# Patient Record
Sex: FEMALE | Race: WHITE | Employment: OTHER | URBAN - METROPOLITAN AREA
[De-identification: names, ages, dates, MRNs, and addresses within clinical notes are randomized per-mention and may not be internally consistent; named-entity substitution may affect disease eponyms.]

---

## 2017-01-11 ENCOUNTER — DOCUMENTATION ONLY (OUTPATIENT)
Dept: OTHER | Facility: CLINIC | Age: 52
End: 2017-01-11

## 2017-01-11 DIAGNOSIS — Z71.89 ACP (ADVANCE CARE PLANNING): Primary | Chronic | ICD-10-CM

## 2017-01-23 ENCOUNTER — OFFICE VISIT (OUTPATIENT)
Dept: SURGERY | Facility: CLINIC | Age: 52
End: 2017-01-23
Attending: SURGERY
Payer: COMMERCIAL

## 2017-01-23 VITALS
OXYGEN SATURATION: 98 % | RESPIRATION RATE: 16 BRPM | SYSTOLIC BLOOD PRESSURE: 122 MMHG | BODY MASS INDEX: 21.63 KG/M2 | TEMPERATURE: 97.9 F | DIASTOLIC BLOOD PRESSURE: 74 MMHG | HEART RATE: 63 BPM | WEIGHT: 142.2 LBS

## 2017-01-23 DIAGNOSIS — Q44.6 POLYCYSTIC LIVER DISEASE: Primary | ICD-10-CM

## 2017-01-23 PROCEDURE — 99212 OFFICE O/P EST SF 10 MIN: CPT | Mod: ZF

## 2017-01-23 ASSESSMENT — PAIN SCALES - GENERAL: PAINLEVEL: NO PAIN (0)

## 2017-01-23 NOTE — PROGRESS NOTES
"Jade Carcamo is a 51 year old female who presents for:  Chief Complaint   Patient presents with     Oncology Clinic Visit     Return for Post op , Liver disease        Initial Vitals:  /74 mmHg  Pulse 63  Temp(Src) 97.9  F (36.6  C) (Oral)  Resp 16  Wt 64.5 kg (142 lb 3.2 oz)  SpO2 98% Estimated body mass index is 21.63 kg/(m^2) as calculated from the following:    Height as of 11/18/16: 1.727 m (5' 8\").    Weight as of this encounter: 64.5 kg (142 lb 3.2 oz).. There is no height on file to calculate BSA. BP completed using cuff size: regular  No Pain (0) No LMP recorded. Allergies and medications reviewed.     Medications: Medication refills not needed today.  Pharmacy name entered into [employer]:    [facility] [id number], [place] - [address] [facility] [id number]  [place], [place] - [address] [place] [address] [facility]    Comments:     6  minutes for nursing intake (face to face time)   Desiree Contreras MA          "

## 2017-01-23 NOTE — Clinical Note
"1/23/2017       RE: Jade Carcamo  [address] [id number]     Dear Colleague,    Thank you for referring your patient, Jade Carcamo, to the [facility]. Please see a copy of my visit note below.    Jade Carcamo is a 51 year old female who presents for:  Chief Complaint   Patient presents with     Oncology Clinic Visit     Return for Post op , Liver disease        Initial Vitals:  /74 mmHg  Pulse 63  Temp(Src) 97.9  F (36.6  C) (Oral)  Resp 16  Wt 64.5 kg (142 lb 3.2 oz)  SpO2 98% Estimated body mass index is 21.63 kg/(m^2) as calculated from the following:    Height as of 11/18/16: 1.727 m (5' 8\").    Weight as of this encounter: 64.5 kg (142 lb 3.2 oz).. There is no height on file to calculate BSA. BP completed using cuff size: regular  No Pain (0) No LMP recorded. Allergies and medications reviewed.     Medications: Medication refills not needed today.  Pharmacy name entered into [employer]:    [facility] [address] [facility], [place] - [address] [facility] [address] [facility], [place] - [address] [place] [address] [facility]    Comments:     6  minutes for nursing intake (face to face time)   Desiree Contreras MA            Looks great   Occasional belly pains but all resolved now    Reviewed recent CT   Discussed I typically do not recommend further imaging for surveillance - just follow up with occasional labs with Dr. Hobbs - and imaging at his discretion  I would be happy to see again if recurrent symptoms arise    Again, thank you for allowing me to participate in the care of your patient.      Sincerely,    Sonido Cruz MD      "

## 2017-01-23 NOTE — PROGRESS NOTES
Looks great   Occasional belly pains but all resolved now    Reviewed recent CT   Discussed I typically do not recommend further imaging for surveillance - just follow up with occasional labs with Dr. Hobbs - and imaging at his discretion  I would be happy to see again if recurrent symptoms arise

## 2017-05-19 ASSESSMENT — ENCOUNTER SYMPTOMS
DECREASED LIBIDO: 0
FEVER: 0
TACHYCARDIA: 1
HYPERTENSION: 0
SMELL DISTURBANCE: 0
LEG PAIN: 0
POLYPHAGIA: 0
CHILLS: 0
SINUS CONGESTION: 0
INCREASED ENERGY: 0
LIGHT-HEADEDNESS: 0
SINUS PAIN: 0
ORTHOPNEA: 0
HALLUCINATIONS: 0
WEIGHT LOSS: 0
PALPITATIONS: 1
LEG SWELLING: 0
FATIGUE: 0
HYPOTENSION: 0
TASTE DISTURBANCE: 0
HOARSE VOICE: 0
SORE THROAT: 0
NIGHT SWEATS: 1
EXERCISE INTOLERANCE: 0
TROUBLE SWALLOWING: 0
WEIGHT GAIN: 0
SLEEP DISTURBANCES DUE TO BREATHING: 0
NECK MASS: 0
CLAUDICATION: 0
HOT FLASHES: 1
DECREASED APPETITE: 0
POLYDIPSIA: 0
ALTERED TEMPERATURE REGULATION: 1
SYNCOPE: 0

## 2017-05-19 ASSESSMENT — ACTIVITIES OF DAILY LIVING (ADL)
ARE_THERE_FIREARMS_IN_YOUR_HOME?: N
DO_MEMBERS_OF_YOUR_HOUSEHOLD_USE_SAFETY_HELMETS?: Y
DO_MEMBERS_OF_YOUR_HOUSEHOLD_WEAR_SEAT_BELTS?: Y
DO_MEMBERS_OF_YOUR_HOUSEHOLD_USE_SUNSCREEN?: Y
ARE_THERE_CARBON_MONOXIDE_DETECTORS_IN_YOUR_HOME?: Y
ARE_THERE_SMOKE_DETECTORS_IN_YOUR_HOME?: Y

## 2017-05-22 ENCOUNTER — OFFICE VISIT (OUTPATIENT)
Dept: INTERNAL MEDICINE | Facility: CLINIC | Age: 52
End: 2017-05-22

## 2017-05-22 VITALS
TEMPERATURE: 97.9 F | BODY MASS INDEX: 21.96 KG/M2 | HEIGHT: 68 IN | HEART RATE: 74 BPM | SYSTOLIC BLOOD PRESSURE: 99 MMHG | RESPIRATION RATE: 18 BRPM | DIASTOLIC BLOOD PRESSURE: 66 MMHG | OXYGEN SATURATION: 97 % | WEIGHT: 144.9 LBS

## 2017-05-22 DIAGNOSIS — N95.1 MENOPAUSAL SYNDROME (HOT FLASHES): ICD-10-CM

## 2017-05-22 DIAGNOSIS — Z13.220 SCREENING FOR HYPERLIPIDEMIA: ICD-10-CM

## 2017-05-22 DIAGNOSIS — Z11.59 NEED FOR HEPATITIS C SCREENING TEST: ICD-10-CM

## 2017-05-22 DIAGNOSIS — Z12.31 VISIT FOR SCREENING MAMMOGRAM: ICD-10-CM

## 2017-05-22 DIAGNOSIS — G47.00 PERSISTENT DISORDER OF INITIATING OR MAINTAINING SLEEP: Primary | ICD-10-CM

## 2017-05-22 LAB
CHOLEST SERPL-MCNC: 195 MG/DL
HCV AB SERPL QL IA: NORMAL
HDLC SERPL-MCNC: 77 MG/DL
LDLC SERPL CALC-MCNC: 106 MG/DL
NONHDLC SERPL-MCNC: 118 MG/DL
TRIGL SERPL-MCNC: 63 MG/DL
TSH SERPL DL<=0.005 MIU/L-ACNC: 3.31 MU/L (ref 0.4–4)

## 2017-05-22 RX ORDER — VENLAFAXINE HYDROCHLORIDE 37.5 MG/1
37.5 CAPSULE, EXTENDED RELEASE ORAL EVERY MORNING
Qty: 60 CAPSULE | Refills: 1 | Status: ON HOLD | OUTPATIENT
Start: 2017-05-22 | End: 2018-07-13

## 2017-05-22 RX ORDER — ZOLPIDEM TARTRATE 5 MG/1
5 TABLET ORAL
Qty: 15 TABLET | Refills: 0 | Status: ON HOLD | OUTPATIENT
Start: 2017-05-22 | End: 2018-07-13

## 2017-05-22 RX ORDER — DIPHENOXYLATE HYDROCHLORIDE AND ATROPINE SULFATE 2.5; .025 MG/1; MG/1
TABLET ORAL
Status: ON HOLD | COMMUNITY
Start: 2006-01-18 | End: 2018-07-13

## 2017-05-22 ASSESSMENT — PAIN SCALES - GENERAL: PAINLEVEL: NO PAIN (0)

## 2017-05-22 NOTE — PROGRESS NOTES
Ms. Carcamo is a 51 year old female here to establish care, discuss hot flashes.    History of Present Illness:  Jade is a generally healthy 52 yo F with history of polycystic liver disease.  Had surgery with Dr. Cruz 11/16 for polycystic liver disease.  She developed pain, pressure, felt pregnant, nauseated.   Interestingly, she stopped having periods 11/16 and has been noted menopausal hot flashes, stopped abruptly.  Now she is having hot flashes, daily since January, until about a week ago.  No spotting, energy is okay, no mood issues, sleep is disrupted with hotflashes.  She wonders about medications.    Not opposed to HRT but would not want anything which would make liver cysts worse.  She has no history of DVT, no hx of CVD, breast cancer. No hysterectomy.    Her health maintenance is up to date through [facility].    A full 10-pt Review of Systems was performed, verified and is negative except as documented in the HPI.  All health questionnaires were reviewed, verified and relevant information documented above.    Past Medical History:  Past Medical History:   Diagnosis Date     GERD (gastroesophageal reflux disease)      Polycystic liver disease     in 40 years       Past Surgical History:  Past Surgical History:   Procedure Laterality Date     LAPAROSCOPIC UNROOFING LIVER CYST N/A 11/4/2016    Procedure: LAPAROSCOPIC UNROOFING LIVER CYST;  Surgeon: Sonido Cruz MD;  Location: [facility] OR       Active Meds:  Current Outpatient Prescriptions   Medication     Multiple Vitamin (MULTI-VITAMINS) TABS     zolpidem (AMBIEN) 5 MG tablet     venlafaxine (EFFEXOR-XR) 37.5 MG 24 hr capsule     No current facility-administered medications for this visit.         Allergies:  Bee venom and Penicillins    Family History:  family history includes Alzheimer Disease in her mother; Thyroid Disease in her sister.    Social History:  Social History   Substance Use Topics     Smoking status: Never Smoker     Smokeless tobacco: Not on  "file     Alcohol use Yes      Comment: 2 per week       Physical Exam:  Vitals: BP 99/66  Pulse 74  Temp 97.9  F (36.6  C) (Oral)  Resp 18  Ht 1.73 m (5' 8.11\")  Wt 65.7 kg (144 lb 14.4 oz)  SpO2 97%  Breastfeeding? No  BMI 21.96 kg/m2  Constitutional: Alert, oriented, pleasant, no acute distress  Head: Normocephalic, atraumatic  Eyes: Extra-ocular movements intact, pupils equally round and reactive bilaterally, no scleral icterus  ENT: Oropharynx clear, moist mucus membranes, good dentition  Neck: Supple, no lymphadenopathy, no thyromegaly  Cardiovascular: Regular rate and rhythm, no murmurs, rubs or gallops, peripheral pulses full/symmetric  Respiratory: Good air movement bilaterally, lungs clear, no wheezes/rales/rhonchi  GI: Abdomen soft, bowel sounds present, nondistended, nontender, liver palpable, no rebound/guarding  Musculoskeletal: No edema, normal muscle tone, normal gait  Neurologic: Alert and oriented, cranial nerves 2-12 intact, non-focal  Skin: No rashes/lesions  Psychiatric: normal mentation, affect and mood      Assessment and Plan:    Jade was seen today for establish care and menopausal sx. Extensive time spent today reviewing patients history, questions about menopause and treatment options, as well as overall care.    Diagnoses and all orders for this visit:    Persistent disorder of initiating or maintaining sleep  -     zolpidem (AMBIEN) 5 MG tablet; Take 1 tablet (5 mg) by mouth nightly as needed for sleep    Need for hepatitis C screening test  -     Hepatitis C Screen Reflex to HCV RNA Quant and Genotype; Future    Visit for screening mammogram  -     MA SCREENING DIGITAL BILAT - Future  (s+30); Future    Menopausal syndrome (hot flashes): Will trial non hormonal options first. Need to consult with hepatology about safety of estrogen prior to use.  -     TSH with free T4 reflex; Future  -     venlafaxine (EFFEXOR-XR) 37.5 MG 24 hr capsule; Take 1 capsule (37.5 mg) by mouth every " morning Take 1 capsule daily for 14 days, then take 2 capsules daily if tolerating well.    Screening for hyperlipidemia  -     Lipid panel reflex to direct LDL - -(Today); Future      #Routine Health Maintenence:  Immunizations (zoster, pneumovax, flu, Tdap, Hep A/B):   Most Recent Immunizations   Administered Date(s) Administered     Hepatitis A (Adult) 10/28/2008     Hepatitis B 05/29/2009     TDAP Vaccine (Boostrix) 09/26/2008     Zoster vaccine, live 06/24/2016     Lipids:   Recent Labs   Lab Test  05/22/17   1022   CHOL  195   HDL  77   LDL  106*   TRIG  63     Lung Ca Screening (>30 pk age 55-79 or >20 py age 50-79 + RF): n/a  Colonoscopy (50-75 yrs): 3/15, hemorrhoids, repeat 10 years  Dexa (>65W or 70M yrs): deferred  Mammogram (40-75 yrs): 6/16 Pathologic findings:  RIGHT BREAST, 3 O'CLOCK, 2 CM FROM NIPPLE, ULTRASOUND-GUIDED CORE BIOPSY:   1. Non-proliferative fibrocystic change with cystic apocrine metaplasia   2. Calcifications: Present   3. Negative for atypia and malignancy  Pap (21-65 yrs): 6/16 NIL  Pelvic/Breast: 6/16  GC/Chlam (<25 yrs): n/a  HIV/HCV if risk factors: n/a  Safety/Lifestyle: not high risk  Tob/EtOH: n/a  Depression: screen neg  Advanced Directive: deferred    Return to clinic:  Prn to f/u medication above    Marina Owens MD  Internal Medicine      30 min spent face to face, of which >50% time spent on counseling/coordinating care exclusive of any procedure time     ADDENDUM: Preliminary lit review suggests estrogen may be associated with increase liver mass or growth of cysts, thus would likely not be optimal intervention for hot flashes.  Will also see if GI has additional insight.  Marina Owens MD  Internal Medicine

## 2017-05-22 NOTE — MR AVS SNAPSHOT
After Visit Summary   5/22/2017    Jade Carcamo    MRN: [id number]           Patient Information     Date Of Birth          1965        Visit Information        Provider Department      5/22/2017 8:30 AM Marina Owens MD [facility]        Today's Diagnoses     Persistent disorder of initiating or maintaining sleep    -  1    Need for hepatitis C screening test        Visit for screening mammogram        Menopausal syndrome (hot flashes)        Screening for hyperlipidemia          Care Instructions    Primary Care Center Medication Refill Request Information:  * Please contact your pharmacy regarding ANY request for medication refills.  ** [facility] Prescription Fax = [phone]  * Please allow 3 business days for routine medication refills.  * Please allow 5 business days for controlled substance medication refills.     Primary Care Center Test Result notification information:  *You will be notified with in 7-10 days of your appointment day regarding the results of your test.  If you are on MyChart you will be notified as soon as the provider has reviewed the results and signed off on them.    Primary Care Center [phone]     Melatonin 3-4 mg with dinner.        Follow-ups after your visit        Your next 10 appointments already scheduled     May 22, 2017  9:45 AM CDT   LAB with  LAB   [facility] ([facility])    [address] [place] [id number] [phone]           Patient must bring picture ID.  Patient should be prepared to give a urine specimen  Please do not eat 10-12 hours before your appointment if you are coming in fasting for labs on lipids, cholesterol, or glucose (sugar).  Pregnant women should follow their Care Team instructions. Water with medications is okay. Do not drink coffee or other fluids.   If you have concerns about taking  your medications, please ask at office or if scheduling via  [email or web address], send a message by clicking on Secure Messaging, Message Your Care Team.              Future tests that were ordered for you today     Open Future Orders        Priority Expected Expires Ordered    Hepatitis C Screen Reflex to HCV RNA Quant and Genotype Routine 5/22/2017 5/22/2018 5/22/2017    MA SCREENING DIGITAL BILAT - Future  (s+30) Routine  5/22/2018 5/22/2017    Lipid panel reflex to direct LDL - -(Today) Routine 5/22/2017 5/22/2018 5/22/2017    TSH with free T4 reflex Routine 5/22/2017 6/5/2017 5/22/2017            Who to contact     Please call your clinic at [phone] to:    Ask questions about your health    Make or cancel appointments    Discuss your medicines    Learn about your test results    Speak to your doctor   If you have compliments or concerns about an experience at your clinic, or if you wish to file a complaint, please contact [facility] Patient Relations at [phone] or email us at [email or web address]         Additional Information About Your Visit        [email or web address] Information     [email or web address] gives you secure access to your electronic health record. If you see a primary care provider, you can also send messages to your care team and make appointments. If you have questions, please call your primary care clinic.  If you do not have a primary care provider, please call [phone] and they will assist you.      [email or web address] is an electronic gateway that provides easy, online access to your medical records. With [email or web address], you can request a clinic appointment, read your test results, renew a prescription or communicate with your care team.     To access your existing account, please contact your [facility] or call [phone] for assistance.        Care EveryWhere ID     This is your Care EveryWhere ID. This could be used by other organizations to access your [facility] medical records  [id number]        Your Vitals Were   "   Pulse Temperature Respirations Height Pulse Oximetry Breastfeeding?    74 97.9  F (36.6  C) (Oral) 18 1.73 m (5' 8.11\") 97% No    BMI (Body Mass Index)                   21.96 kg/m2            Blood Pressure from Last 3 Encounters:   05/22/17 99/66   01/23/17 122/74   11/18/16 105/73    Weight from Last 3 Encounters:   05/22/17 65.7 kg (144 lb 14.4 oz)   01/23/17 64.5 kg (142 lb 3.2 oz)   11/18/16 62 kg (136 lb 11.2 oz)                 Today's Medication Changes          These changes are accurate as of: 5/22/17  9:43 AM.  If you have any questions, ask your nurse or doctor.               Start taking these medicines.        Dose/Directions    zolpidem 5 MG tablet   Commonly known as:  AMBIEN   Used for:  Persistent disorder of initiating or maintaining sleep   Started by:  Marina Owens MD        Dose:  5 mg   Take 1 tablet (5 mg) by mouth nightly as needed for sleep   Quantity:  15 tablet   Refills:  0            Where to get your medicines      Some of these will need a paper prescription and others can be bought over the counter.  Ask your nurse if you have questions.     Bring a paper prescription for each of these medications     zolpidem 5 MG tablet                Primary Care Provider Office Phone # Fax #    Vilma Chu [phone]       [facility] [address] [place] [id number]        Thank you!     Thank you for choosing [facility]  for your care. Our goal is always to provide you with excellent care. Hearing back from our patients is one way we can continue to improve our services. Please take a few minutes to complete the written survey that you may receive in the mail after your visit with us. Thank you!             Your Updated Medication List - Protect others around you: Learn how to safely use, store and throw away your medicines at www.disposemymeds.org.          This list is accurate as of: 5/22/17  9:43 AM.  Always use your most " recent med list.                   Brand Name Dispense Instructions for use    MULTI-VITAMINS Tabs          zolpidem 5 MG tablet    AMBIEN    15 tablet    Take 1 tablet (5 mg) by mouth nightly as needed for sleep

## 2017-05-22 NOTE — PATIENT INSTRUCTIONS
[facility] Medication Refill Request Information:  * Please contact your pharmacy regarding ANY request for medication refills.  ** [facility] Prescription Fax = [phone]  * Please allow 3 business days for routine medication refills.  * Please allow 5 business days for controlled substance medication refills.     [facility] Test Result notification information:  *You will be notified with in 7-10 days of your appointment day regarding the results of your test.  If you are on MyChart you will be notified as soon as the provider has reviewed the results and signed off on them.    [facility] [phone]     Melatonin 3-4 mg with dinner.

## 2017-05-22 NOTE — NURSING NOTE
Chief Complaint   Patient presents with     Establish Care     Patient is here to establish a new PCP.      Menopausal Sx     Patient is here to discuss hot flashes.      Myrna Keen LPN at 8:32 AM on 5/22/2017.

## 2017-06-08 ENCOUNTER — MYC MEDICAL ADVICE (OUTPATIENT)
Dept: INTERNAL MEDICINE | Facility: CLINIC | Age: 52
End: 2017-06-08

## 2017-06-08 DIAGNOSIS — F32.89 OTHER DEPRESSION: Primary | ICD-10-CM

## 2017-06-09 RX ORDER — ESCITALOPRAM OXALATE 10 MG/1
10 TABLET ORAL DAILY
Qty: 60 TABLET | Refills: 1 | Status: SHIPPED | OUTPATIENT
Start: 2017-06-09 | End: 2017-09-21

## 2017-06-17 ENCOUNTER — HEALTH MAINTENANCE LETTER (OUTPATIENT)
Age: 52
End: 2017-06-17

## 2017-09-14 ENCOUNTER — TELEPHONE (OUTPATIENT)
Dept: INTERNAL MEDICINE | Facility: CLINIC | Age: 52
End: 2017-09-14

## 2017-09-18 NOTE — TELEPHONE ENCOUNTER
PA Initiation    Medication: escitalopram (LEXAPRO) 10 MG tablet -Initiated-  Insurance Company: [employer] - Phone [phone] Fax [phone]  Pharmacy Filling the Rx: [facility] [address] [place] [address] & [address]  Filling Pharmacy Phone: [phone]  Filling Pharmacy Fax:    Start Date: 9/18/2017    Spoke to [employer] to make sure the Escitalopram needed a prior authorization through the patient's insurance. The pharmacy said that the insurance would not cover two tablets a day. I did submit the prior authorization for two tablets a day, but the pharmacy suggested changing to the Escitalporam 20mg tablets, one tablet per day.

## 2017-09-20 NOTE — TELEPHONE ENCOUNTER
Prior Authorization Approval    Authorization Effective Date: 8/19/2017  Authorization Expiration Date: 9/17/2020  Medication: escitalopram (LEXAPRO) 10 MG tablet -APPROVED-  Approved Dose/Quantity:   Reference #:     Insurance Company: [employer] - Phone [phone] Fax [phone]  Expected CoPay: $5.12     CoPay Card Available:      Foundation Assistance Needed:    Which Pharmacy is filling the prescription (Not needed for infusion/clinic administered): [facility] [address] [address] & [facility]  Pharmacy Notified: Yes  Patient Notified: Yes

## 2017-09-21 DIAGNOSIS — F32.89 OTHER DEPRESSION: ICD-10-CM

## 2017-09-21 RX ORDER — ESCITALOPRAM OXALATE 20 MG/1
20 TABLET ORAL DAILY
Qty: 90 TABLET | Refills: 2 | Status: SHIPPED | OUTPATIENT
Start: 2017-09-21 | End: 2017-10-11

## 2017-09-21 NOTE — TELEPHONE ENCOUNTER
ecitalopram      Last Written Prescription Date:  6-9-17  Last Fill Quantity: 60,   # refills: 1  Last Office Visit : 5-22-17  Future Office visit:  none    Kathleen M Doege RN

## 2017-10-08 DIAGNOSIS — F32.89 OTHER DEPRESSION: ICD-10-CM

## 2017-10-10 RX ORDER — ESCITALOPRAM OXALATE 10 MG/1
TABLET ORAL
Qty: 60 TABLET | Refills: 0 | OUTPATIENT
Start: 2017-10-10

## 2017-10-11 ENCOUNTER — TELEPHONE (OUTPATIENT)
Dept: INTERNAL MEDICINE | Facility: CLINIC | Age: 52
End: 2017-10-11

## 2017-10-11 RX ORDER — ESCITALOPRAM OXALATE 20 MG/1
20 TABLET ORAL DAILY
Qty: 90 TABLET | Refills: 3 | Status: ON HOLD | COMMUNITY
Start: 2017-09-21 | End: 2018-07-13

## 2017-10-11 NOTE — TELEPHONE ENCOUNTER
Pharmacy requesting clarification of medication.  They cannot fill 2 tablets of ecitalopram without a prior authorization.  Contacted patient and she is tolerating 20mg daily and would prefer to stay there.  Pharmacy updated.  Prachi Fishman LPN

## 2018-06-23 ENCOUNTER — HOSPITAL ENCOUNTER (EMERGENCY)
Facility: CLINIC | Age: 53
Discharge: HOME | End: 2018-06-23
Attending: EMERGENCY MEDICINE | Admitting: EMERGENCY MEDICINE
Payer: COMMERCIAL

## 2018-06-23 ENCOUNTER — APPOINTMENT (OUTPATIENT)
Dept: ULTRASOUND IMAGING | Facility: CLINIC | Age: 53
End: 2018-06-23
Attending: EMERGENCY MEDICINE
Payer: COMMERCIAL

## 2018-06-23 ENCOUNTER — APPOINTMENT (OUTPATIENT)
Dept: GENERAL RADIOLOGY | Facility: CLINIC | Age: 53
End: 2018-06-23
Attending: EMERGENCY MEDICINE
Payer: COMMERCIAL

## 2018-06-23 VITALS
BODY MASS INDEX: 22.58 KG/M2 | OXYGEN SATURATION: 93 % | HEIGHT: 68 IN | TEMPERATURE: 99.8 F | RESPIRATION RATE: 16 BRPM | SYSTOLIC BLOOD PRESSURE: 100 MMHG | DIASTOLIC BLOOD PRESSURE: 65 MMHG | WEIGHT: 149 LBS

## 2018-06-23 DIAGNOSIS — R50.9 FEVER, UNSPECIFIED FEVER CAUSE: ICD-10-CM

## 2018-06-23 DIAGNOSIS — B34.9 VIRAL SYNDROME: ICD-10-CM

## 2018-06-23 LAB
ALBUMIN SERPL-MCNC: 3.8 G/DL (ref 3.4–5)
ALBUMIN UR-MCNC: 10 MG/DL
ALP SERPL-CCNC: 71 U/L (ref 40–150)
ALT SERPL W P-5'-P-CCNC: 17 U/L (ref 0–50)
ANION GAP SERPL CALCULATED.3IONS-SCNC: 8 MMOL/L (ref 3–14)
APPEARANCE UR: CLEAR
AST SERPL W P-5'-P-CCNC: 25 U/L (ref 0–45)
BACTERIA #/AREA URNS HPF: ABNORMAL /HPF
BASOPHILS # BLD AUTO: 0 10E9/L (ref 0–0.2)
BASOPHILS NFR BLD AUTO: 0.1 %
BILIRUB SERPL-MCNC: 2.8 MG/DL (ref 0.2–1.3)
BILIRUB UR QL STRIP: NEGATIVE
BUN SERPL-MCNC: 11 MG/DL (ref 7–30)
CALCIUM SERPL-MCNC: 9 MG/DL (ref 8.5–10.1)
CHLORIDE SERPL-SCNC: 104 MMOL/L (ref 94–109)
CO2 SERPL-SCNC: 28 MMOL/L (ref 20–32)
COLOR UR AUTO: YELLOW
CREAT SERPL-MCNC: 0.83 MG/DL (ref 0.52–1.04)
CRP SERPL-MCNC: 81 MG/L (ref 0–8)
DIFFERENTIAL METHOD BLD: ABNORMAL
EOSINOPHIL # BLD AUTO: 0 10E9/L (ref 0–0.7)
EOSINOPHIL NFR BLD AUTO: 0.1 %
ERYTHROCYTE [DISTWIDTH] IN BLOOD BY AUTOMATED COUNT: 12.3 % (ref 10–15)
GFR SERPL CREATININE-BSD FRML MDRD: 72 ML/MIN/1.7M2
GLUCOSE SERPL-MCNC: 118 MG/DL (ref 70–99)
GLUCOSE UR STRIP-MCNC: NEGATIVE MG/DL
HCT VFR BLD AUTO: 38.8 % (ref 35–47)
HGB BLD-MCNC: 12.8 G/DL (ref 11.7–15.7)
HGB UR QL STRIP: ABNORMAL
IMM GRANULOCYTES # BLD: 0 10E9/L (ref 0–0.4)
IMM GRANULOCYTES NFR BLD: 0.1 %
KETONES UR STRIP-MCNC: NEGATIVE MG/DL
LEUKOCYTE ESTERASE UR QL STRIP: ABNORMAL
LIPASE SERPL-CCNC: 135 U/L (ref 73–393)
LYMPHOCYTES # BLD AUTO: 0.3 10E9/L (ref 0.8–5.3)
LYMPHOCYTES NFR BLD AUTO: 4.2 %
MCH RBC QN AUTO: 30.5 PG (ref 26.5–33)
MCHC RBC AUTO-ENTMCNC: 33 G/DL (ref 31.5–36.5)
MCV RBC AUTO: 92 FL (ref 78–100)
MONOCYTES # BLD AUTO: 0.3 10E9/L (ref 0–1.3)
MONOCYTES NFR BLD AUTO: 3.8 %
MUCOUS THREADS #/AREA URNS LPF: PRESENT /LPF
NEUTROPHILS # BLD AUTO: 6.6 10E9/L (ref 1.6–8.3)
NEUTROPHILS NFR BLD AUTO: 91.7 %
NITRATE UR QL: NEGATIVE
NRBC # BLD AUTO: 0 10*3/UL
NRBC BLD AUTO-RTO: 0 /100
PH UR STRIP: 7 PH (ref 5–7)
PLATELET # BLD AUTO: 137 10E9/L (ref 150–450)
POTASSIUM SERPL-SCNC: 3.8 MMOL/L (ref 3.4–5.3)
PROT SERPL-MCNC: 7.1 G/DL (ref 6.8–8.8)
RBC # BLD AUTO: 4.2 10E12/L (ref 3.8–5.2)
RBC #/AREA URNS AUTO: 3 /HPF (ref 0–2)
SODIUM SERPL-SCNC: 140 MMOL/L (ref 133–144)
SOURCE: ABNORMAL
SP GR UR STRIP: 1.01 (ref 1–1.03)
SQUAMOUS #/AREA URNS AUTO: <1 /HPF (ref 0–1)
UROBILINOGEN UR STRIP-MCNC: NORMAL MG/DL (ref 0–2)
WBC # BLD AUTO: 7.2 10E9/L (ref 4–11)
WBC #/AREA URNS AUTO: 1 /HPF (ref 0–5)

## 2018-06-23 PROCEDURE — 80053 COMPREHEN METABOLIC PANEL: CPT | Performed by: EMERGENCY MEDICINE

## 2018-06-23 PROCEDURE — 99285 EMERGENCY DEPT VISIT HI MDM: CPT | Mod: 25

## 2018-06-23 PROCEDURE — 76705 ECHO EXAM OF ABDOMEN: CPT

## 2018-06-23 PROCEDURE — 83690 ASSAY OF LIPASE: CPT | Performed by: EMERGENCY MEDICINE

## 2018-06-23 PROCEDURE — 71046 X-RAY EXAM CHEST 2 VIEWS: CPT

## 2018-06-23 PROCEDURE — 96361 HYDRATE IV INFUSION ADD-ON: CPT

## 2018-06-23 PROCEDURE — 25000128 H RX IP 250 OP 636: Performed by: EMERGENCY MEDICINE

## 2018-06-23 PROCEDURE — 25000132 ZZH RX MED GY IP 250 OP 250 PS 637: Performed by: EMERGENCY MEDICINE

## 2018-06-23 PROCEDURE — 86618 LYME DISEASE ANTIBODY: CPT | Performed by: EMERGENCY MEDICINE

## 2018-06-23 PROCEDURE — 85025 COMPLETE CBC W/AUTO DIFF WBC: CPT | Performed by: EMERGENCY MEDICINE

## 2018-06-23 PROCEDURE — 96374 THER/PROPH/DIAG INJ IV PUSH: CPT

## 2018-06-23 PROCEDURE — 87086 URINE CULTURE/COLONY COUNT: CPT | Performed by: EMERGENCY MEDICINE

## 2018-06-23 PROCEDURE — 81001 URINALYSIS AUTO W/SCOPE: CPT | Performed by: EMERGENCY MEDICINE

## 2018-06-23 PROCEDURE — 86140 C-REACTIVE PROTEIN: CPT | Performed by: EMERGENCY MEDICINE

## 2018-06-23 RX ORDER — SODIUM CHLORIDE 9 MG/ML
1000 INJECTION, SOLUTION INTRAVENOUS CONTINUOUS
Status: DISCONTINUED | OUTPATIENT
Start: 2018-06-23 | End: 2018-06-23 | Stop reason: HOSPADM

## 2018-06-23 RX ORDER — IBUPROFEN 600 MG/1
600 TABLET, FILM COATED ORAL EVERY 8 HOURS PRN
Qty: 30 TABLET | Refills: 0 | Status: ON HOLD | OUTPATIENT
Start: 2018-06-23 | End: 2018-07-13

## 2018-06-23 RX ORDER — ACETAMINOPHEN 500 MG
1000 TABLET ORAL ONCE
Status: COMPLETED | OUTPATIENT
Start: 2018-06-23 | End: 2018-06-23

## 2018-06-23 RX ORDER — ONDANSETRON 2 MG/ML
4 INJECTION INTRAMUSCULAR; INTRAVENOUS EVERY 30 MIN PRN
Status: DISCONTINUED | OUTPATIENT
Start: 2018-06-23 | End: 2018-06-23 | Stop reason: HOSPADM

## 2018-06-23 RX ADMIN — SODIUM CHLORIDE 1000 ML: 9 INJECTION, SOLUTION INTRAVENOUS at 14:41

## 2018-06-23 RX ADMIN — ONDANSETRON 4 MG: 2 INJECTION INTRAMUSCULAR; INTRAVENOUS at 14:42

## 2018-06-23 RX ADMIN — ACETAMINOPHEN 1000 MG: 500 TABLET, FILM COATED ORAL at 14:42

## 2018-06-23 RX ADMIN — SODIUM CHLORIDE 1000 ML: 9 INJECTION, SOLUTION INTRAVENOUS at 17:09

## 2018-06-23 ASSESSMENT — ENCOUNTER SYMPTOMS
RHINORRHEA: 0
CHILLS: 1
VOMITING: 1
DIARRHEA: 0
SHORTNESS OF BREATH: 0
HEADACHES: 0
MYALGIAS: 1
SORE THROAT: 0
DYSURIA: 0
HEMATURIA: 0
NECK PAIN: 1
FEVER: 1

## 2018-06-23 NOTE — ED AVS SNAPSHOT
Emergency Department    [address] [facility] [address]    Phone:  [phone]    Fax:  [phone]                                       Jade Carcamo   MRN: [id number]    Department:   Emergency Department   Date of Visit:  6/23/2018           Patient Information     Date Of Birth          1965        Your diagnoses for this visit were:     Fever, unspecified fever cause     Viral syndrome        You were seen by Eliazar Davidson MD.      Follow-up Information     Follow up with Marina Owens MD. Schedule an appointment as soon as possible for a visit in 3 days.    Specialty:  Internal Medicine    Why:  For close follow up    Contact information:    [address] [facility] [address] [phone]          Discharge Instructions       Discharge Instructions  Fever    You have been seen today for a fever. Fever is a normal body reaction to illness or inflammation. Fever is a sign that your body is doing what it should to fight something off. Fever is not dangerous, but it can make you feel miserable, and you will probably feel better if you get your fever to go down. Most infections are caused by a virus, and antibiotics will not help; your provider will tell you whether antibiotics are needed in your case. At this time your provider does not find that your fever is a sign of anything dangerous or life-threatening.  However, sometimes the signs of serious illness do not show up right away so additional care may be necessary.    Generally, every Emergency Department visit should have a follow-up clinic visit with either a primary or a specialty clinic/provider. Please follow-up as instructed by your emergency provider today.    What can I do to help myself?    Fill any prescriptions the provider gave you and take them right away--especially antibiotics.    If you have a fever, get plenty of rest and drink lots of fluids, especially water.    What clothes or blankets  you have on will not change your fever. Do what is comfortable for you.    Bathing or sponging in lukewarm water may help you feel better.    Tylenol  (acetaminophen), Motrin  (ibuprofen), or Advil  (ibuprofen) help bring fever down and may help you feel more comfortable. Be sure to read and follow the package directions, and ask your provider if you have questions.    Do not drink alcohol.    Return to the Emergency Department if:    Any of the symptoms you have get much worse.    You seem very sick, like being too weak to get up.    You have any new symptoms, especially serious things like abdominal (belly) pain or chest pain.    You are short of breath.    You have a severe headache.    You are vomiting (throwing up) so much you cannot keep fluids or medicines down.    You have confusion or seem unusually drowsy.    You have a seizure.    You have anything else that worries you.  If you were given a prescription for medicine here today, be sure to read all of the information (including the package insert) that comes with your prescription.  This will include important information about the medicine, its side effects, and any warnings that you need to know about.  The pharmacist who fills the prescription can provide more information and answer questions you may have about the medicine.  If you have questions or concerns that the pharmacist cannot address, please call or return to the Emergency Department.   Remember that you can always come back to the Emergency Department if you are not able to see your regular provider in the amount of time listed above, if you get any new symptoms, or if there is anything that worries you.      24 Hour Appointment Hotline       To make an appointment at any [facility], call [phone] ([phone]). If you don't have a family doctor or clinic, we will help you find one. [facility] clinics are conveniently located to serve the needs of you and your family.              Review of your medicines      START taking        Dose / Directions Last dose taken    acetaminophen 500 MG Caps   Dose:  2 capsule   Quantity:  60 capsule        Take 2 capsules by mouth every 8 hours as needed For aches, pain, fever   Refills:  0        ibuprofen 600 MG tablet   Commonly known as:  ADVIL/MOTRIN   Dose:  600 mg   Quantity:  30 tablet        Take 1 tablet (600 mg) by mouth every 8 hours as needed for moderate pain   Refills:  0          Our records show that you are taking the medicines listed below. If these are incorrect, please call your family doctor or clinic.        Dose / Directions Last dose taken    escitalopram 20 MG tablet   Commonly known as:  LEXAPRO   Dose:  20 mg   Quantity:  90 tablet        Take 1 tablet (20 mg) by mouth daily   Refills:  3        MULTI-VITAMINS Tabs        Refills:  0        venlafaxine 37.5 MG 24 hr capsule   Commonly known as:  EFFEXOR-XR   Dose:  37.5 mg   Quantity:  60 capsule        Take 1 capsule (37.5 mg) by mouth every morning Take 1 capsule daily for 14 days, then take 2 capsules daily if tolerating well.   Refills:  1        zolpidem 5 MG tablet   Commonly known as:  AMBIEN   Dose:  5 mg   Quantity:  15 tablet        Take 1 tablet (5 mg) by mouth nightly as needed for sleep   Refills:  0                Prescriptions were sent or printed at these locations (2 Prescriptions)                   Other Prescriptions                Printed at Department/Unit printer (2 of 2)         acetaminophen 500 MG CAPS               ibuprofen (ADVIL/MOTRIN) 600 MG tablet                Procedures and tests performed during your visit     CBC with platelets differential    CRP inflammation    Comprehensive metabolic panel    Lipase    Lyme Disease April with reflex to WB Serum    Peripheral IV catheter    UA reflex to Microscopic and Culture    US Abdomen Limited    Urine Culture Aerobic Bacterial    XR Chest 2 Views      Orders Needing Specimen Collection     None       Pending Results     Date and Time Order Name Status Description    6/23/2018 1550 Urine Culture Aerobic Bacterial In process     6/23/2018 1415 Lyme Disease April with reflex to WB Serum In process             Pending Culture Results     Date and Time Order Name Status Description    6/23/2018 1550 Urine Culture Aerobic Bacterial In process             Pending Results Instructions     If you had any lab results that were not finalized at the time of your Discharge, you can call the ED Lab Result RN at [phone]. You will be contacted by this team for any positive Lab results or changes in treatment. The nurses are available 7 days a week from 10A to 6:30P.  You can leave a message 24 hours per day and they will return your call.        Test Results From Your Hospital Stay        6/23/2018  3:11 PM      Narrative     XR CHEST 2 VW   6/23/2018 2:56 PM     HISTORY: fever;     COMPARISON: Film dated 11/4/2016    FINDINGS: The heart is negative.  The lungs are clear. The pulmonary  vasculature is normal.  The bones and soft tissues are unremarkable.   Surgical clips are seen in the right upper abdomen.        Impression     IMPRESSION: No active infiltrates are identified. No significant  change.        CHAD RODRIGUEZ MD         6/23/2018  2:46 PM      Component Results     Component Value Ref Range & Units Status    WBC 7.2 4.0 - 11.0 10e9/L Final    RBC Count 4.20 3.8 - 5.2 10e12/L Final    Hemoglobin 12.8 11.7 - 15.7 g/dL Final    Hematocrit 38.8 35.0 - 47.0 % Final    MCV 92 78 - 100 fl Final    MCH 30.5 26.5 - 33.0 pg Final    MCHC 33.0 31.5 - 36.5 g/dL Final    RDW 12.3 10.0 - 15.0 % Final    Platelet Count 137 (L) 150 - 450 10e9/L Final    Diff Method Automated Method  Final    % Neutrophils 91.7 % Final    % Lymphocytes 4.2 % Final    % Monocytes 3.8 % Final    % Eosinophils 0.1 % Final    % Basophils 0.1 % Final    % Immature Granulocytes 0.1 % Final    Nucleated RBCs 0 0 /100 Final    Absolute Neutrophil 6.6 1.6  - 8.3 10e9/L Final    Absolute Lymphocytes 0.3 (L) 0.8 - 5.3 10e9/L Final    Absolute Monocytes 0.3 0.0 - 1.3 10e9/L Final    Absolute Eosinophils 0.0 0.0 - 0.7 10e9/L Final    Absolute Basophils 0.0 0.0 - 0.2 10e9/L Final    Abs Immature Granulocytes 0.0 0 - 0.4 10e9/L Final    Absolute Nucleated RBC 0.0  Final         6/23/2018  3:07 PM      Component Results     Component Value Ref Range & Units Status    Sodium 140 133 - 144 mmol/L Final    Potassium 3.8 3.4 - 5.3 mmol/L Final    Chloride 104 94 - 109 mmol/L Final    Carbon Dioxide 28 20 - 32 mmol/L Final    Anion Gap 8 3 - 14 mmol/L Final    Glucose 118 (H) 70 - 99 mg/dL Final    Urea Nitrogen 11 7 - 30 mg/dL Final    Creatinine 0.83 0.52 - 1.04 mg/dL Final    GFR Estimate 72 >60 mL/min/1.7m2 Final    Non  GFR Calc    GFR Estimate If Black 87 >60 mL/min/1.7m2 Final    African American GFR Calc    Calcium 9.0 8.5 - 10.1 mg/dL Final    Bilirubin Total 2.8 (H) 0.2 - 1.3 mg/dL Final    Albumin 3.8 3.4 - 5.0 g/dL Final    Protein Total 7.1 6.8 - 8.8 g/dL Final    Alkaline Phosphatase 71 40 - 150 U/L Final    ALT 17 0 - 50 U/L Final    AST 25 0 - 45 U/L Final         6/23/2018  4:21 PM      Component Results     Component Value Ref Range & Units Status    Color Urine Yellow  Final    Appearance Urine Clear  Final    Glucose Urine Negative NEG^Negative mg/dL Final    Bilirubin Urine Negative NEG^Negative Final    Ketones Urine Negative NEG^Negative mg/dL Final    Specific Gravity Urine 1.015 1.003 - 1.035 Final    Blood Urine Trace (A) NEG^Negative Final    pH Urine 7.0 5.0 - 7.0 pH Final    Protein Albumin Urine 10 (A) NEG^Negative mg/dL Final    Urobilinogen mg/dL Normal 0.0 - 2.0 mg/dL Final    Nitrite Urine Negative NEG^Negative Final    Leukocyte Esterase Urine Moderate (A) NEG^Negative Final    Source Midstream Urine  Final    RBC Urine 3 (H) 0 - 2 /HPF Final    WBC Urine 1 0 - 5 /HPF Final    Bacteria Urine Few (A) NEG^Negative /HPF Final     Squamous Epithelial /HPF Urine <1 0 - 1 /HPF Final    Mucous Urine Present (A) NEG^Negative /LPF Final         6/23/2018  3:05 PM      Component Results     Component Value Ref Range & Units Status    CRP Inflammation 81.0 (H) 0.0 - 8.0 mg/L Final         6/23/2018  3:29 PM      Narrative     US ABDOMEN LIMITED 6/23/2018 3:27 PM    HISTORY: Abdominal pain.    FINDINGS: Numerous cysts scattered throughout the liver. Negative  gallbladder, and common bile duct. Negative right kidney. Pancreas  partially obscured by gas.         Impression     IMPRESSION:  Numerous cysts scattered throughout the liver. Exam  otherwise unremarkable.    ZENIA SKINNER MD         6/23/2018  3:05 PM      Component Results     Component Value Ref Range & Units Status    Lipase 135 73 - 393 U/L Final         6/23/2018  3:50 PM         6/23/2018  4:23 PM                Clinical Quality Measure: Blood Pressure Screening     Your blood pressure was checked while you were in the emergency department today. The last reading we obtained was  BP: 100/65 . Please read the guidelines below about what these numbers mean and what you should do about them.  If your systolic blood pressure (the top number) is less than 120 and your diastolic blood pressure (the bottom number) is less than 80, then your blood pressure is normal. There is nothing more that you need to do about it.  If your systolic blood pressure (the top number) is 120-139 or your diastolic blood pressure (the bottom number) is 80-89, your blood pressure may be higher than it should be. You should have your blood pressure rechecked within a year by a primary care provider.  If your systolic blood pressure (the top number) is 140 or greater or your diastolic blood pressure (the bottom number) is 90 or greater, you may have high blood pressure. High blood pressure is treatable, but if left untreated over time it can put you at risk for heart attack, stroke, or kidney failure. You should  have your blood pressure rechecked by a primary care provider within the next 4 weeks.  If your provider in the emergency department today gave you specific instructions to follow-up with your doctor or provider even sooner than that, you should follow that instruction and not wait for up to 4 weeks for your follow-up visit.        Thank you for choosing [facility]       Thank you for choosing [facility] for your care. Our goal is always to provide you with excellent care. Hearing back from our patients is one way we can continue to improve our services. Please take a few minutes to complete the written survey that you may receive in the mail after you visit with us. Thank you!        [facility] Information     [employer] gives you secure access to your electronic health record. If you see a primary care provider, you can also send messages to your care team and make appointments. If you have questions, please call your primary care clinic.  If you do not have a primary care provider, please call [phone] and they will assist you.        Care EveryWhere ID     This is your Care EveryWhere ID. This could be used by other organizations to access your [facility] medical records  [id number]        Equal Access to Services     HENRRY HALL : Hadbrian Lees, waedy cruz, qalisa tovar, nehemiah rocha . So [facility] [phone].    ATENCIÓN: Si habla español, tiene a francisco disposición servicios gratuitos de asistencia lingüística. Llame al [phone].    We comply with applicable federal civil rights laws and [place] laws. We do not discriminate on the basis of race, color, national origin, age, disability, sex, sexual orientation, or gender identity.            After Visit Summary       This is your record. Keep this with you and show to your community pharmacist(s) and doctor(s) at your next visit.

## 2018-06-23 NOTE — ED AVS SNAPSHOT
Emergency Department    [address] [place] [address]    Phone:  [phone]    Fax:  [phone]                                       Jade Carcamo   MRN: [id number]    Department:   Emergency Department   Date of Visit:  6/23/2018           After Visit Summary Signature Page     I have received my discharge instructions, and my questions have been answered. I have discussed any challenges I see with this plan with the nurse or doctor.    ..........................................................................................................................................  Patient/Patient Representative Signature      ..........................................................................................................................................  Patient Representative Print Name and Relationship to Patient    ..................................................               ................................................  Date                                            Time    ..........................................................................................................................................  Reviewed by Signature/Title    ...................................................              ..............................................  Date                                                            Time

## 2018-06-23 NOTE — ED PROVIDER NOTES
"  History     Chief Complaint:  Fever and Vomiting      HPI   Jade Carcamo is a 52 year old female who presents with fever and vomiting which came on suddenly yesterday. She first noticed widespread myalgias at noon yesterday, followed by a fever before she went to bed and vomiting x 1 this morning.  She notes her fever was at 102 and she couldn't keep it down. She has taken Aleve, Motrin and Tylenol but nothing has helped to alleviate her symptoms. She reports associated chills and muscle soreness. She also reports her breathing \"feels funny\" and that her head does not feel connected to her body.  Her last BM was yesterday. She still has her appendix and gallbladder. Patient travelled to the [facility] two weeks ago and was exposed to ticks but did not see a tick on her personally. Patient states she is concerned about lung cancer. She notes her  felt sick last week with congestion and fatigue but no fever. Patient denies sore throat, abdominal pain, chest pain, shortness of breath, rhinorrhea, otalgia, diarrhea, dysuria, hematuria, headache and rash. Denies neck stiffness.     Allergies:  Bee venom  Penicillins     Medications:    Lexapro  Multivitamin  Effexor  Ambien     Past Medical History:    GERD  Polycystic liver disease    Past Surgical History:    Laparoscopic unroofing liver cyst    Family History:    Alzheimer's disease  Thyroid disease    Social History:  Marital Status:   Presents to the ED with her .  Tobacco Use: Never  Alcohol Use: Twice weekly  PCP: Marina Owens     Review of Systems   Constitutional: Positive for chills and fever.   HENT: Negative for ear pain, rhinorrhea and sore throat.    Respiratory: Negative for shortness of breath.    Gastrointestinal: Positive for vomiting. Negative for diarrhea.   Genitourinary: Negative for dysuria and hematuria.   Musculoskeletal: Positive for myalgias and neck pain.   Skin: Negative for rash.   Neurological: Negative " "for headaches.   All other systems reviewed and are negative.      Physical Exam   First Vitals:  BP: 135/78  Heart Rate: 99  Temp: 102.2  F (39  C)  Resp: 16  Height: 172.7 cm (5' 8\")  Weight: 67.6 kg (149 lb)  SpO2: 100 %      Physical Exam  General: Well appearing, nontoxic. Patient tearful.  Head:  Scalp, face, and head appear normal  Eyes:  Pupils are equal, round, and reactive to light    Conjunctivae non-injected and sclerae white  ENT:    The external nose is normal    Pinnae are normal. Bilateral TMs clear without erythema, bulging, or effusion. Auditory canals normal.     The oropharynx is normal, mucous membranes moist    Posterior pharynx clear without swelling, exudates or erythema     Uvula is in the midline  Neck:  Normal range of motion    There is no rigidity noted    Trachea is in the midline  CV:  Regular rate and rhythm     Normal S1/S2, no S3/S4    No murmur or rub  Resp:  Lungs are equal bilaterally    There is no tachypnea    No increased work of breathing    No wheezing, or rhonchi. Fine rales left lung base.   GI:  Abdomen is soft, no rigidity or guarding    No distension, or mass    Minimal epigastric tenderness. No rebound tenderness   MS:  Normal muscular tone    Symmetric motor strength    No lower extremity edema  Skin:  No rash or acute skin lesions noted  Neuro: Awake and alert    Speech is normal and fluent    Moves all extremities spontaneously  Psych:  Tearful labile affect.  Appropriate interactions.     Emergency Department Course     Imaging:  Radiographic findings were communicated with the patient who voiced understanding of the findings.    US Abdomen Limited   Final Result   IMPRESSION:  Numerous cysts scattered throughout the liver. Exam   otherwise unremarkable.      ZENIA SKINNER MD      XR Chest 2 Views   Final Result   IMPRESSION: No active infiltrates are identified. No significant   change.          CHAD RODRIGUEZ MD        Laboratory:  CBC:  WBC 7.2, HGB 12.8,  " (L), otherwise WNL  CMP: Glucose 118 (H), bilirubin 2.8 (H), otherwise WNL (Creatinine 0.83)  CRP: 81.0 (H)  Lipase: 135  UA: Clear yellow urine, trace blood, protein 10, moderate leukocyte esterase, RBC 3 (H), few bacteria, mucous present, otherwise WNL  Lyme Disease April with reflex to WB Serum: Pending    Interventions:  1441: Normal Saline, 1 liter, IV bolus   1442: Zofran, 4 mg, IV injection   1442: Tylenol, 1000 mg, oral   1709: Normal Saline, 1 liter, IV bolus     Emergency Department Course:  The patient arrived in triage where vitals were measured and recorded.   The patient was then escorted back to the emergency department.   The patient's medical records were reviewed.  Nursing notes and vitals were reviewed.  1413: I performed an exam of the patient as documented above.  The above workup was undertaken.  1738: I rechecked the patient and discussed results.  Findings and plan explained to the Patient. Patient discharged home, status improved, with instructions regarding supportive care, medications, and reasons to return as well as the importance of close follow-up was reviewed. Patient was prescribed Acetaminophen and Advil.     Impression & Plan      Medical Decision Making:  Jade Carcamo is a 52 year old female who presents for evaluation of fever.  This is of unclear source by history and no source is seen on detailed physical exam.  The differential diagnosis of a fever is broad and includes more benign etiologies such as viral syndromes such as URI, influenza, etc.  However, other serious etiologies were considered in this patient including bacterial etiologies (meningitis, otitis, pneumonia, bacteremia, cellulitis, intraabdominal infections/appendicitis, cellulitis, lyme etc), encephalitis, central fevers, leukemias or lymphomas, etc.  Given the otherwise well appearance of the patient, lack of focal findings suggestive of any serious bacterial etiologies. CXR, UA, and CBC are nonconcerning. No  focal clinical evidence of infection. I do not believe further workup is needed here in the Emergency Department. Given  with recent illness, the most likely etiology is viral. Lyme testing ordered as patient had known possible exposure to ticks and the duration since then is about right for lyme incubation period. No erythema migrans. Repeat abdominal exam remains benign. US RUQ neg for cholecystitis.  Recommended continued supportive care with tylenol/ibuprofen, rest, and good PO fluids. Recommended close PCP follow up in 2-3 days. Return precautions were discussed with patient. The patient's questions were answered and the patient was agreeable with discharge.    Diagnosis:    ICD-10-CM    1. Fever, unspecified fever cause R50.9    2. Viral syndrome B34.9        Disposition:  Discharged to home.     Discharge Medications:  New Prescriptions    ACETAMINOPHEN 500 MG CAPS    Take 2 capsules by mouth every 8 hours as needed For aches, pain, fever    IBUPROFEN (ADVIL/MOTRIN) 600 MG TABLET    Take 1 tablet (600 mg) by mouth every 8 hours as needed for moderate pain         I, Brenda Saleh, am serving as a scribe on 6/23/2018 at 2:13 PM to personally document services performed by Dr. Davidson based on my observations and the provider's statements to me.    EMERGENCY DEPARTMENT         Eliazar Davidson MD  06/23/18 1329

## 2018-06-24 LAB
BACTERIA SPEC CULT: NORMAL
Lab: NORMAL
SPECIMEN SOURCE: NORMAL

## 2018-06-25 ENCOUNTER — MYC MEDICAL ADVICE (OUTPATIENT)
Dept: INTERNAL MEDICINE | Facility: CLINIC | Age: 53
End: 2018-06-25

## 2018-06-25 ENCOUNTER — OFFICE VISIT (OUTPATIENT)
Dept: INTERNAL MEDICINE | Facility: CLINIC | Age: 53
End: 2018-06-25
Payer: COMMERCIAL

## 2018-06-25 VITALS
HEART RATE: 84 BPM | DIASTOLIC BLOOD PRESSURE: 68 MMHG | SYSTOLIC BLOOD PRESSURE: 102 MMHG | OXYGEN SATURATION: 96 % | TEMPERATURE: 99.8 F

## 2018-06-25 DIAGNOSIS — R68.83 CHILLS: ICD-10-CM

## 2018-06-25 DIAGNOSIS — R53.81 MALAISE: Primary | ICD-10-CM

## 2018-06-25 DIAGNOSIS — M79.10 MYALGIA: ICD-10-CM

## 2018-06-25 DIAGNOSIS — R53.81 MALAISE: ICD-10-CM

## 2018-06-25 DIAGNOSIS — R11.2 NON-INTRACTABLE VOMITING WITH NAUSEA, UNSPECIFIED VOMITING TYPE: ICD-10-CM

## 2018-06-25 LAB
ALBUMIN SERPL-MCNC: 3.3 G/DL (ref 3.4–5)
ALP SERPL-CCNC: 112 U/L (ref 40–150)
ALT SERPL W P-5'-P-CCNC: 51 U/L (ref 0–50)
ANION GAP SERPL CALCULATED.3IONS-SCNC: 4 MMOL/L (ref 3–14)
AST SERPL W P-5'-P-CCNC: 48 U/L (ref 0–45)
B BURGDOR IGG+IGM SER QL: <0.01 (ref 0–0.89)
BASOPHILS # BLD AUTO: 0 10E9/L (ref 0–0.2)
BASOPHILS NFR BLD AUTO: 0 %
BILIRUB SERPL-MCNC: 1.8 MG/DL (ref 0.2–1.3)
BUN SERPL-MCNC: 8 MG/DL (ref 7–30)
CALCIUM SERPL-MCNC: 9 MG/DL (ref 8.5–10.1)
CHLORIDE SERPL-SCNC: 105 MMOL/L (ref 94–109)
CK SERPL-CCNC: 39 U/L (ref 30–225)
CO2 SERPL-SCNC: 30 MMOL/L (ref 20–32)
CREAT SERPL-MCNC: 0.66 MG/DL (ref 0.52–1.04)
DIFFERENTIAL METHOD BLD: ABNORMAL
EOSINOPHIL # BLD AUTO: 0 10E9/L (ref 0–0.7)
EOSINOPHIL NFR BLD AUTO: 0 %
ERYTHROCYTE [DISTWIDTH] IN BLOOD BY AUTOMATED COUNT: 12 % (ref 10–15)
GFR SERPL CREATININE-BSD FRML MDRD: >90 ML/MIN/1.7M2
GLUCOSE SERPL-MCNC: 112 MG/DL (ref 70–99)
HCT VFR BLD AUTO: 36.8 % (ref 35–47)
HGB BLD-MCNC: 12.1 G/DL (ref 11.7–15.7)
LYMPHOCYTES # BLD AUTO: 0.5 10E9/L (ref 0.8–5.3)
LYMPHOCYTES NFR BLD AUTO: 8.7 %
MCH RBC QN AUTO: 30.9 PG (ref 26.5–33)
MCHC RBC AUTO-ENTMCNC: 32.9 G/DL (ref 31.5–36.5)
MCV RBC AUTO: 94 FL (ref 78–100)
MONOCYTES # BLD AUTO: 0.2 10E9/L (ref 0–1.3)
MONOCYTES NFR BLD AUTO: 3.5 %
NEUTROPHILS # BLD AUTO: 4.8 10E9/L (ref 1.6–8.3)
NEUTROPHILS NFR BLD AUTO: 87.8 %
PLATELET # BLD AUTO: 118 10E9/L (ref 150–450)
PLATELET # BLD EST: ABNORMAL 10*3/UL
POTASSIUM SERPL-SCNC: 3.7 MMOL/L (ref 3.4–5.3)
PROT SERPL-MCNC: 6.9 G/DL (ref 6.8–8.8)
RBC # BLD AUTO: 3.92 10E12/L (ref 3.8–5.2)
RBC MORPH BLD: NORMAL
SODIUM SERPL-SCNC: 139 MMOL/L (ref 133–144)
WBC # BLD AUTO: 5.5 10E9/L (ref 4–11)

## 2018-06-25 RX ORDER — DOXYCYCLINE 100 MG/1
100 CAPSULE ORAL 2 TIMES DAILY
Qty: 15 CAPSULE | Refills: 0 | Status: ON HOLD | OUTPATIENT
Start: 2018-06-25 | End: 2018-07-13

## 2018-06-25 ASSESSMENT — PAIN SCALES - GENERAL: PAINLEVEL: MODERATE PAIN (4)

## 2018-06-25 NOTE — NURSING NOTE
Chief Complaint   Patient presents with     Pain     Has had nausea, vomiting, and fevers 3-4 days.       Jayda Hicks LPN at 4:25 PM on 6/25/2018

## 2018-06-25 NOTE — PATIENT INSTRUCTIONS
Primary Care Center Phone Number [phone]  Primary Care Center Medication Refill Request Information:  * Please contact your pharmacy regarding ANY request for medication refills.  ** [facility] Prescription Fax = [phone]  * Please allow 3 business days for routine medication refills.  * Please allow 5 business days for controlled substance medication refills.     Primary Care Center Test Result notification information:  *You will be notified with in 7-10 days of your appointment day regarding the results of your test.  If you are on MyChart you will be notified as soon as the provider has reviewed the results and signed off on them.

## 2018-06-25 NOTE — PROGRESS NOTES
"[facility]  Primary Care [place]   Marina Owens MD  06/25/2018      Chief Complaint:   Pain       History of Present Illness:   Jade Carcamo is a 52 year old female with a history of GERD and polycystic liver disease who presents for evaluation of generalized pain.    Hospital Visit 06/23/18:  Jade was admitted to the ED due to a sudden onset of fever and vomiting the day prior. Her fever had been at 102 at the time and she couldn't keep this down. She had tried taking Aleve, Motrin and Tylenol but with minimal symptomatic improvement. Jade had travelled to the [facility] ([facility]] 2 weeks prior and was exposed to ticks but found none on her body. CXR, UA, and CBC were found to be nonconcerning.    Jade notes that last Friday her body was very sore. She reports extreme soreness to the point that the \"hairs on her legs were sore\". She had recently started yoga and accredited the soreness to this but knows it not to be the case now. She feels some pressure in her sinuses and on her eyes. The passed few days she has had \"uncontrollable shivering\" and a fever. Jade acknowledges that she did vomit last night. Symptoms drastically worsened in a matter of a day. Her pain is severe enough where it clearly limits her daily activities to a minimum. She intermittently has coughing when inhaling drastically. She used to have some stomach pain and is generally nauseated. The patient states she has had no rash, diarrhea or pain urinating. She was tested for lyme disease but came back negative. Nobody else at home is sick. They did just come back two weeks ago from the [facility] ([facility]] and could have been exposed to ticks. The patient reports that she is still eating and drinking water but in very small amounts.     Review of Systems:   Pertinent items are noted in HPI, remainder of complete ROS is negative.      Active Medications:      acetaminophen 500 MG CAPS, Take 2 capsules by mouth every 8 hours as needed For aches, " pain, fever, Disp: 60 capsule, Rfl: 0     escitalopram (LEXAPRO) 20 MG tablet, Take 1 tablet (20 mg) by mouth daily, Disp: 90 tablet, Rfl: 3     ibuprofen (ADVIL/MOTRIN) 600 MG tablet, Take 1 tablet (600 mg) by mouth every 8 hours as needed for moderate pain, Disp: 30 tablet, Rfl: 0     Multiple Vitamin (MULTI-VITAMINS) TABS, , Disp: , Rfl:      venlafaxine (EFFEXOR-XR) 37.5 MG 24 hr capsule, Take 1 capsule (37.5 mg) by mouth every morning Take 1 capsule daily for 14 days, then take 2 capsules daily if tolerating well., Disp: 60 capsule, Rfl: 1     zolpidem (AMBIEN) 5 MG tablet, Take 1 tablet (5 mg) by mouth nightly as needed for sleep, Disp: 15 tablet, Rfl: 0      Allergies:   Bee venom and Penicillins      Past Medical History:  GERD  Polycystic liver disease  ACP     Past Surgical History:  Laparoscopic unroofing liver cyst    Family History:   Alzheimer- Mother  Thyroid Disease- Sister      Social History:   The patient was accompanied to the appointment by her .  Smoking Status: Never   Smokeless Tobacco: Not on file   Alcohol Use: Yes, 2 per week   Marital Status:      Physical Exam:   /68  Pulse 84  Temp 99.8  F (37.7  C) (Oral)  SpO2 96%  Breastfeeding? No   Constitutional: Alert, oriented, pleasant, ill appearing laying on the exam table  Head: Normocephalic, atraumatic  Eyes: Extra-ocular movements intact, no scleral icterus  ENT: Oropharynx clear, moist mucus membranes, good dentition, no oral petechiae, TM's clear bilaterally  Neck: Supple, no lymphadenopathy, no thyromegaly, no meningismus  Cardiovascular: Regular rate and rhythm, no murmurs, rubs or gallops, peripheral pulses full/symmetric  Respiratory: Good air movement bilaterally, lungs clear, no wheezes/rales/rhonchi  GI: Abdomen soft, bowel sounds present, nondistended, nontender, no organomegaly or masses, no rebound/guarding  Musculoskeletal: No edema, normal muscle tone, normal gait  Neurologic: Alert and oriented,  cranial nerves 2-12 intact.  Skin: No rashes/lesions  Psychiatric: normal mentation, affect and mood     Assessment and Plan:  Malaise, chills, myalgia, and non-intractable vomiting with nausea, unspecified vomiting type  Patient presents with 4 days of acute febrile illness associated with myalgias, vomiting, and mild head pressure. She returned from the [facility] two weeks ago. A recent lab work up in the ER showed a normal abdominal ultrasound, chest xray. However, she had a very elevated CRP, elevated Billirubin and mild thrombocytopenia. She has no concerning symptoms for encephalitis or meningitis at this time. I am concerned about tick born illness with ehrlichia or rickettsial illness being possible or possibly bacteremia. I told her we are going to presumptively treat her for tick born illness with doxycycline. If she does not defervesce in 48-72 hours, I recommended returning to the ER.   - Parasite stain  - Ehrlichia Anaplasma Sp by PCR  - Ehrlichia chaffeenis Abys IgG and IgM  - Anaplasma phagocytoph antibody IgG IgM  - Babesia antibody IgG IgM  - Lyme Disease April with reflex to WB  - Rickettsia rickettsii antibody IgG & IgM  - Blood culture  - CK total  - Comprehensive metabolic panel  - CBC with platelets differential  - doxycycline (VIBRAMYCIN) 100 MG capsule  Dispense: 15 capsule; Refill: 0        Follow-up: PRN     ADDENDUM: Tick smear presumptively positive for ehrlichiosis. Will send confirmatory PCR to [facility].  Patient notified. Started doxy yesterday.  She will let us know if not improving by tomorrow or any worsening symptoms develop. ML        Scribe Disclosure:   I, Salas Cedillo, am serving as a scribe to document services personally performed by Marina Owens MD at this visit, based upon the provider's statements to me. All documentation has been reviewed by the aforementioned provider prior to being entered into the official medical record.     Portions of this medical record  were completed by a scribe. UPON MY REVIEW AND AUTHENTICATION BY ELECTRONIC SIGNATURE, this confirms (a) I performed the applicable clinical services, and (b) the record is accurate.   Marina Owens MD  Internal Medicine    25 min spent face to face, of which >50% time spent on counseling/coordinating care exclusive of any procedure time

## 2018-06-25 NOTE — MR AVS SNAPSHOT
After Visit Summary   6/25/2018    Jade Carcamo    MRN: [id number]           Patient Information     Date Of Birth          1965        Visit Information        Provider Department      6/25/2018 3:55 PM Marina Owens MD [facility]        Care Instructions    Primary Care Center Phone Number [phone]  [facility] Medication Refill Request Information:  * Please contact your pharmacy regarding ANY request for medication refills.  ** [facility] Prescription Fax = [phone]  * Please allow 3 business days for routine medication refills.  * Please allow 5 business days for controlled substance medication refills.     [facility] Test Result notification information:  *You will be notified with in 7-10 days of your appointment day regarding the results of your test.  If you are on MyChart you will be notified as soon as the provider has reviewed the results and signed off on them.                    Follow-ups after your visit        Who to contact     Please call your clinic at [phone] to:    Ask questions about your health    Make or cancel appointments    Discuss your medicines    Learn about your test results    Speak to your doctor            Additional Information About Your Visit        MyChart Information     [employer] gives you secure access to your electronic health record. If you see a primary care provider, you can also send messages to your care team and make appointments. If you have questions, please call your primary care clinic.  If you do not have a primary care provider, please call [phone] and they will assist you.      [employer] is an electronic gateway that provides easy, online access to your medical records. With [employer], you can request a clinic appointment, read your test results, renew a prescription or communicate with your care team.     To access your existing account, please contact your [facility]inic or call [phone] for assistance.        Care EveryWhere ID     This is your Care EveryWhere ID. This could be used by other organizations to access your [place] medical records  [id number]        Your Vitals Were     Pulse Temperature Pulse Oximetry Breastfeeding?          84 99.8  F (37.7  C) (Oral) 96% No         Blood Pressure from Last 3 Encounters:   06/25/18 102/68   06/23/18 100/65   05/22/17 99/66    Weight from Last 3 Encounters:   06/23/18 67.6 kg (149 lb)   05/22/17 65.7 kg (144 lb 14.4 oz)   01/23/17 64.5 kg (142 lb 3.2 oz)              Today, you had the following     No orders found for display       Primary Care Provider Office Phone # Fax #    Marina Owens [phone] [address] [address]        Equal Access to Services     HENRRY HALL : Hadii aad ku hadasho Soomaali, waaxda luqadaha, qaybta kaalmada adeegyada, waxay emmettin caroline rocha . So [facility] [phone].    ATENCIÓN: Si habla español, tiene a francisco disposición servicios gratuitos de asistencia lingüística. Llame al [phone].    We comply with applicable federal civil rights laws and [place] laws. We do not discriminate on the basis of race, color, national origin, age, disability, sex, sexual orientation, or gender identity.            Thank you!     Thank you for choosing [facility]IMARY [facility] CLIN[facility]  for your care. Our goal is always to provide you with excellent care. Hearing back from our patients is one way we can continue to improve our services. Please take a few minutes to complete the written survey that you may receive in the mail after your visit with us. Thank you!             Your Updated Medication List - Protect others around you: Learn how to safely use, store and throw away your medicines at www.disposemymeds.org.          This list is accurate as of 6/25/18  4:55 PM.  Always use your most recent med list.                   Brand Name  Dispense Instructions for use Diagnosis    acetaminophen 500 MG Caps     60 capsule    Take 2 capsules by mouth every 8 hours as needed For aches, pain, fever        escitalopram 20 MG tablet    LEXAPRO    90 tablet    Take 1 tablet (20 mg) by mouth daily        ibuprofen 600 MG tablet    ADVIL/MOTRIN    30 tablet    Take 1 tablet (600 mg) by mouth every 8 hours as needed for moderate pain        MULTI-VITAMINS Tabs           venlafaxine 37.5 MG 24 hr capsule    EFFEXOR-XR    60 capsule    Take 1 capsule (37.5 mg) by mouth every morning Take 1 capsule daily for 14 days, then take 2 capsules daily if tolerating well.    Menopausal syndrome (hot flashes)       zolpidem 5 MG tablet    AMBIEN    15 tablet    Take 1 tablet (5 mg) by mouth nightly as needed for sleep    Persistent disorder of initiating or maintaining sleep

## 2018-06-25 NOTE — TELEPHONE ENCOUNTER
Spoke with patient via telephone call. Appt scheduled for today at 3:55 with Dr Owens. Ama Skaggs CMA at 8:42 AM on 6/25/2018

## 2018-06-26 LAB
A PHAGOCYTOPH DNA BLD QL NAA+PROBE: NORMAL
B BURGDOR IGG+IGM SER QL: <0.01 (ref 0–0.89)
E CHAFFEENSIS DNA BLD QL NAA+PROBE: NORMAL
E EWINGII DNA SPEC QL NAA+PROBE: NORMAL
EHRLICHIA DNA SPEC QL NAA+PROBE: NORMAL
Lab: NORMAL
MISCELLANEOUS TEST: NORMAL
PARASITE SPEC INSPECT: NORMAL
SPECIMEN SOURCE: NORMAL

## 2018-06-26 RX ORDER — DOXYCYCLINE 100 MG/1
100 CAPSULE ORAL 2 TIMES DAILY
Qty: 6 CAPSULE | Refills: 0 | Status: SHIPPED | OUTPATIENT
Start: 2018-06-26 | End: 2018-06-29

## 2018-06-27 ENCOUNTER — TELEPHONE (OUTPATIENT)
Dept: INTERNAL MEDICINE | Facility: CLINIC | Age: 53
End: 2018-06-27

## 2018-06-27 LAB
RESULT: NORMAL
SEND OUTS MISC TEST CODE: NORMAL
SEND OUTS MISC TEST SPECIMEN: NORMAL
TEST NAME: NORMAL

## 2018-06-27 NOTE — TELEPHONE ENCOUNTER
Patient called and c/o fever 102.0F since Saturday (5 days ago) along with nausea, vomiting, weakness and fatigue.  She is taking doxycycline as directed 1 tablet 2x daily since being seen in clinic on Monday. She had been taking compazine, given to her after a past surgery, and still she is nauseated.  Advised her to ER per triage RN.  Blayne Yancey LPN at 9:25 AM on 6/27/2018  Called patient back and she agreed to go to [place] [facility] as it is closest to her home. Blayne Yancey LPN at 9:31 AM on 6/27/2018

## 2018-06-27 NOTE — TELEPHONE ENCOUNTER
Spoke with Eleazar and relayed prior messages/recommendations to go the ED. Requested to speak with Genny as she had talked to the patient before. Ama Skaggs CMA at 2:36 PM on 6/27/2018    Routed to Genny MORA and Urmila ALEXANDER     called and he states that pt is drinking fluids and fever is down to 100.0. Pt resting and sleeping at this time. Pt is urinating.  to monitor pt intake/output and will take to ER if he feels it is necessary.  Urmila Padilla RN 2:44 PM on 6/27/2018.

## 2018-06-27 NOTE — TELEPHONE ENCOUNTER
[facility] Call Center    Phone Message    May a detailed message be left on voicemail: yes    Reason for Call: Symptoms or Concerns     If patient has red-flag symptoms, warm transfer to triage line    Current symptom or concern: Pt's  Eleazar would like a call back asap. Pt saw [facility] on Monday for a bad tick born illness. Pt is still dealing with fever and nausea. They said they are trying to figure out whether or not to go to the hospital. Please call pt's  back asap.    Symptoms have been present for:  UNSPECIFIED day(s)    Has patient previously been seen for this? Yes    By : FABIAN LOGEAIS    Date: 06/25/18    Are there any new or worsening symptoms? No    Action Taken: Message routed to:  [facility] P [facility]

## 2018-06-28 ENCOUNTER — APPOINTMENT (OUTPATIENT)
Dept: GENERAL RADIOLOGY | Facility: CLINIC | Age: 53
End: 2018-06-28
Attending: EMERGENCY MEDICINE
Payer: COMMERCIAL

## 2018-06-28 ENCOUNTER — TELEPHONE (OUTPATIENT)
Dept: INTERNAL MEDICINE | Facility: CLINIC | Age: 53
End: 2018-06-28

## 2018-06-28 ENCOUNTER — HOSPITAL ENCOUNTER (EMERGENCY)
Facility: CLINIC | Age: 53
Discharge: HOME | End: 2018-06-28
Attending: EMERGENCY MEDICINE | Admitting: EMERGENCY MEDICINE
Payer: COMMERCIAL

## 2018-06-28 VITALS
RESPIRATION RATE: 16 BRPM | DIASTOLIC BLOOD PRESSURE: 72 MMHG | HEIGHT: 68 IN | TEMPERATURE: 100.9 F | HEART RATE: 101 BPM | SYSTOLIC BLOOD PRESSURE: 114 MMHG | OXYGEN SATURATION: 98 %

## 2018-06-28 DIAGNOSIS — M79.10 MYALGIA: ICD-10-CM

## 2018-06-28 DIAGNOSIS — R68.83 CHILLS: ICD-10-CM

## 2018-06-28 DIAGNOSIS — R11.0 NAUSEA: ICD-10-CM

## 2018-06-28 DIAGNOSIS — R53.81 MALAISE: ICD-10-CM

## 2018-06-28 LAB
A PHAGOCYTOPH IGG TITR SER IF: NORMAL {TITER}
A PHAGOCYTOPH IGM TITR SER IF: NORMAL {TITER}
ALBUMIN SERPL-MCNC: 2.6 G/DL (ref 3.4–5)
ALBUMIN UR-MCNC: 100 MG/DL
ALP SERPL-CCNC: 151 U/L (ref 40–150)
ALT SERPL W P-5'-P-CCNC: 27 U/L (ref 0–50)
ANION GAP SERPL CALCULATED.3IONS-SCNC: 8 MMOL/L (ref 3–14)
APPEARANCE UR: ABNORMAL
AST SERPL W P-5'-P-CCNC: 15 U/L (ref 0–45)
BASOPHILS # BLD AUTO: 0 10E9/L (ref 0–0.2)
BASOPHILS NFR BLD AUTO: 0.1 %
BILIRUB SERPL-MCNC: 1.5 MG/DL (ref 0.2–1.3)
BILIRUB UR QL STRIP: NEGATIVE
BUN SERPL-MCNC: 8 MG/DL (ref 7–30)
CA-I BLD-MCNC: 4.6 MG/DL (ref 4.4–5.2)
CALCIUM SERPL-MCNC: 8.8 MG/DL (ref 8.5–10.1)
CHLORIDE BLD-SCNC: 102 MMOL/L (ref 94–109)
CHLORIDE SERPL-SCNC: 103 MMOL/L (ref 94–109)
CK SERPL-CCNC: 34 U/L (ref 30–225)
CO2 SERPL-SCNC: 28 MMOL/L (ref 20–32)
COLOR UR AUTO: YELLOW
CREAT SERPL-MCNC: 0.63 MG/DL (ref 0.52–1.04)
CRP SERPL-MCNC: 330 MG/L (ref 0–8)
DIFFERENTIAL METHOD BLD: ABNORMAL
E CHAFFEENSIS IGG TITR SER: NORMAL {TITER}
E CHAFFEENSIS IGM TITR SER: NORMAL {TITER}
EOSINOPHIL # BLD AUTO: 0 10E9/L (ref 0–0.7)
EOSINOPHIL NFR BLD AUTO: 0 %
ERYTHROCYTE [DISTWIDTH] IN BLOOD BY AUTOMATED COUNT: 12.7 % (ref 10–15)
GFR SERPL CREATININE-BSD FRML MDRD: >90 ML/MIN/1.7M2
GLUCOSE BLD-MCNC: 116 MG/DL (ref 70–99)
GLUCOSE SERPL-MCNC: 117 MG/DL (ref 70–99)
GLUCOSE UR STRIP-MCNC: NEGATIVE MG/DL
HCT VFR BLD AUTO: 30.9 % (ref 35–47)
HGB BLD-MCNC: 10.5 G/DL (ref 11.7–15.7)
HGB UR QL STRIP: ABNORMAL
IMM GRANULOCYTES # BLD: 0.1 10E9/L (ref 0–0.4)
IMM GRANULOCYTES NFR BLD: 0.6 %
KETONES UR STRIP-MCNC: NEGATIVE MG/DL
LACTATE BLD-SCNC: 1.2 MMOL/L (ref 0.7–2)
LACTATE BLD-SCNC: 1.2 MMOL/L (ref 0.7–2)
LEUKOCYTE ESTERASE UR QL STRIP: NEGATIVE
LYMPHOCYTES # BLD AUTO: 1.3 10E9/L (ref 0.8–5.3)
LYMPHOCYTES NFR BLD AUTO: 15.5 %
MAGNESIUM SERPL-MCNC: 2.1 MG/DL (ref 1.6–2.3)
MCH RBC QN AUTO: 30.9 PG (ref 26.5–33)
MCHC RBC AUTO-ENTMCNC: 34 G/DL (ref 31.5–36.5)
MCV RBC AUTO: 91 FL (ref 78–100)
MONOCYTES # BLD AUTO: 0.6 10E9/L (ref 0–1.3)
MONOCYTES NFR BLD AUTO: 6.7 %
MUCOUS THREADS #/AREA URNS LPF: PRESENT /LPF
NEUTROPHILS # BLD AUTO: 6.4 10E9/L (ref 1.6–8.3)
NEUTROPHILS NFR BLD AUTO: 77.1 %
NITRATE UR QL: NEGATIVE
NRBC # BLD AUTO: 0 10*3/UL
NRBC BLD AUTO-RTO: 0 /100
PH UR STRIP: 6 PH (ref 5–7)
PLATELET # BLD AUTO: 173 10E9/L (ref 150–450)
PLATELET # BLD EST: ABNORMAL 10*3/UL
POTASSIUM BLD-SCNC: 3 MMOL/L (ref 3.4–5.3)
POTASSIUM SERPL-SCNC: 3.1 MMOL/L (ref 3.4–5.3)
PROT SERPL-MCNC: 6.8 G/DL (ref 6.8–8.8)
R RICKETTSI IGG TITR SER IF: NORMAL {TITER}
R RICKETTSI IGM TITR SER IF: NORMAL {TITER}
RBC # BLD AUTO: 3.4 10E12/L (ref 3.8–5.2)
RBC #/AREA URNS AUTO: 5 /HPF (ref 0–2)
SODIUM BLD-SCNC: 140 MMOL/L (ref 133–144)
SODIUM SERPL-SCNC: 139 MMOL/L (ref 133–144)
SOURCE: ABNORMAL
SP GR UR STRIP: 1.01 (ref 1–1.03)
SQUAMOUS #/AREA URNS AUTO: 1 /HPF (ref 0–1)
TRANS CELLS #/AREA URNS HPF: <1 /HPF (ref 0–1)
UROBILINOGEN UR STRIP-MCNC: 2 MG/DL (ref 0–2)
WBC # BLD AUTO: 8.3 10E9/L (ref 4–11)
WBC #/AREA URNS AUTO: 5 /HPF (ref 0–5)

## 2018-06-28 PROCEDURE — 86747 PARVOVIRUS ANTIBODY: CPT | Performed by: INTERNAL MEDICINE

## 2018-06-28 PROCEDURE — 25000132 ZZH RX MED GY IP 250 OP 250 PS 637: Performed by: EMERGENCY MEDICINE

## 2018-06-28 PROCEDURE — 99284 EMERGENCY DEPT VISIT MOD MDM: CPT | Mod: 25 | Performed by: EMERGENCY MEDICINE

## 2018-06-28 PROCEDURE — 99285 EMERGENCY DEPT VISIT HI MDM: CPT | Mod: Z6 | Performed by: EMERGENCY MEDICINE

## 2018-06-28 PROCEDURE — 84295 ASSAY OF SERUM SODIUM: CPT | Mod: 91

## 2018-06-28 PROCEDURE — 82550 ASSAY OF CK (CPK): CPT | Performed by: EMERGENCY MEDICINE

## 2018-06-28 PROCEDURE — 25000128 H RX IP 250 OP 636: Performed by: EMERGENCY MEDICINE

## 2018-06-28 PROCEDURE — 84132 ASSAY OF SERUM POTASSIUM: CPT

## 2018-06-28 PROCEDURE — 80053 COMPREHEN METABOLIC PANEL: CPT | Performed by: EMERGENCY MEDICINE

## 2018-06-28 PROCEDURE — 81001 URINALYSIS AUTO W/SCOPE: CPT | Performed by: EMERGENCY MEDICINE

## 2018-06-28 PROCEDURE — 82435 ASSAY OF BLOOD CHLORIDE: CPT

## 2018-06-28 PROCEDURE — 87040 BLOOD CULTURE FOR BACTERIA: CPT | Performed by: EMERGENCY MEDICINE

## 2018-06-28 PROCEDURE — 96361 HYDRATE IV INFUSION ADD-ON: CPT | Performed by: EMERGENCY MEDICINE

## 2018-06-28 PROCEDURE — 25000125 ZZHC RX 250: Performed by: EMERGENCY MEDICINE

## 2018-06-28 PROCEDURE — 87798 DETECT AGENT NOS DNA AMP: CPT | Performed by: EMERGENCY MEDICINE

## 2018-06-28 PROCEDURE — 83735 ASSAY OF MAGNESIUM: CPT | Performed by: EMERGENCY MEDICINE

## 2018-06-28 PROCEDURE — 71046 X-RAY EXAM CHEST 2 VIEWS: CPT

## 2018-06-28 PROCEDURE — 82330 ASSAY OF CALCIUM: CPT

## 2018-06-28 PROCEDURE — 83605 ASSAY OF LACTIC ACID: CPT | Performed by: EMERGENCY MEDICINE

## 2018-06-28 PROCEDURE — 82947 ASSAY GLUCOSE BLOOD QUANT: CPT

## 2018-06-28 PROCEDURE — 86140 C-REACTIVE PROTEIN: CPT | Performed by: EMERGENCY MEDICINE

## 2018-06-28 PROCEDURE — 83605 ASSAY OF LACTIC ACID: CPT

## 2018-06-28 PROCEDURE — 85025 COMPLETE CBC W/AUTO DIFF WBC: CPT | Performed by: EMERGENCY MEDICINE

## 2018-06-28 PROCEDURE — 87799 DETECT AGENT NOS DNA QUANT: CPT | Mod: XU | Performed by: EMERGENCY MEDICINE

## 2018-06-28 PROCEDURE — 96374 THER/PROPH/DIAG INJ IV PUSH: CPT | Performed by: EMERGENCY MEDICINE

## 2018-06-28 RX ORDER — POTASSIUM CHLORIDE 1.5 G/1.58G
40 POWDER, FOR SOLUTION ORAL ONCE
Status: COMPLETED | OUTPATIENT
Start: 2018-06-28 | End: 2018-06-28

## 2018-06-28 RX ORDER — ONDANSETRON 2 MG/ML
4 INJECTION INTRAMUSCULAR; INTRAVENOUS ONCE
Status: COMPLETED | OUTPATIENT
Start: 2018-06-28 | End: 2018-06-28

## 2018-06-28 RX ORDER — ONDANSETRON 4 MG/1
4 TABLET, ORALLY DISINTEGRATING ORAL ONCE
Status: COMPLETED | OUTPATIENT
Start: 2018-06-28 | End: 2018-06-28

## 2018-06-28 RX ORDER — ONDANSETRON 4 MG/1
4 TABLET, ORALLY DISINTEGRATING ORAL EVERY 6 HOURS PRN
Qty: 10 TABLET | Refills: 0 | Status: ON HOLD | OUTPATIENT
Start: 2018-06-28 | End: 2018-07-13

## 2018-06-28 RX ADMIN — POTASSIUM CHLORIDE 40 MEQ: 1.5 POWDER, FOR SOLUTION ORAL at 12:35

## 2018-06-28 RX ADMIN — ONDANSETRON 4 MG: 2 INJECTION INTRAMUSCULAR; INTRAVENOUS at 11:21

## 2018-06-28 RX ADMIN — ONDANSETRON 4 MG: 4 TABLET, ORALLY DISINTEGRATING ORAL at 14:20

## 2018-06-28 RX ADMIN — POTASSIUM CHLORIDE 40 MEQ: 1.5 POWDER, FOR SOLUTION ORAL at 13:47

## 2018-06-28 RX ADMIN — SODIUM CHLORIDE 2000 ML: 9 INJECTION, SOLUTION INTRAVENOUS at 11:20

## 2018-06-28 ASSESSMENT — ENCOUNTER SYMPTOMS
DIFFICULTY URINATING: 0
SHORTNESS OF BREATH: 0
MYALGIAS: 1
NECK STIFFNESS: 0
ABDOMINAL PAIN: 0
ARTHRALGIAS: 0
COLOR CHANGE: 0
EYE REDNESS: 0
FATIGUE: 1
DIARRHEA: 0
NAUSEA: 1
CONFUSION: 0
FEVER: 1
HEADACHES: 0
VOMITING: 1

## 2018-06-28 NOTE — ED AVS SNAPSHOT
[facility], Emergency Department    [address] [address]    Phone:  [phone]                                       Jade Carcamo   MRN: [id number]    Department:  [facility], Emergency Department   Date of Visit:  6/28/2018           Patient Information     Date Of Birth          1965        Your diagnoses for this visit were:     Malaise     Chills     Myalgia     Nausea        You were seen by Shlomo Saldaña MD.      Follow-up Information     Follow up with Marina Owens MD.    Specialty:  Internal Medicine    Contact information:    [address]  [facility] [address]  [phone]          Follow up with Britt Dumas MD. Go in 1 day.    Specialty:  Infectious Diseases    Contact information:    [address]  [place] [facility] [address]  [phone]          Discharge Instructions         Myalgias  Myalgias are another word for muscle aches and soreness. This is a symptom, not a disease. Myalgias can have many causes. A cold, the flu, or an acute infection can cause them. So can any illness with a high fever. They may happen after exertion (such as heavy exercise) or injury (such as an accident or fall). Some medicines (such as statins and certain antidepressants) can cause myalgias. They can also be a symptom of chronic or ongoing medical problems (such as lupus, chronic fatigue, or hypothyroidism). With these illnesses, other serious symptoms often occur in addition to muscle pain and soreness.    Myalgias most often go away on their own. If they don't go away, come back, or are severe, testing may be needed to help find the cause.  Home care    Rest until you feel better.    Follow instructions that you were given for how to care for yourself. This may depend on the cause of your myalgias.     If myalgia is thought to be due to a medicine, be sure to talk to the doctor that prescribed the medicine about the best course of action.    To control  pain, take prescription or over-the-counter medicines as directed. Unless told not to, you can try acetaminophen or ibuprofen.  Follow-up care  Follow up with your healthcare provider or as advised. If your symptoms do not go away in a few days or if they come back, follow up with your healthcare provider for an exam and testing.  When to see medical advice  Call your healthcare provider for any of the following:    Fever of 100.4 F (38 C) or higher, or as directed by your healthcare provider    Pain that gets worse and not better, or that goes away and comes back    New joint pains    New rash    Severe headache, neck pain, drowsiness, or confusion  Date Last Reviewed: 3/1/2017    2180-2363 [employer]. [address] [place] [address], [place] [id number]. All rights reserved. This information is not intended as a substitute for professional medical care. Always follow your healthcare professional's instructions.          24 Hour Appointment Hotline       To make an appointment at any [facility] call [phone] ([phone]). If you don't have a family doctor or clinic, we will help you find one. [place] clinics are conveniently located to serve the needs of you and your family.             Review of your medicines      START taking        Dose / Directions Last dose taken    ondansetron 4 MG ODT tab   Commonly known as:  ZOFRAN ODT   Dose:  4 mg   Quantity:  10 tablet        Take 1 tablet (4 mg) by mouth every 6 hours as needed for nausea   Refills:  0          Our records show that you are taking the medicines listed below. If these are incorrect, please call your family doctor or clinic.        Dose / Directions Last dose taken    acetaminophen 500 MG Caps   Dose:  2 capsule   Quantity:  60 capsule        Take 2 capsules by mouth every 8 hours as needed For aches, pain, fever   Refills:  0        * doxycycline 100 MG capsule   Commonly known as:  VIBRAMYCIN   Dose:  100 mg   Quantity:  15  capsule        Take 1 capsule (100 mg) by mouth 2 times daily Take 2 tabs (200mg) for first dose, then 1 tab twice daily until gone   Refills:  0        * doxycycline 100 MG capsule   Commonly known as:  VIBRAMYCIN   Dose:  100 mg   Quantity:  6 capsule        Take 1 capsule (100 mg) by mouth 2 times daily for 3 days In addition to previous 7 days to complete 10 total days   Refills:  0        escitalopram 20 MG tablet   Commonly known as:  LEXAPRO   Dose:  20 mg   Quantity:  90 tablet        Take 1 tablet (20 mg) by mouth daily   Refills:  3        ibuprofen 600 MG tablet   Commonly known as:  ADVIL/MOTRIN   Dose:  600 mg   Quantity:  30 tablet        Take 1 tablet (600 mg) by mouth every 8 hours as needed for moderate pain   Refills:  0        MULTI-VITAMINS Tabs        Refills:  0        venlafaxine 37.5 MG 24 hr capsule   Commonly known as:  EFFEXOR-XR   Dose:  37.5 mg   Quantity:  60 capsule        Take 1 capsule (37.5 mg) by mouth every morning Take 1 capsule daily for 14 days, then take 2 capsules daily if tolerating well.   Refills:  1        zolpidem 5 MG tablet   Commonly known as:  AMBIEN   Dose:  5 mg   Quantity:  15 tablet        Take 1 tablet (5 mg) by mouth nightly as needed for sleep   Refills:  0        * Notice:  This list has 2 medication(s) that are the same as other medications prescribed for you. Read the directions carefully, and ask your doctor or other care provider to review them with you.            Prescriptions were sent or printed at these locations (1 Prescription)                   Other Prescriptions                Printed at Department/Unit printer (1 of 1)         ondansetron (ZOFRAN ODT) 4 MG ODT tab                Procedures and tests performed during your visit     Babesia Species by PCR    Blood Culture ONE site    CBC with platelets differential    CK total    CMV DNA quantification    CRP inflammation    Calcium ionized whole blood    Chloride whole blood    Comprehensive  metabolic panel    EBV DNA PCR Quantitative Whole Blood    Glucose whole blood    Lactic acid    Lactic acid whole blood    Magnesium    Potassium whole blood    Routine UA with microscopic    Sodium whole blood    XR Chest 2 Views      Orders Needing Specimen Collection     None      Pending Results     Date and Time Order Name Status Description    6/28/2018 1338 CMV DNA quantification In process     6/28/2018 1337 Babesia Species by PCR In process     6/28/2018 1335 EBV DNA PCR Quantitative Whole Blood In process     6/28/2018 1121 Blood Culture ONE site Preliminary             Pending Culture Results     Date and Time Order Name Status Description    6/28/2018 1121 Blood Culture ONE site Preliminary             Pending Results Instructions     If you had any lab results that were not finalized at the time of your Discharge, you can call the ED Lab Result RN at [phone]. You will be contacted by this team for any positive Lab results or changes in treatment. The nurses are available 7 days a week from 10A to 6:30P.  You can leave a message 24 hours per day and they will return your call.        Thank you for choosing [facility]       Thank you for choosing [facility] for your care. Our goal is always to provide you with excellent care. Hearing back from our patients is one way we can continue to improve our services. Please take a few minutes to complete the written survey that you may receive in the mail after you visit with us. Thank you!        [employer] Information     [employer] gives you secure access to your electronic health record. If you see a primary care provider, you can also send messages to your care team and make appointments. If you have questions, please call your primary care clinic.  If you do not have a primary care provider, please call [phone] and they will assist you.        Care EveryWhere ID     This is your Care EveryWhere ID. This could be used by other organizations to access your  [place] medical records  [id number]        Equal Access to Services     HENRRY HALL : Susana Lees, chris cruz, dusty tovar, nehemiah worrell. So [facility] [phone].    ATENCIÓN: Si habla español, tiene a francisco disposición servicios gratuitos de asistencia lingüística. Llame al [phone].    We comply with applicable federal civil rights laws and [place] laws. We do not discriminate on the basis of race, color, national origin, age, disability, sex, sexual orientation, or gender identity.            After Visit Summary       This is your record. Keep this with you and show to your community pharmacist(s) and doctor(s) at your next visit.

## 2018-06-28 NOTE — DISCHARGE INSTRUCTIONS
Myalgias  Myalgias are another word for muscle aches and soreness. This is a symptom, not a disease. Myalgias can have many causes. A cold, the flu, or an acute infection can cause them. So can any illness with a high fever. They may happen after exertion (such as heavy exercise) or injury (such as an accident or fall). Some medicines (such as statins and certain antidepressants) can cause myalgias. They can also be a symptom of chronic or ongoing medical problems (such as lupus, chronic fatigue, or hypothyroidism). With these illnesses, other serious symptoms often occur in addition to muscle pain and soreness.    Myalgias most often go away on their own. If they don't go away, come back, or are severe, testing may be needed to help find the cause.  Home care    Rest until you feel better.    Follow instructions that you were given for how to care for yourself. This may depend on the cause of your myalgias.     If myalgia is thought to be due to a medicine, be sure to talk to the doctor that prescribed the medicine about the best course of action.    To control pain, take prescription or over-the-counter medicines as directed. Unless told not to, you can try acetaminophen or ibuprofen.  Follow-up care  Follow up with your healthcare provider or as advised. If your symptoms do not go away in a few days or if they come back, follow up with your healthcare provider for an exam and testing.  When to see medical advice  Call your healthcare provider for any of the following:    Fever of 100.4 F (38 C) or higher, or as directed by your healthcare provider    Pain that gets worse and not better, or that goes away and comes back    New joint pains    New rash    Severe headache, neck pain, drowsiness, or confusion  Date Last Reviewed: 3/1/2017    0071-4296 [employer]. [address] [place] [address]. All rights reserved. This information is not intended as a substitute for professional medical  care. Always follow your healthcare professional's instructions.

## 2018-06-28 NOTE — ED PROVIDER NOTES
"  History     Chief Complaint   Patient presents with     Fatigue     HPI  Jade Carcamo is a 52 year old female with a history of polycystic liver disease and GERD who presents for evaluation of fever and fatigue. Patient reports she had the onset of fevers measured up to 102  F one week ago with associated generalized myalgias, fatigue, nausea, and intermittent vomiting. Per chart review, she was initially seen and evaluated in the Emergency Department at [facility] for these symptoms where she had a chest x-ray and abdominal ultrasound which were both unremarkable. She also had laboratory studies including CBC, CMP, CRP, lipase, UA, and Lyme disease (as patient had travelled to the [facility] two weeks prior to symptom onset) performed which showed elevated CRP and bilirubin, and mild thrombocytopenia. She was treated in the Emergency Department with 2 L normal saline, 1g Tylenol, and 4 mg of Zofran and discharged with instructions to follow-up with her PCP. She was seen and evaluated in the primary care clinic on 06/25/18, three days ago, where she had further testing including Ehrlichia and was presumptively started on doxycycline.     Today, patient returns to the Emergency Department due to persistent fevers measured between 102-103  F despite routinely taking Motrin and Tylenol. She complains of associated fatigue, generalized myalgias, nausea, and 1-2 episodes of vomiting daily. Her last episode of vomiting was yesterday. She also reports early this morning she had two episodes of urination with \"orange and red\" color, but subsequently urinated again later and this was clear. She denies chest pain, shortness of breath, abdominal pain, diarrhea, or syncope. She has been compliant with prescribed doxycycline since Monday, without improvement in her symptoms. She states she was instructed by her primary care provider to present here to the [place] ED for further evaluation and possible ID " consult if her fever did not go down.     I have reviewed the Medications, Allergies, Past Medical and Surgical History, and Social History in the [facility] system.  Past Medical History:   Diagnosis Date     GERD (gastroesophageal reflux disease)      Polycystic liver disease     in 40 years       Past Surgical History:   Procedure Laterality Date     LAPAROSCOPIC UNROOFING LIVER CYST N/A 11/4/2016    Procedure: LAPAROSCOPIC UNROOFING LIVER CYST;  Surgeon: Sonido Cruz MD;  Location: [facility] OR       Family History   Problem Relation Age of Onset     Alzheimer Disease Mother      Thyroid Disease Sister      thyroid cancer     HEART DISEASE No family hx of        Social History   Substance Use Topics     Smoking status: Never Smoker     Smokeless tobacco: Never Used     Alcohol use Yes      Comment: 2 per week       No current facility-administered medications for this encounter.      Current Outpatient Prescriptions   Medication     ondansetron (ZOFRAN ODT) 4 MG ODT tab     acetaminophen 500 MG CAPS     doxycycline (VIBRAMYCIN) 100 MG capsule     doxycycline (VIBRAMYCIN) 100 MG capsule     escitalopram (LEXAPRO) 20 MG tablet     ibuprofen (ADVIL/MOTRIN) 600 MG tablet     Multiple Vitamin (MULTI-VITAMINS) TABS     venlafaxine (EFFEXOR-XR) 37.5 MG 24 hr capsule     zolpidem (AMBIEN) 5 MG tablet        Allergies   Allergen Reactions     Bee Venom      Penicillins Unknown       Review of Systems   Constitutional: Positive for fatigue and fever.   HENT: Negative for congestion.    Eyes: Negative for redness.   Respiratory: Negative for shortness of breath.    Cardiovascular: Negative for chest pain.   Gastrointestinal: Positive for nausea and vomiting. Negative for abdominal pain and diarrhea.   Genitourinary: Negative for difficulty urinating.   Musculoskeletal: Positive for myalgias. Negative for arthralgias and neck stiffness.   Skin: Negative for color change.   Neurological: Negative for headaches.  "  Psychiatric/Behavioral: Negative for confusion.   All other systems reviewed and are negative.      Physical Exam   BP: 114/69  Pulse: 103  Temp: 100.9  F (38.3  C)  Resp: 16  Height: 172.7 cm (5' 8\")  SpO2: 96 %      Physical Exam   Constitutional: She appears distressed ( anxious\).   HENT:   Head: Atraumatic.   Mouth/Throat: Oropharynx is clear and moist. No oropharyngeal exudate.   Eyes: Pupils are equal, round, and reactive to light. No scleral icterus.   Cardiovascular: Normal heart sounds and intact distal pulses.    Pulmonary/Chest: Breath sounds normal. No respiratory distress.   Abdominal: Soft. Bowel sounds are normal. There is no tenderness.   Musculoskeletal: She exhibits no edema or tenderness.   Skin: Skin is warm. No rash noted. She is not diaphoretic.       ED Course     ED Course     Procedures       11:03 AM  The patient was seen and examined by Dr. Saldaña in Room HW.     Results for orders placed or performed during the hospital encounter of 06/28/18   XR Chest 2 Views    Narrative    XR CHEST 2 VW  6/28/2018 11:53 AM    History:  Cough, fever; .     Comparison: Chest radiograph dated 6/23/2018    Findings:   PA and the lateral view of the chest. Trachea is midline. Cardiac  silhouette is within normal limits. Pulmonary vasculature is distinct.  No airspace opacities. Small pleural effusions. Scattered surgical  clips projects over the right upper quadrant.      Impression    IMPRESSION: Small pleural effusions, new from previous. Otherwise no  new focal airspace opacities.    I have personally reviewed the examination and initial interpretation  and I agree with the findings.    GRACE ODOM MD   CBC with platelets differential   Result Value Ref Range    WBC 8.3 4.0 - 11.0 10e9/L    RBC Count 3.40 (L) 3.8 - 5.2 10e12/L    Hemoglobin 10.5 (L) 11.7 - 15.7 g/dL    Hematocrit 30.9 (L) 35.0 - 47.0 %    MCV 91 78 - 100 fl    MCH 30.9 26.5 - 33.0 pg    MCHC 34.0 31.5 - 36.5 g/dL    RDW 12.7 " 10.0 - 15.0 %    Platelet Count 173 150 - 450 10e9/L    Diff Method Automated Method     % Neutrophils 77.1 %    % Lymphocytes 15.5 %    % Monocytes 6.7 %    % Eosinophils 0.0 %    % Basophils 0.1 %    % Immature Granulocytes 0.6 %    Nucleated RBCs 0 0 /100    Absolute Neutrophil 6.4 1.6 - 8.3 10e9/L    Absolute Lymphocytes 1.3 0.8 - 5.3 10e9/L    Absolute Monocytes 0.6 0.0 - 1.3 10e9/L    Absolute Eosinophils 0.0 0.0 - 0.7 10e9/L    Absolute Basophils 0.0 0.0 - 0.2 10e9/L    Abs Immature Granulocytes 0.1 0 - 0.4 10e9/L    Absolute Nucleated RBC 0.0     Platelet Estimate Confirming automated cell count    Comprehensive metabolic panel   Result Value Ref Range    Sodium 139 133 - 144 mmol/L    Potassium 3.1 (L) 3.4 - 5.3 mmol/L    Chloride 103 94 - 109 mmol/L    Carbon Dioxide 28 20 - 32 mmol/L    Anion Gap 8 3 - 14 mmol/L    Glucose 117 (H) 70 - 99 mg/dL    Urea Nitrogen 8 7 - 30 mg/dL    Creatinine 0.63 0.52 - 1.04 mg/dL    GFR Estimate >90 >60 mL/min/1.7m2    GFR Estimate If Black >90 >60 mL/min/1.7m2    Calcium 8.8 8.5 - 10.1 mg/dL    Bilirubin Total 1.5 (H) 0.2 - 1.3 mg/dL    Albumin 2.6 (L) 3.4 - 5.0 g/dL    Protein Total 6.8 6.8 - 8.8 g/dL    Alkaline Phosphatase 151 (H) 40 - 150 U/L    ALT 27 0 - 50 U/L    AST 15 0 - 45 U/L   CK total   Result Value Ref Range    CK Total 34 30 - 225 U/L   Routine UA with microscopic   Result Value Ref Range    Color Urine Yellow     Appearance Urine Slightly Cloudy     Glucose Urine Negative NEG^Negative mg/dL    Bilirubin Urine Negative NEG^Negative    Ketones Urine Negative NEG^Negative mg/dL    Specific Gravity Urine 1.015 1.003 - 1.035    Blood Urine Moderate (A) NEG^Negative    pH Urine 6.0 5.0 - 7.0 pH    Protein Albumin Urine 100 (A) NEG^Negative mg/dL    Urobilinogen mg/dL 2.0 0.0 - 2.0 mg/dL    Nitrite Urine Negative NEG^Negative    Leukocyte Esterase Urine Negative NEG^Negative    Source Midstream Urine     WBC Urine 5 0 - 5 /HPF    RBC Urine 5 (H) 0 - 2 /HPF     Squamous Epithelial /HPF Urine 1 0 - 1 /HPF    Transitional Epi <1 0 - 1 /HPF    Mucous Urine Present (A) NEG^Negative /LPF   Lactic acid   Result Value Ref Range    Lactic Acid 1.2 0.7 - 2.0 mmol/L   Chloride whole blood   Result Value Ref Range    Chloride 102 94 - 109 mmol/L   Glucose whole blood   Result Value Ref Range    Glucose 116 (H) 70 - 99 mg/dL   Calcium ionized whole blood   Result Value Ref Range    Calcium Ionized Whole Blood 4.6 4.4 - 5.2 mg/dL   Potassium whole blood   Result Value Ref Range    Potassium 3.0 (L) 3.4 - 5.3 mmol/L   Lactic acid whole blood   Result Value Ref Range    Lactic Acid 1.2 0.7 - 2.0 mmol/L   Sodium whole blood   Result Value Ref Range    Sodium 140 133 - 144 mmol/L   Magnesium   Result Value Ref Range    Magnesium 2.1 1.6 - 2.3 mg/dL   CRP inflammation   Result Value Ref Range    CRP Inflammation 330.0 (H) 0.0 - 8.0 mg/L   Blood Culture ONE site   Result Value Ref Range    Specimen Description Right Hand Blood     Special Requests Received in aerobic bottle only     Culture Micro PENDING             Labs Ordered and Resulted from Time of ED Arrival Up to the Time of Departure from the ED   CBC WITH PLATELETS DIFFERENTIAL - Abnormal; Notable for the following:        Result Value    RBC Count 3.40 (*)     Hemoglobin 10.5 (*)     Hematocrit 30.9 (*)     All other components within normal limits   COMPREHENSIVE METABOLIC PANEL - Abnormal; Notable for the following:     Potassium 3.1 (*)     Glucose 117 (*)     Bilirubin Total 1.5 (*)     Albumin 2.6 (*)     Alkaline Phosphatase 151 (*)     All other components within normal limits   ROUTINE UA WITH MICROSCOPIC - Abnormal; Notable for the following:     Blood Urine Moderate (*)     Protein Albumin Urine 100 (*)     RBC Urine 5 (*)     Mucous Urine Present (*)     All other components within normal limits   GLUCOSE WHOLE BLOOD - Abnormal; Notable for the following:     Glucose 116 (*)     All other components within normal limits    POTASSIUM WHOLE BLOOD - Abnormal; Notable for the following:     Potassium 3.0 (*)     All other components within normal limits   CRP INFLAMMATION - Abnormal; Notable for the following:     CRP Inflammation 330.0 (*)     All other components within normal limits   CK TOTAL   LACTIC ACID WHOLE BLOOD   CHLORIDE WHOLE BLOOD   CALCIUM IONIZED WHOLE BLOOD   LACTIC ACID WHOLE BLOOD   SODIUM WHOLE BLOOD   MAGNESIUM   EBV DNA PCR QUANTITATIVE WHOLE BLOOD   BABESIA SPECIES BY PCR   CMV DNA QUANTIFICATION   BLOOD CULTURE           Medications   0.9% sodium chloride BOLUS (0 mLs Intravenous Stopped 6/28/18 1345)   ondansetron (ZOFRAN) injection 4 mg (4 mg Intravenous Given 6/28/18 1121)   potassium chloride (KLOR-CON) Packet 40 mEq (40 mEq Oral Given 6/28/18 1235)   potassium chloride (KLOR-CON) Packet 40 mEq (40 mEq Oral Given 6/28/18 1347)   ondansetron (ZOFRAN-ODT) ODT tab 4 mg (4 mg Oral Given 6/28/18 1420)         Assessments & Plan (with Medical Decision Making)   52-year-old female presents for evaluation of ongoing 7 day history of weakness, fatigue, fevers and nausea.  Differential is broad and includes infectious etiologies, rheumatologic conditions as well as malignancies.  Initial exam revealed temperature 100.9 with heart rate of 103.  Remainder of her exam was unremarkable.  Laboratories in the emergency room were obtained including CBC revealing normal white count at 8.3 as well as normocytic anemia with a hemoglobin at 10.5.  Lactic acid and basic metabolic panel were unremarkable with exception of slightly low potassium of 3.1.  Urinalysis revealed 5 red cells and 5 white cells.  Chest x-ray revealed small pleural effusions.  Magnesium was normal.  The case was discussed at length with the patient's primary physician as well as infectious disease specialist who is down to see the patient.  Number of other studies were ordered including PCR for EBV, CMV and babesia.  In the emergency room, the patient was  treated with Zofran, 2 doses of potassium as well as IV bolus.  The patient will be discharged to follow-up with infectious disease tomorrow.    I have reviewed the nursing notes.    I have reviewed the findings, diagnosis, plan and need for follow up with the patient.    Discharge Medication List as of 6/28/2018  2:18 PM      START taking these medications    Details   ondansetron (ZOFRAN ODT) 4 MG ODT tab Take 1 tablet (4 mg) by mouth every 6 hours as needed for nausea, Disp-10 tablet, R-0, Local Print             Final diagnoses:   Malaise   Chills   Myalgia   Nausea   I, Ritu Langford, am serving as a trained medical scribe to document services personally performed by Eleazar Saldaña MD, based on the provider's statements to me.   I, Eleazar Saldaña MD, was physically present and have reviewed and verified the accuracy of this note documented by Ritu Langford.      6/28/2018   [facility], [place], EMERGENCY DEPARTMENT     Shlomo Saldaña MD  06/28/18 1532

## 2018-06-28 NOTE — ED TRIAGE NOTES
"Pt presents with 7 days of weakness/fatigue, fevers, and nausea. Pt reports she was diagnosed with a \"tick infection\".   "

## 2018-06-28 NOTE — PROGRESS NOTES
"6/28/2018     Infectious Diseases Brief Note:   I saw Ms. Carcamo in the ED today at the request of Sharlene Saldaña (ED) and Graciela (PCP). Ms. Carcamo has had nearly a week of ongoing fevers to 102 and diffuse body aches. These symptoms developed ~2 weeks after a camping/hiking trip along the [facility]. She had a tick borne workup (some still pending) and a thick/thin smear \"could not rule out Ehrlichia.\" She has been on doxycycline for several days without improvement and Ehrlichia PCR is negative. Today I recommended continuing doxycycline, adding some viral testing (EBV, CMV, parvovirus), and I will see her in clinic tomorrow (6/29) at 4:30 PM for further evaluation of fever. Dr. Saldaña kindly helped with symptom management in the meantime (Zofran, etc.). Patient was agreeable to this plan.     Britt Dumas MD  Infectious Diseases  [phone]    "

## 2018-06-28 NOTE — ED AVS SNAPSHOT
[facility], [place], Emergency Department    [address] [place] [id number]    Phone:  [phone]                                       Jade Carcamo   MRN: [id number]    Department:  [facility], Emergency Department   Date of Visit:  6/28/2018           After Visit Summary Signature Page     I have received my discharge instructions, and my questions have been answered. I have discussed any challenges I see with this plan with the nurse or doctor.    ..........................................................................................................................................  Patient/Patient Representative Signature      ..........................................................................................................................................  Patient Representative Print Name and Relationship to Patient    ..................................................               ................................................  Date                                            Time    ..........................................................................................................................................  Reviewed by Signature/Title    ...................................................              ..............................................  Date                                                            Time

## 2018-06-28 NOTE — TELEPHONE ENCOUNTER
"[facility]: Nurse Triage Note  SITUATION/BACKGROUND                                                      Jade Carcamo is a 52 year old female who calls with  Fever, persistent at 102, new hematuria, recent tick bite.  PMH:GERD and polycystic liver disease   Body aches, cough with sputum.  Nausea and vomiting continue.  Saw Dr Owens /PCP on 6/25 and [facility] ER 6/23.  She states\" I am sicker than I have ever been in my life.\".   Labs done in Er 6/23/2018 and with Dr Owens 6/25/2018, screens and blood  Cultures.  6/23: CRP 81.0  6/25 Platelet count   was 118, Bili1.8  Lyme antibodies neg on 6/25/2018  Other parasite screens pending.  Negative CXR/ UA in ER  She remains on Doxycycline x 3 days without change in symptoms    Negative chest pain, breathing difficulties.  Positive for cough, persistent fever at 102 or greater  Positive blood in urine( negative culture).  She has been advised to present to ED for assessment if fever persisted or hematuria.  She was advised today to present to ED.  She has questions about potentially being seen by ID specialist, and we reviewed that coming in and being assessed for continuing persistent and worsening symptoms was priority, and ID is consulted as indicated by findings.  MEDICATIONS:   Taking medication(s) as prescribed? Yes  Taking over the counter medication(s?) Yes and No  Any barriers to taking medication(s) as prescribed?  NoN/V  Medication(s) improving/managing symptoms?  No    Allergies:   Allergies   Allergen Reactions     Bee Venom      Penicillins Unknown       ASSESSMENT      Recurrent and persistent fever of >102 X 5 DAYS  With N and V not changed by Doxycycline and new hematuria without pain.    RECOMMENDATION/PLAN                                                      RECOMMENDED DISPOSITION:  To ED, another person to drive -  available. She asks about [facility] vs [facility], and availability of ID staff.  Will comply with recommendation: " Yes    If further questions/concerns or if symptoms do not improve, worsen or new symptoms develop, call your PCP or [phone] to talk with the Resident on call, as soon as possible.    Guideline used: pp urine, abnormal color, pp [phone]  Telephone Triage Protocols for Nurses, Fifth Edition, Hui Cazares RN

## 2018-06-29 ENCOUNTER — RADIANT APPOINTMENT (OUTPATIENT)
Dept: CT IMAGING | Facility: CLINIC | Age: 53
End: 2018-06-29
Attending: INTERNAL MEDICINE
Payer: COMMERCIAL

## 2018-06-29 ENCOUNTER — OFFICE VISIT (OUTPATIENT)
Dept: INFECTIOUS DISEASES | Facility: CLINIC | Age: 53
End: 2018-06-29
Attending: INTERNAL MEDICINE
Payer: COMMERCIAL

## 2018-06-29 VITALS
HEART RATE: 100 BPM | SYSTOLIC BLOOD PRESSURE: 127 MMHG | DIASTOLIC BLOOD PRESSURE: 77 MMHG | OXYGEN SATURATION: 97 % | WEIGHT: 157.3 LBS | HEIGHT: 68 IN | BODY MASS INDEX: 23.84 KG/M2 | TEMPERATURE: 102.4 F

## 2018-06-29 DIAGNOSIS — R50.9 FUO (FEVER OF UNKNOWN ORIGIN): Primary | ICD-10-CM

## 2018-06-29 DIAGNOSIS — R50.9 FUO (FEVER OF UNKNOWN ORIGIN): ICD-10-CM

## 2018-06-29 DIAGNOSIS — M25.50 ARTHRALGIA, UNSPECIFIED JOINT: ICD-10-CM

## 2018-06-29 DIAGNOSIS — D72.810 LYMPHOPENIA: ICD-10-CM

## 2018-06-29 DIAGNOSIS — R79.82 ELEVATED C-REACTIVE PROTEIN (CRP): ICD-10-CM

## 2018-06-29 LAB
CMV DNA SPEC NAA+PROBE-ACNC: NORMAL [IU]/ML
CMV DNA SPEC NAA+PROBE-LOG#: NORMAL {LOG_IU}/ML
SPECIMEN SOURCE: NORMAL

## 2018-06-29 PROCEDURE — G0463 HOSPITAL OUTPT CLINIC VISIT: HCPCS | Mod: ZF

## 2018-06-29 RX ORDER — DOXYCYCLINE 100 MG/1
100 CAPSULE ORAL 2 TIMES DAILY
Qty: 8 CAPSULE | Refills: 0 | Status: ON HOLD | OUTPATIENT
Start: 2018-06-29 | End: 2018-07-13

## 2018-06-29 RX ORDER — IOPAMIDOL 755 MG/ML
100 INJECTION, SOLUTION INTRAVASCULAR ONCE
Status: COMPLETED | OUTPATIENT
Start: 2018-06-29 | End: 2018-06-29

## 2018-06-29 RX ADMIN — IOPAMIDOL 96 ML: 755 INJECTION, SOLUTION INTRAVASCULAR at 17:37

## 2018-06-29 ASSESSMENT — ENCOUNTER SYMPTOMS
STIFFNESS: 0
HEMOPTYSIS: 0
DIARRHEA: 0
ARTHRALGIAS: 0
FATIGUE: 1
NECK PAIN: 0
MYALGIAS: 1
FEVER: 1
NAUSEA: 1
ABDOMINAL PAIN: 1
BLOATING: 1
POLYPHAGIA: 0
POLYDIPSIA: 1
DECREASED APPETITE: 1
SHORTNESS OF BREATH: 1
DIFFICULTY URINATING: 0
FLANK PAIN: 0
BOWEL INCONTINENCE: 0
HALLUCINATIONS: 0
WHEEZING: 0
BACK PAIN: 0
HEMATURIA: 0
CONSTIPATION: 0
CHILLS: 1
NIGHT SWEATS: 1
COUGH: 0
POSTURAL DYSPNEA: 0
SPUTUM PRODUCTION: 0
WEIGHT GAIN: 0
HOT FLASHES: 1
INCREASED ENERGY: 1
MUSCLE CRAMPS: 0
JOINT SWELLING: 0
VOMITING: 1
JAUNDICE: 0
MUSCLE WEAKNESS: 0
RECTAL PAIN: 0
SNORES LOUDLY: 0
COUGH DISTURBING SLEEP: 0
DYSURIA: 0
WEIGHT LOSS: 0
DYSPNEA ON EXERTION: 0
BLOOD IN STOOL: 0
ALTERED TEMPERATURE REGULATION: 1
DECREASED LIBIDO: 1
HEARTBURN: 0

## 2018-06-29 ASSESSMENT — PAIN SCALES - GENERAL: PAINLEVEL: SEVERE PAIN (7)

## 2018-06-29 NOTE — PROGRESS NOTES
[facility]  New Patient Visit  6/29/2018     Chief Complaint:  Fever    HPI:  Jade Carcamo is a 52 year old female who I first met briefly in the emergency department yesterday.  Ms. Carcamo has polycystic liver disease but has otherwise been in good health.  She recently went on a trip that involved camping and hiking along the [facility].  Then, approximately 1 week ago she developed fevers as high as 103 and diffuse muscle and joint pains.  Since then she has been seen by multiple providers in both her primary care clinic and in the emergency department.  Her initial labs are remarkable for mild thrombocytopenia, normal total white count but with some lymphopenia, mildly elevated LFTs, he had a markedly elevated CRP.  Her CK was normal.  She initially underwent a evaluation for tickborne workup with Lyme serology which was negative, Babesia serology which is still pending, and a parasite smear which showed some conclusions that could not rule out Ehrlichia.  An Ehrlichia PCR was sent to [place] in the meantime she was started on doxycycline.  The Ehrlichia PCR returned as negative and today, after 72 hours and doxycycline she remains febrile up to 102.  She has been using ibuprofen and Tylenol which do temporarily bring down her fever.  In addition to fevers, she continues to have a dull headache.  She does not have significant photophobia and has no neck pain or stiffness.  She has not had any lethargy or confusion.  She did report some mild stomach pain on some of her earlier medical visits but does not have any today.  In fact, she reports that she did have a few hours this morning where she felt better than she has since this started.  And then her fever relapsed this afternoon.     ROS: She has a mild nonproductive cough but mostly comes on with the aforementioned coughing.  No diarrhea.  She thought she might of had some red urine but a UA was not concerning for hematuria and her urine  subsequently returned to normal.  A complete 12 point review of systems was otherwise negative.    Past Medical History:  Past Medical History:   Diagnosis Date     GERD (gastroesophageal reflux disease)      Polycystic liver disease     in 40 years       Past Surgical History:  Past Surgical History:   Procedure Laterality Date     LAPAROSCOPIC UNROOFING LIVER CYST N/A 11/4/2016    Procedure: LAPAROSCOPIC UNROOFING LIVER CYST;  Surgeon: Sonido Cruz MD;  Location:  OR       Social History:  Social History     Social History     Marital status:      Spouse name: N/A     Number of children: N/A     Years of education: N/A     Occupational History     Not on file.     Social History Main Topics     Smoking status: Never Smoker     Smokeless tobacco: Never Used     Alcohol use Yes      Comment: 2 per week     Drug use: No     Sexual activity: Not on file     Other Topics Concern     Not on file     Social History Narrative    Works in [employer], contracts with [employer]       In addition to travel to the [facility], the patient was recently in [place].  No other travel.  She does garden but does not spend a lot of time in the woods and has had no known tick bites this season.  She has had a healthy dog.  No other animal exposures.  No sick contacts.  No significant time around small children.    Family Medical History:  Family History   Problem Relation Age of Onset     Alzheimer Disease Mother      Thyroid Disease Sister      thyroid cancer     HEART DISEASE No family hx of        Allergies:     Allergies   Allergen Reactions     Bee Venom      Penicillins Unknown       Medications:  Current Outpatient Prescriptions   Medication Sig Dispense Refill     doxycycline (VIBRAMYCIN) 100 MG capsule Take 1 capsule (100 mg) by mouth 2 times daily 8 capsule 0     doxycycline (VIBRAMYCIN) 100 MG capsule Take 1 capsule (100 mg) by mouth 2 times daily for 3 days In addition to previous 7  days to complete 10 total days 6 capsule 0     ibuprofen (ADVIL/MOTRIN) 600 MG tablet Take 1 tablet (600 mg) by mouth every 8 hours as needed for moderate pain 30 tablet 0     ondansetron (ZOFRAN ODT) 4 MG ODT tab Take 1 tablet (4 mg) by mouth every 6 hours as needed for nausea 10 tablet 0     acetaminophen 500 MG CAPS Take 2 capsules by mouth every 8 hours as needed For aches, pain, fever (Patient not taking: Reported on 6/25/2018) 60 capsule 0     doxycycline (VIBRAMYCIN) 100 MG capsule Take 1 capsule (100 mg) by mouth 2 times daily Take 2 tabs (200mg) for first dose, then 1 tab twice daily until gone 15 capsule 0     escitalopram (LEXAPRO) 20 MG tablet Take 1 tablet (20 mg) by mouth daily 90 tablet 3     Multiple Vitamin (MULTI-VITAMINS) TABS        venlafaxine (EFFEXOR-XR) 37.5 MG 24 hr capsule Take 1 capsule (37.5 mg) by mouth every morning Take 1 capsule daily for 14 days, then take 2 capsules daily if tolerating well. (Patient not taking: Reported on 6/25/2018) 60 capsule 1     zolpidem (AMBIEN) 5 MG tablet Take 1 tablet (5 mg) by mouth nightly as needed for sleep (Patient not taking: Reported on 6/25/2018) 15 tablet 0       Immunizations:  Immunization History   Administered Date(s) Administered     HepA-Adult 09/26/2008, 10/28/2008     HepB 09/26/2008, 10/28/2008, 05/29/2009     TDAP Vaccine (Boostrix) 09/26/2008     Zoster vaccine, live 06/24/2016       Exam:  B/P: 127/77, T: 102.4, P: 100, R: Data Unavailable, Weight: 157 lbs 4.8 oz  Gen: Alert and in no distress but appears uncomfortable  Psych: Normal affect. Alert and oriented.   HEENT: PERRL. No icterus. Oropharynx pink and moist without lesions.   Neck: No lymphadenopathy.   CV: Regular rate and rhythm without m/r/g.   Chest: Clear to auscultation bilaterally without wheezes or crackles.   Abdomen: Soft, non-distended. Non-tender. Normal bowel sounds.   Extremities: Warm and well perfused.   Skin: No rashes or lesions noted.     Labs:  All labs  personally reviewed.   WBC   Date Value Ref Range Status   06/28/2018 8.3 4.0 - 11.0 10e9/L Final       CRP Inflammation   Date Value Ref Range Status   06/28/2018 330.0 (H) 0.0 - 8.0 mg/L Final   06/23/2018 81.0 (H) 0.0 - 8.0 mg/L Final       Creatinine   Date Value Ref Range Status   06/28/2018 0.63 0.52 - 1.04 mg/dL Final   06/25/2018 0.66 0.52 - 1.04 mg/dL Final   06/23/2018 0.83 0.52 - 1.04 mg/dL Final     Imaging:   CXR images reviewed. Agree with findings as stated below.   Recent Results (from the past 48 hour(s))   XR Chest 2 Views    Narrative    XR CHEST 2 VW  6/28/2018 11:53 AM    History:  Cough, fever; .     Comparison: Chest radiograph dated 6/23/2018    Findings:   PA and the lateral view of the chest. Trachea is midline. Cardiac  silhouette is within normal limits. Pulmonary vasculature is distinct.  No airspace opacities. Small pleural effusions. Scattered surgical  clips projects over the right upper quadrant.      Impression    IMPRESSION: Small pleural effusions, new from previous. Otherwise no  new focal airspace opacities.    I have personally reviewed the examination and initial interpretation  and I agree with the findings.    GRACE ODOM MD       Assessment and Plan:  Jade Carcamo is a 52 year old female who has an ongoing acute inflammatory process marked by ongoing high fevers and a markedly elevated CRP.  To date all testing for a infectious etiology including blood cultures, Lyme serology, Ehrlichia PCR, RMSF serology, Anaplasma testing, and CMV PCR are negative. A  Babesia PCR, Babesia serology, parvovirus serologies, and EBV are still pending.  If this were Lyme, Ehrlichia, or Anaplasma she should respond to doxycycline by now.  However, I will have her complete a 14 day course of doxycycline so that we are certain she has been fully treated for Lyme disease and no questions regarding a partial treatment course arise in the future.  Doxycycline would not treat babesiosis so we  will wait for this testing.  However it should be noted that babesiosis should have shown up on the parasite smear if present.  Given the duration of her fever, she is nearing meeting criteria for fever of unknown origin.  The next step in evaluating FUO would be a CT scan of the chest abdomen and pelvis.  We will have this done today.    Plan:  1. Continue doxycycline to complete 14 day course  2. CT of the chest abdomen and pelvis today  3. Await pending parvovirus, EBV, and babesiosis testing  4. If CT is unrevealing, will check additional blood cultures, echocardiogram, recheck inflammatory markers, and possibly start workup for FUO  starting on Monday.    5. If patient has ongoing fevers without evidence of infection, involving rheumatology may then be warranted.    Patient and  are agreeable to the above plan.  I will call her on June 30 with CT results.    7/2/2018 Addendum:   CT was unrevealing. However, since becoming ill she has now had 3 ED visits and had symptomatic improvement with IV hydration each time. Home antipyretics are not effective. She has now also been on doxycycline for 1 week without improvement. I did also get additional history that she has recently redone a fish pond in the backyard so she would be at increased risk of endemic fungi with soil disruption. At her ED visit last night she had a WBC of 12.8 and . Therefore, we will pursue admission.     To date she has had the following evaluation:   Ehrlichia  - negative  Lyme screen - negative  Anaplasma - negative   Babesia - negative  RMSF - negative  CMV - negative  EBV - low titer consistent with reactivation  Parvovirus - previous infection  CK - normal  Blood cultures x 3 negative to date (only first one was prior to doxycycline)  Urine culture negative  CT C/A/P showed previously known polycystic liver disease. No evidence of infected cyst on CT - discussed with radiology 7/2/18.   ROBBY - pending    Planned additional  evaluation (currently ordered for outpatient draw):  TTE  Hepatititis A,B,C  HIV  Quantiferon Gold  Histoplasma urine and blood ag  Blastomyces urine and blood ag  Fungal antibody panel  West Nile Virus serology  RF  LDH  Repeat LFTs - GGT if alk phos is still rising    Note that leptospirosis, tularemia, Q fever, and brucellosis should all have improved with 1 week of doxycycline so are unlikely.     At this point she is unable to manage her fevers at home. Therefore, we will admit her to medicine for symptomatic relief and to facilitate workup of FUO. I will plan to see her as an inpatient on 7/3/18.     Britt Dumas MD  Infectious Diseases  [phone]

## 2018-06-29 NOTE — LETTER
6/29/2018      RE: Jade Carcamo  [address] [address]       [facility]  New Patient Visit  6/29/2018     Chief Complaint:  Fever    HPI:  Jade Carcamo is a 52 year old female who I first met briefly in the emergency department yesterday.  Ms. Carcamo has polycystic liver disease but has otherwise been in good health.  She recently went on a trip that involved camping and hiking along the [facility].  Then, approximately 1 week ago she developed fevers as high as 103 and diffuse muscle and joint pains.  Since then she has been seen by multiple providers in both her primary care clinic and in the emergency department.  Her initial labs are remarkable for mild thrombocytopenia, normal total white count but with some lymphopenia, mildly elevated LFTs, he had a markedly elevated CRP.  Her CK was normal.  She initially underwent a evaluation for tickborne workup with Lyme serology which was negative, Babesia serology which is still pending, and a parasite smear which showed some conclusions that could not rule out Ehrlichia.  An Ehrlichia PCR was sent to [place] in the meantime she was started on doxycycline.  The Ehrlichia PCR returned as negative and today, after 72 hours and doxycycline she remains febrile up to 102.  She has been using ibuprofen and Tylenol which do temporarily bring down her fever.  In addition to fevers, she continues to have a dull headache.  She does not have significant photophobia and has no neck pain or stiffness.  She has not had any lethargy or confusion.  She did report some mild stomach pain on some of her earlier medical visits but does not have any today.  In fact, she reports that she did have a few hours this morning where she felt better than she has since this started.  And then her fever relapsed this afternoon.     ROS: She has a mild nonproductive cough but mostly comes on with the aforementioned coughing.  No diarrhea.  She thought she  might of had some red urine but a UA was not concerning for hematuria and her urine subsequently returned to normal.  A complete 12 point review of systems was otherwise negative.    Past Medical History:  Past Medical History:   Diagnosis Date     GERD (gastroesophageal reflux disease)      Polycystic liver disease     in 40 years       Past Surgical History:  Past Surgical History:   Procedure Laterality Date     LAPAROSCOPIC UNROOFING LIVER CYST N/A 11/4/2016    Procedure: LAPAROSCOPIC UNROOFING LIVER CYST;  Surgeon: Sonido Cruz MD;  Location:  OR       Social History:  Social History     Social History     Marital status:      Spouse name: N/A     Number of children: N/A     Years of education: N/A     Occupational History     Not on file.     Social History Main Topics     Smoking status: Never Smoker     Smokeless tobacco: Never Used     Alcohol use Yes      Comment: 2 per week     Drug use: No     Sexual activity: Not on file     Other Topics Concern     Not on file     Social History Narrative    Works in [employer], contracts with [employer]       In addition to travel to the [facility], the patient was recently in [place].  No other travel.  She does garden but does not spend a lot of time in the woods and has had no known tick bites this season.  She has had a healthy dog.  No other animal exposures.  No sick contacts.  No significant time around small children.    Family Medical History:  Family History   Problem Relation Age of Onset     Alzheimer Disease Mother      Thyroid Disease Sister      thyroid cancer     HEART DISEASE No family hx of        Allergies:     Allergies   Allergen Reactions     Bee Venom      Penicillins Unknown       Medications:  Current Outpatient Prescriptions   Medication Sig Dispense Refill     doxycycline (VIBRAMYCIN) 100 MG capsule Take 1 capsule (100 mg) by mouth 2 times daily 8 capsule 0     doxycycline (VIBRAMYCIN) 100 MG capsule  Take 1 capsule (100 mg) by mouth 2 times daily for 3 days In addition to previous 7 days to complete 10 total days 6 capsule 0     ibuprofen (ADVIL/MOTRIN) 600 MG tablet Take 1 tablet (600 mg) by mouth every 8 hours as needed for moderate pain 30 tablet 0     ondansetron (ZOFRAN ODT) 4 MG ODT tab Take 1 tablet (4 mg) by mouth every 6 hours as needed for nausea 10 tablet 0     acetaminophen 500 MG CAPS Take 2 capsules by mouth every 8 hours as needed For aches, pain, fever (Patient not taking: Reported on 6/25/2018) 60 capsule 0     doxycycline (VIBRAMYCIN) 100 MG capsule Take 1 capsule (100 mg) by mouth 2 times daily Take 2 tabs (200mg) for first dose, then 1 tab twice daily until gone 15 capsule 0     escitalopram (LEXAPRO) 20 MG tablet Take 1 tablet (20 mg) by mouth daily 90 tablet 3     Multiple Vitamin (MULTI-VITAMINS) TABS        venlafaxine (EFFEXOR-XR) 37.5 MG 24 hr capsule Take 1 capsule (37.5 mg) by mouth every morning Take 1 capsule daily for 14 days, then take 2 capsules daily if tolerating well. (Patient not taking: Reported on 6/25/2018) 60 capsule 1     zolpidem (AMBIEN) 5 MG tablet Take 1 tablet (5 mg) by mouth nightly as needed for sleep (Patient not taking: Reported on 6/25/2018) 15 tablet 0       Immunizations:  Immunization History   Administered Date(s) Administered     HepA-Adult 09/26/2008, 10/28/2008     HepB 09/26/2008, 10/28/2008, 05/29/2009     TDAP Vaccine (Boostrix) 09/26/2008     Zoster vaccine, live 06/24/2016       Exam:  B/P: 127/77, T: 102.4, P: 100, R: Data Unavailable, Weight: 157 lbs 4.8 oz  Gen: Alert and in no distress but appears uncomfortable  Psych: Normal affect. Alert and oriented.   HEENT: PERRL. No icterus. Oropharynx pink and moist without lesions.   Neck: No lymphadenopathy.   CV: Regular rate and rhythm without m/r/g.   Chest: Clear to auscultation bilaterally without wheezes or crackles.   Abdomen: Soft, non-distended. Non-tender. Normal bowel sounds.   Extremities:  Warm and well perfused.   Skin: No rashes or lesions noted.     Labs:  All labs personally reviewed.   WBC   Date Value Ref Range Status   06/28/2018 8.3 4.0 - 11.0 10e9/L Final       CRP Inflammation   Date Value Ref Range Status   06/28/2018 330.0 (H) 0.0 - 8.0 mg/L Final   06/23/2018 81.0 (H) 0.0 - 8.0 mg/L Final       Creatinine   Date Value Ref Range Status   06/28/2018 0.63 0.52 - 1.04 mg/dL Final   06/25/2018 0.66 0.52 - 1.04 mg/dL Final   06/23/2018 0.83 0.52 - 1.04 mg/dL Final     Imaging:   CXR images reviewed. Agree with findings as stated below.   Recent Results (from the past 48 hour(s))   XR Chest 2 Views    Narrative    XR CHEST 2 VW  6/28/2018 11:53 AM    History:  Cough, fever; .     Comparison: Chest radiograph dated 6/23/2018    Findings:   PA and the lateral view of the chest. Trachea is midline. Cardiac  silhouette is within normal limits. Pulmonary vasculature is distinct.  No airspace opacities. Small pleural effusions. Scattered surgical  clips projects over the right upper quadrant.      Impression    IMPRESSION: Small pleural effusions, new from previous. Otherwise no  new focal airspace opacities.    I have personally reviewed the examination and initial interpretation  and I agree with the findings.    GRACE ODOM MD       Assessment and Plan:  Jade Carcamo is a 52 year old female who has an ongoing acute inflammatory process marked by ongoing high fevers and a markedly elevated CRP.  To date all testing for a infectious etiology including blood cultures, Lyme serology, Ehrlichia PCR, RMSF serology, Anaplasma testing, and CMV PCR are negative. A  Babesia PCR, Babesia serology, parvovirus serologies, and EBV are still pending.  If this were Lyme, Ehrlichia, or Anaplasma she should respond to doxycycline by now.  However, I will have her complete a 14 day course of doxycycline so that we are certain she has been fully treated for Lyme disease and no questions regarding a partial  treatment course arise in the future.  Doxycycline would not treat babesiosis so we will wait for this testing.  However it should be noted that babesiosis should have shown up on the parasite smear if present.  Given the duration of her fever, she is nearing meeting criteria for fever of unknown origin.  The next step in evaluating FUO would be a CT scan of the chest abdomen and pelvis.  We will have this done today.    Plan:  1. Continue doxycycline to complete 14 day course  2. CT of the chest abdomen and pelvis today  3. Await pending parvovirus, EBV, and babesiosis testing  4. If CT is unrevealing, will check additional blood cultures, echocardiogram, recheck inflammatory markers, and possibly start workup for organisms that contribute to FUO such as Coxiella starting on Monday.    5. If patient has ongoing fevers without evidence of infection, involving rheumatology may then be warranted.    Patient and  are agreeable to the above plan.  I will call him on June 30 with CT results.    Britt Dumas MD  Infectious Diseases  [phone]

## 2018-06-29 NOTE — MR AVS SNAPSHOT
After Visit Summary   6/29/2018    Jade Carcamo    MRN: [id number]           Patient Information     Date Of Birth          1965        Visit Information        Provider Department      6/29/2018 4:30 PM Britt Dumas MD [facility]        Today's Diagnoses     FUO (fever of unknown origin)    -  1    Lymphopenia        Arthralgia, unspecified joint        Elevated C-reactive protein (CRP)           Follow-ups after your visit        Future tests that were ordered for you today     Open Future Orders        Priority Expected Expires Ordered    Parvovirus B19 Antibody IgG Add-On  6/29/2019 6/29/2018            Who to contact     If you have questions or need follow up information about today's clinic visit or your schedule please [facility] directly at [phone].  Normal or non-critical lab and imaging results will be communicated to you by MyChart, letter or phone within 4 business days after the clinic has received the results. If you do not hear from us within 7 days, please contact the clinic through [employer] or phone. If you have a critical or abnormal lab result, we will notify you by phone as soon as possible.  Submit refill requests through [employer] or call your pharmacy and they will forward the refill request to us. Please allow 3 business days for your refill to be completed.          Additional Information About Your Visit        MyChart Information     [employer] gives you secure access to your electronic health record. If you see a primary care provider, you can also send messages to your care team and make appointments. If you have questions, please call your primary care clinic.  If you do not have a primary care provider, please call [phone] and they will assist you.        Care EveryWhere ID     This is your Care EveryWhere ID. This could be used by other organizations to access your  "[place] medical records  [id number]        Your Vitals Were     Pulse Temperature Height Last Period Pulse Oximetry BMI (Body Mass Index)    100 102.4  F (39.1  C) (Oral) 1.727 m (5' 8\") 11/14/2016 97% 23.92 kg/m2       Blood Pressure from Last 3 Encounters:   06/29/18 127/77   06/28/18 114/72   06/25/18 102/68    Weight from Last 3 Encounters:   06/29/18 71.4 kg (157 lb 4.8 oz)   06/23/18 67.6 kg (149 lb)   05/22/17 65.7 kg (144 lb 14.4 oz)              We Performed the Following     Parvovirus B19 Antibody IgG     Parvovirus B19 Antibody IgM          Today's Medication Changes          These changes are accurate as of 6/29/18 11:59 PM.  If you have any questions, ask your nurse or doctor.               These medicines have changed or have updated prescriptions.        Dose/Directions    * doxycycline 100 MG capsule   Commonly known as:  VIBRAMYCIN   This may have changed:  Another medication with the same name was added. Make sure you understand how and when to take each.   Used for:  Malaise, Chills, Myalgia, Non-intractable vomiting with nausea, unspecified vomiting type   Changed by:  Britt Dumas MD        Dose:  100 mg   Take 1 capsule (100 mg) by mouth 2 times daily Take 2 tabs (200mg) for first dose, then 1 tab twice daily until gone   Quantity:  15 capsule   Refills:  0       * doxycycline 100 MG capsule   Commonly known as:  VIBRAMYCIN   This may have changed:  Another medication with the same name was added. Make sure you understand how and when to take each.   Used for:  Malaise, Chills, Myalgia, Non-intractable vomiting with nausea, unspecified vomiting type   Changed by:  Britt Dumas MD        Dose:  100 mg   Take 1 capsule (100 mg) by mouth 2 times daily for 3 days In addition to previous 7 days to complete 10 total days   Quantity:  6 capsule   Refills:  0       * doxycycline 100 MG capsule   Commonly known as:  VIBRAMYCIN   This may have changed:  You were already taking a " medication with the same name, and this prescription was added. Make sure you understand how and when to take each.   Used for:  FUO (fever of unknown origin)   Changed by:  Britt Dumas MD        Dose:  100 mg   Take 1 capsule (100 mg) by mouth 2 times daily   Quantity:  8 capsule   Refills:  0       * Notice:  This list has 3 medication(s) that are the same as other medications prescribed for you. Read the directions carefully, and ask your doctor or other care provider to review them with you.         Where to get your medicines      These medications were sent to [facility] [id number] [place] [address] & Market  [address] [place] [address]     Phone:  [phone]     doxycycline 100 MG capsule                Primary Care Provider Office Phone # Fax #    Marina Owens [phone]       [address] [address]        Equal Access to Services     [facility]RRY [facility] : Hadii laisha ku hadasho Soomaali, waaxda luqadaha, qaybta kaalmada adeegyada, waxay emmettin haybelia rocha . So [facility] [phone].    ATENCIÓN: Si habla español, tiene a francisco disposición servicios gratuitos de asistencia lingüística. Llame al [phone].    We comply with applicable federal civil rights laws and [place] laws. We do not discriminate on the basis of race, color, national origin, age, disability, sex, sexual orientation, or gender identity.            Thank you!     Thank you for choosing [facility]  for your care. Our goal is always to provide you with excellent care. Hearing back from our patients is one way we can continue to improve our services. Please take a few minutes to complete the written survey that you may receive in the mail after your visit with us. Thank you!             Your Updated Medication List - Protect others around you: Learn how to safely use, store and throw away your  medicines at www.disposemymeds.org.          This list is accurate as of 6/29/18 11:59 PM.  Always use your most recent med list.                   Brand Name Dispense Instructions for use Diagnosis    acetaminophen 500 MG Caps     60 capsule    Take 2 capsules by mouth every 8 hours as needed For aches, pain, fever        * doxycycline 100 MG capsule    VIBRAMYCIN    15 capsule    Take 1 capsule (100 mg) by mouth 2 times daily Take 2 tabs (200mg) for first dose, then 1 tab twice daily until gone    Malaise, Chills, Myalgia, Non-intractable vomiting with nausea, unspecified vomiting type       * doxycycline 100 MG capsule    VIBRAMYCIN    6 capsule    Take 1 capsule (100 mg) by mouth 2 times daily for 3 days In addition to previous 7 days to complete 10 total days    Malaise, Chills, Myalgia, Non-intractable vomiting with nausea, unspecified vomiting type       * doxycycline 100 MG capsule    VIBRAMYCIN    8 capsule    Take 1 capsule (100 mg) by mouth 2 times daily    FUO (fever of unknown origin)       escitalopram 20 MG tablet    LEXAPRO    90 tablet    Take 1 tablet (20 mg) by mouth daily        ibuprofen 600 MG tablet    ADVIL/MOTRIN    30 tablet    Take 1 tablet (600 mg) by mouth every 8 hours as needed for moderate pain        MULTI-VITAMINS Tabs           ondansetron 4 MG ODT tab    ZOFRAN ODT    10 tablet    Take 1 tablet (4 mg) by mouth every 6 hours as needed for nausea        venlafaxine 37.5 MG 24 hr capsule    EFFEXOR-XR    60 capsule    Take 1 capsule (37.5 mg) by mouth every morning Take 1 capsule daily for 14 days, then take 2 capsules daily if tolerating well.    Menopausal syndrome (hot flashes)       zolpidem 5 MG tablet    AMBIEN    15 tablet    Take 1 tablet (5 mg) by mouth nightly as needed for sleep    Persistent disorder of initiating or maintaining sleep       * Notice:  This list has 3 medication(s) that are the same as other medications prescribed for you. Read the directions carefully,  and ask your doctor or other care provider to review them with you.

## 2018-06-29 NOTE — NURSING NOTE
"Chief Complaint   Patient presents with     Consult     fever- tick bite      /77  Pulse 100  Temp 102.4  F (39.1  C) (Oral)  Ht 1.727 m (5' 8\")  Wt 71.4 kg (157 lb 4.8 oz)  LMP 11/14/2016  SpO2 97%  BMI 23.92 kg/m2  Niall Castro, CMA    "

## 2018-06-29 NOTE — DISCHARGE INSTRUCTIONS

## 2018-06-30 LAB
B19V IGG SER IA-ACNC: 5.82 IV
B19V IGM SER IA-ACNC: 0.18 IV

## 2018-07-01 ENCOUNTER — HOSPITAL ENCOUNTER (EMERGENCY)
Facility: CLINIC | Age: 53
Discharge: HOME | End: 2018-07-02
Attending: EMERGENCY MEDICINE | Admitting: EMERGENCY MEDICINE
Payer: COMMERCIAL

## 2018-07-01 DIAGNOSIS — R50.9 FEVER OF UNKNOWN ORIGIN (FUO): ICD-10-CM

## 2018-07-01 LAB
ANION GAP SERPL CALCULATED.3IONS-SCNC: 11 MMOL/L (ref 3–14)
BACTERIA SPEC CULT: NO GROWTH
BUN SERPL-MCNC: 8 MG/DL (ref 7–30)
CALCIUM SERPL-MCNC: 8.6 MG/DL (ref 8.5–10.1)
CHLORIDE SERPL-SCNC: 103 MMOL/L (ref 94–109)
CO2 SERPL-SCNC: 24 MMOL/L (ref 20–32)
CREAT SERPL-MCNC: 0.65 MG/DL (ref 0.52–1.04)
EBV DNA # SPEC NAA+PROBE: 1863 {COPIES}/ML
EBV DNA SPEC NAA+PROBE-LOG#: 3.3 {LOG_COPIES}/ML
ERYTHROCYTE [DISTWIDTH] IN BLOOD BY AUTOMATED COUNT: 12.4 % (ref 10–15)
ERYTHROCYTE [SEDIMENTATION RATE] IN BLOOD BY WESTERGREN METHOD: 102 MM/H (ref 0–30)
GFR SERPL CREATININE-BSD FRML MDRD: >90 ML/MIN/1.7M2
GLUCOSE SERPL-MCNC: 101 MG/DL (ref 70–99)
HCT VFR BLD AUTO: 28.8 % (ref 35–47)
HGB BLD-MCNC: 9.8 G/DL (ref 11.7–15.7)
Lab: NORMAL
MAGNESIUM SERPL-MCNC: 2.3 MG/DL (ref 1.6–2.3)
MCH RBC QN AUTO: 30.7 PG (ref 26.5–33)
MCHC RBC AUTO-ENTMCNC: 34 G/DL (ref 31.5–36.5)
MCV RBC AUTO: 90 FL (ref 78–100)
PLATELET # BLD AUTO: 280 10E9/L (ref 150–450)
POTASSIUM SERPL-SCNC: 3.8 MMOL/L (ref 3.4–5.3)
RBC # BLD AUTO: 3.19 10E12/L (ref 3.8–5.2)
SODIUM SERPL-SCNC: 138 MMOL/L (ref 133–144)
SPECIMEN SOURCE: NORMAL
WBC # BLD AUTO: 12.8 10E9/L (ref 4–11)

## 2018-07-01 PROCEDURE — 96374 THER/PROPH/DIAG INJ IV PUSH: CPT

## 2018-07-01 PROCEDURE — 85652 RBC SED RATE AUTOMATED: CPT | Performed by: EMERGENCY MEDICINE

## 2018-07-01 PROCEDURE — 25000128 H RX IP 250 OP 636: Performed by: EMERGENCY MEDICINE

## 2018-07-01 PROCEDURE — 96361 HYDRATE IV INFUSION ADD-ON: CPT

## 2018-07-01 PROCEDURE — 85027 COMPLETE CBC AUTOMATED: CPT | Performed by: EMERGENCY MEDICINE

## 2018-07-01 PROCEDURE — 99284 EMERGENCY DEPT VISIT MOD MDM: CPT | Mod: 25

## 2018-07-01 PROCEDURE — 80048 BASIC METABOLIC PNL TOTAL CA: CPT | Performed by: EMERGENCY MEDICINE

## 2018-07-01 PROCEDURE — 87040 BLOOD CULTURE FOR BACTERIA: CPT | Performed by: EMERGENCY MEDICINE

## 2018-07-01 PROCEDURE — 86140 C-REACTIVE PROTEIN: CPT | Performed by: EMERGENCY MEDICINE

## 2018-07-01 PROCEDURE — 86038 ANTINUCLEAR ANTIBODIES: CPT | Performed by: EMERGENCY MEDICINE

## 2018-07-01 PROCEDURE — 83735 ASSAY OF MAGNESIUM: CPT | Performed by: EMERGENCY MEDICINE

## 2018-07-01 PROCEDURE — 36415 COLL VENOUS BLD VENIPUNCTURE: CPT

## 2018-07-01 RX ORDER — ONDANSETRON 2 MG/ML
4 INJECTION INTRAMUSCULAR; INTRAVENOUS ONCE
Status: COMPLETED | OUTPATIENT
Start: 2018-07-01 | End: 2018-07-01

## 2018-07-01 RX ORDER — SODIUM CHLORIDE, SODIUM LACTATE, POTASSIUM CHLORIDE, CALCIUM CHLORIDE 600; 310; 30; 20 MG/100ML; MG/100ML; MG/100ML; MG/100ML
1000 INJECTION, SOLUTION INTRAVENOUS CONTINUOUS
Status: DISCONTINUED | OUTPATIENT
Start: 2018-07-01 | End: 2018-07-02 | Stop reason: HOSPADM

## 2018-07-01 RX ADMIN — SODIUM CHLORIDE, POTASSIUM CHLORIDE, SODIUM LACTATE AND CALCIUM CHLORIDE 1000 ML: 600; 310; 30; 20 INJECTION, SOLUTION INTRAVENOUS at 23:14

## 2018-07-01 RX ADMIN — ONDANSETRON 4 MG: 2 INJECTION INTRAMUSCULAR; INTRAVENOUS at 23:17

## 2018-07-01 ASSESSMENT — ENCOUNTER SYMPTOMS
FEVER: 1
ACTIVITY CHANGE: 0
FATIGUE: 1

## 2018-07-01 NOTE — ED AVS SNAPSHOT
Emergency Department    [address] [place] [address]    Phone:  [phone]    Fax:  [phone]                                       Jade Carcamo   MRN: [id number]    Department:   Emergency Department   Date of Visit:  7/1/2018           After Visit Summary Signature Page     I have received my discharge instructions, and my questions have been answered. I have discussed any challenges I see with this plan with the nurse or doctor.    ..........................................................................................................................................  Patient/Patient Representative Signature      ..........................................................................................................................................  Patient Representative Print Name and Relationship to Patient    ..................................................               ................................................  Date                                            Time    ..........................................................................................................................................  Reviewed by Signature/Title    ...................................................              ..............................................  Date                                                            Time

## 2018-07-01 NOTE — ED AVS SNAPSHOT
Emergency Department    [address] [facility] [address]    Phone:  [phone]    Fax:  [phone]                                       Jade Carcamo   MRN: [id number]    Department:   Emergency Department   Date of Visit:  7/1/2018           Patient Information     Date Of Birth          1965        Your diagnoses for this visit were:     Fever of unknown origin (FUO)        You were seen by Trierweiler, Chad A, MD.      Follow-up Information     Follow up with Britt Dumas MD In 1 day.    Specialty:  Infectious Diseases    Why:  as scheduled    Contact information:    [address]  [facility] [address] [phone]          Follow up with  Emergency Department.    Specialty:  EMERGENCY MEDICINE    Why:  If symptoms worsen    Contact information:    [address] [facility] [id number] [phone]        Discharge Instructions       Discharge Instructions  Fever    You have been seen today for a fever. Fever is a normal body reaction to illness or inflammation. Fever is a sign that your body is doing what it should to fight something off. Fever is not dangerous, but it can make you feel miserable, and you will probably feel better if you get your fever to go down. Most infections are caused by a virus, and antibiotics will not help; your provider will tell you whether antibiotics are needed in your case. At this time your provider does not find that your fever is a sign of anything dangerous or life-threatening.  However, sometimes the signs of serious illness do not show up right away so additional care may be necessary.    Generally, every Emergency Department visit should have a follow-up clinic visit with either a primary or a specialty clinic/provider. Please follow-up as instructed by your emergency provider today.    What can I do to help myself?    Fill any prescriptions the provider gave you and take them right away--especially  antibiotics.    If you have a fever, get plenty of rest and drink lots of fluids, especially water.    What clothes or blankets you have on will not change your fever. Do what is comfortable for you.    Bathing or sponging in lukewarm water may help you feel better.    Tylenol  (acetaminophen), Motrin  (ibuprofen), or Advil  (ibuprofen) help bring fever down and may help you feel more comfortable. Be sure to read and follow the package directions, and ask your provider if you have questions.    Do not drink alcohol.    Return to the Emergency Department if:    Any of the symptoms you have get much worse.    You seem very sick, like being too weak to get up.    You have any new symptoms, especially serious things like abdominal (belly) pain or chest pain.    You are short of breath.    You have a severe headache.    You are vomiting (throwing up) so much you cannot keep fluids or medicines down.    You have confusion or seem unusually drowsy.    You have a seizure.    You have anything else that worries you.  If you were given a prescription for medicine here today, be sure to read all of the information (including the package insert) that comes with your prescription.  This will include important information about the medicine, its side effects, and any warnings that you need to know about.  The pharmacist who fills the prescription can provide more information and answer questions you may have about the medicine.  If you have questions or concerns that the pharmacist cannot address, please call or return to the Emergency Department.   Remember that you can always come back to the Emergency Department if you are not able to see your regular provider in the amount of time listed above, if you get any new symptoms, or if there is anything that worries you.      24 Hour Appointment Hotline       To make an appointment at any [facility], call [phone] ([phone]). If you don't have a family doctor or  clinic, we will help you find one. [facility] are conveniently located to serve the needs of you and your family.             Review of your medicines      Our records show that you are taking the medicines listed below. If these are incorrect, please call your family doctor or clinic.        Dose / Directions Last dose taken    acetaminophen 500 MG Caps   Dose:  2 capsule   Quantity:  60 capsule        Take 2 capsules by mouth every 8 hours as needed For aches, pain, fever   Refills:  0        * doxycycline 100 MG capsule   Commonly known as:  VIBRAMYCIN   Dose:  100 mg   Quantity:  15 capsule        Take 1 capsule (100 mg) by mouth 2 times daily Take 2 tabs (200mg) for first dose, then 1 tab twice daily until gone   Refills:  0        * doxycycline 100 MG capsule   Commonly known as:  VIBRAMYCIN   Dose:  100 mg   Quantity:  8 capsule        Take 1 capsule (100 mg) by mouth 2 times daily   Refills:  0        escitalopram 20 MG tablet   Commonly known as:  LEXAPRO   Dose:  20 mg   Quantity:  90 tablet        Take 1 tablet (20 mg) by mouth daily   Refills:  3        ibuprofen 600 MG tablet   Commonly known as:  ADVIL/MOTRIN   Dose:  600 mg   Quantity:  30 tablet        Take 1 tablet (600 mg) by mouth every 8 hours as needed for moderate pain   Refills:  0        MULTI-VITAMINS Tabs        Refills:  0        venlafaxine 37.5 MG 24 hr capsule   Commonly known as:  EFFEXOR-XR   Dose:  37.5 mg   Quantity:  60 capsule        Take 1 capsule (37.5 mg) by mouth every morning Take 1 capsule daily for 14 days, then take 2 capsules daily if tolerating well.   Refills:  1        zolpidem 5 MG tablet   Commonly known as:  AMBIEN   Dose:  5 mg   Quantity:  15 tablet        Take 1 tablet (5 mg) by mouth nightly as needed for sleep   Refills:  0        * Notice:  This list has 2 medication(s) that are the same as other medications prescribed for you. Read the directions carefully, and ask your doctor or other care provider to  review them with you.      ASK your doctor about these medications        Dose / Directions Last dose taken    ondansetron 4 MG ODT tab   Commonly known as:  ZOFRAN ODT   Dose:  4 mg   Quantity:  10 tablet   Ask about: Should I take this medication?        Take 1 tablet (4 mg) by mouth every 6 hours as needed for nausea   Refills:  0                Procedures and tests performed during your visit     Procedure/Test Number of Times Performed    Anti Nuclear April IgG by IFA with Reflex 1    Basic metabolic panel 1    Blood culture 2    CBC (+ platelets, no diff) 1    CRP inflammation 1    Erythrocyte sedimentation rate auto 1    Magnesium 1      Orders Needing Specimen Collection     None      Pending Results     Date and Time Order Name Status Description    7/1/2018 2259 Blood culture In process     7/1/2018 2259 Blood culture In process     7/1/2018 2259 Anti Nuclear April IgG by IFA with Reflex In process             Pending Culture Results     Date and Time Order Name Status Description    7/1/2018 2259 Blood culture In process     7/1/2018 2259 Blood culture In process             Pending Results Instructions     If you had any lab results that were not finalized at the time of your Discharge, you can call the ED Lab Result RN at [phone]. You will be contacted by this team for any positive Lab results or changes in treatment. The nurses are available 7 days a week from 10A to 6:30P.  You can leave a message 24 hours per day and they will return your call.        Test Results From Your Hospital Stay        7/1/2018 11:52 PM      Component Results     Component Value Ref Range & Units Status    Sodium 138 133 - 144 mmol/L Final    Potassium 3.8 3.4 - 5.3 mmol/L Final    Chloride 103 94 - 109 mmol/L Final    Carbon Dioxide 24 20 - 32 mmol/L Final    Anion Gap 11 3 - 14 mmol/L Final    Glucose 101 (H) 70 - 99 mg/dL Final    Urea Nitrogen 8 7 - 30 mg/dL Final    Creatinine 0.65 0.52 - 1.04 mg/dL Final    GFR  Estimate >90 >60 mL/min/1.7m2 Final    Non  GFR Calc    GFR Estimate If Black >90 >60 mL/min/1.7m2 Final    African American GFR Calc    Calcium 8.6 8.5 - 10.1 mg/dL Final         7/1/2018 11:52 PM      Component Results     Component Value Ref Range & Units Status    Magnesium 2.3 1.6 - 2.3 mg/dL Final         7/2/2018 12:05 AM      Component Results     Component Value Ref Range & Units Status    CRP Inflammation 236.0 (H) 0.0 - 8.0 mg/L Final         7/1/2018 11:53 PM      Component Results     Component Value Ref Range & Units Status    Sed Rate 102 (H) 0 - 30 mm/h Final         7/1/2018 11:34 PM      Component Results     Component Value Ref Range & Units Status    WBC 12.8 (H) 4.0 - 11.0 10e9/L Final    RBC Count 3.19 (L) 3.8 - 5.2 10e12/L Final    Hemoglobin 9.8 (L) 11.7 - 15.7 g/dL Final    Hematocrit 28.8 (L) 35.0 - 47.0 % Final    MCV 90 78 - 100 fl Final    MCH 30.7 26.5 - 33.0 pg Final    MCHC 34.0 31.5 - 36.5 g/dL Final    RDW 12.4 10.0 - 15.0 % Final    Platelet Count 280 150 - 450 10e9/L Final         7/1/2018 11:25 PM         7/2/2018 12:49 AM         7/2/2018  1:22 AM                Clinical Quality Measure: Blood Pressure Screening     Your blood pressure was checked while you were in the emergency department today. The last reading we obtained was  BP: 130/81 . Please read the guidelines below about what these numbers mean and what you should do about them.  If your systolic blood pressure (the top number) is less than 120 and your diastolic blood pressure (the bottom number) is less than 80, then your blood pressure is normal. There is nothing more that you need to do about it.  If your systolic blood pressure (the top number) is 120-139 or your diastolic blood pressure (the bottom number) is 80-89, your blood pressure may be higher than it should be. You should have your blood pressure rechecked within a year by a primary care provider.  If your systolic blood pressure (the top  number) is 140 or greater or your diastolic blood pressure (the bottom number) is 90 or greater, you may have high blood pressure. High blood pressure is treatable, but if left untreated over time it can put you at risk for heart attack, stroke, or kidney failure. You should have your blood pressure rechecked by a primary care provider within the next 4 weeks.  If your provider in the emergency department today gave you specific instructions to follow-up with your doctor or provider even sooner than that, you should follow that instruction and not wait for up to 4 weeks for your follow-up visit.        Thank you for choosing [facility]       Thank you for choosing [facility] for your care. Our goal is always to provide you with excellent care. Hearing back from our patients is one way we can continue to improve our services. Please take a few minutes to complete the written survey that you may receive in the mail after you visit with us. Thank you!        [employer] Information     [employer] gives you secure access to your electronic health record. If you see a primary care provider, you can also send messages to your care team and make appointments. If you have questions, please call your primary care clinic.  If you do not have a primary care provider, please call [phone] and they will assist you.        Care EveryWhere ID     This is your Care EveryWhere ID. This could be used by other organizations to access your [facility] medical records  [id number]        Equal Access to Services     HENRRY HALL : Susana Lees, waarjunda nancy, qaybta kaaloneal tovar, nehemiah worrell. So [facility] [phone].    ATENCIÓN: Si habla español, tiene a francisco disposición servicios gratuitos de asistencia lingüística. Llame al [phone].    We comply with applicable federal civil rights laws and [place] laws. We do not discriminate on the basis of race, color, national origin, age,  disability, sex, sexual orientation, or gender identity.            After Visit Summary       This is your record. Keep this with you and show to your community pharmacist(s) and doctor(s) at your next visit.

## 2018-07-02 ENCOUNTER — TELEPHONE (OUTPATIENT)
Dept: INFECTIOUS DISEASES | Facility: CLINIC | Age: 53
End: 2018-07-02

## 2018-07-02 ENCOUNTER — HOSPITAL ENCOUNTER (INPATIENT)
Facility: CLINIC | Age: 53
LOS: 11 days | Discharge: HOME HEALTH | DRG: 445 | End: 2018-07-13
Attending: PEDIATRICS | Admitting: HOSPITALIST
Payer: COMMERCIAL

## 2018-07-02 VITALS
RESPIRATION RATE: 20 BRPM | HEART RATE: 75 BPM | DIASTOLIC BLOOD PRESSURE: 64 MMHG | HEIGHT: 68 IN | OXYGEN SATURATION: 94 % | SYSTOLIC BLOOD PRESSURE: 108 MMHG | TEMPERATURE: 98.7 F | BODY MASS INDEX: 22.73 KG/M2 | WEIGHT: 150 LBS

## 2018-07-02 DIAGNOSIS — R50.9 FUO (FEVER OF UNKNOWN ORIGIN): Primary | ICD-10-CM

## 2018-07-02 DIAGNOSIS — K30 ACID INDIGESTION: ICD-10-CM

## 2018-07-02 DIAGNOSIS — K83.09 CHOLANGITIS (H): Primary | ICD-10-CM

## 2018-07-02 DIAGNOSIS — B37.0 THRUSH OF MOUTH AND ESOPHAGUS (H): ICD-10-CM

## 2018-07-02 DIAGNOSIS — B37.81 THRUSH OF MOUTH AND ESOPHAGUS (H): ICD-10-CM

## 2018-07-02 LAB
ALBUMIN SERPL-MCNC: 2.4 G/DL (ref 3.4–5)
ALP SERPL-CCNC: 229 U/L (ref 40–150)
ALT SERPL W P-5'-P-CCNC: 29 U/L (ref 0–50)
ANION GAP SERPL CALCULATED.3IONS-SCNC: 6 MMOL/L (ref 3–14)
AST SERPL W P-5'-P-CCNC: 18 U/L (ref 0–45)
B MICROTI DNA SPEC QL NAA+PROBE: NOT DETECTED
BABESIA DNA SPEC QL NAA+PROBE: NOT DETECTED
BASOPHILS # BLD AUTO: 0 10E9/L (ref 0–0.2)
BASOPHILS NFR BLD AUTO: 0.1 %
BILIRUB SERPL-MCNC: 0.8 MG/DL (ref 0.2–1.3)
BUN SERPL-MCNC: 8 MG/DL (ref 7–30)
CALCIUM SERPL-MCNC: 8.7 MG/DL (ref 8.5–10.1)
CHLORIDE SERPL-SCNC: 102 MMOL/L (ref 94–109)
CO2 SERPL-SCNC: 29 MMOL/L (ref 20–32)
CREAT SERPL-MCNC: 0.68 MG/DL (ref 0.52–1.04)
CREAT SERPL-MCNC: 0.69 MG/DL (ref 0.52–1.04)
CRP SERPL-MCNC: 236 MG/L (ref 0–8)
DIFFERENTIAL METHOD BLD: ABNORMAL
EOSINOPHIL # BLD AUTO: 0.1 10E9/L (ref 0–0.7)
EOSINOPHIL NFR BLD AUTO: 0.4 %
ERYTHROCYTE [DISTWIDTH] IN BLOOD BY AUTOMATED COUNT: 12.7 % (ref 10–15)
FERRITIN SERPL-MCNC: 210 NG/ML (ref 8–252)
GFR SERPL CREATININE-BSD FRML MDRD: 89 ML/MIN/1.7M2
GFR SERPL CREATININE-BSD FRML MDRD: >90 ML/MIN/1.7M2
GLUCOSE SERPL-MCNC: 113 MG/DL (ref 70–99)
HCT VFR BLD AUTO: 30 % (ref 35–47)
HGB BLD-MCNC: 10 G/DL (ref 11.7–15.7)
IMM GRANULOCYTES # BLD: 0.1 10E9/L (ref 0–0.4)
IMM GRANULOCYTES NFR BLD: 0.6 %
IRON SATN MFR SERPL: 8 % (ref 15–46)
IRON SERPL-MCNC: 15 UG/DL (ref 35–180)
LYMPHOCYTES # BLD AUTO: 1.1 10E9/L (ref 0.8–5.3)
LYMPHOCYTES NFR BLD AUTO: 7.7 %
MCH RBC QN AUTO: 31 PG (ref 26.5–33)
MCHC RBC AUTO-ENTMCNC: 33.3 G/DL (ref 31.5–36.5)
MCV RBC AUTO: 93 FL (ref 78–100)
MONOCYTES # BLD AUTO: 1.3 10E9/L (ref 0–1.3)
MONOCYTES NFR BLD AUTO: 9.1 %
NEUTROPHILS # BLD AUTO: 11.6 10E9/L (ref 1.6–8.3)
NEUTROPHILS NFR BLD AUTO: 82.1 %
NRBC # BLD AUTO: 0 10*3/UL
NRBC BLD AUTO-RTO: 0 /100
PLATELET # BLD AUTO: 255 10E9/L (ref 150–450)
PLATELET # BLD AUTO: 289 10E9/L (ref 150–450)
POTASSIUM SERPL-SCNC: 4.1 MMOL/L (ref 3.4–5.3)
PROT SERPL-MCNC: 6.9 G/DL (ref 6.8–8.8)
RBC # BLD AUTO: 3.23 10E12/L (ref 3.8–5.2)
SODIUM SERPL-SCNC: 138 MMOL/L (ref 133–144)
TIBC SERPL-MCNC: 181 UG/DL (ref 240–430)
WBC # BLD AUTO: 14.1 10E9/L (ref 4–11)

## 2018-07-02 PROCEDURE — 82728 ASSAY OF FERRITIN: CPT | Performed by: HOSPITALIST

## 2018-07-02 PROCEDURE — 87040 BLOOD CULTURE FOR BACTERIA: CPT | Performed by: EMERGENCY MEDICINE

## 2018-07-02 PROCEDURE — 85025 COMPLETE CBC W/AUTO DIFF WBC: CPT | Performed by: HOSPITALIST

## 2018-07-02 PROCEDURE — 25000128 H RX IP 250 OP 636: Performed by: STUDENT IN AN ORGANIZED HEALTH CARE EDUCATION/TRAINING PROGRAM

## 2018-07-02 PROCEDURE — 99223 1ST HOSP IP/OBS HIGH 75: CPT | Mod: AI | Performed by: HOSPITALIST

## 2018-07-02 PROCEDURE — 85049 AUTOMATED PLATELET COUNT: CPT | Performed by: HOSPITALIST

## 2018-07-02 PROCEDURE — 25000128 H RX IP 250 OP 636: Performed by: EMERGENCY MEDICINE

## 2018-07-02 PROCEDURE — 87449 NOS EACH ORGANISM AG IA: CPT | Performed by: HOSPITALIST

## 2018-07-02 PROCEDURE — 82565 ASSAY OF CREATININE: CPT | Performed by: HOSPITALIST

## 2018-07-02 PROCEDURE — 87340 HEPATITIS B SURFACE AG IA: CPT | Performed by: HOSPITALIST

## 2018-07-02 PROCEDURE — 83540 ASSAY OF IRON: CPT | Performed by: HOSPITALIST

## 2018-07-02 PROCEDURE — 87385 HISTOPLASMA CAPSUL AG IA: CPT | Performed by: HOSPITALIST

## 2018-07-02 PROCEDURE — 86803 HEPATITIS C AB TEST: CPT | Performed by: HOSPITALIST

## 2018-07-02 PROCEDURE — 84443 ASSAY THYROID STIM HORMONE: CPT | Performed by: HOSPITALIST

## 2018-07-02 PROCEDURE — 36415 COLL VENOUS BLD VENIPUNCTURE: CPT | Performed by: HOSPITALIST

## 2018-07-02 PROCEDURE — 80053 COMPREHEN METABOLIC PANEL: CPT | Performed by: HOSPITALIST

## 2018-07-02 PROCEDURE — 25000125 ZZHC RX 250: Performed by: STUDENT IN AN ORGANIZED HEALTH CARE EDUCATION/TRAINING PROGRAM

## 2018-07-02 PROCEDURE — 86704 HEP B CORE ANTIBODY TOTAL: CPT | Performed by: HOSPITALIST

## 2018-07-02 PROCEDURE — 86708 HEPATITIS A ANTIBODY: CPT | Performed by: HOSPITALIST

## 2018-07-02 PROCEDURE — 40000141 ZZH STATISTIC PERIPHERAL IV START W/O US GUIDANCE

## 2018-07-02 PROCEDURE — 83550 IRON BINDING TEST: CPT | Performed by: HOSPITALIST

## 2018-07-02 PROCEDURE — 25000132 ZZH RX MED GY IP 250 OP 250 PS 637: Performed by: STUDENT IN AN ORGANIZED HEALTH CARE EDUCATION/TRAINING PROGRAM

## 2018-07-02 PROCEDURE — 12000026 ZZH R&B TRANSPLANT

## 2018-07-02 RX ORDER — ACETAMINOPHEN 325 MG/1
975 TABLET ORAL EVERY 4 HOURS PRN
Status: DISCONTINUED | OUTPATIENT
Start: 2018-07-02 | End: 2018-07-02

## 2018-07-02 RX ORDER — ONDANSETRON 2 MG/ML
4 INJECTION INTRAMUSCULAR; INTRAVENOUS EVERY 6 HOURS PRN
Status: DISCONTINUED | OUTPATIENT
Start: 2018-07-02 | End: 2018-07-07

## 2018-07-02 RX ORDER — ONDANSETRON 4 MG/1
4 TABLET, ORALLY DISINTEGRATING ORAL EVERY 6 HOURS PRN
Status: DISCONTINUED | OUTPATIENT
Start: 2018-07-02 | End: 2018-07-07

## 2018-07-02 RX ORDER — ACETAMINOPHEN 325 MG/1
650 TABLET ORAL EVERY 4 HOURS PRN
Status: DISCONTINUED | OUTPATIENT
Start: 2018-07-02 | End: 2018-07-02

## 2018-07-02 RX ORDER — AMOXICILLIN 250 MG
1 CAPSULE ORAL 2 TIMES DAILY PRN
Status: DISCONTINUED | OUTPATIENT
Start: 2018-07-02 | End: 2018-07-03

## 2018-07-02 RX ORDER — SODIUM CHLORIDE 9 MG/ML
INJECTION, SOLUTION INTRAVENOUS CONTINUOUS
Status: DISCONTINUED | OUTPATIENT
Start: 2018-07-02 | End: 2018-07-03

## 2018-07-02 RX ORDER — AMOXICILLIN 250 MG
2 CAPSULE ORAL 2 TIMES DAILY PRN
Status: DISCONTINUED | OUTPATIENT
Start: 2018-07-02 | End: 2018-07-03

## 2018-07-02 RX ORDER — ACETAMINOPHEN 325 MG/1
975 TABLET ORAL EVERY 6 HOURS
Status: DISCONTINUED | OUTPATIENT
Start: 2018-07-03 | End: 2018-07-10

## 2018-07-02 RX ORDER — IBUPROFEN 400 MG/1
400 TABLET, FILM COATED ORAL EVERY 6 HOURS PRN
Status: DISCONTINUED | OUTPATIENT
Start: 2018-07-02 | End: 2018-07-03

## 2018-07-02 RX ORDER — NALOXONE HYDROCHLORIDE 0.4 MG/ML
.1-.4 INJECTION, SOLUTION INTRAMUSCULAR; INTRAVENOUS; SUBCUTANEOUS
Status: DISCONTINUED | OUTPATIENT
Start: 2018-07-02 | End: 2018-07-13 | Stop reason: HOSPADM

## 2018-07-02 RX ORDER — DOXYCYCLINE 100 MG/1
100 CAPSULE ORAL 2 TIMES DAILY
Status: DISCONTINUED | OUTPATIENT
Start: 2018-07-02 | End: 2018-07-02

## 2018-07-02 RX ADMIN — SODIUM CHLORIDE, POTASSIUM CHLORIDE, SODIUM LACTATE AND CALCIUM CHLORIDE 1000 ML: 600; 310; 30; 20 INJECTION, SOLUTION INTRAVENOUS at 00:15

## 2018-07-02 RX ADMIN — ONDANSETRON 4 MG: 4 TABLET, ORALLY DISINTEGRATING ORAL at 21:42

## 2018-07-02 RX ADMIN — DOXYCYCLINE HYCLATE 100 MG: 100 CAPSULE ORAL at 21:11

## 2018-07-02 RX ADMIN — SODIUM CHLORIDE: 9 INJECTION, SOLUTION INTRAVENOUS at 22:18

## 2018-07-02 RX ADMIN — ACETAMINOPHEN 975 MG: 325 TABLET, FILM COATED ORAL at 21:11

## 2018-07-02 RX ADMIN — ENOXAPARIN SODIUM 40 MG: 40 INJECTION SUBCUTANEOUS at 21:11

## 2018-07-02 NOTE — ED PROVIDER NOTES
"  History     Chief Complaint:  Fever       HPI   Jade Carcamo is a 52 year old female who presents with a fever. The patient has been having a high fever for over a week. In this time, the patient has been evaluated by her primary care doctor and seen in the ED twice each. The patient has also seen an infectious disease doctor, and had a CT scan in the past week. The patient states that she has had a fever of 102 or higher every day for the past week. She complains of fatigue, trouble sleeping, and general body aches. She states that she is \"just not feeling well\". The patient took 3 Motrin around 1930, which didn't help reduce the fever, and then took 5 Tylenol today.    Allergies:  Bee Venom  Penicillins    Medications:    Doxycycline   Escitalopram   Effexor-XR  Ambien    Past Medical History:    GERD   Polycystic liver disease     Past Surgical History:    Laparoscopic Unroofing Liver Cyst    Family History:    Alzheimer Disease  Thyroid cancer    Social History:  Smoking status: Never Smoker  Alcohol use: Yes     Review of Systems   Constitutional: Positive for fatigue and fever. Negative for activity change.   All other systems reviewed and are negative.        Physical Exam   First Vitals:  BP: 130/81  Pulse: 94  Temp: 102.8  F (39.3  C)  Resp: 20  Height: 172.7 cm (5' 8\")  Weight: 68 kg (150 lb)  SpO2: 94 %      Physical Exam  Eye:  Pupils are equal, round, and reactive.  Extraocular movements intact.    ENT:  No rhinorrhea.  Moist mucus membranes.  Normal tongue and tonsil.    Cardiac:  Regular rate and rhythm.  No murmurs, gallops, or rubs.    Pulmonary:  Clear to auscultation bilaterally.  No wheezes, rales, or rhonchi.    Abdomen:  Positive bowel sounds.  Abdomen is soft and non-distended, without focal tenderness.    Musculoskeletal:  Normal movement of all extremities without evidence for deficit.    Skin:  diaphoretic and warm to touch without rash.    Neurologic:  Non-focal exam without asymmetric " "weakness or numbness.     Psychiatric:  Patient is tearful and moaning, asking \"Why is this happening\".        Emergency Department Course     Laboratory:  Blood culture: (In process)    Blood culture: (In process)    CRP inflammation: 236.0    SED Rate: 102    CBC: WBC: 12.8, HGB: 9.8, PLT: 280    BMP: Glucose 101, o/w WNL (Creatinine: 0.65)    Magnesium: 2.3    Anti Nuclear April IgG by IFA with Reflex: (In process)    Interventions:  2314 Lactated ringers 1000 mL IV Bolus   84251 Lactated ringers 1000 mL IV Bolus   2317 Zofran 4 mg IV      Emergency Department Course:  Nursing notes and vitals reviewed.     1026 I performed an exam of the patient as documented above.     IV inserted. Medicine administered as documented above.     Blood drawn. This was sent to the lab for further testing, results above.    2252 I consulted with Dr. Faria, [facility] Infect[facility], regarding the patient's history and presentation here in the emergency department.    0054 I rechecked the patient and discussed the results of her workup thus far.     Findings and plan explained to the patient. Patient discharged home with instructions regarding supportive care, medications, and reasons to return. The importance of close follow-up was reviewed.     I personally reviewed the laboratory results with the patient and answered all related questions prior to discharge.         Impression & Plan      Medical Decision Making:  This delightful and unfortunate 52-year-old presents with continued issues of fevers for the last 12 days.  In brief, she has had several workups to the emergency department, her primary doctor, and a visit with infectious disease.  Life-threatening etiologies have been considered and ruled out with no bacterial cause ever found.  She has never shown any signs of severe sepsis.  Viral etiologies have seem to be the most likely and her workup is ongoing.  Multiple labs were sent after she visited " "with Dr. Dumas of infectious disease at the [facility] on Thursday and she underwent a CT of the chest/abdomen/pelvis to look for other more nefarious causes.  This CT was unremarkable and her virology's were generally unremarkable except for an elevation of her IgG of parvovirus and a mild elevation of a viral load from Maude-Barr virus.  With these results, I spoke with Dr. Faria of infectious disease from the [facility] who reviewed these lab results with me.  She felt that the viral load was low with Maude-Barr and she felt that this was likely a spurious value rather than the source of fever.  She requested that a repeat ESR, CRP, white blood cell count, blood cultures, and ROBBY would be sent.  The patient is scheduled to visit with her infectious disease physician tomorrow to talk about further workup.    On exam, the patient was initially febrile and felt miserable from her fevers, but denies any new or focal abnormality.  She denies abdominal pain, dysuria, rash, or anything that is different.  She simply came in tonight because \"I just cannot take these fevers any longer.\"  She had taken antipyretics before she came in and in the time that she was in my emergency department, her fever came down to a normal temperature.  A laboratory investigation shows a mild elevation of her white blood cell count.  Otherwise, she shows no evidence of dehydration or electrolyte abnormality, as she has struggled with hypokalemia in the past.    On my multiple reassessments, the patient noted she was feeling markedly improved.  Considering her very thorough outpatient workup along with scheduled outpatient evaluation tomorrow by infectious disease, we discussed disposition.  The patient was torn between being admitted versus going home.  After shared decision making, she decided that she would prefer to sleep in her own bed tonight and follow-up with infectious disease tomorrow to continue her workup.  I fully " support this as I feel she is unlikely to be suffering from an occult bacterial infection or other more life-threatening process.  However, it was exceedingly clear that should never be hesitation to return to us if she has any worsening of her conditions or other emergent concerns.  Her  was present for these conversations and he is comfortable taking her home with his questions fully answered.      Diagnosis:    ICD-10-CM   1. Fever of unknown origin (FUO) R50.9       Disposition:  Discharged to home with .    Discharge Medications:    I, Randy Correa, am serving as a scribe on 7/1/2018 at 10:26 PM to personally document services performed by Trierweiler, Chad A, MD based on my observations and the provider's statements to me.       Randy Correa  7/1/2018    EMERGENCY DEPARTMENT       Trierweiler, Chad A, MD  07/02/18 7302

## 2018-07-02 NOTE — IP AVS SNAPSHOT
[address] [address]    Phone:  [phone]                                       After Visit Summary   7/2/2018    Jade Carcamo    MRN: [id number]           After Visit Summary Signature Page     I have received my discharge instructions, and my questions have been answered. I have discussed any challenges I see with this plan with the nurse or doctor.    ..........................................................................................................................................  Patient/Patient Representative Signature      ..........................................................................................................................................  Patient Representative Print Name and Relationship to Patient    ..................................................               ................................................  Date                                            Time    ..........................................................................................................................................  Reviewed by Signature/Title    ...................................................              ..............................................  Date                                                            Time

## 2018-07-02 NOTE — LETTER
Transition Communication Hand-off for Care Transitions to Next Level of Care Provider    Name: Jade Carcamo  : 1965  MRN #: [id number]  Primary Care Provider: Marina Owens     Primary Clinic: [address] [place] [id number]     Reason for Hospitalization:    fever of unknown origin  Fever  Cholangititis   Cholangitis     Admit Date/Time: 2018  7:09 PM  Discharge Date: 2018    Payor Source: Payor: [employer] / Plan: [employer] CHOICES OPEN ACCESS / Product Type: HMO /     Discharge Needs Assessment:  Needs       Most Recent Value    Equipment Currently Used at Home none    Transportation Available car, family or friend will provide    Home Infusion Provider [facility] [phone], Fax: [phone]              Referrals     Future Labs/Procedures    Home infusion referral     Comments:    [facility]  Phone # [phone]  Fax # [phone]     Anticipated Length of Therapy: per MD discharge order, expected last dose is     Home Infusion Pharmacist to adjust therapy based on labs and clinical assessments: No (Home Infusion will call for order)    Labs:  May draw labs from Venous Catheter: Yes  Home Infusion Pharmacist to order labs based on therapy type and clinical assessments: Yes    Agency Staff to assess nursing needs for Infusion Therapy.    Access Device Management:  IV Access Type: PICC (placed 18)  Flush with Heparin and Normal Saline IVP PRN and routine site care (per agency protocol) to maintain access device? Yes            Key Recommendations:  Please review AVS    Felicity Hanley RN BSN PHN  Patient Care Management Coordinator  [facility] [address] [address]  Phone: [phone] / Pager: [phone]      AVS/Discharge Summary is the source of truth; this is a helpful guide for improved communication of patient story

## 2018-07-02 NOTE — IP AVS SNAPSHOT
"    [address] [facility]: [phone]                                              INTERAGENCY TRANSFER FORM - PHYSICIAN ORDERS   2018                    Hospital Admission Date: 2018  IGNACIO ZULETA   : 1965  Sex: Female        Attending Provider: Davis Guajardo MD     Allergies:  Bee Venom, Penicillins    Infection:  None   Service:  GENERAL MEDI    Ht:  1.727 m (5' 8\")   Wt:  78.8 kg (173 lb 11.2 oz)   Admission Wt:  72.3 kg (159 lb 8 oz)    BMI:  26.41 kg/m 2   BSA:  1.94 m 2            Patient PCP Information     Provider PCP Type    Marina Owens MD General      ED Clinical Impression     Diagnosis Description Comment Added By Time Added    Cholangitis [K83.0] Cholangitis [K83.0]  Anusha Moscoso MD 2018 10:06 AM    Acid indigestion [K30] Acid indigestion [K30]  Anusha Moscoso MD 2018 11:35 AM      Hospital Problems as of 2018              Priority Class Noted POA    Fever Medium  2018 Yes      Non-Hospital Problems as of 2018              Priority Class Noted    Liver cyst Medium  2016    ACP (advance care planning) Medium  2017      Code Status History     Date Active Date Inactive Code Status Order ID Comments User Context    2018 12:11 PM  Full Code [id number]  Anusha Moscoso MD Outpatient    2018  7:57 PM 2018 12:11 PM Full Code [id number]  Taylor Desai MD Inpatient    2016  8:09 AM 2018  7:57 PM Full Code [id number]  Armando Rosado MD Outpatient    2016  5:56 PM 2016  8:09 AM Full Code [id number]  Can Valdivia MD Inpatient         Medication Review      START taking        Dose / Directions Comments    calcium carbonate 500 MG chewable tablet   Commonly known as:  TUMS   Used for:  Acid indigestion        Dose:  1 chew tab   Take 1 tablet (500 mg) by mouth daily   Quantity:  150 tablet   Refills:  0        ciprofloxacin 500 MG tablet   Commonly known as:  CIPRO   Indication:  Infection Within " the Abdomen   Used for:  Cholangitis        Dose:  500 mg   Take 1 tablet (500 mg) by mouth every 12 hours for 9 days   Quantity:  18 tablet   Refills:  0        ertapenem 1 GM vial   Commonly known as:  INVanz   Indication:  Infection Within the Abdomen   Used for:  Cholangitis        Dose:  1 g   Inject 1 g into the vein every 24 hours for 9 days   Quantity:  90 mL   Refills:  0          CONTINUE these medications which have NOT CHANGED        Dose / Directions Comments    acetaminophen 500 MG Caps        Dose:  2 capsule   Take 2 capsules by mouth every 8 hours as needed For aches, pain, fever   Quantity:  60 capsule   Refills:  0          STOP taking     doxycycline 100 MG capsule   Commonly known as:  VIBRAMYCIN           escitalopram 20 MG tablet   Commonly known as:  LEXAPRO           ibuprofen 600 MG tablet   Commonly known as:  ADVIL/MOTRIN           MULTI-VITAMINS Tabs           ondansetron 4 MG ODT tab   Commonly known as:  ZOFRAN ODT           venlafaxine 37.5 MG 24 hr capsule   Commonly known as:  EFFEXOR-XR           zolpidem 5 MG tablet   Commonly known as:  AMBIEN                   Summary of Visit     Reason for your hospital stay       You were hospitalized for fever of unknown etiology with a presumptive diagnosis of atypical cholangitis             After Care     Activity       Your activity upon discharge: activity as tolerated       Diet       Follow this diet upon discharge: Advance to a regular diet as tolerated             Referrals     Home infusion referral       [facility]  Phone # [phone]  Fax # [phone]     Anticipated Length of Therapy: per MD discharge order, expected last dose is 7/23    Home Infusion Pharmacist to adjust therapy based on labs and clinical assessments: No (Home Infusion will call for order)    Labs:  May draw labs from Venous Catheter: Yes  Home Infusion Pharmacist to order labs based on therapy type and clinical assessments: Yes    Agency Staff  to assess nursing needs for Infusion Therapy.    Access Device Management:  IV Access Type: PICC (placed 7/12/18)  Flush with Heparin and Normal Saline IVP PRN and routine site care (per agency protocol) to maintain access device? Yes             Follow-Up Appointment Instructions     Future Labs/Procedures    Adult [facility] Follow-up and recommended labs and tests     Comments:    Follow up with primary care provider, Marina Owens, within 4-5 days, for hospital follow- up. The following labs/tests are recommended: CBC, CRP.    Follow-up with GI for repeat ERCP in 8 weeks for repeat ECRP.  Follow-up with Infectious disease within 2-4 weeks (Britt Dumas MD) for further outpatient work-up of fever of unknown etiology.     Appointments on [facility] and/or [facility] (with [email or web address] or [facility] provider or service). Call [phone] if you haven't heard regarding these appointments within 7 days of discharge.      Follow-Up Appointment Instructions     Adult [facility] Follow-up and recommended labs and tests       Follow up with primary care provider, Marina Owens, within 4-5 days, for hospital follow- up. The following labs/tests are recommended: CBC, CRP.    Follow-up with GI for repeat ERCP in 8 weeks for repeat ECRP.  Follow-up with Infectious disease within 2-4 weeks (Britt Dumas MD) for further outpatient work-up of fever of unknown etiology.     Appointments on [facility] and/or [facility] (with [facility] [email or web address] or [facility] provider or service). Call [phone] if you haven't heard regarding these appointments within 7 days of discharge.             Statement of Approval     Ordered          07/13/18 1212  I have reviewed and agree with all the recommendations and orders detailed in this document.  EFFECTIVE NOW     Approved and electronically signed by:  Anusha Moscoso MD

## 2018-07-02 NOTE — TELEPHONE ENCOUNTER
Infectious Diseases Telephone Note:   7/2/2018     Will admit to medicine for ongoing workup. Please see addendum at end of 6/29/18 clinic note for synopsis of workup to date and recommendations for next steps. I will plan to see patient for ID consult on 7/3/18.     Britt Dumas MD  Infectious Diseases  [phone]

## 2018-07-02 NOTE — IP AVS SNAPSHOT
MRN:[id number]                      After Visit Summary   7/2/2018    Jade Carcamo    MRN: [id number]           Thank you!     Thank you for choosing [facility] for your care. Our goal is always to provide you with excellent care. Hearing back from our patients is one way we can continue to improve our services. Please take a few minutes to complete the written survey that you may receive in the mail after you visit with us. Thank you!        Patient Information     Date Of Birth          1965        Designated Caregiver       Most Recent Value    Caregiver    Will someone help with your care after discharge? yes    Name of designated caregiver Eleazar    Phone number of caregiver     Caregiver address [place], [place]      About your hospital stay     You were admitted on:  July 2, 2018 You last received care in the:  [address] [facility]    You were discharged on:  July 13, 2018        Reason for your hospital stay       You were hospitalized for fever of unknown etiology with a presumptive diagnosis of atypical cholangitis                  Who to Call     For medical emergencies, please call 911.  For non-urgent questions about your medical care, please call your primary care provider or clinic, [phone]  For questions related to your surgery, please call your surgery clinic        Attending Provider     Provider Specialty    Johnnie Mathews MD Internal Medicine    Fredis Servin MD Internal Medicine    Sarah Strickland MD Internal Medicine    Davis Guajardo MD Internal Medicine       Primary Care Provider Office Phone # Fax #    Marina Owens [phone]      After Care Instructions     Activity       Your activity upon discharge: activity as tolerated            Diet       Follow this diet upon discharge: Advance to a regular diet as tolerated                  Follow-up Appointments     Adult [facility] Follow-up and recommended labs and  tests       Follow up with primary care provider, Marina Owens, within 4-5 days, for hospital follow- up. The following labs/tests are recommended: CBC, CRP.    Follow-up with GI for repeat ERCP in 8 weeks for repeat ECRP.  Follow-up with Infectious disease within 2-4 weeks (Britt Dumas MD) for further outpatient work-up of fever of unknown etiology.     Appointments on [place] and/or [facility] (with [facility] or [facility] provider or service). Call [phone] if you haven't heard regarding these appointments within 7 days of discharge.                  Additional Services     Home infusion referral       [facility]  Phone # [phone]  Fax # [phone]     Anticipated Length of Therapy: per MD discharge order, expected last dose is 7/23    Home Infusion Pharmacist to adjust therapy based on labs and clinical assessments: No (Home Infusion will call for order)    Labs:  May draw labs from Venous Catheter: Yes  Home Infusion Pharmacist to order labs based on therapy type and clinical assessments: Yes    Agency Staff to assess nursing needs for Infusion Therapy.    Access Device Management:  IV Access Type: PICC (placed 7/12/18)  Flush with Heparin and Normal Saline IVP PRN and routine site care (per agency protocol) to maintain access device? Yes                  Additional Information     If you use hormonal birth control (such as the pill, patch, ring or implants): You'll need a second form of birth control for 7 days (condoms, a diaphragm or contraceptive foam). While in the hospital, you received a medicine called Bridion. Your normal birth control will not work as well for a week after taking this medicine.          Pending Results     Date and Time Order Name Status Description    7/12/2018 1107 POC US Guide for Thorcentesis In process     7/11/2018 0737 Blood culture Preliminary     7/11/2018 0737 Blood culture Preliminary             Statement of Approval     Ordered           07/13/18 1341  I have reviewed and agree with all the recommendations and orders detailed in this document.  EFFECTIVE NOW     Approved and electronically signed by:  Anusha Moscoso MD           07/13/18 1212  I have reviewed and agree with all the recommendations and orders detailed in this document.  EFFECTIVE NOW     Approved and electronically signed by:  Anusha Moscoso MD             Admission Information     Date & Time Provider Department Dept. Phone    7/2/2018 Davis Guajardo MD [address] [facility] [phone]      Your Vitals Were     Blood Pressure Pulse Temperature Respirations Weight Last Period    138/84 (BP Location: Right arm) 87 99.3  F (37.4  C) (Oral) 18 78.8 kg (173 lb 11.2 oz) 11/14/2016    Pulse Oximetry BMI (Body Mass Index)                94% 26.41 kg/m2          MyChart Information     [employer] gives you secure access to your electronic health record. If you see a primary care provider, you can also send messages to your care team and make appointments. If you have questions, please call your primary care clinic.  If you do not have a primary care provider, please call [phone] and they will assist you.        Care EveryWhere ID     This is your Care EveryWhere ID. This could be used by other organizations to access your [facility] medical records  [id number]        Equal Access to Services     HENRRY HALL : Susana Lees, waaxda luqadaha, qaybta kaalmada guy, nehemiah worrell. So [facility] [phone].    ATENCIÓN: Si habla español, tiene a francisco disposición servicios gratuitos de asistencia lingüística. Llame al [phone].    We comply with applicable federal civil rights laws and [place] laws. We do not discriminate on the basis of race, color, national origin, age, disability, sex, sexual orientation, or gender identity.               Review of your medicines      START taking        Dose / Directions    calcium carbonate 500 MG chewable  tablet   Commonly known as:  TUMS   Used for:  Acid indigestion        Dose:  1 chew tab   Take 1 tablet (500 mg) by mouth daily   Quantity:  150 tablet   Refills:  0       ciprofloxacin 500 MG tablet   Commonly known as:  CIPRO   Indication:  Infection Within the Abdomen   Used for:  Cholangitis        Dose:  500 mg   Take 1 tablet (500 mg) by mouth every 12 hours for 9 days   Quantity:  18 tablet   Refills:  0       ertapenem 1 GM vial   Commonly known as:  INVanz   Indication:  Infection Within the Abdomen   Used for:  Cholangitis        Dose:  1 g   Inject 1 g into the vein every 24 hours for 9 days   Quantity:  90 mL   Refills:  0       nystatin [id number] UNIT/ML suspension   Commonly known as:  MYCOSTATIN   Indication:  Candidiasis Fungal Infection of the Oropharynx   Used for:  Thrush of mouth and esophagus (H)        Dose:  066039 Units   Take 5 mLs (500,000 Units) by mouth 4 times daily for 12 days   Quantity:  240 mL   Refills:  0         CONTINUE these medicines which have NOT CHANGED        Dose / Directions    acetaminophen 500 MG Caps        Dose:  2 capsule   Take 2 capsules by mouth every 8 hours as needed For aches, pain, fever   Quantity:  60 capsule   Refills:  0         STOP taking     doxycycline 100 MG capsule   Commonly known as:  VIBRAMYCIN           escitalopram 20 MG tablet   Commonly known as:  LEXAPRO           ibuprofen 600 MG tablet   Commonly known as:  ADVIL/MOTRIN           MULTI-VITAMINS Tabs           ondansetron 4 MG ODT tab   Commonly known as:  ZOFRAN ODT           venlafaxine 37.5 MG 24 hr capsule   Commonly known as:  EFFEXOR-XR           zolpidem 5 MG tablet   Commonly known as:  AMBIEN                Where to get your medicines      These medications were sent to [facility] - [place], [place] - [address]  [address] [address]     Phone:  [phone]     calcium carbonate 500 MG chewable tablet    ciprofloxacin 500 MG tablet     nystatin [id number] UNIT/ML suspension         Some of these will need a paper prescription and others can be bought over the counter. Ask your nurse if you have questions.     You don't need a prescription for these medications     ertapenem 1 GM vial                Protect others around you: Learn how to safely use, store and throw away your medicines at www.disposemymeds.org.        ANTIBIOTIC INSTRUCTION     You've Been Prescribed an Antibiotic - Now What?  Your healthcare team thinks that you or your loved one might have an infection. Some infections can be treated with antibiotics, which are powerful, life-saving drugs. Like all medications, antibiotics have side effects and should only be used when necessary. There are some important things you should know about your antibiotic treatment.      Your healthcare team may run tests before you start taking an antibiotic.    Your team may take samples (e.g., from your blood, urine or other areas) to run tests to look for bacteria. These test can be important to determine if you need an antibiotic at all and, if you do, which antibiotic will work best.      Within a few days, your healthcare team might change or even stop your antibiotic.    Your team may start you on an antibiotic while they are working to find out what is making you sick.    Your team might change your antibiotic because test results show that a different antibiotic would be better to treat your infection.    In some cases, once your team has more information, they learn that you do not need an antibiotic at all. They may find out that you don't have an infection, or that the antibiotic you're taking won't work against your infection. For example, an infection caused by a virus can't be treated with antibiotics. Staying on an antibiotic when you don't need it is more likely to be harmful than helpful.      You may experience side effects from your antibiotic.    Like all medications, antibiotics have  side effects. Some of these can be serious.    Let you healthcare team know if you have any known allergies when you are admitted to the hospital.    One significant side effect of nearly all antibiotics is the risk of severe and sometimes deadly diarrhea caused by Clostridium difficile (C. Difficile). This occurs when a person takes antibiotics because some good germs are destroyed. Antibiotic use allows C. diificile to take over, putting patients at high risk for this serious infection.    As a patient or caregiver, it is important to understand your or your loved one's antibiotic treatment. It is especially important for caregivers to speak up when patients can't speak for themselves. Here are some important questions to ask your healthcare team.    What infection is this antibiotic treating and how do you know I have that infection?    What side effects might occur from this antibiotic?    How long will I need to take this antibiotic?    Is it safe to take this antibiotic with other medications or supplements (e.g., vitamins) that I am taking?     Are there any special directions I need to know about taking this antibiotic? For example, should I take it with food?    How will I be monitored to know whether my infection is responding to the antibiotic?    What tests may help to make sure the right antibiotic is prescribed for me?      Information provided by:  www.cdc.gov/getsmart  U.S. Department of Health and Human Services  Centers for disease Control and Prevention  National Center for Emerging and Zoonotic Infectious Diseases  Division of Healthcare Quality Promotion             Medication List: This is a list of all your medications and when to take them. Check marks below indicate your daily home schedule. Keep this list as a reference.      Medications           Morning Afternoon Evening Bedtime As Needed    acetaminophen 500 MG Caps   Take 2 capsules by mouth every 8 hours as needed For aches, pain,  fever                                calcium carbonate 500 MG chewable tablet   Commonly known as:  TUMS   Take 1 tablet (500 mg) by mouth daily   Last time this was given:  500 mg on 7/12/2018  5:48 PM                                ciprofloxacin 500 MG tablet   Commonly known as:  CIPRO   Take 1 tablet (500 mg) by mouth every 12 hours for 9 days   Last time this was given:  500 mg on 7/13/2018 10:46 AM                                ertapenem 1 GM vial   Commonly known as:  INVanz   Inject 1 g into the vein every 24 hours for 9 days   Last time this was given:  1 g on 7/13/2018 12:47 PM                                nystatin [id number] UNIT/ML suspension   Commonly known as:  MYCOSTATIN   Take 5 mLs (500,000 Units) by mouth 4 times daily for 12 days   Last time this was given:  500,000 Units on 7/13/2018 12:57 PM

## 2018-07-03 ENCOUNTER — APPOINTMENT (OUTPATIENT)
Dept: CARDIOLOGY | Facility: CLINIC | Age: 53
DRG: 445 | End: 2018-07-03
Attending: HOSPITALIST
Payer: COMMERCIAL

## 2018-07-03 LAB
ANA SER QL IF: NEGATIVE
ANION GAP SERPL CALCULATED.3IONS-SCNC: 9 MMOL/L (ref 3–14)
BASOPHILS # BLD AUTO: 0 10E9/L (ref 0–0.2)
BASOPHILS NFR BLD AUTO: 0.1 %
BUN SERPL-MCNC: 7 MG/DL (ref 7–30)
CALCIUM SERPL-MCNC: 8.8 MG/DL (ref 8.5–10.1)
CHLORIDE SERPL-SCNC: 103 MMOL/L (ref 94–109)
CO2 SERPL-SCNC: 27 MMOL/L (ref 20–32)
CREAT SERPL-MCNC: 0.7 MG/DL (ref 0.52–1.04)
DIFFERENTIAL METHOD BLD: ABNORMAL
EOSINOPHIL # BLD AUTO: 0 10E9/L (ref 0–0.7)
EOSINOPHIL NFR BLD AUTO: 0.3 %
ERYTHROCYTE [DISTWIDTH] IN BLOOD BY AUTOMATED COUNT: 12.7 % (ref 10–15)
GFR SERPL CREATININE-BSD FRML MDRD: 87 ML/MIN/1.7M2
GLUCOSE SERPL-MCNC: 96 MG/DL (ref 70–99)
HAV IGG SER QL IA: REACTIVE
HBV CORE AB SERPL QL IA: NONREACTIVE
HBV SURFACE AG SERPL QL IA: NONREACTIVE
HCT VFR BLD AUTO: 29.9 % (ref 35–47)
HCV AB SERPL QL IA: NONREACTIVE
HGB BLD-MCNC: 9.8 G/DL (ref 11.7–15.7)
HIV 1+2 AB+HIV1 P24 AG SERPL QL IA: NONREACTIVE
IMM GRANULOCYTES # BLD: 0.1 10E9/L (ref 0–0.4)
IMM GRANULOCYTES NFR BLD: 0.7 %
LACTATE BLD-SCNC: 0.5 MMOL/L (ref 0.4–1.9)
LDH SERPL L TO P-CCNC: 140 U/L (ref 81–234)
LYMPHOCYTES # BLD AUTO: 0.9 10E9/L (ref 0.8–5.3)
LYMPHOCYTES NFR BLD AUTO: 5.9 %
MCH RBC QN AUTO: 30.3 PG (ref 26.5–33)
MCHC RBC AUTO-ENTMCNC: 32.8 G/DL (ref 31.5–36.5)
MCV RBC AUTO: 93 FL (ref 78–100)
MONOCYTES # BLD AUTO: 1.5 10E9/L (ref 0–1.3)
MONOCYTES NFR BLD AUTO: 9.9 %
NEUTROPHILS # BLD AUTO: 12.2 10E9/L (ref 1.6–8.3)
NEUTROPHILS NFR BLD AUTO: 83.1 %
NRBC # BLD AUTO: 0 10*3/UL
NRBC BLD AUTO-RTO: 0 /100
PLATELET # BLD AUTO: 288 10E9/L (ref 150–450)
POTASSIUM SERPL-SCNC: 3.8 MMOL/L (ref 3.4–5.3)
PROCALCITONIN SERPL-MCNC: 1.04 NG/ML
RBC # BLD AUTO: 3.23 10E12/L (ref 3.8–5.2)
RETICS # AUTO: 50.4 10E9/L (ref 25–95)
RETICS/RBC NFR AUTO: 1.6 % (ref 0.5–2)
RHEUMATOID FACT SER NEPH-ACNC: <20 IU/ML (ref 0–20)
SODIUM SERPL-SCNC: 139 MMOL/L (ref 133–144)
T4 FREE SERPL-MCNC: 1.28 NG/DL (ref 0.76–1.46)
TSH SERPL DL<=0.005 MIU/L-ACNC: 5.42 MU/L (ref 0.4–4)
WBC # BLD AUTO: 14.7 10E9/L (ref 4–11)

## 2018-07-03 PROCEDURE — 99233 SBSQ HOSP IP/OBS HIGH 50: CPT | Mod: GC | Performed by: INTERNAL MEDICINE

## 2018-07-03 PROCEDURE — 25000128 H RX IP 250 OP 636: Performed by: HOSPITALIST

## 2018-07-03 PROCEDURE — 25000132 ZZH RX MED GY IP 250 OP 250 PS 637: Performed by: INTERNAL MEDICINE

## 2018-07-03 PROCEDURE — 36415 COLL VENOUS BLD VENIPUNCTURE: CPT | Performed by: INTERNAL MEDICINE

## 2018-07-03 PROCEDURE — 87449 NOS EACH ORGANISM AG IA: CPT | Performed by: HOSPITALIST

## 2018-07-03 PROCEDURE — 93306 TTE W/DOPPLER COMPLETE: CPT | Mod: 26 | Performed by: INTERNAL MEDICINE

## 2018-07-03 PROCEDURE — 85045 AUTOMATED RETICULOCYTE COUNT: CPT | Performed by: INTERNAL MEDICINE

## 2018-07-03 PROCEDURE — 83605 ASSAY OF LACTIC ACID: CPT | Performed by: HOSPITALIST

## 2018-07-03 PROCEDURE — 87040 BLOOD CULTURE FOR BACTERIA: CPT | Performed by: HOSPITALIST

## 2018-07-03 PROCEDURE — 85025 COMPLETE CBC W/AUTO DIFF WBC: CPT | Performed by: INTERNAL MEDICINE

## 2018-07-03 PROCEDURE — 86698 HISTOPLASMA ANTIBODY: CPT | Performed by: HOSPITALIST

## 2018-07-03 PROCEDURE — 93308 TTE F-UP OR LMTD: CPT

## 2018-07-03 PROCEDURE — 86612 BLASTOMYCES ANTIBODY: CPT | Performed by: HOSPITALIST

## 2018-07-03 PROCEDURE — 86709 HEPATITIS A IGM ANTIBODY: CPT | Performed by: HOSPITALIST

## 2018-07-03 PROCEDURE — 87385 HISTOPLASMA CAPSUL AG IA: CPT | Performed by: HOSPITALIST

## 2018-07-03 PROCEDURE — 83615 LACTATE (LD) (LDH) ENZYME: CPT | Performed by: HOSPITALIST

## 2018-07-03 PROCEDURE — 84145 PROCALCITONIN (PCT): CPT | Performed by: HOSPITALIST

## 2018-07-03 PROCEDURE — 86431 RHEUMATOID FACTOR QUANT: CPT | Performed by: HOSPITALIST

## 2018-07-03 PROCEDURE — 40000611 ZZHCL STATISTIC MORPHOLOGY W/INTERP HEMEPATH TC 85060: Performed by: INTERNAL MEDICINE

## 2018-07-03 PROCEDURE — 87389 HIV-1 AG W/HIV-1&-2 AB AG IA: CPT | Performed by: HOSPITALIST

## 2018-07-03 PROCEDURE — 25000128 H RX IP 250 OP 636: Performed by: INTERNAL MEDICINE

## 2018-07-03 PROCEDURE — 86606 ASPERGILLUS ANTIBODY: CPT | Performed by: HOSPITALIST

## 2018-07-03 PROCEDURE — 25000125 ZZHC RX 250: Performed by: STUDENT IN AN ORGANIZED HEALTH CARE EDUCATION/TRAINING PROGRAM

## 2018-07-03 PROCEDURE — 86635 COCCIDIOIDES ANTIBODY: CPT | Performed by: HOSPITALIST

## 2018-07-03 PROCEDURE — 86480 TB TEST CELL IMMUN MEASURE: CPT | Performed by: HOSPITALIST

## 2018-07-03 PROCEDURE — 12000026 ZZH R&B TRANSPLANT

## 2018-07-03 PROCEDURE — 93306 TTE W/DOPPLER COMPLETE: CPT

## 2018-07-03 PROCEDURE — 36415 COLL VENOUS BLD VENIPUNCTURE: CPT | Performed by: HOSPITALIST

## 2018-07-03 PROCEDURE — 25000132 ZZH RX MED GY IP 250 OP 250 PS 637: Performed by: STUDENT IN AN ORGANIZED HEALTH CARE EDUCATION/TRAINING PROGRAM

## 2018-07-03 PROCEDURE — 84145 PROCALCITONIN (PCT): CPT | Performed by: INTERNAL MEDICINE

## 2018-07-03 PROCEDURE — 84080 ASSAY ALKALINE PHOSPHATASES: CPT | Performed by: INTERNAL MEDICINE

## 2018-07-03 PROCEDURE — 86788 WEST NILE VIRUS AB IGM: CPT | Performed by: HOSPITALIST

## 2018-07-03 PROCEDURE — 80048 BASIC METABOLIC PNL TOTAL CA: CPT | Performed by: HOSPITALIST

## 2018-07-03 PROCEDURE — 86789 WEST NILE VIRUS ANTIBODY: CPT | Performed by: HOSPITALIST

## 2018-07-03 PROCEDURE — 84439 ASSAY OF FREE THYROXINE: CPT | Performed by: HOSPITALIST

## 2018-07-03 PROCEDURE — 25000128 H RX IP 250 OP 636: Performed by: STUDENT IN AN ORGANIZED HEALTH CARE EDUCATION/TRAINING PROGRAM

## 2018-07-03 RX ORDER — PROCHLORPERAZINE MALEATE 5 MG
5 TABLET ORAL EVERY 6 HOURS PRN
Status: DISCONTINUED | OUTPATIENT
Start: 2018-07-03 | End: 2018-07-13 | Stop reason: HOSPADM

## 2018-07-03 RX ORDER — PROCHLORPERAZINE 25 MG
25 SUPPOSITORY, RECTAL RECTAL EVERY 12 HOURS PRN
Status: DISCONTINUED | OUTPATIENT
Start: 2018-07-03 | End: 2018-07-13 | Stop reason: HOSPADM

## 2018-07-03 RX ORDER — POLYETHYLENE GLYCOL 3350 17 G/17G
17 POWDER, FOR SOLUTION ORAL DAILY
Status: DISCONTINUED | OUTPATIENT
Start: 2018-07-03 | End: 2018-07-03

## 2018-07-03 RX ORDER — IBUPROFEN 400 MG/1
400 TABLET, FILM COATED ORAL EVERY 6 HOURS
Status: DISCONTINUED | OUTPATIENT
Start: 2018-07-03 | End: 2018-07-10

## 2018-07-03 RX ORDER — POLYETHYLENE GLYCOL 3350 17 G/17G
17 POWDER, FOR SOLUTION ORAL DAILY PRN
Status: DISCONTINUED | OUTPATIENT
Start: 2018-07-03 | End: 2018-07-13 | Stop reason: HOSPADM

## 2018-07-03 RX ORDER — DOCUSATE SODIUM 100 MG/1
100 CAPSULE, LIQUID FILLED ORAL 2 TIMES DAILY PRN
Status: DISCONTINUED | OUTPATIENT
Start: 2018-07-03 | End: 2018-07-13 | Stop reason: HOSPADM

## 2018-07-03 RX ORDER — ACETAMINOPHEN 10 MG/ML
1000 INJECTION, SOLUTION INTRAVENOUS ONCE
Status: COMPLETED | OUTPATIENT
Start: 2018-07-03 | End: 2018-07-03

## 2018-07-03 RX ORDER — DOCUSATE SODIUM 100 MG/1
100 CAPSULE, LIQUID FILLED ORAL 2 TIMES DAILY
Status: DISCONTINUED | OUTPATIENT
Start: 2018-07-03 | End: 2018-07-03

## 2018-07-03 RX ADMIN — DOCUSATE SODIUM 100 MG: 100 CAPSULE, LIQUID FILLED ORAL at 15:51

## 2018-07-03 RX ADMIN — ONDANSETRON 4 MG: 4 TABLET, ORALLY DISINTEGRATING ORAL at 12:00

## 2018-07-03 RX ADMIN — ENOXAPARIN SODIUM 40 MG: 40 INJECTION SUBCUTANEOUS at 20:22

## 2018-07-03 RX ADMIN — IBUPROFEN 400 MG: 400 TABLET ORAL at 00:29

## 2018-07-03 RX ADMIN — ONDANSETRON 4 MG: 2 INJECTION INTRAMUSCULAR; INTRAVENOUS at 20:21

## 2018-07-03 RX ADMIN — DEXTROSE AND SODIUM CHLORIDE: 5; 900 INJECTION, SOLUTION INTRAVENOUS at 22:36

## 2018-07-03 RX ADMIN — PROCHLORPERAZINE EDISYLATE 5 MG: 5 INJECTION INTRAMUSCULAR; INTRAVENOUS at 00:08

## 2018-07-03 RX ADMIN — IBUPROFEN 400 MG: 400 TABLET ORAL at 12:06

## 2018-07-03 RX ADMIN — ACETAMINOPHEN 975 MG: 325 TABLET, FILM COATED ORAL at 15:51

## 2018-07-03 RX ADMIN — ACETAMINOPHEN 1000 MG: 10 INJECTION, SOLUTION INTRAVENOUS at 23:25

## 2018-07-03 RX ADMIN — DOCUSATE SODIUM 100 MG: 100 CAPSULE, LIQUID FILLED ORAL at 20:21

## 2018-07-03 RX ADMIN — ACETAMINOPHEN 975 MG: 325 TABLET, FILM COATED ORAL at 09:41

## 2018-07-03 RX ADMIN — IBUPROFEN 400 MG: 400 TABLET ORAL at 06:19

## 2018-07-03 RX ADMIN — ACETAMINOPHEN 975 MG: 325 TABLET, FILM COATED ORAL at 03:08

## 2018-07-03 RX ADMIN — IBUPROFEN 400 MG: 400 TABLET ORAL at 18:26

## 2018-07-03 RX ADMIN — Medication 1 MG: at 00:29

## 2018-07-03 RX ADMIN — DEXTROSE AND SODIUM CHLORIDE: 5; 900 INJECTION, SOLUTION INTRAVENOUS at 11:54

## 2018-07-03 RX ADMIN — ONDANSETRON 4 MG: 2 INJECTION INTRAMUSCULAR; INTRAVENOUS at 06:19

## 2018-07-03 NOTE — PLAN OF CARE
Problem: Infection, Risk/Actual (Adult)  Goal: Identify Related Risk Factors and Signs and Symptoms  Related risk factors and signs and symptoms are identified upon initiation of Human Response Clinical Practice Guideline (CPG).   Temp max 101.6. MD notified. Blood cx drawn x2. Sepsis protocol triggered. Lactic result of 0.5. Tylenol and ibuprofen admin. Ice packs applied. Temp reduction to 99.2o acheived. MIVF infusion maintained. Tolerated ice water through shift. zofran and compazine admin for nausea. No emesis. Up with sba 2/2 general weakness. Void spontaneous. UOP wdl. Rested btwn cares. Awaiting ID consult. Continue POC.   07/03/18 0804   Infection, Risk/Actual   Related Risk Factors (Infection, Risk/Actual) other (see comments)  (fever of unknown origin)   Signs and Symptoms (Infection, Risk/Actual) body temperature changes;chills;lab value changes;malaise;nausea;weakness

## 2018-07-03 NOTE — PROGRESS NOTES
[facility], [place]    Internal Medicine Progress Note - [facility] Service    Main Plans for Today   - ID consult  - supportive cares  - TTE to evaluate for endocarditis     Assessment & Plan   Jade Carcamo is a 52 year old female admitted on 7/2/2018. Jade Carcamo is a 52 year old female admitted on 7/2/2018. She was previously healthy now with 11 days of persistent fever up to 103F now s/p 7d doxycycline with negative outpatient infectious workup here now for supportive cares and further workup of fever.    Fever without source - unclear etiology  Ddx includes infections (fungal vs vector vs viral, less likely bacterial) vs autoimmune vs malignancy. Drug reaction less likely as no known exposure. On admission had leukocytosis of 14.1, up from 12.8 on 7/1 and 8.3 on 6/28, neutrophil predominant. Sed rate elevated at 102 (7/1) and . Has not improved after despite 7 days of doxycycline therapy. She has had a relatively negative infectious work up. She has had possible recent environmental exposure to spores on her hiking trip and/or around her backyard pond that had a broken filter. Fungal work-up pending. Her alk phos has been slowly trending up, but other LFTs are at her baseline. Autoimmune is also possible given elevated inflammatory markers and RF/ROBBY are pending; however, no arthralgias or rashes. Malignancy is much less likely at this time. Had CT C/A/P that was unremarkable on 6/28, CBC not concerning for hematologic malignancy. Although she does not meet the criteria for fever of unknown origin at this time, we are continuing with this workup to attempt to better elucidate the etiology of her symptoms.  - Infectious disease consult, recs appreciated  - TTE today  - no abx unless clinically decompensated  - HepA IgG, HepB sAg and core IgG, HepC IgG pending  - HIV antibody and antigen pending  - Quantiferon Gold pending  - Histoplasma urine and blood ag pending  - Blastomyces  urine and blood ag pending  - Fungal antibody panel pending  - West Nile Virus serology pending  - Rheumatoid factor pending  - ROBBY (drawn 7/1) pending  - Add on alk davi isoenzymes given elevated alk phos pending     Workup to date:  - Erlichia - negative  - Lyme screen - negative  - Anaplasma - negative  - Babesia - negative  - RMSF - negative  - CMV - negative  - EBV - low titer consistent with reactivation  - Parvovirus - previous infection  - CK - normal  - LDH - wnl  - Blood cultures x4 (6/25, 6/28, 7/1, 7/2) - NGTD  - Urine culture (6/23) - Negative  - CT C/A/P (7/1) - previously known polycystic liver disease. No evidence of new or infected cyst. Dr. Ferguson discussed with radiology.    # Pain Assessment:  Current Pain Score 7/3/2018   Patient currently in pain? denies   Pain score (0-10) -   Pain location -   Pain descriptors -   - Jade is experiencing pain due to fevers. Pain management was discussed and the plan was created in a collaborative fashion.  Jade's response to the current recommendations: engaged  - Please see the plan for pain management as documented above      Diet: Regular Diet Adult  Fluids: mIVF D5NS @100ml/h  DVT Prophylaxis: Enoxaparin (Lovenox) SQ  Code Status: Full Code    Disposition Plan   Expected discharge: 2 - 3 days, recommended to prior living arrangement once adequate pain management/ tolerating PO medications and SIRS/Sepsis treated.     Entered: Anusha Moscoso 07/03/2018, 7:28 AM   Information in the above section will display in the discharge planner report.      The patient's care was discussed with the Attending Physician, Dr. Strickland, Care Coordinator/ and Patient.    Anusha Moscoso  [facility]  Maroon: 5  Pager: [phone]  Please see sticky note for cross cover information    Interval History   Overnight events and notes reviewed.   This AM feels slightly better than at admission. Denies pain. Some nausea. Denies abdominal pain, chest pain, sob,  headache, vision changes.     4 point ROS negative except as above.     Physical Exam   Vital Signs: Temp: 99.2  F (37.3  C) Temp src: Oral BP: 110/77 Pulse: 82   Resp: 20 SpO2: 94 % O2 Device: None (Room air)    Weight: 0 lbs 0 oz    General Appearance: No distress. Awake and alert and answering questions appropriately.  Eyes: No scleral icterus. PERRLA  HEENT: NC/AT. Oropharynx clear. No lymphadenopathy.  Respiratory: CTAB. No accessory muscle use. No crackles, wheezes, rhonchi or rales.  Cardiovascular: RRR. No murmurs, rubs or gallops.  GI: BS+. Soft, nondistended. No tenderness to palpation. No guarding or rebound tenderness. No organomegaly.  Skin: No rash. No ecchymoses or petechiae.  Musculoskeletal: Normal muscle bulk and tone. Extremities warm and well perfused. DP and radial pulses 2+ bilaterally.  Neurologic: AOx3. CN II-XII intact. No focal deficits. Face symmetric. Moving all extremities equally and independently.

## 2018-07-03 NOTE — PLAN OF CARE
Problem: Infection, Risk/Actual (Adult)  Goal: Identify Related Risk Factors and Signs and Symptoms  Related risk factors and signs and symptoms are identified upon initiation of Human Response Clinical Practice Guideline (CPG).   Outcome: No Change    Tmax 99.1, other VSS, and denies pain.  Continues to alternate scheduled tylenol and ibuprofen.  Patient up independently in room, voiding, no bowel movement reported, and tolerating diet with fair appetite.  Echo completed (results pending).  ODT zofran x1 prophylaxis to lunch.  Scheduled meds given as ordered.  Continue to monitor, treat as ordered, notify team with changes.  Awaiting ID consult.

## 2018-07-03 NOTE — H&P
"[facility], [place]    Internal Medicine History and Physical - [facility] Service       Date of Admission:  7/2/2018    Chief Complaint   Fever    History is obtained from the patient    History of Present Illness   Jade Carcamo is a 52 year old female  who has a history of polycystic liver disease s/p laparoscopic un-karina (2016) and is admitted for further workup of persistent fever. Her symptoms started 11 days ago (Friday 6/22) around 1pm after she returned home from a class. She had relatively sudden onset of fatigue, diffuse myalgias and body aches. She described it as \"flu-like\" and said she had to lay down and not move. She also noted hypersensitivity and felt that even her \"hair follicles hurt\". She then spiked a fever to 103F associated with shaking chills and nausea with vomiting. The fever was unresponsive to 1000mg tylenol and 600mg motrin. She had recently returned from a backpacking trip in the [facility] two weeks prior to onset of symptoms. She did not notice any tick bites, but states that she did not wear any insect repellent as there weren't mosquitos. She has spent some time in [place] recently as well. Her only known sick contact was the son of a friend she visited while on her backpacking trip. She is not sure what he had, but that he \"appeared ill\". Other possible exposures include her backyard pond, which as had a broken filter since last August. She states that it has been a \"cest pool\" during the spring and she has spent time around it gardening. No new recent medications or supplements. No recent weight loss. Endorses minimal dry cough. No chest pain or SOB. Does have mild RUQ abdominal pain, unchanged over the past 11 days. No diarrhea or constipation. She did have nausea and vomiting until Wednesday, but has not had any subsequent episodes of emesis. No easy bruising. She has had an intermittent dull headache that is not associated with photophobia, " or neck stiffness. She has only had 1-2 instances where she has been afebrile otherwise her temp fluctuates between 100-103F daily. She usually only has ~1 temp spike a day. She has been taking scheduled tylenol (1000mg q6h) with 400mg motrin and only had minimal control of her temp.    On Saturday she went to the ED at [facility] where CXR, UA and CBC. Labs significant for mild thrombocytopenia (137), mild lymphopenia, elevated CRP (81). Lyme titer was sent given possible recent tick exposure, no rash was noted. RUQ US obtained due to RUQ pain, negative for cholecystitis. She did feel some symptomatic relief with IVF. She went to a f/u appointment with her PCP on 6/25 who initiated a tick-borne disease workup including ehrlichia, anaplasma, babesia, lyme, rickettsia in addition to blood cultures and CBC. She was also started on 7d course of doxycycline. That workup was negative (see below). She returned to the ED at [facility] on Thursday for continued fever, chills and body aches. Dr. Dumas of ID saw her in the ED and recommended continuing the doxy and f/u in ID clinic. At that visit Dr. Dumas recommended further infectious w/u including viral serologies (HIV, Hepatitis A/B/C, WNV), fungal workup, TB, ECHO as well as some initial rheumatology labs including ROBBY and RF. She also had a CT C/A/P, which was negative except for known polycystic liver. The plan was to do the remainder of this workup outpatient, but the patient has continued to have persistent fevers fluctuating between 100-103F and has not been able to manage her symptoms outpatient.     Workup to date:  - Erlichia - negative  - Lyme screen - negative  - Anaplasma - negative  - Babesia - negative  - RMSF - negative  - CMV - negative  - EBV - low titer consistent with reactivation  - Parvovirus - previous infection  - CK - normal  - Blood cultures x4 (6/25, 6/28, 7/1, 7/2) - NGTD  - Urine culture (6/23) - Negative  - CT C/A/P (7/1) - previously known  polycystic liver disease. No evidence of new or infected cyst. Dr. Ferguson discussed with radiology.    Review of Systems   The 10 point Review of Systems is negative other than noted in the HPI    Past Medical History    I have reviewed this patient's medical history and updated it with pertinent information if needed.   Past Medical History:   Diagnosis Date     GERD (gastroesophageal reflux disease)      Polycystic liver disease     in 40 years        Past Surgical History   I have reviewed this patient's surgical history and updated it with pertinent information if needed.  Past Surgical History:   Procedure Laterality Date     LAPAROSCOPIC UNROOFING LIVER CYST N/A 11/4/2016    Procedure: LAPAROSCOPIC UNROOFING LIVER CYST;  Surgeon: Sonido Cruz MD;  Location:  OR        Social History   Social History   Substance Use Topics     Smoking status: Never Smoker     Smokeless tobacco: Never Used     Alcohol use Yes      Comment: 2 per week   No history of IVDU.    Family History   I have reviewed this patient's family history and updated it with pertinent information if needed.   Family History   Problem Relation Age of Onset     Alzheimer Disease Mother      Thyroid Disease Sister      thyroid cancer     HEART DISEASE Father      Other - See Comments Brother      bradycardia requiring ablation     Psoriasis Maternal Grandfather        Prior to Admission Medications   Prior to Admission Medications   Prescriptions Last Dose Informant Patient Reported? Taking?   Multiple Vitamin (MULTI-VITAMINS) TABS Unknown at Unknown time  Yes No   acetaminophen 500 MG CAPS 7/2/2018 at 4pm  No Yes   Sig: Take 2 capsules by mouth every 8 hours as needed For aches, pain, fever   doxycycline (VIBRAMYCIN) 100 MG capsule 7/2/2018 at AM  No Yes   Sig: Take 1 capsule (100 mg) by mouth 2 times daily Take 2 tabs (200mg) for first dose, then 1 tab twice daily until gone   doxycycline (VIBRAMYCIN) 100 MG capsule 7/2/2018 at AM  No Yes    Sig: Take 1 capsule (100 mg) by mouth 2 times daily   escitalopram (LEXAPRO) 20 MG tablet Unknown at Unknown time  Yes No   Sig: Take 1 tablet (20 mg) by mouth daily   ibuprofen (ADVIL/MOTRIN) 600 MG tablet 7/2/2018 at 2:30pm  No Yes   Sig: Take 1 tablet (600 mg) by mouth every 8 hours as needed for moderate pain   ondansetron (ZOFRAN ODT) 4 MG ODT tab   No No   Sig: Take 1 tablet (4 mg) by mouth every 6 hours as needed for nausea   venlafaxine (EFFEXOR-XR) 37.5 MG 24 hr capsule Unknown at Unknown time  No No   Sig: Take 1 capsule (37.5 mg) by mouth every morning Take 1 capsule daily for 14 days, then take 2 capsules daily if tolerating well.   Patient not taking: Reported on 6/25/2018   zolpidem (AMBIEN) 5 MG tablet Unknown at Unknown time  No No   Sig: Take 1 tablet (5 mg) by mouth nightly as needed for sleep   Patient not taking: Reported on 6/25/2018      Facility-Administered Medications: None     Allergies   Allergies   Allergen Reactions     Bee Venom      Penicillins Unknown       Physical Exam   Vital Signs: Temp: 99.5  F (37.5  C) Temp src: Oral BP: 119/77 Pulse: 80   Resp: 18 SpO2: 99 % O2 Device: None (Room air)    Weight: 0 lbs 0 oz    General Appearance: Patient appears unwell. Lying in bed with lights off. Noticeably shivering. Awake and alert and answering questions appropriately.  Eyes: No scleral icterus. PERRLA  HEENT: NC/AT. Oropharynx clear. No ant/post cervical, submandibular lymphadenopathy.  Respiratory: CTAB. Breathing comfortably on room air. No accessory muscle use. No crackles, wheezes, rhonchi or rales.  Cardiovascular: RRR. No murmurs, rubs or gallops.  GI: BS+. Soft, nondistended. Mild RUQ tenderness to palpation. No guarding or rebound tenderness. No organomegaly.  Skin: No rash. No ecchymoses or petechiae.  Musculoskeletal: Normal muscle bulk and tone. Extremities warm and well perfused. DP and radial pulses 2+ bilaterally.  Neurologic: AOx3. CN II-XII intact. No focal deficits.  Face symmetric. Moving all extremities equally and independently.  Psychiatric: Normal mood and affect.      Assessment & Plan   Jade Carcamo is a 52 year old female admitted on 7/2/2018. She was previously healthy now with 11 days of persistent fever up to 103F now s/p 7d doxycycline with negative outpatient infectious workup here now for supportive cares and further workup of fever.    # Fever - unclear etiology  Ddx includes infections (fungal vs vector vs viral, less likely bacterial), autoimmune, malignancy. Fever has ranged from 100-103.5 for 11 days with minimal response to scheduled tylenol and motrin. On admission had leukocytosis of 14.1, up from 12.8 on 7/1 and 8.3 on 6/28, neutrophil predominant. Sed rate elevated at 102 (7/1) and . Has not improved after one week of doxycycline therapy making tick-borne disease less likely (outpatient tick born workup negative). She has had possible recent environmental exposure to spores on her hiking trip and/or around her backyard pond that had a broken filter. Will work up for possible fungal infection. Viral is also probably less likely at this time given rising leukocytosis. Her alk phos has been slowly trending up, but other LFTs are at her baseline. Autoimmune is also possible given elevated inflammatory markers. She has had intermittent myalgias and fatigue, but no arthralgias or rashes. Malignancy is much less likely at this time. Had CT C/A/P that was unremarkable on 6/28, CBC not concerning for hematologic malignancy. Although she does not meet the criteria for fever of unknown origin at this time, we are continuing with this workup to attempt to better elucidate the etiology of her symptoms.  - ID consulted - Dr. Dumas is on service starting tomorrow and is aware she is in the hospital  - TTE  - HepA IgG, HepB sAg and core IgG, HepC IgG  - HIV antibody and antigen  - Quantiferon Gold  - Histoplasma urine and blood ag  - Blastomyces urine and blood  ag  - Fungal antibody panel  - West Nile Virus serology  - Rheumatoid factor  - ROBBY (drawn 7/1) pending  - LDH  - Consider GGT if AP continues to rise  - Acetaminophen 1000mg q6h  - Ibuprofen 400mg q6h prn  - Ice packs for symptomatic relief  - Could consider Fernández-2 inhibitor or IV tylenol if patient remains febrile  - Will obtain BCx2 if patient spikes overnight - including fungal cultures  - Hold doxycycline to minimize interference with new cultures (discussed with Dr. Dumas)    # Pain Assessment:  Current Pain Score 7/2/2018   Patient currently in pain? denies   Pain score (0-10) -   Pain location -   Pain descriptors -   Jade francois pain level was assessed and she currently denies pain.      Diet: Regular Diet Adult  Fluids: mIVF NS @100ml/h  DVT Prophylaxis: Enoxaparin (Lovenox) SQ  Code Status: Full Code    Disposition Plan   Expected discharge: 2 - 3 days; recommended to prior living arrangement once afebrile and further infectious workup complete.     Entered: Taylor Desai 07/02/2018, 9:16 PM   Information in the above section will display in the discharge planner report.    The patient was discussed with Dr. Servin.    Taylor Desai  [facility]   Pager: [phone]  Please see sticky note for cross cover information    Data   Data     Recent Labs  Lab 07/02/18  2034 07/01/18  2300 06/28/18  1144 06/28/18  1101   WBC 14.1* 12.8*  --  8.3   HGB 10.0* 9.8*  --  10.5*   MCV 93 90  --  91    280  --  173    138 140 139   POTASSIUM 4.1 3.8 3.0* 3.1*   CHLORIDE 102 103 102 103   CO2 29 24  --  28   BUN 8 8  --  8   CR 0.68 0.65  --  0.63   ANIONGAP 6 11  --  8   THUAN 8.7 8.6  --  8.8   * 101* 116* 117*   ALBUMIN 2.4*  --   --  2.6*   PROTTOTAL 6.9  --   --  6.8   BILITOTAL 0.8  --   --  1.5*   ALKPHOS 229*  --   --  151*   ALT 29  --   --  27   AST 18  --   --  15     No results found for this or any previous visit (from the past 24  hour(s)).    CT Chest/Abd/Pelvis IMPRESSION (6/29/18):  1. No infectious source identified in the chest, abdomen, or pelvis.  2. Postoperative changes of hepatic cyst unroofing procedure. There is  mildly increased ascites compared to CT 11/14/2016.  3. Heterogeneous thyroid nodule in the inferior left thyroid lobe  measuring 2.8 cm. Thyroid ultrasound could be considered if indicated.  4. Small pleural effusions. Associated atelectasis.

## 2018-07-03 NOTE — PROGRESS NOTES
"CLINICAL NUTRITION SERVICES - ASSESSMENT NOTE     Nutrition Prescription    Recommendations already ordered by Registered Dietitian (RD):  Ordered Boost Plus and/or Boost Breeze (when requested) with meals.        REASON FOR ASSESSMENT  Jade Carcamo is a/an 52 year old female assessed by the dietitian for Admission Nutrition Risk Screen for reduced oral intake over the last month    NUTRITION HISTORY  PMH of polycystic liver disease s/p laparoscopic un-karina (2016)    Per chart review, patient with no recent weight loss.  Had been eating well prior to most recent symptom onset (fevers, body aches, nausea/vomiting) which has only been over the last 11 days.     CURRENT NUTRITION ORDERS  Diet: Regular    Intake/Tolerance: Some nausea and weakness this admission.  Tolerated ice water, orange juice and scrambled eggs so far today.     LABS  Labs reviewed    MEDICATIONS  Zofran/Compazine PRN  IVF with D5 @ 100 ml/hr    ANTHROPOMETRICS  Height: 5'8\"  Most Recent Weight: 68 kg (7/1/18)     IBW: 63.6 kg (107%)  BMI: Normal BMI  Weight History:  Pt denied weight loss.   Dosing Weight: 68 kg (actual weight)    ASSESSED NUTRITION NEEDS  Estimated Energy Needs: 2730-7275 kcals/day (25 - 30 kcals/kg)  Justification: Maintenance  Estimated Protein Needs: 54-68 grams protein/day (0.8 - 1 grams of pro/kg)  Justification: Maintenance  Estimated Fluid Needs: 1 mL/kcal  Justification: Maintenance    PHYSICAL FINDINGS  See malnutrition section below.    MALNUTRITION  % Intake: < 75% for > 7 days (non-severe)  % Weight Loss: None noted  Subcutaneous Fat Loss: Unable to assess  Muscle Loss: Unable to assess   Fluid Accumulation/Edema: Unable to assess  Malnutrition Diagnosis: Unable to determine due to incomplete information.     NUTRITION DIAGNOSIS  Inadequate oral intake related to recent illness, nausea as evidenced by patient eating minimally today (1 meal).      INTERVENTIONS  Implementation  Nutrition Education: Unable to " complete due to unable to see patient.       Goals  Patient to consume % of nutritionally adequate meal trays TID, or the equivalent with supplements/snacks.     Monitoring/Evaluation  Progress toward goals will be monitored and evaluated per protocol.    Estefany Stratton MS, RD, LD  Pager [phone]

## 2018-07-03 NOTE — PLAN OF CARE
Problem: Patient Care Overview  Goal: Plan of Care/Patient Progress Review  Outcome: No Change  /77  Pulse 80  Temp 99.5  F (37.5  C) (Oral)  Resp 18  LMP 11/14/2016  SpO2 99%    1900 - 2300: Pt admitted from home to  for fever of unknown origin workup. Tmax 99.5. Pt endorses feeling feverish; experiencing chills and hot flashes. Tylenol administered and ice packs applied. Pt c/o generalized aches. Received 4mg ODT zofran x1 for nausea with good relief. PIV with NS @ 100ml/hr. Voiding adequately, urine samples sent - results pending. Pt reports last BM yesterday. Pt up SBA/ind. Plan for ID consult and echo tomorrow. Labs drawn - see results review. Plan for continued infectious workup. Will continue to monitor and notify of any changes.

## 2018-07-04 LAB
BACTERIA SPEC CULT: NO GROWTH
CRP SERPL-MCNC: 230 MG/L (ref 0–8)
ERYTHROCYTE [DISTWIDTH] IN BLOOD BY AUTOMATED COUNT: 12.8 % (ref 10–15)
ERYTHROCYTE [SEDIMENTATION RATE] IN BLOOD BY WESTERGREN METHOD: 98 MM/H (ref 0–30)
HCT VFR BLD AUTO: 26 % (ref 35–47)
HGB BLD-MCNC: 8.4 G/DL (ref 11.7–15.7)
LACTATE BLD-SCNC: 0.6 MMOL/L (ref 0.4–1.9)
Lab: NORMAL
MCH RBC QN AUTO: 29.9 PG (ref 26.5–33)
MCHC RBC AUTO-ENTMCNC: 32.3 G/DL (ref 31.5–36.5)
MCV RBC AUTO: 93 FL (ref 78–100)
PLATELET # BLD AUTO: 269 10E9/L (ref 150–450)
RBC # BLD AUTO: 2.81 10E12/L (ref 3.8–5.2)
SPECIMEN SOURCE: NORMAL
WBC # BLD AUTO: 14.4 10E9/L (ref 4–11)
WNV IGG SER-ACNC: 0.52 IV
WNV IGM SER-ACNC: 0.02 IV

## 2018-07-04 PROCEDURE — 25000125 ZZHC RX 250: Performed by: STUDENT IN AN ORGANIZED HEALTH CARE EDUCATION/TRAINING PROGRAM

## 2018-07-04 PROCEDURE — 85027 COMPLETE CBC AUTOMATED: CPT | Performed by: INTERNAL MEDICINE

## 2018-07-04 PROCEDURE — 12000026 ZZH R&B TRANSPLANT

## 2018-07-04 PROCEDURE — 36415 COLL VENOUS BLD VENIPUNCTURE: CPT | Performed by: INTERNAL MEDICINE

## 2018-07-04 PROCEDURE — 25000132 ZZH RX MED GY IP 250 OP 250 PS 637: Performed by: STUDENT IN AN ORGANIZED HEALTH CARE EDUCATION/TRAINING PROGRAM

## 2018-07-04 PROCEDURE — 25000128 H RX IP 250 OP 636: Performed by: INTERNAL MEDICINE

## 2018-07-04 PROCEDURE — 83605 ASSAY OF LACTIC ACID: CPT | Performed by: INTERNAL MEDICINE

## 2018-07-04 PROCEDURE — 99233 SBSQ HOSP IP/OBS HIGH 50: CPT | Mod: GC | Performed by: INTERNAL MEDICINE

## 2018-07-04 PROCEDURE — 25000132 ZZH RX MED GY IP 250 OP 250 PS 637: Performed by: INTERNAL MEDICINE

## 2018-07-04 PROCEDURE — 25000128 H RX IP 250 OP 636: Performed by: STUDENT IN AN ORGANIZED HEALTH CARE EDUCATION/TRAINING PROGRAM

## 2018-07-04 PROCEDURE — 86140 C-REACTIVE PROTEIN: CPT | Performed by: INTERNAL MEDICINE

## 2018-07-04 PROCEDURE — 85652 RBC SED RATE AUTOMATED: CPT | Performed by: INTERNAL MEDICINE

## 2018-07-04 RX ORDER — METRONIDAZOLE 500 MG/1
500 TABLET ORAL EVERY 8 HOURS SCHEDULED
Status: DISCONTINUED | OUTPATIENT
Start: 2018-07-04 | End: 2018-07-05

## 2018-07-04 RX ORDER — CEFTRIAXONE 1 G/1
1 INJECTION, POWDER, FOR SOLUTION INTRAMUSCULAR; INTRAVENOUS EVERY 24 HOURS
Status: DISCONTINUED | OUTPATIENT
Start: 2018-07-04 | End: 2018-07-05

## 2018-07-04 RX ADMIN — ACETAMINOPHEN 975 MG: 325 TABLET, FILM COATED ORAL at 05:31

## 2018-07-04 RX ADMIN — ONDANSETRON 4 MG: 4 TABLET, ORALLY DISINTEGRATING ORAL at 08:10

## 2018-07-04 RX ADMIN — ONDANSETRON 4 MG: 4 TABLET, ORALLY DISINTEGRATING ORAL at 14:09

## 2018-07-04 RX ADMIN — CEFTRIAXONE 1 G: 1 INJECTION, POWDER, FOR SOLUTION INTRAMUSCULAR; INTRAVENOUS at 13:11

## 2018-07-04 RX ADMIN — DEXTROSE AND SODIUM CHLORIDE: 5; 900 INJECTION, SOLUTION INTRAVENOUS at 19:00

## 2018-07-04 RX ADMIN — ACETAMINOPHEN 975 MG: 325 TABLET, FILM COATED ORAL at 23:11

## 2018-07-04 RX ADMIN — ENOXAPARIN SODIUM 40 MG: 40 INJECTION SUBCUTANEOUS at 19:03

## 2018-07-04 RX ADMIN — IBUPROFEN 400 MG: 400 TABLET ORAL at 06:47

## 2018-07-04 RX ADMIN — DOCUSATE SODIUM 100 MG: 100 CAPSULE, LIQUID FILLED ORAL at 16:55

## 2018-07-04 RX ADMIN — ACETAMINOPHEN 975 MG: 325 TABLET, FILM COATED ORAL at 11:12

## 2018-07-04 RX ADMIN — DOCUSATE SODIUM 100 MG: 100 CAPSULE, LIQUID FILLED ORAL at 08:49

## 2018-07-04 RX ADMIN — IBUPROFEN 400 MG: 400 TABLET ORAL at 01:22

## 2018-07-04 RX ADMIN — METRONIDAZOLE 500 MG: 500 TABLET ORAL at 14:09

## 2018-07-04 RX ADMIN — ACETAMINOPHEN 975 MG: 325 TABLET, FILM COATED ORAL at 16:55

## 2018-07-04 RX ADMIN — DEXTROSE AND SODIUM CHLORIDE: 5; 900 INJECTION, SOLUTION INTRAVENOUS at 08:11

## 2018-07-04 RX ADMIN — METRONIDAZOLE 500 MG: 500 TABLET ORAL at 22:35

## 2018-07-04 RX ADMIN — IBUPROFEN 400 MG: 400 TABLET ORAL at 19:03

## 2018-07-04 RX ADMIN — IBUPROFEN 400 MG: 400 TABLET ORAL at 12:35

## 2018-07-04 NOTE — PLAN OF CARE
Problem: Patient Care Overview  Goal: Plan of Care/Patient Progress Review  Outcome: No Change  /83 (BP Location: Right arm)  Pulse 78  Temp 101  F (38.3  C) (Oral)  Resp 18  LMP 11/14/2016  SpO2 98%    Tmax 101. Pt endorses feeling feverish. Scheduled antipyretics administered with minimal relief - one time dose of IV tylenol ordered; awaiting med from pharmacy to administer. Pt using ice packs, cold washcloths and fan for alternative cooling measures. C/o generalized aches. Received 4mg IV zofran x1 for nausea with good relief. Up independently. Voiding adequately, no BM reported. Received colace x2 for constipation with no reported results. PIV with D5NS @ 100ml/hr. Awaiting further recs from ID. Family at bedside supportive of pt. Will continue to monitor and notify of any changes.

## 2018-07-04 NOTE — PLAN OF CARE
Problem: Patient Care Overview  Goal: Plan of Care/Patient Progress Review  Outcome: No Change  Tmax: 101.3 (o), OVSS, on RA.  Alternating between Tylenol & Ibuprofen for fevers along with fan, cool washcloth & ice packs.  Pt. up independently.  Voiding adequate amounts, no stools reported this shift.  D5NS at 100cc/hour via PIV. Pt. stated she slept well overnight.  Continue to follow POC & notify with changes.

## 2018-07-04 NOTE — CONSULTS
[facility] ID SERVICE: NEW CONSULTATION  Patient:  Jade Carcamo, Date of birth 1965, Medical record number [id number]  Date of Admission: 7/2/2018  Date of Visit:  7/3/2018  Requesting Provider: Sarah Strickland         Assessment and Recommendations:   Problem List:  1. Fevers, does not yet meet technical FUO criteria, but evaluating as such  2. Leukocytosis with neutrophilic predominance - developed after initial mild leukopenia  3. Elevated CRP  4. Polycystic liver disease - no evidence of infected cysts on exam or imaging  5. Listed penicillin allergy - has not received penicillins or cephalosporins in our system    Discussion:  Ms. Carcamo continues to have fevers without an etiology discovered to date. Next steps might be to give short trial of broad spectrum antibiotics to ensure she does not have improvement in ~48 hours. This could be done at any point since we have ample blood cultures pending. The most compelling reason to do this would be to ensure no response to antibacterials prior to any future trial of steroids. Rheumatology consult would also be useful prior to discharge. If no etiology found with pending workup, consider CNS imaging prior to discharge to evaluate for any central cause of fever, CNS lymphoma, etc.     Agree with stopping doxycycline given lack of improvement. Note that Ehrlichia, anaplasma, leptospirosis, tularemia, Q fever, and brucellosis should all have improved with 1 week of doxycycline so are unlikely.     Recommendations:  1. Await pending testing  2. Consider rheumatology consult  3. Consider short trial of broad spectrum antibiotics to assess for rapid response   4. Agree with holding doxycycline    Recommendations discussed with primary team    Thank you for this consult. The BLUE ID team will continue to follow this patient. Please feel free to call with any questions.     Britt Dumas MD  Infectious Diseases  [phone]        History of Present Illness:    Jade Carcamo is a 52 year old female who I first met briefly in the emergency department on 5/28 and saw in clinic on 5/29.   Ms. Carcamo has polycystic liver disease but has otherwise been in good health.  She recently went on a trip that involved camping and hiking along the [facility].  Then, approximately 10 days ago she developed fevers as high as 103 and diffuse muscle and joint pains. These have continued since with limited response to antipyretics and no etiology found.  She has been seen by multiple providers in both her primary care clinic and in the emergency department.  Her initial labs are remarkable for mild thrombocytopenia, normal total white count but with some lymphopenia, mildly elevated LFTs, and markedly elevated CRP.  Her CK was normal.  She initially underwent a evaluation for tickborne workup with Lyme serology which was negative, Babesia serology which is still pending, and a parasite smear which showed some conclusions that could not rule out Ehrlichia.  An Ehrlichia PCR was sent to [place] in the meantime she was started on doxycycline.  The Ehrlichia PCR returned as negative and she had no response to doxycycline.  She does not have significant photophobia and has no neck pain or stiffness.  She has not had any lethargy or confusion.  She intermittently reports some mild stomach pain. A CT C/A/P on 6/29 showed only her known (impressive) liver cysts. Please see remainder of negative workup to date below.     She had an additional ED visit on 7/1 due to unbearable symptoms at home at which time she was found to have a new neutrophilic predominant leukocytsis. CRP remained very high but had actually decreased slightly. She was discharged to home again. On 7/2 I spoke with her and arranged for admission due to ongoing highly symptomatic fevers to expedite fever/pain control and workup.          Review of Systems:   She has a mild nonproductive cough.  No diarrhea. No abdominal pain or  distension today. Somewhat constipated. Positive as in HPI.   A complete 12 point review of systems was otherwise negative.       Past Medical History:     Past Medical History:   Diagnosis Date     GERD (gastroesophageal reflux disease)      Polycystic liver disease     in 40 years         Allergies:      Allergies   Allergen Reactions     Bee Venom      Penicillins Unknown           Recent Antimicrobials::   Doxycycline       Family History:     Family History   Problem Relation Age of Onset     Alzheimer Disease Mother      Thyroid Disease Sister      thyroid cancer     HEART DISEASE Father      Other - See Comments Brother      bradycardia requiring ablation     Psoriasis Maternal Grandfather             Social History:     Social History     Social History     Marital status:      Spouse name: N/A     Number of children: N/A     Years of education: N/A     Occupational History     Not on file.     Social History Main Topics     Smoking status: Never Smoker     Smokeless tobacco: Never Used     Alcohol use Yes      Comment: 2 per week     Drug use: No     Sexual activity: Not on file     Other Topics Concern     Not on file     Social History Narrative    Works in [employer], contracts with [employer]       In addition to travel to the [facility], the patient was recently in [place].  No other travel.  She does garden and has a fish pond (swamp) in her back yard but does not spend a lot of time in the woods and has had no known tick bites this season.  She has had a healthy dog.  No other animal exposures.  No sick contacts.  No significant time around small children.         Physical Exam:   /83 (BP Location: Right arm)  Pulse 78  Temp 101  F (38.3  C) (Oral)  Resp 18  LMP 11/14/2016  SpO2 98%   Exam:  GENERAL:  Well-developed, well-nourished, sitting in bed in no acute distress.   ENT:  Head is normocephalic, atraumatic. Oropharynx is moist without exudates or  ulcers.  EYES:  Eyes have anicteric sclerae.    NECK:  Supple.  LUNGS:  Clear to auscultation.  CARDIOVASCULAR:  Regular rate and rhythm with no murmurs, gallops or rubs.  ABDOMEN:  Normal bowel sounds, soft, nontender.  EXT: Extremities warm and without edema.  SKIN:  No acute rashes.  Line is in place without any surrounding erythema.  NEUROLOGIC:  Grossly nonfocal.         Laboratory Data:     Creatinine   Date Value Ref Range Status   07/03/2018 0.70 0.52 - 1.04 mg/dL Final   07/02/2018 0.69 0.52 - 1.04 mg/dL Final   07/02/2018 0.68 0.52 - 1.04 mg/dL Final   07/01/2018 0.65 0.52 - 1.04 mg/dL Final   06/28/2018 0.63 0.52 - 1.04 mg/dL Final     WBC   Date Value Ref Range Status   07/03/2018 14.7 (H) 4.0 - 11.0 10e9/L Final   07/02/2018 14.1 (H) 4.0 - 11.0 10e9/L Final   07/01/2018 12.8 (H) 4.0 - 11.0 10e9/L Final   06/28/2018 8.3 4.0 - 11.0 10e9/L Final   06/25/2018 5.5 4.0 - 11.0 10e9/L Final     Hemoglobin   Date Value Ref Range Status   07/03/2018 9.8 (L) 11.7 - 15.7 g/dL Final     Platelet Count   Date Value Ref Range Status   07/03/2018 288 150 - 450 10e9/L Final     Lab Results   Component Value Date     07/03/2018    BUN 7 07/03/2018    CO2 27 07/03/2018     CRP Inflammation   Date Value Ref Range Status   07/01/2018 236.0 (H) 0.0 - 8.0 mg/L Final   06/28/2018 330.0 (H) 0.0 - 8.0 mg/L Final   06/23/2018 81.0 (H) 0.0 - 8.0 mg/L Final           Pertinent Recent Microbiology Data:     Recent Labs  Lab 07/03/18  0116 07/02/18  0115 07/01/18  2300 06/28/18  1200   CULT No growth after 17 hours  No growth after 17 hours No growth after 1 day No growth after 1 day No growth after 5 days   SDES Blood Left Hand  Blood Left Arm Blood Right Arm Blood Right Arm Right Hand Blood     To date she has had the following evaluation:   Ehrlichia  - negative  Lyme screen - negative  Anaplasma - negative   Babesia - negative  RMSF - negative  CMV - negative  EBV - low titer consistent with reactivation  Parvovirus -  previous infection  CK - normal  Blood cultures x 3 negative to date (only first one was prior to doxycycline)  Urine culture negative  CT C/A/P showed previously known polycystic liver disease. No evidence of infected cyst on CT - discussed with radiology 7/2/18.   ROBBY - pending  TTE - bicuspid aortic valve. No evidence endocarditis  Hepatititis A - IgG positive. Reflex IgM pending  Hep B - negative surface ag and core ab. Surface ab not done.   Hep C - negative  HIV - negative  Quantiferon Gold - pending  Histoplasma urine and blood ag - pending  Blastomyces urine and blood ag - pending  Fungal antibody panel - pending  West Nile Virus serology - pending  RF - negative  LDH - normal  TSH - slightly high         Imaging:   CT C/A/P from 6/30/18 personally reviewed and reviewed with radiology. Showed known polycystic liver disease.

## 2018-07-04 NOTE — PLAN OF CARE
Problem: Patient Care Overview  Goal: Plan of Care/Patient Progress Review  Outcome: No Change  /76 (BP Location: Right arm)  Pulse 89  Temp 99.5  F (37.5  C) (Oral)  Resp 16  Wt 72.3 kg (159 lb 8 oz)  LMP 11/14/2016  SpO2 98%  BMI 24.25 kg/m2    1028-1117: T max 99.5. Other VSS. CRP level 230.0. Scheduled tylenol and ibuprofen given. Complained of nausea zofran 4mg x1 given with effective relief. PRN colace given. PIV with D5 NS at 100cc/hour. Pt abd rounded. Pt feels slightly pufffy in feet. Team is aware. Up ad ramon. Alert and oriented x4. Plan of care for patient is infection vs autoimmune. Rheumatology will be consulted and ID visited patient and started patient on IV Rocephin every 24hours  and oral flagyl every 8 hours for a 48 hour trial to see if pt improves. Will continue to monitor temp and follow plan of care.

## 2018-07-04 NOTE — PROGRESS NOTES
[facility] ID SERVICE: ONGOING CONSULTATION  Patient:  Jade Carcamo, Date of birth 1965, Medical record number [id number]  Date of Admission: 7/2/2018  Date of Visit:  7/4/2018  Requesting Provider: Sarah Strickland         Assessment and Recommendations:     Problem List:  1. Fevers, does not yet meet technical FUO criteria, but evaluating as such. Continuing fevers to 101 despite antipyretics.   2. Leukocytosis with neutrophilic predominance - developed after initial mild leukopenia  3. Elevated CRP and ESR  4. Polycystic liver disease - no evidence of infected cysts on exam or imaging  5. Listed penicillin allergy. Had limited rash 20 years ago. Has not received penicillins or cephalosporins in our system.   6. Anemia - progressive  7. 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     Discussion:  Ms. Carcamo continues to have fevers without an etiology discovered to date. We don't usually advocate trials of antibiotics for FUO in stable patients, but since her fevers are symptomatic enough to require hospitalization, a short empiric course is reasonable. Ceftriaxone and metronidazole will likely be well tolerated despite history of penicillin allergy, should cover most pathogens causing occult infection of a liver cyst, and have fairly good coverage for oral jayna which the family is concerned about. Will start today and plan to continue for 48 hours.     Agree with stopping doxycycline given lack of improvement. Note that ehrlichiosis, anaplasmosis, leptospirosis, tularemia, Q fever, and brucellosis should all have improved with 1 week of doxycycline so are unlikely.     Recommendations:  1. Await pending testing  2. Agree with rheumatology consult  3. Will give 48H trial of ceftriaxone and metronidazole and assess for improvement.     Recommendations discussed with primary team    Thank you for this consult. The [facility] ID team will continue to follow this patient. Please feel free to call with  any questions.     Britt Dumas MD  Infectious Diseases  [phone]      Interval History:   Ongoing fevers to 101 overnight. Otherwise no new or different symptoms. Family reports that patient did have her teeth cleaned the day before onset of symptoms. WBC and CRP stable today.     4 point ROS including Respiratory, CV, GI and , other than that noted in the HPI,  is negative         History of Present Illness:   Jade Carcamo is a 52 year old female who I first met briefly in the emergency department on 5/28 and saw in clinic on 5/29.   Ms. Carcamo has polycystic liver disease but has otherwise been in good health.  She recently went on a trip that involved camping and hiking along the [place].  Then, on 6/25 she developed fevers as high as 103 and diffuse muscle and joint pains. These have continued since with limited response to antipyretics and no etiology found.  She has been seen by multiple providers in both her primary care clinic and in the emergency department.  Her initial labs are remarkable for mild thrombocytopenia, normal total white count but with some lymphopenia, mildly elevated LFTs, and markedly elevated CRP.  Her CK was normal.  She initially underwent a evaluation for tickborne workup with Lyme serology which was negative, Babesia serology which is still pending, and a parasite smear which showed some conclusions that could not rule out Ehrlichia.  An Ehrlichia PCR was sent to [place] in the meantime she was started on doxycycline.  The Ehrlichia PCR returned as negative and she had no response to doxycycline.  She does not have significant photophobia and has no neck pain or stiffness.  She has not had any lethargy or confusion.  She intermittently reports some mild stomach pain. A CT C/A/P on 6/29 showed only her known (impressive) liver cysts. Please see remainder of negative workup to date below.     She had an additional ED visit on 7/1 due to unbearable symptoms at home at  which time she was found to have a new neutrophilic predominant leukocytsis. CRP remained very high but had actually decreased slightly. She was discharged to home again. On 7/2 I spoke with her and arranged for admission due to ongoing highly symptomatic fevers to expedite fever/pain control and workup.       Allergies:      Allergies   Allergen Reactions     Bee Venom      Penicillins Unknown           Recent Antimicrobials::   Doxycycline           Physical Exam:   /80 (BP Location: Right arm)  Pulse 77  Temp 99.1  F (37.3  C) (Oral)  Resp 16  Wt 72.3 kg (159 lb 8 oz)  LMP 11/14/2016  SpO2 94%  BMI 24.25 kg/m2   Exam:  GENERAL:  Well-developed, well-nourished, sitting in bed in no acute distress.   ENT:  Head is normocephalic, atraumatic. Anterior oropharynx without ulcers.  EYES:  Eyes have anicteric sclerae.    NECK:  Supple.  LUNGS:  Normal respiratory effort.   ABDOMEN:  Non-distended.   EXT: Extremities without visible edema.   SKIN:  No acute rashes.  Line is in place without any surrounding erythema.  NEUROLOGIC:  Grossly nonfocal.          Laboratory Data:     Creatinine   Date Value Ref Range Status   07/03/2018 0.70 0.52 - 1.04 mg/dL Final   07/02/2018 0.69 0.52 - 1.04 mg/dL Final   07/02/2018 0.68 0.52 - 1.04 mg/dL Final   07/01/2018 0.65 0.52 - 1.04 mg/dL Final   06/28/2018 0.63 0.52 - 1.04 mg/dL Final     WBC   Date Value Ref Range Status   07/04/2018 14.4 (H) 4.0 - 11.0 10e9/L Final   07/03/2018 14.7 (H) 4.0 - 11.0 10e9/L Final   07/02/2018 14.1 (H) 4.0 - 11.0 10e9/L Final   07/01/2018 12.8 (H) 4.0 - 11.0 10e9/L Final   06/28/2018 8.3 4.0 - 11.0 10e9/L Final     Hemoglobin   Date Value Ref Range Status   07/04/2018 8.4 (L) 11.7 - 15.7 g/dL Final     Platelet Count   Date Value Ref Range Status   07/04/2018 269 150 - 450 10e9/L Final     Lab Results   Component Value Date     07/03/2018    BUN 7 07/03/2018    CO2 27 07/03/2018     CRP Inflammation   Date Value Ref Range Status    07/04/2018 230.0 (H) 0.0 - 8.0 mg/L Final   07/01/2018 236.0 (H) 0.0 - 8.0 mg/L Final   06/28/2018 330.0 (H) 0.0 - 8.0 mg/L Final   06/23/2018 81.0 (H) 0.0 - 8.0 mg/L Final           Pertinent Recent Microbiology Data:       Recent Labs  Lab 07/03/18  0116 07/02/18  0115 07/01/18  2300 06/28/18  1200   CULT No growth after 1 day  No growth after 1 day No growth after 2 days No growth after 2 days No growth   SDES Blood Left Hand  Blood Left Arm Blood Right Arm Blood Right Arm Right Hand Blood     To date she has had the following evaluation:   Ehrlichia  - negative  Lyme screen - negative  Anaplasma - negative   Babesia - negative  RMSF - negative  CMV - negative  EBV - low titer consistent with reactivation  Parvovirus - previous infection  CK - normal  Blood cultures x 4 negative to date (only first one was prior to doxycycline)  Urine culture low levels mixed urogenital jayna  CT C/A/P showed previously known polycystic liver disease. No evidence of infected cyst on CT - discussed with radiology 7/2/18.   ROBBY - negative  TTE - bicuspid aortic valve. No evidence endocarditis  Hepatititis A - IgG positive. Reflex IgM pending  Hep B - negative surface ag and core ab. Surface ab not done.   Hep C - negative  HIV - negative  Quantiferon Gold - pending  Histoplasma urine and blood ag - pending  Blastomyces urine and blood ag - pending  Fungal antibody panel - pending  West Nile Virus serology - pending  RF - negative  LDH - normal  TSH - slightly high         Imaging:   CT C/A/P from 6/30/18 personally reviewed and reviewed with radiology. Showed known polycystic liver disease.

## 2018-07-05 ENCOUNTER — APPOINTMENT (OUTPATIENT)
Dept: ULTRASOUND IMAGING | Facility: CLINIC | Age: 53
DRG: 445 | End: 2018-07-05
Attending: PEDIATRICS
Payer: COMMERCIAL

## 2018-07-05 ENCOUNTER — APPOINTMENT (OUTPATIENT)
Dept: MRI IMAGING | Facility: CLINIC | Age: 53
DRG: 445 | End: 2018-07-05
Attending: PEDIATRICS
Payer: COMMERCIAL

## 2018-07-05 LAB
ALP BONE CFR SERPL: 36 U/L (ref 0–55)
ALP LIVER SERPL-CCNC: 178 U/L (ref 0–94)
ALP OTHER CFR SERPL: 0 U/L
ALP SERPL-CCNC: 214 U/L (ref 40–120)
BASOPHILS # BLD AUTO: 0 10E9/L (ref 0–0.2)
BASOPHILS NFR BLD AUTO: 0.2 %
COPATH REPORT: NORMAL
DIFFERENTIAL METHOD BLD: ABNORMAL
EOSINOPHIL # BLD AUTO: 0 10E9/L (ref 0–0.7)
EOSINOPHIL NFR BLD AUTO: 0.2 %
ERYTHROCYTE [DISTWIDTH] IN BLOOD BY AUTOMATED COUNT: 13.1 % (ref 10–15)
HAV IGM SERPL QL IA: NONREACTIVE
HCT VFR BLD AUTO: 25 % (ref 35–47)
HGB BLD-MCNC: 7.9 G/DL (ref 11.7–15.7)
IMM GRANULOCYTES # BLD: 0.1 10E9/L (ref 0–0.4)
IMM GRANULOCYTES NFR BLD: 0.9 %
LYMPHOCYTES # BLD AUTO: 0.7 10E9/L (ref 0.8–5.3)
LYMPHOCYTES NFR BLD AUTO: 5.8 %
M TB TUBERC IFN-G BLD QL: ABNORMAL
M TB TUBERC IFN-G/MITOGEN IGNF BLD: 0 IU/ML
MCH RBC QN AUTO: 29.8 PG (ref 26.5–33)
MCHC RBC AUTO-ENTMCNC: 31.6 G/DL (ref 31.5–36.5)
MCV RBC AUTO: 94 FL (ref 78–100)
MONOCYTES # BLD AUTO: 1.2 10E9/L (ref 0–1.3)
MONOCYTES NFR BLD AUTO: 9.7 %
NEUTROPHILS # BLD AUTO: 10.5 10E9/L (ref 1.6–8.3)
NEUTROPHILS NFR BLD AUTO: 83.2 %
NRBC # BLD AUTO: 0 10*3/UL
NRBC BLD AUTO-RTO: 0 /100
PLATELET # BLD AUTO: 248 10E9/L (ref 150–450)
RBC # BLD AUTO: 2.65 10E12/L (ref 3.8–5.2)
WBC # BLD AUTO: 12.7 10E9/L (ref 4–11)

## 2018-07-05 PROCEDURE — 85025 COMPLETE CBC W/AUTO DIFF WBC: CPT | Performed by: HOSPITALIST

## 2018-07-05 PROCEDURE — 25000132 ZZH RX MED GY IP 250 OP 250 PS 637: Performed by: INTERNAL MEDICINE

## 2018-07-05 PROCEDURE — 25000125 ZZHC RX 250: Performed by: STUDENT IN AN ORGANIZED HEALTH CARE EDUCATION/TRAINING PROGRAM

## 2018-07-05 PROCEDURE — 25000128 H RX IP 250 OP 636: Performed by: INTERNAL MEDICINE

## 2018-07-05 PROCEDURE — 25000125 ZZHC RX 250: Performed by: INTERNAL MEDICINE

## 2018-07-05 PROCEDURE — 76705 ECHO EXAM OF ABDOMEN: CPT

## 2018-07-05 PROCEDURE — A9585 GADOBUTROL INJECTION: HCPCS | Performed by: INTERNAL MEDICINE

## 2018-07-05 PROCEDURE — 99233 SBSQ HOSP IP/OBS HIGH 50: CPT | Mod: GC | Performed by: INTERNAL MEDICINE

## 2018-07-05 PROCEDURE — 36415 COLL VENOUS BLD VENIPUNCTURE: CPT | Performed by: HOSPITALIST

## 2018-07-05 PROCEDURE — 25000132 ZZH RX MED GY IP 250 OP 250 PS 637: Performed by: STUDENT IN AN ORGANIZED HEALTH CARE EDUCATION/TRAINING PROGRAM

## 2018-07-05 PROCEDURE — 25000128 H RX IP 250 OP 636: Performed by: STUDENT IN AN ORGANIZED HEALTH CARE EDUCATION/TRAINING PROGRAM

## 2018-07-05 PROCEDURE — 12000026 ZZH R&B TRANSPLANT

## 2018-07-05 PROCEDURE — 74183 MRI ABD W/O CNTR FLWD CNTR: CPT

## 2018-07-05 PROCEDURE — 25000128 H RX IP 250 OP 636: Performed by: HOSPITALIST

## 2018-07-05 RX ORDER — CEFTRIAXONE 2 G/1
2 INJECTION, POWDER, FOR SOLUTION INTRAMUSCULAR; INTRAVENOUS EVERY 24 HOURS
Status: DISCONTINUED | OUTPATIENT
Start: 2018-07-05 | End: 2018-07-07

## 2018-07-05 RX ORDER — METOCLOPRAMIDE HYDROCHLORIDE 5 MG/ML
10 INJECTION INTRAMUSCULAR; INTRAVENOUS ONCE
Status: DISCONTINUED | OUTPATIENT
Start: 2018-07-05 | End: 2018-07-07

## 2018-07-05 RX ORDER — GADOBUTROL 604.72 MG/ML
7.5 INJECTION INTRAVENOUS ONCE
Status: COMPLETED | OUTPATIENT
Start: 2018-07-05 | End: 2018-07-05

## 2018-07-05 RX ADMIN — ACETAMINOPHEN 975 MG: 325 TABLET, FILM COATED ORAL at 17:46

## 2018-07-05 RX ADMIN — ACETAMINOPHEN 975 MG: 325 TABLET, FILM COATED ORAL at 11:03

## 2018-07-05 RX ADMIN — IBUPROFEN 400 MG: 400 TABLET ORAL at 07:19

## 2018-07-05 RX ADMIN — ONDANSETRON 4 MG: 4 TABLET, ORALLY DISINTEGRATING ORAL at 22:09

## 2018-07-05 RX ADMIN — DEXTROSE AND SODIUM CHLORIDE: 5; 900 INJECTION, SOLUTION INTRAVENOUS at 05:40

## 2018-07-05 RX ADMIN — ACETAMINOPHEN 975 MG: 325 TABLET, FILM COATED ORAL at 05:40

## 2018-07-05 RX ADMIN — CEFTRIAXONE SODIUM 2 G: 2 INJECTION, POWDER, FOR SOLUTION INTRAMUSCULAR; INTRAVENOUS at 12:50

## 2018-07-05 RX ADMIN — PROCHLORPERAZINE EDISYLATE 5 MG: 5 INJECTION INTRAMUSCULAR; INTRAVENOUS at 10:25

## 2018-07-05 RX ADMIN — ONDANSETRON 4 MG: 2 INJECTION INTRAMUSCULAR; INTRAVENOUS at 05:46

## 2018-07-05 RX ADMIN — DEXTROSE AND SODIUM CHLORIDE: 5; 900 INJECTION, SOLUTION INTRAVENOUS at 18:53

## 2018-07-05 RX ADMIN — METRONIDAZOLE 500 MG: 500 TABLET ORAL at 05:40

## 2018-07-05 RX ADMIN — ENOXAPARIN SODIUM 40 MG: 40 INJECTION SUBCUTANEOUS at 20:03

## 2018-07-05 RX ADMIN — DOCUSATE SODIUM 100 MG: 100 CAPSULE, LIQUID FILLED ORAL at 12:50

## 2018-07-05 RX ADMIN — IBUPROFEN 400 MG: 400 TABLET ORAL at 00:58

## 2018-07-05 RX ADMIN — IBUPROFEN 400 MG: 400 TABLET ORAL at 12:50

## 2018-07-05 RX ADMIN — GADOBUTROL 7.5 ML: 604.72 INJECTION INTRAVENOUS at 15:03

## 2018-07-05 RX ADMIN — PROCHLORPERAZINE EDISYLATE 5 MG: 5 INJECTION INTRAMUSCULAR; INTRAVENOUS at 17:47

## 2018-07-05 RX ADMIN — METRONIDAZOLE 500 MG: 500 INJECTION, SOLUTION INTRAVENOUS at 15:30

## 2018-07-05 RX ADMIN — ONDANSETRON 4 MG: 2 INJECTION INTRAMUSCULAR; INTRAVENOUS at 15:59

## 2018-07-05 RX ADMIN — IBUPROFEN 400 MG: 400 TABLET ORAL at 18:51

## 2018-07-05 NOTE — PLAN OF CARE
Problem: Patient Care Overview  Goal: Plan of Care/Patient Progress Review  Outcome: No Change  Tmax: 100.7 (o), OVSS, on RA. Fever treated with sched. Tylenol & Ibuprofen, ice packs & cool washcloth.  Pt. denies pain.  Pt. had 1 emesis this morning & given IV Zofran x1 with some relief.  Pt. up ad ramon. Voiding adequate amounts, no stools this shift. MIVF at 100cc/hour. Regular diet..  Pt. Slept fair overnight. Continue to monitor & treat per POC & notify with changes.

## 2018-07-05 NOTE — PROGRESS NOTES
[facility], [place]    Internal Medicine Progress Note - [facility] Service    Main Plans for Today   - Surgical Oncology Consult  - c/w empiric ceftriaxone and flagyl per ID recs    Assessment & Plan   Jade Carcamo is a 52 year old female admitted on 7/2/2018. Jade Carcamo is a 52 year old female admitted on 7/2/2018. She was previously healthy now with 11 days of persistent fever up to 103F now s/p 7d doxycycline with negative outpatient infectious workup here now for supportive cares and further workup of fever.    Fever without source - unclear etiology  Ddx includes infections (fungal vs vector vs viral, less likely bacterial) vs autoimmune vs malignancy. Drug reaction less likely as no known exposure. On admission had leukocytosis of 14.1, up from 12.8 on 7/1 and 8.3 on 6/28, neutrophil predominant. Sed rate elevated at 102 (7/1) and . Has not improved after despite 7 days of doxycycline therapy. She has had a relatively negative infectious work up all of which has been NGTD. Fungal work-up pending. Autoimmune is also possible given elevated inflammatory markers and RF/ROBBY are negative and no exam findings. Malignancy is much less likely at this time. Had CT C/A/P that was unremarkable on 6/28. Although she does not meet the criteria for fever of unknown origin at this time, we are continuing with this workup to attempt to better elucidate the etiology of her symptoms. TTE without endocarditis. Hep A IgG positive, Hep B, Hep C negative. HIV negative. Ab US concerning for large dominant R hepatic cyst with internal septations, will consult surgery to evaluate for possible infected hepatic cyst as source of fevers.   - Infectious disease consult, recs appreciated  - Surg On consulted, recs appreciated  - Rheum consult for possible seronegative autoimmune disorder, recs appreciated.   - will empirically give 48H trial of ceftriaxone and metronidazole and assess for improvement  -  Hep A IgM pending  - Quantiferon Gold pending  - Histoplasma urine and blood ag pending  - Blastomyces urine and blood ag pending  - Fungal antibody panel pending  - Add on alk davi isoenzymes given elevated alk phos pending   - consider TASNEEM if rest of work-up negative.     Workup to date:  - Erlichia - negative  - West Nile Virus serology negative  - Lyme screen - negative  - Anaplasma - negative  - Babesia - negative  - RMSF - negative  - CMV - negative  - EBV - low titer consistent with reactivation  - Parvovirus - previous infection  - CK - normal  - LDH - wnl  - Blood cultures x4 (6/25, 6/28, 7/1, 7/2) - NGTD  - Urine culture (6/23) - Negative    Hx of Polycystic Liver Disease  CT C/A/P (7/1) - previously known polycystic liver disease. No evidence of new or infected cyst. However, could be a ruptured cyst (sterile or infected fluid collection) that is causing fevers. See discussion above regarding abdominal US findings.       # Pain Assessment:  Current Pain Score 7/5/2018   Patient currently in pain? denies   Pain score (0-10) -   Pain location -   Pain descriptors -   - Jade is experiencing pain due to fevers. Pain management was discussed and the plan was created in a collaborative fashion.  Jade's response to the current recommendations: engaged  - Please see the plan for pain management as documented above      Diet: Regular Diet Adult  Fluids: mIVF D5NS @100ml/h  DVT Prophylaxis: Enoxaparin (Lovenox) SQ  Code Status: Full Code    Disposition Plan   Expected discharge: 2 - 3 days, recommended to prior living arrangement once adequate pain management/ tolerating PO medications and SIRS/Sepsis treated.     Entered: Anusha Moscoso 07/05/2018, 1:17 PM   Information in the above section will display in the discharge planner report.      The patient's care was discussed with the Attending Physician, Dr. Strickland, Care Coordinator/ and Patient.    Anusha Moscoso  [facility]  [facility]  Mil: 5  Pager: [phone]  Please see sticky note for cross cover information    Interval History   Overnight events and notes reviewed.     Febrile to ~101 this AM which did come down with IV tylenol. Pt states she is very anxious about ever getting better. Would really like to know what is causing her illness. Had an episode of emesis while trying to take PO antibiotic. Intermittent abdominal pain.     Denies chest pain, sob, headache, vision changes.     4 point ROS negative except as above.     Physical Exam   Vital Signs: Temp: 98.5  F (36.9  C) Temp src: Oral BP: 118/69 Pulse: 67   Resp: 16 SpO2: 98 % O2 Device: None (Room air)    Weight: 163 lbs 8 oz    General Appearance: No distress. Awake and alert and answering questions appropriately. Tearful  Eyes: No scleral icterus. PERRL  HEENT: NC/AT. Oropharynx clear. No lymphadenopathy. MMM  Respiratory: CTAB.  Cardiovascular: RRR. No murmurs, rubs or gallops.  GI: BS+. Soft, nondistended. No tenderness to palpation. No guarding or rebound tenderness. No organomegaly.  Skin: No rash. No ecchymoses or petechiae.  Musculoskeletal: Normal muscle bulk and tone. Extremities warm and well perfused. DP and radial pulses 2+ bilaterally.  Neurologic: AOx3. CN II-XII intact. No focal deficits.       I personally reviewed medications, labs and imaging.

## 2018-07-05 NOTE — CONSULTS
[facility] Consultation    Jade Carcamo MRN# [id number]   Age: 52 year old YOB: 1965     Date of Admission:  7/2/2018    Date of Consult:   7/02/18    Reason for consult: - fever of unknown origen, possible infected liver cyst       Requesting service: Infectious disease; requesting provider: Dr. Dumas                   Assessment and Plan:   Assessment:   53 y/o F with h/o PCLD s/p laparoscopic fenestration 11/16' now with FUO x2 weeks and recent travel/camping trip.  Extensive ID work-up negative to date.  Possible PMR per rheumatology.  Imaging work-up identified large dominant R hepatic cyst with internal septations, surgery consulted to evaluate for possible infected hepatic cyst as source of fevers.        Plan:   - based on CT and US, unlikely that pt has an infected hepatic cyst, however, due to complex appearance of dominant cyst based on US today, will obtain MRI to rule out amoebic/parasitic or bacterial hepatic abscess  - consider stool studies if diarrhea develops  - will follow-up imaing    Discussed with staff, Dr. Guerrero            Chief Complaint:   Persistent fevers of unknown origen         History of Present Illness:   Jade Carcamo is a 53 y/o F with h/o Polycystic liver disease s/p laparoscopic unroofing x 19 cysts and fenestration of additional 10 cysts by Dr. Cruz on 11/4/16. She did well post-operatively, discharged POD 3.  She is now admitted for further work-up and management of fever of unknown origin.    4 weeks ago she was on a backpacking trip along the [facility] for 5 days.  Two weeks after her trip, on June 22nd she developed persistent high fevers to 103 along with diffuse myalgias.  She also reports working outside in a garden prior to fever onset.  No known tick or bug bites.  She was managed as an outpatient and had multiple ER visits until she was ultimately admitted 7/2 for further cares.  Her work-up has included an extensive ID panel  including lymes, RMSF, babesia (see ID note) and blood cultures which have been negative.  She has also undergone TTE (negative for vegetations) and US abdomen (6/23) and CT of the chest/abd/pelvis (6/29).  Her imaging has re-demonstrated polycystic liver disease with mild ascites without radiologic signs of parasitic or bacterial abscess.  Today, a repeat abdominal US was obtained looking for ascites and interval growth was noted to a dominant R liver cyst from 7.5 to 11.4cm with internal hemorrhagic or proteinaceous component.  Surgical oncology is consulted to evaluate for possible infected hepatic cyst.  She denies any abdominal pain although does report increased abdominal girth over the previous two weeks of her fevers.  She has having bowel function and tolerating a regular diet - last BM yesterday.  No vomiting.           Past Medical History:     Past Medical History:   Diagnosis Date     GERD (gastroesophageal reflux disease)      Polycystic liver disease     in 40 years             Past Surgical History:     Past Surgical History:   Procedure Laterality Date     LAPAROSCOPIC UNROOFING LIVER CYST N/A 11/4/2016    Procedure: LAPAROSCOPIC UNROOFING LIVER CYST;  Surgeon: Sonido Cruz MD;  Location:  OR             Social History:     Social History   Substance Use Topics     Smoking status: Never Smoker     Smokeless tobacco: Never Used     Alcohol use Yes      Comment: 2 per week             Family History:     Family History   Problem Relation Age of Onset     Alzheimer Disease Mother      Thyroid Disease Sister      thyroid cancer     HEART DISEASE Father      Other - See Comments Brother      bradycardia requiring ablation     Psoriasis Maternal Grandfather                 Allergies:     Allergies   Allergen Reactions     Bee Venom      Penicillins Rash     Rash limited to arm ~20 years ago.             Medications:     Current Facility-Administered Medications   Medication     acetaminophen  (TYLENOL) tablet 975 mg     cefTRIAXone (ROCEPHIN) 2 g vial to attach to  ml bag for ADULTS or NS 50 ml bag for PEDS     dextrose 5% and 0.9% NaCl infusion     docusate sodium (COLACE) capsule 100 mg     enoxaparin (LOVENOX) injection 40 mg     ibuprofen (ADVIL/MOTRIN) tablet 400 mg     melatonin tablet 1 mg     metroNIDAZOLE (FLAGYL) infusion 500 mg     naloxone (NARCAN) injection 0.1-0.4 mg     ondansetron (ZOFRAN-ODT) ODT tab 4 mg    Or     ondansetron (ZOFRAN) injection 4 mg     polyethylene glycol (MIRALAX/GLYCOLAX) Packet 17 g     prochlorperazine (COMPAZINE) injection 5 mg    Or     prochlorperazine (COMPAZINE) tablet 5 mg    Or     prochlorperazine (COMPAZINE) Suppository 25 mg               Review of Systems:   CV: NEGATIVE for chest pain, palpitations or peripheral edema  C: Positive for fevers/chills  E/M: NEGATIVE for ear, mouth and throat problems  R: NEGATIVE for significant cough or SOB          Physical Exam:   All vitals have been reviewed  Temp:  [98.5  F (36.9  C)-100.7  F (38.2  C)] 98.5  F (36.9  C)  Pulse:  [65-89] 67  Resp:  [16] 16  BP: (101-121)/(52-76) 118/69  SpO2:  [94 %-98 %] 98 %    Intake/Output Summary (Last 24 hours) at 07/05/18 1201  Last data filed at 07/05/18 0800   Gross per 24 hour   Intake             3200 ml   Output             3300 ml   Net             -100 ml     Physical Exam:    Neurologic:  Awake/alert, oriented x3, no acute distress    Cardiovascuar:  Regular rate and rhythm    Hematologic / Lymphatic:   no inguinal lymphadenopathy     Chest:   Equal chest rise bilaterally, no tachypnea or labored breathing     Abdomen:   Soft, moderate distention, non-tender to palpation, well healed scars     Musculoskeletal:   There is no redness, warmth, or swelling of the joints.  Full range of motion noted.  Motor strength is 5 out of 5 all extremities bilaterally.               Data:   All laboratory data reviewed    Results:  [facility]  Recent Labs  Lab 07/03/18  0671  07/02/18 2147 07/02/18 2034 07/01/18  2300     --  138 138   POTASSIUM 3.8  --  4.1 3.8   CHLORIDE 103  --  102 103   CO2 27  --  29 24   BUN 7  --  8 8   CR 0.70 0.69 0.68 0.65   GLC 96  --  113* 101*     CBC  Recent Labs  Lab 07/05/18  0632 07/04/18  0629 07/03/18  1342 07/02/18 2147 07/02/18 2034   WBC 12.7* 14.4* 14.7*  --  14.1*   HGB 7.9* 8.4* 9.8*  --  10.0*    269 288 255 289     LFT  Recent Labs  Lab 07/02/18 2034   AST 18   ALT 29   ALKPHOS 229*   BILITOTAL 0.8   ALBUMIN 2.4*       Recent Labs  Lab 07/03/18  0649 07/02/18 2034 07/01/18  2300   GLC 96 113* 101*       Imaging:    CT:   IMPRESSION:  1. No infectious source identified in the chest, abdomen, or pelvis.  2. Postoperative changes of hepatic cyst unroofing procedure. There is  mildly increased ascites compared to CT 11/14/2016.  3. Heterogeneous thyroid nodule in the inferior left thyroid lobe  measuring 2.8 cm. Thyroid ultrasound could be considered if indicated.  4. Small pleural effusions. Associated atelectasis.    US abdomen:  IMPRESSION:   1. Multicystic liver; the majority of the hepatic cysts appear to be  simple by ultrasound evaluation. There is however a dominant complex  cyst which which has increased in size since 6/23/2018. Appearance and  growth suggest an internal hemorrhagic or proteinaceous component.  2. No ascites as questioned. Trace left pleural effusion.      Jerry Wooten MD

## 2018-07-05 NOTE — PROGRESS NOTES
Rheumatology Progress Note    Jade Carcamo MRN# [id number]   Age: 52 year old YOB: 1965   Date of Admission: 7/2/2018      Subjective :   Patient seen and examined at bedside today. She complains of feeling more distended and continues to have episodes of NBNB emesis. Her last episode prior to today was 2 days ago. Her pain seems to have localized in her right upper quadrant. Patient underwent MR of the abdomen earlier today in regards to the cyst seen on her ultrasound.        Allergies:     Allergies   Allergen Reactions     Bee Venom      Penicillins Rash     Rash limited to arm ~20 years ago.             Medications:     CURRENT INPATIENT MEDICATIONS:    acetaminophen  975 mg Oral Q6H     cefTRIAXone  2 g Intravenous Q24H     enoxaparin  40 mg Subcutaneous Q24H     ibuprofen  400 mg Oral Q6H     metroNIDAZOLE  500 mg Intravenous Q8H           Physical Exam (Resident / Clinician):   Blood pressure 118/69, pulse 67, temperature 98.5  F (36.9  C), temperature source Oral, resp. rate 16, weight 74.2 kg (163 lb 8 oz), last menstrual period 11/14/2016, SpO2 98 %, not currently breastfeeding.    GENERAL : In mild to moderate distress.   HEENT: No scleral icterus, mucous membrane is moist. Oropharynx is clear. No palpable cervical adenopathy, or visible neck masses.   CARDIAC: Regular rate and rhythm, normal S1 S2, without murmurs, rubs or gallops   RESP: Lungs clear to auscultation - no rales, rhonchi, wheezes, or rubs.   ABDOMEN: slightly distended abdomen, pain localizing in the RUQ vs the pain being more diffuse previously   EXTREMITIES: No clubbing, cyanosis or edema.      NEURO: awake, alert, responds appropriately to questions.           Data:   This patient is a 52 year old  female with a significant past medical history of polycystic liver disease who was admitted on 7/2/18 for additional work-up of persistent fevers.    Problem list  1. Fever without source (infectious vs autoimmune  vs malignancy)  2. Elevated CRP/ESR  3. Neutrophilia    Discussion and Recommendations  - Based on the dominant complex cyst found on ultrasound of the abdomen, it is reasonable to hold off on the prednisone 20 mg trial for now as the infectious work up is in progress  - Agree with the antibiotics  - Follow up with the MR of the abdomen, which was done today  - In favor of a TASNEEM to rule out endocarditis as the TTE did not show any signs of endocarditis  - We can consider ordering ANCA studies down the line if the current work up ends up being negative. Patient does not need to have the renal and pulmonary symptoms as suggested with ANCA pathologies, as symptoms can be more nonspecific.    Patient seen and examined with Dr. Ramirez during rounds.        I saw this patient with the medical resident. I agree with the findings and recommendations. Other than elevated inflammatory markers, I find no clinical data to support a diagnosis of autoimmunity that could account for the clinical picture. We will be happy to discuss further if ongoing investigations of hepatic cysts and of hepatobiliary ductal systems are not revealing.     Leland Ramirez M.D.  Staff Rheumatologist, [facility]  Pager [phone]

## 2018-07-05 NOTE — PLAN OF CARE
Problem: Patient Care Overview  Goal: Plan of Care/Patient Progress Review  Outcome: No Change  /72 (BP Location: Right arm)  Pulse 78  Temp 100.6  F (38.1  C) (Oral)  Resp 16  Wt 72.3 kg (159 lb 8 oz)  LMP 11/14/2016  SpO2 97%  BMI 24.25 kg/m2    Tmax 100.6. OVSS on RA. Scheduled antipyretics administered. Ice packs, cool washcloth and fans utilized for alternative cooling measures. No c/o pain or nausea. PIV with D5NS @ 100ml/hr. Voiding adequately, no BM reported. Received colace x1 per pt request. Rheumatology consulted today - see rheum note. Plan for liver US tomorrow. Continue with scheduled abx.  at beside supportive of pt and involved with cares. Will continue to monitor and notify of any changes.

## 2018-07-05 NOTE — PROGRESS NOTES
[facility] SERVICE: ONGOING CONSULTATION  Patient:  Jade Carcamo, Date of birth 1965, Medical record number [id number]  Date of Admission: 7/2/2018  Date of Visit:  7/5/2018  Requesting Provider: Sarah Strickland         Assessment and Recommendations:     Problem List:  1. Fevers, does not yet meet technical FUO criteria, but evaluating as such. Continuing fevers to 101 despite antipyretics.   2. Leukocytosis with neutrophilic predominance - developed after initial mild leukopenia  3. Elevated CRP and ESR  4. Polycystic liver disease - no evidence of infected cysts on CT, but repeat US shows enlarging, newly complex cyst since 6/23.   5. Listed penicillin allergy. Had limited rash 20 years ago. Tolerating ceftriaxone.   6. Anemia - progressive  7. 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   8. Bicuspid aortic valve - discovered on TTE during this admission.     Discussion:  Ms. Carcamo continues to have fevers without an etiology discovered to date. She is currently undergoing an empiric trial of antibiotics. Additionally she had a repeat ultrasound that suggested she is having evolution of a dominant liver cyst. Surgery evaluated her and recommended MRI which is pending.     Recommendations:  1. Continue 48H trial of ceftriaxone and metronidazole and assess for improvement.   2. Await MRI ordered by surgery team.   3. TASNEEM may be considered if liver cysts are not thought to be etiology of fevers. Will hold off for now.     Recommendations discussed with primary team    Thank you for this consult. The [facility] ID team will continue to follow this patient. Please feel free to call with any questions.     Britt Dumas MD  Infectious Diseases  [phone]      Interval History:   Ongoing fevers. Increasing abdominal distension. WBC slightly decreased. Vomiting 2/2 fever vs. Metronidazole. Now switched to IV metronidazole.     4 point ROS including Respiratory, CV, GI and , other than that  noted in the HPI,  is negative         History of Present Illness:   Jade Carcamo is a 52 year old female who I first met briefly in the emergency department on 5/28 and saw in clinic on 5/29.   Ms. Carcamo has polycystic liver disease but has otherwise been in good health.  She recently went on a trip that involved camping and hiking along the [facility].  Then, on 6/25 she developed fevers as high as 103 and diffuse muscle and joint pains. These have continued since with limited response to antipyretics and no etiology found.  She has been seen by multiple providers in both her primary care clinic and in the emergency department.  Her initial labs are remarkable for mild thrombocytopenia, normal total white count but with some lymphopenia, mildly elevated LFTs, and markedly elevated CRP.  Her CK was normal.  She initially underwent a evaluation for tickborne workup with Lyme serology which was negative, Babesia serology which is still pending, and a parasite smear which showed some conclusions that could not rule out Ehrlichia.  An Ehrlichia PCR was sent to [place] in the meantime she was started on doxycycline.  The Ehrlichia PCR returned as negative and she had no response to doxycycline.  She does not have significant photophobia and has no neck pain or stiffness.  She has not had any lethargy or confusion.  She intermittently reports some mild stomach pain. A CT C/A/P on 6/29 showed only her known (impressive) liver cysts. Please see remainder of negative workup to date below.     She had an additional ED visit on 7/1 due to unbearable symptoms at home at which time she was found to have a new neutrophilic predominant leukocytsis. CRP remained very high but had actually decreased slightly. She was discharged to home again. On 7/2 I spoke with her and arranged for admission due to ongoing highly symptomatic fevers to expedite fever/pain control and workup.       Allergies:      Allergies   Allergen Reactions      Bee Venom      Penicillins Rash     Rash limited to arm ~20 years ago.           Recent Antimicrobials::   Ceftriaxone 7/4-present  Metronidazole 7/4-present    Doxycyclnie prior to admission.            Physical Exam:   /69 (BP Location: Right arm)  Pulse 67  Temp 98.5  F (36.9  C) (Oral)  Resp 16  Wt 74.2 kg (163 lb 8 oz)  LMP 11/14/2016  SpO2 98%  BMI 24.86 kg/m2   Exam:  GENERAL:  Well-developed, well-nourished, sitting in bed in no acute distress.   ENT:  Head is normocephalic, atraumatic. Anterior oropharynx without ulcers.  EYES:  Eyes have anicteric sclerae.    NECK:  Supple.  LUNGS:  Normal respiratory effort.   ABDOMEN:  Increasing distension. Somewhat tense. Not tender.   EXT: Extremities without visible edema.   SKIN:  No acute rashes.  Line is in place without any surrounding erythema.  NEUROLOGIC:  Grossly nonfocal.          Laboratory Data:     Creatinine   Date Value Ref Range Status   07/03/2018 0.70 0.52 - 1.04 mg/dL Final   07/02/2018 0.69 0.52 - 1.04 mg/dL Final   07/02/2018 0.68 0.52 - 1.04 mg/dL Final   07/01/2018 0.65 0.52 - 1.04 mg/dL Final   06/28/2018 0.63 0.52 - 1.04 mg/dL Final     WBC   Date Value Ref Range Status   07/05/2018 12.7 (H) 4.0 - 11.0 10e9/L Final   07/04/2018 14.4 (H) 4.0 - 11.0 10e9/L Final   07/03/2018 14.7 (H) 4.0 - 11.0 10e9/L Final   07/02/2018 14.1 (H) 4.0 - 11.0 10e9/L Final   07/01/2018 12.8 (H) 4.0 - 11.0 10e9/L Final     Hemoglobin   Date Value Ref Range Status   07/05/2018 7.9 (L) 11.7 - 15.7 g/dL Final     Platelet Count   Date Value Ref Range Status   07/05/2018 248 150 - 450 10e9/L Final     Lab Results   Component Value Date     07/03/2018    BUN 7 07/03/2018    CO2 27 07/03/2018     CRP Inflammation   Date Value Ref Range Status   07/04/2018 230.0 (H) 0.0 - 8.0 mg/L Final   07/01/2018 236.0 (H) 0.0 - 8.0 mg/L Final   06/28/2018 330.0 (H) 0.0 - 8.0 mg/L Final   06/23/2018 81.0 (H) 0.0 - 8.0 mg/L Final           Pertinent Recent  Microbiology Data:       Recent Labs  Lab 07/03/18  0116 07/02/18  0115 07/01/18  2300   CULT No growth after 2 days  No growth after 2 days No growth after 3 days No growth after 3 days   SDES Blood Left Hand  Blood Left Arm Blood Right Arm Blood Right Arm     To date she has had the following evaluation:   Ehrlichia  - negative  Lyme screen - negative  Anaplasma - negative   Babesia - negative  RMSF - negative  CMV - negative  EBV - low titer consistent with reactivation  Parvovirus - previous infection  CK - normal  Blood cultures x 4 negative to date (only first one was prior to doxycycline)  Urine culture low levels mixed urogenital jayna  CT C/A/P showed previously known polycystic liver disease. No evidence of infected cyst on CT - discussed with radiology 7/2/18.   ROBBY - negative  TTE - bicuspid aortic valve. No evidence endocarditis  Hepatititis A - IgG positive. Reflex IgM negative.   Hep B - negative surface ag and core ab. Surface ab not done.   Hep C - negative  HIV - negative  Quantiferon Gold - indeterminate due to impaired response  Histoplasma urine and blood ag - pending  Blastomyces urine and blood ag - pending  Fungal antibody panel - pending  West Nile Virus serology negative  RF - negative  LDH - normal  TSH - slightly high         Imaging:   CT C/A/P from 6/30/18 reviewed with radiology. Showed known polycystic liver disease.     Recent Results (from the past 48 hour(s))   US Abdomen Limited    Narrative    EXAM: Ultrasound abdomen limited  7/5/2018 8:56 AM      HISTORY: History of polycystic liver, evaluate ascites/cysts    COMPARISON: CT 6/29/2018, ultrasound 6/23/2018    FINDINGS:   Multicystic liver. The majority of the pancreatic cystic lesions  appear simple by sonographic imaging. There is however a dominant  lesion in the right hepatic lobe which shows thick and avascular  septations with internal echogenicity, measuring approximately 11.4 x  8.1 x 7.1 cm. Previously there was a  right hepatic lobe cyst measuring  up to 7.5 cm on the ultrasound 6/23/2018. On ultrasound 6/23/2018, a  dominant complex cyst measures 7.5 x 4.6 x 5.2 cm.    No ascites. Trace left pleural effusion.      Impression    IMPRESSION:   1. Multicystic liver; the majority of the hepatic cysts appear to be  simple by ultrasound evaluation. There is however a dominant complex  cyst which which has increased in size since 6/23/2018. Appearance and  growth suggest an internal hemorrhagic or proteinaceous component.  2. No ascites as questioned. Trace left pleural effusion.    I have personally reviewed the examination and initial interpretation  and I agree with the findings.    CELIA JOHNSON MD

## 2018-07-05 NOTE — PLAN OF CARE
Problem: Patient Care Overview  Goal: Plan of Care/Patient Progress Review  Outcome: Therapy, progress toward functional goals is gradual  /69 (BP Location: Right arm)  Pulse 67  Temp 98.5  F (36.9  C) (Oral)  Resp 16  Wt 74.2 kg (163 lb 8 oz)  LMP 11/14/2016  SpO2 98%  BMI 24.86 kg/m2    Temp max this shift 100. Remained afebrile most of the day, until 1800 temp of 99.6. OVSS on room air. Denies pain. On regular diet with poor appetite. C/o nausea and had emesis x3. Compazine given x2, zofran given x2.  Abdomen distended. Had BM x1 and is passing gas. Voiding good amounts of urine spontaneously. Had US and MRI of abdomen completed today. Please review results in chart. Spouse present at bedside. Up independently. Will continue POC and notify team with any changes in status.

## 2018-07-06 LAB
ALBUMIN SERPL-MCNC: 1.8 G/DL (ref 3.4–5)
ALP SERPL-CCNC: 153 U/L (ref 40–150)
ALT SERPL W P-5'-P-CCNC: 13 U/L (ref 0–50)
ASPERGILLUS AB SER QL ID: NORMAL
AST SERPL W P-5'-P-CCNC: 15 U/L (ref 0–45)
B DERMAT AB SER QL ID: NORMAL
BILIRUB DIRECT SERPL-MCNC: 0.2 MG/DL (ref 0–0.2)
BILIRUB SERPL-MCNC: 0.5 MG/DL (ref 0.2–1.3)
COCCIDIOIDES AB SPEC QL ID: NORMAL
CRP SERPL-MCNC: 230 MG/L (ref 0–8)
ERYTHROCYTE [DISTWIDTH] IN BLOOD BY AUTOMATED COUNT: 13.2 % (ref 10–15)
H CAPSUL AB TITR SER ID: NORMAL {TITER}
HCT VFR BLD AUTO: 25.1 % (ref 35–47)
HGB BLD-MCNC: 8.1 G/DL (ref 11.7–15.7)
INR PPP: 1.24 (ref 0.86–1.14)
LACTATE BLD-SCNC: 0.8 MMOL/L (ref 0.4–1.9)
MCH RBC QN AUTO: 30.2 PG (ref 26.5–33)
MCHC RBC AUTO-ENTMCNC: 32.3 G/DL (ref 31.5–36.5)
MCV RBC AUTO: 94 FL (ref 78–100)
PLATELET # BLD AUTO: 246 10E9/L (ref 150–450)
PROT SERPL-MCNC: 5.7 G/DL (ref 6.8–8.8)
RBC # BLD AUTO: 2.68 10E12/L (ref 3.8–5.2)
WBC # BLD AUTO: 10.8 10E9/L (ref 4–11)

## 2018-07-06 PROCEDURE — 25000128 H RX IP 250 OP 636: Performed by: INTERNAL MEDICINE

## 2018-07-06 PROCEDURE — 80076 HEPATIC FUNCTION PANEL: CPT | Performed by: INTERNAL MEDICINE

## 2018-07-06 PROCEDURE — 99233 SBSQ HOSP IP/OBS HIGH 50: CPT | Mod: GC | Performed by: INTERNAL MEDICINE

## 2018-07-06 PROCEDURE — 85610 PROTHROMBIN TIME: CPT | Performed by: INTERNAL MEDICINE

## 2018-07-06 PROCEDURE — 25000128 H RX IP 250 OP 636: Performed by: HOSPITALIST

## 2018-07-06 PROCEDURE — 85027 COMPLETE CBC AUTOMATED: CPT | Performed by: INTERNAL MEDICINE

## 2018-07-06 PROCEDURE — 83605 ASSAY OF LACTIC ACID: CPT | Performed by: INTERNAL MEDICINE

## 2018-07-06 PROCEDURE — 12000026 ZZH R&B TRANSPLANT

## 2018-07-06 PROCEDURE — 86140 C-REACTIVE PROTEIN: CPT | Performed by: INTERNAL MEDICINE

## 2018-07-06 PROCEDURE — 25000128 H RX IP 250 OP 636: Performed by: STUDENT IN AN ORGANIZED HEALTH CARE EDUCATION/TRAINING PROGRAM

## 2018-07-06 PROCEDURE — 36415 COLL VENOUS BLD VENIPUNCTURE: CPT | Performed by: INTERNAL MEDICINE

## 2018-07-06 PROCEDURE — 25000132 ZZH RX MED GY IP 250 OP 250 PS 637: Performed by: STUDENT IN AN ORGANIZED HEALTH CARE EDUCATION/TRAINING PROGRAM

## 2018-07-06 PROCEDURE — 40000141 ZZH STATISTIC PERIPHERAL IV START W/O US GUIDANCE

## 2018-07-06 PROCEDURE — 25000125 ZZHC RX 250: Performed by: INTERNAL MEDICINE

## 2018-07-06 PROCEDURE — 25000132 ZZH RX MED GY IP 250 OP 250 PS 637: Performed by: INTERNAL MEDICINE

## 2018-07-06 RX ORDER — SCOLOPAMINE TRANSDERMAL SYSTEM 1 MG/1
1 PATCH, EXTENDED RELEASE TRANSDERMAL
Status: DISCONTINUED | OUTPATIENT
Start: 2018-07-06 | End: 2018-07-13 | Stop reason: HOSPADM

## 2018-07-06 RX ORDER — ONDANSETRON 2 MG/ML
4 INJECTION INTRAMUSCULAR; INTRAVENOUS
Status: DISCONTINUED | OUTPATIENT
Start: 2018-07-06 | End: 2018-07-09

## 2018-07-06 RX ADMIN — METRONIDAZOLE 500 MG: 500 INJECTION, SOLUTION INTRAVENOUS at 17:37

## 2018-07-06 RX ADMIN — IBUPROFEN 400 MG: 400 TABLET ORAL at 16:23

## 2018-07-06 RX ADMIN — ACETAMINOPHEN 975 MG: 325 TABLET, FILM COATED ORAL at 23:48

## 2018-07-06 RX ADMIN — DEXTROSE AND SODIUM CHLORIDE: 5; 900 INJECTION, SOLUTION INTRAVENOUS at 04:15

## 2018-07-06 RX ADMIN — PROCHLORPERAZINE EDISYLATE 5 MG: 5 INJECTION INTRAMUSCULAR; INTRAVENOUS at 06:01

## 2018-07-06 RX ADMIN — DEXTROSE AND SODIUM CHLORIDE: 5; 900 INJECTION, SOLUTION INTRAVENOUS at 17:36

## 2018-07-06 RX ADMIN — METRONIDAZOLE 500 MG: 500 INJECTION, SOLUTION INTRAVENOUS at 09:00

## 2018-07-06 RX ADMIN — SCOPALAMINE 1 PATCH: 1 PATCH, EXTENDED RELEASE TRANSDERMAL at 16:46

## 2018-07-06 RX ADMIN — ACETAMINOPHEN 650 MG: 325 TABLET, FILM COATED ORAL at 12:01

## 2018-07-06 RX ADMIN — ACETAMINOPHEN 975 MG: 325 TABLET, FILM COATED ORAL at 00:05

## 2018-07-06 RX ADMIN — ONDANSETRON 4 MG: 2 INJECTION INTRAMUSCULAR; INTRAVENOUS at 23:48

## 2018-07-06 RX ADMIN — METRONIDAZOLE 500 MG: 500 INJECTION, SOLUTION INTRAVENOUS at 00:05

## 2018-07-06 RX ADMIN — IBUPROFEN 400 MG: 400 TABLET ORAL at 22:46

## 2018-07-06 RX ADMIN — RANITIDINE 150 MG: 150 TABLET, FILM COATED ORAL at 20:17

## 2018-07-06 RX ADMIN — IBUPROFEN 400 MG: 400 TABLET ORAL at 09:49

## 2018-07-06 RX ADMIN — ONDANSETRON 4 MG: 2 INJECTION INTRAMUSCULAR; INTRAVENOUS at 04:12

## 2018-07-06 RX ADMIN — PROCHLORPERAZINE EDISYLATE 5 MG: 5 INJECTION INTRAMUSCULAR; INTRAVENOUS at 00:02

## 2018-07-06 RX ADMIN — RANITIDINE 150 MG: 150 TABLET, FILM COATED ORAL at 12:05

## 2018-07-06 RX ADMIN — ONDANSETRON 4 MG: 2 INJECTION INTRAMUSCULAR; INTRAVENOUS at 09:58

## 2018-07-06 RX ADMIN — CEFTRIAXONE SODIUM 2 G: 2 INJECTION, POWDER, FOR SOLUTION INTRAMUSCULAR; INTRAVENOUS at 13:42

## 2018-07-06 RX ADMIN — ENOXAPARIN SODIUM 40 MG: 40 INJECTION SUBCUTANEOUS at 20:17

## 2018-07-06 RX ADMIN — ONDANSETRON 4 MG: 2 INJECTION INTRAMUSCULAR; INTRAVENOUS at 17:42

## 2018-07-06 RX ADMIN — ACETAMINOPHEN 975 MG: 325 TABLET, FILM COATED ORAL at 05:50

## 2018-07-06 RX ADMIN — IBUPROFEN 400 MG: 400 TABLET ORAL at 02:58

## 2018-07-06 RX ADMIN — ONDANSETRON HYDROCHLORIDE 4 MG: 2 INJECTION, SOLUTION INTRAMUSCULAR; INTRAVENOUS at 22:47

## 2018-07-06 RX ADMIN — ACETAMINOPHEN 650 MG: 325 TABLET, FILM COATED ORAL at 17:30

## 2018-07-06 NOTE — CONSULTS
GASTROENTEROLOGY CONSULTATION      Date of Admission:  7/2/2018           Reason for Consultation:   We were asked by Dr. Strickland of Internal Medicine to evaluate this patient with Fevers.           ASSESSMENT AND RECOMMENDATIONS:   Assessment:  52 year old female with a history of GERD and Polycystic liver disease s/p laparoscopic unroofing x 19 cysts and fenestration of additional 10 cysts by Dr. Cruz on 11/4/16 and readmitted on 7/2/18 with fever of unknown origin.  Etiology of patient's fever from GI perspective is unclear.  Mild T bili elevation 6/28/2018 since normalized with only mild alk phos elevation is noted.  Imaging was reviewed in liver tumor board raising the question of cholangitis.  Patient's symptoms and labs are not consistent with typical findings of cholangitis however given the clinical scenario it seems reasonable to evaluate further with MRCP followed by potential empiric ERCP unless better source of fever is found.     Recommendations  -Trend LFTs  -Obtain INR, INR goal < 1.5  -Reanalyze MRI imaging for MRCP or repeat MRCP  -Tentative plan for Tuesday ERCP presuming MRCP is completed      Gastroenterology outpatient follow up recommendations: pending clinical course    Thank you for involving us in this patient's care. Please do not hesitate to contact the GI service with any questions or concerns.     Pt care plan discussed with Dr. Hart, GI staff physician.    Ronny Luciano  -------------------------------------------------------------------------------------------------------------------           History of Present Illness:   Jade Carcamo is a 52 year old female with a history of GERD and Polycystic liver disease s/p laparoscopic unroofing x 19 cysts and fenestration of additional 10 cysts by Dr. Cruz on 11/4/16 and readmitted on 7/2/18 with fever of unknown origin.    Per chart review patient was seen in the emergency department by infectious disease on 5/28/2018 in  clinic on 5/29/18.  Patient had recently gone camping and hiking along the [facility] then 10 days prior to admission developed 103 fever diffuse muscle aches and joint pains.  Patient was noted to have mild thrombocytopenia, normal total white count with some lymphopenia, mildly elevated LFTs and markedly elevated CRP with a normal CK.  She was evaluated for the tick born disease and started empirically on doxycycline (work up negative).  She noted photophobia and some mild stomach pain.  7/1 patient was seen in the emergency department due to unbearable symptoms at home found to have neutrophilic predominant leukocytosis and CRP elevation.  She was discharged and represented on 7/2 due to high symptomatic fevers.      Chart shows both rheumatology and infectious disease were consulted.  There was some question of whether there was hepatic cyst infection versus cholangitis patient was empirically started on ceftriaxone and metronidazole.  Review at liver tumor conference board raised concern for cholangitis and GI consult.      Consultation the patient reports that she was in her normal state of health until 10 days prior to admission when she developed high-grade fever.  She reports since that time she has had intermittent body aches, rigors, chills and nausea.   She was treated empirically with doxycycline with symptoms. At time of consultation the patient reports this morning, malignancies, bunions, vomiting and all the symptoms went away with improvement in her fever patient reports chronic, intermittent sharp stabbing pains that have been present chronically.  She reports maybe mild dull epigastric pain but otherwise denies abdominal pain.  She reports normal Bm's with no grey stools or bloody stools.  She denies rashes, joint pains, other infectious localizing symptoms.        Lab review shows patient for ability of 2.8 on 6/23/2018 (normalized to 0.5 at time of consultation)            Past Medical  History:   Reviewed and edited as appropriate  Past Medical History:   Diagnosis Date     GERD (gastroesophageal reflux disease)      Polycystic liver disease     in 40 years            Past Surgical History:   Reviewed and edited as appropriate   Past Surgical History:   Procedure Laterality Date     LAPAROSCOPIC UNROOFING LIVER CYST N/A 11/4/2016    Procedure: LAPAROSCOPIC UNROOFING LIVER CYST;  Surgeon: Sonido Cruz MD;  Location: [facility] OR            Previous Endoscopy:   No results found for this or any previous visit.         Social History:   Reviewed and edited as appropriate  Social History     Social History     Marital status:      Spouse name: N/A     Number of children: N/A     Years of education: N/A     Occupational History     Not on file.     Social History Main Topics     Smoking status: Never Smoker     Smokeless tobacco: Never Used     Alcohol use Yes      Comment: 2 per week     Drug use: No     Sexual activity: Not on file     Other Topics Concern     Not on file     Social History Narrative    Works in [employer], contracts with [employer]                Family History:   Reviewed and edited as appropriate  Family History   Problem Relation Age of Onset     Alzheimer Disease Mother      Thyroid Disease Sister      thyroid cancer     HEART DISEASE Father      Other - See Comments Brother      bradycardia requiring ablation     Psoriasis Maternal Grandfather        No known history of colorectal cancer, liver disease, or inflammatory bowel disease.       Allergies:   Reviewed and edited as appropriate     Allergies   Allergen Reactions     Bee Venom      Penicillins Rash     Rash limited to arm ~20 years ago.            Medications:     Prescriptions Prior to Admission   Medication Sig Dispense Refill Last Dose     acetaminophen 500 MG CAPS Take 2 capsules by mouth every 8 hours as needed For aches, pain, fever 60 capsule 0 7/2/2018 at 4pm     doxycycline (VIBRAMYCIN)  100 MG capsule Take 1 capsule (100 mg) by mouth 2 times daily 8 capsule 0 2018 at AM     doxycycline (VIBRAMYCIN) 100 MG capsule Take 1 capsule (100 mg) by mouth 2 times daily Take 2 tabs (200mg) for first dose, then 1 tab twice daily until gone 15 capsule 0 2018 at AM     ibuprofen (ADVIL/MOTRIN) 600 MG tablet Take 1 tablet (600 mg) by mouth every 8 hours as needed for moderate pain 30 tablet 0 2018 at 2:30pm     escitalopram (LEXAPRO) 20 MG tablet Take 1 tablet (20 mg) by mouth daily 90 tablet 3 Unknown at Unknown time     Multiple Vitamin (MULTI-VITAMINS) TABS    Unknown at Unknown time     [] ondansetron (ZOFRAN ODT) 4 MG ODT tab Take 1 tablet (4 mg) by mouth every 6 hours as needed for nausea 10 tablet 0 Taking     venlafaxine (EFFEXOR-XR) 37.5 MG 24 hr capsule Take 1 capsule (37.5 mg) by mouth every morning Take 1 capsule daily for 14 days, then take 2 capsules daily if tolerating well. (Patient not taking: Reported on 2018) 60 capsule 1 Unknown at Unknown time     zolpidem (AMBIEN) 5 MG tablet Take 1 tablet (5 mg) by mouth nightly as needed for sleep (Patient not taking: Reported on 2018) 15 tablet 0 Unknown at Unknown time             Review of Systems:   A complete review of systems was performed and is negative except as noted in the HPI           Physical Exam:   /65 (BP Location: Right arm)  Pulse 70  Temp 99  F (37.2  C) (Oral)  Resp 18  Wt 74.2 kg (163 lb 8 oz)  LMP 2016  SpO2 98%  BMI 24.86 kg/m2  Wt:   Wt Readings from Last 2 Encounters:   18 74.2 kg (163 lb 8 oz)   18 68 kg (150 lb)      Constitutional: cooperative, pleasant, not dyspneic/diaphoretic, no acute distress  Eyes: Sclera anicteric/injected  Ears/nose/mouth/throat: Normal oropharynx without ulcers or exudate, mucus membranes moist, hearing intact  Neck: supple, thyroid normal size  CV: No edema  Respiratory: Unlabored breathing  Lymph: No axillary, submandibular, supraclavicular  or inguinal lymphadenopathy  Abd: soft, Nondistended, +bs, hepatomegaly appreciated, LUQ tenderness to deep palpation, no peritoneal signs  Skin: warm, perfused, no jaundice  Neuro: AAO x 3, No asterixis  Psych: Normal affect  MSK: No gross deformities         Data:   Labs and imaging below were independently reviewed and interpreted    BMP  Recent Labs  Lab 07/03/18  0649 07/02/18  2147 07/02/18 2034 07/01/18  2300     --  138 138   POTASSIUM 3.8  --  4.1 3.8   CHLORIDE 103  --  102 103   THUAN 8.8  --  8.7 8.6   CO2 27  --  29 24   BUN 7  --  8 8   CR 0.70 0.69 0.68 0.65   GLC 96  --  113* 101*     CBC  Recent Labs  Lab 07/06/18  0553 07/05/18  0632 07/04/18  0629 07/03/18  1342   WBC 10.8 12.7* 14.4* 14.7*   RBC 2.68* 2.65* 2.81* 3.23*   HGB 8.1* 7.9* 8.4* 9.8*   HCT 25.1* 25.0* 26.0* 29.9*   MCV 94 94 93 93   MCH 30.2 29.8 29.9 30.3   MCHC 32.3 31.6 32.3 32.8   RDW 13.2 13.1 12.8 12.7    248 269 288     INRNo lab results found in last 7 days.  LFTs  Recent Labs  Lab 07/06/18  0553 07/02/18 2034   ALKPHOS 153* 229*   AST 15 18   ALT 13 29   BILITOTAL 0.5 0.8   PROTTOTAL 5.7* 6.9   ALBUMIN 1.8* 2.4*      PANCNo lab results found in last 7 days.    Blood cultures negative, Blastomyces antigen negative, histoplasma negative, Rickettsia negative, Lyme negative Ehrlichia negative, Babesia negative  West Nile negative    Hep C antibody negative  B surface antigen negative   hep B core negative  Hep A IGG positive  HIV negative  CMV negative  EBV DNA 1863  Parvovirus IgG positive, IgM negative    Imaging:  MRCP  IMPRESSION:  1. Areas of peribiliary T2 signal/enhancement of the left hepatic lobe  concerning for cholangitis.  2. Polycystic liver disease. Multiple intermediate signal cysts which  may represent proteinaceous/hemorrhagic cysts without ancillary MR  findings suggestive of infection.   3. Compensatory hypertrophy of the left hepatic segment. Stable mild  periportal edema. Small volume ascites.  4.  Bibasilar pleural effusions with associated atelectasis.    U/S:  IMPRESSION:   1. Multicystic liver; the majority of the hepatic cysts appear to be  simple by ultrasound evaluation. There is however a dominant complex  cyst which which has increased in size since 6/23/2018. Appearance and  growth suggest an internal hemorrhagic or proteinaceous component.  2. No ascites as questioned. Trace left pleural effusion.    CT 6/29/18   IMPRESSION:  1. No infectious source identified in the chest, abdomen, or pelvis.  2. Postoperative changes of hepatic cyst unroofing procedure. There is  mildly increased ascites compared to CT 11/14/2016.  3. Heterogeneous thyroid nodule in the inferior left thyroid lobe  measuring 2.8 cm. Thyroid ultrasound could be considered if indicated.  4. Small pleural effusions. Associated atelectasis.

## 2018-07-06 NOTE — PROGRESS NOTES
CLINICAL NUTRITION SERVICES - BRIEF NOTE     Nutrition Prescription    Recommendations already ordered by Registered Dietitian (RD):  Magic Cup (Vanilla) Trial and pt may order ANY supplement with meals     Future/Additional Recommendations:  1. Monitor tolerance to Magic Cup and need for further intervention (offer other supplements or scheduled snacks.    *Refer to RD progress note on 7/2 for full nutrition assessment.     NEW FINDINGS   Information obtained from patient: (vague historian)   - Pt stated she currently has no appetite with associated nausea and episodes of emesis. She has been given two different medications to help with emesis, but none are working at this time.   - PTA pt expressed not experiencing any current symptoms of N/V and was eating well (2-3 meals/day).     INTERVENTIONS  Implementation  Medical food supplement therapy-- Magic Cup (Vanilla) Trial and pt may order ANY supplement with meals     Monitoring/Evaluation  Progress toward goals will be monitored and evaluated per protocol.     Daphne Hemphill, DARIEN, LD  7A weekday pgr. [phone]

## 2018-07-06 NOTE — PROGRESS NOTES
[facility] SERVICE: ONGOING CONSULTATION  Patient:  Jade Carcamo, Date of birth 1965, Medical record number [id number]  Date of Admission: 7/2/2018  Date of Visit:  7/6/2018         Assessment and Recommendations:     Problem List:  1. Fevers, evaluating as FUO. Continuing fevers to 101 despite antipyretics. Possibly slight symptomatic improvement 7/6.   2. Leukocytosis with neutrophilic predominance - developed after initial mild leukopenia. Appears to be improving after 48 hours of antibiotics.   3. Elevated CRP and ESR  4. Polycystic liver disease - no evidence of infected cysts on CT, but repeat US shows enlarging, newly complex cyst since 6/23.   5. Question of cholangitis on abdominal MRI. Alk phos has been elevated. AST and ALT currently normal. No bili elevation. GI evaluation pending.   5. Listed penicillin allergy. Had limited rash 20 years ago. Tolerating ceftriaxone.   6. Anemia - progressive  7. 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   8. Bicuspid aortic valve - discovered on TTE during this admission.     Discussion:  Ms. Carcamo continues to have fevers without an etiology discovered to date. She does have an evolving hepatic cyst not thought to be infected and concern for cholangitis on MRI, but with very atypical presentation if this is cholangitis. We will await GI evaluation. Since she does seem to be responding to antibiotics, will plan to continue beyond originally planned 48 hour trial. Given severe nausea/vomiting with metronidazole, would like to switch away from this. Given listed penicillin allergy, next best option to continue anaerobic coverage for GI jayna is ertapenem. Since we are continuing antibiotics in the short term, consider pharmacy evaluation for penicillin allergy to see if we could change to amp/sulbactam or pip/tazo since ertapenem is unnecessarily broad. We did consider challenging her with a penicillin but did not want potential allergic reaction  to further confuse her underlying process.      Recommendations:  1. Switch ceftriaxone and metronidazole to ertapenem 1g daily due to severe nausea with metronidazole  2. Consider penicillin allergy skin test assessment by pharmacy if still inpatient next Monday  3. GI considering MRCP (or re-analysis of previous MRI) and possibly ERCP.  4. Consider TASNEEM  5. If TASNEEM is negative and she still seems to be responding to antibiotics, consider PET (if insurance will cover) or tagged WBC scan to see if there is any other focus of infection we are not addressing.    Recommendations discussed with primary team.    Thank you for this consult. The [facility] team will continue to follow this patient. Dr. Núñez will be on over the weekend. Dr. Escamilla will take over on Monday.     Britt Dumas MD  Infectious Diseases  [phone]      Interval History:   Ongoing fevers, but perhaps diminished overnight per patient. WBC is continuing to trend down a little. MRI showed possible cholangitis - GI consult pending. Ongoing abdominal distension. No new symptoms.     4 point ROS including Respiratory, CV, GI and , other than that noted in the HPI,  is negative         History of Present Illness:   Jade Carcamo is a 52 year old female who I first met briefly in the emergency department on 5/28 and saw in clinic on 5/29.   Ms. Carcamo has polycystic liver disease but has otherwise been in good health.  She recently went on a trip that involved camping and hiking along the [facility].  Then, on 6/25 she developed fevers as high as 103 and diffuse muscle and joint pains. These have continued since with limited response to antipyretics and no etiology found.  She has been seen by multiple providers in both her primary care clinic and in the emergency department.  Her initial labs are remarkable for mild thrombocytopenia, normal total white count but with some lymphopenia, mildly elevated LFTs, and markedly elevated CRP.  Her CK was  normal.  She initially underwent a evaluation for tickborne workup with Lyme serology which was negative, Babesia serology which is still pending, and a parasite smear which showed some conclusions that could not rule out Ehrlichia.  An Ehrlichia PCR was sent to [facility] in the meantime she was started on doxycycline.  The Ehrlichia PCR returned as negative and she had no response to doxycycline.  She does not have significant photophobia and has no neck pain or stiffness.  She has not had any lethargy or confusion.  She intermittently reports some mild stomach pain. A CT C/A/P on 6/29 showed only her known (impressive) liver cysts. Please see remainder of negative workup to date below.     She had an additional ED visit on 7/1 due to unbearable symptoms at home at which time she was found to have a new neutrophilic predominant leukocytsis. CRP remained very high but had actually decreased slightly. She was discharged to home again. On 7/2 I spoke with her and arranged for admission due to ongoing highly symptomatic fevers to expedite fever/pain control and workup.       Allergies:      Allergies   Allergen Reactions     Bee Venom      Penicillins Rash     Rash limited to arm ~20 years ago.           Recent Antimicrobials::   Ertapenem 7/6-present    Ceftriaxone 7/4-7/6  Metronidazole 7/4-7/6    Doxycycline prior to admission.            Physical Exam:   /65 (BP Location: Right arm)  Pulse 70  Temp 99  F (37.2  C) (Oral)  Resp 18  Wt 74.2 kg (163 lb 8 oz)  LMP 11/14/2016  SpO2 98%  BMI 24.86 kg/m2   Exam:  GENERAL:  Well-developed, well-nourished, sitting in bed in no acute distress.   ENT:  Head is normocephalic, atraumatic. Anterior oropharynx without ulcers.  EYES:  Eyes have anicteric sclerae.    NECK:  Supple.  LUNGS:  Normal respiratory effort.   ABDOMEN:  Increasing distension. Somewhat tense. Not tender.   EXT: Extremities without visible edema.   SKIN:  No acute rashes.  Line is in place without  any surrounding erythema.  NEUROLOGIC:  Grossly nonfocal.          Laboratory Data:     Creatinine   Date Value Ref Range Status   07/03/2018 0.70 0.52 - 1.04 mg/dL Final   07/02/2018 0.69 0.52 - 1.04 mg/dL Final   07/02/2018 0.68 0.52 - 1.04 mg/dL Final   07/01/2018 0.65 0.52 - 1.04 mg/dL Final   06/28/2018 0.63 0.52 - 1.04 mg/dL Final     WBC   Date Value Ref Range Status   07/06/2018 10.8 4.0 - 11.0 10e9/L Final   07/05/2018 12.7 (H) 4.0 - 11.0 10e9/L Final   07/04/2018 14.4 (H) 4.0 - 11.0 10e9/L Final   07/03/2018 14.7 (H) 4.0 - 11.0 10e9/L Final   07/02/2018 14.1 (H) 4.0 - 11.0 10e9/L Final     Hemoglobin   Date Value Ref Range Status   07/06/2018 8.1 (L) 11.7 - 15.7 g/dL Final     Platelet Count   Date Value Ref Range Status   07/06/2018 246 150 - 450 10e9/L Final     Lab Results   Component Value Date     07/03/2018    BUN 7 07/03/2018    CO2 27 07/03/2018     CRP Inflammation   Date Value Ref Range Status   07/06/2018 230.0 (H) 0.0 - 8.0 mg/L Final   07/04/2018 230.0 (H) 0.0 - 8.0 mg/L Final   07/01/2018 236.0 (H) 0.0 - 8.0 mg/L Final   06/28/2018 330.0 (H) 0.0 - 8.0 mg/L Final   06/23/2018 81.0 (H) 0.0 - 8.0 mg/L Final           Pertinent Recent Microbiology Data:       Recent Labs  Lab 07/03/18  0116 07/02/18  0115 07/01/18  2300   CULT No growth after 3 days  No growth after 3 days No growth after 4 days No growth after 4 days   SDES Blood Left Hand  Blood Left Arm Blood Right Arm Blood Right Arm     To date she has had the following evaluation:   Ehrlichia  - negative  Lyme screen - negative  Anaplasma - negative   Babesia - negative  RMSF - negative  CMV - negative  EBV - low titer consistent with reactivation  Parvovirus - previous infection  CK - normal  Blood cultures x 4 negative to date (only first one was prior to doxycycline)  Urine culture low levels mixed urogenital jayna  ROBBY - negative  TTE - bicuspid aortic valve. No evidence endocarditis  Hepatititis A - IgG positive. Reflex IgM  negative.   Hep B - negative surface ag and core ab. Surface ab not done.   Hep C - negative  HIV - negative  Quantiferon Gold - indeterminate due to impaired response  Histoplasma urine and blood ag - pending  Blastomyces urine and blood ag - pending  Fungal antibody panel - pending  West Nile Virus serology negative  RF - negative  LDH - normal  TSH - slightly high  Ferritin - 210         Imagin18 abdominal US showed simple hepatic cysts  18 CT C/A/P showed previously known polycystic liver disease. No evidence of infected cyst on CT - discussed with radiology 18.     Recent Results (from the past 48 hour(s))   US Abdomen Limited    Narrative    EXAM: Ultrasound abdomen limited  2018 8:56 AM      HISTORY: History of polycystic liver, evaluate ascites/cysts    COMPARISON: CT 2018, ultrasound 2018    FINDINGS:   Multicystic liver. The majority of the pancreatic cystic lesions  appear simple by sonographic imaging. There is however a dominant  lesion in the right hepatic lobe which shows thick and avascular  septations with internal echogenicity, measuring approximately 11.4 x  8.1 x 7.1 cm. Previously there was a right hepatic lobe cyst measuring  up to 7.5 cm on the ultrasound 2018. On ultrasound 2018, a  dominant complex cyst measures 7.5 x 4.6 x 5.2 cm.    No ascites. Trace left pleural effusion.      Impression    IMPRESSION:   1. Multicystic liver; the majority of the hepatic cysts appear to be  simple by ultrasound evaluation. There is however a dominant complex  cyst which which has increased in size since 2018. Appearance and  growth suggest an internal hemorrhagic or proteinaceous component.  2. No ascites as questioned. Trace left pleural effusion.    I have personally reviewed the examination and initial interpretation  and I agree with the findings.    CELIA JOHNSON MD   MR Abdomen w/o & w Contrast    Narrative    MRI ABDOMEN    CLINICAL HISTORY:   Polycystic liver disease, question infected cyst    TECHNIQUE:  Images were acquired with and without intravenous contrast  through the abdomen. The following MR images were acquired: TrueFISP,  multiplanar T2 weighted, axial T1 in/out of phase, axial fat-saturated  T1, diffusion-weighted. Multiplanar T1-weighted images with fat  saturation were before contrast administration and at multiple time  points following the administration of intravenous contrast. Contrast  dose: 7.5ml Gadavist    FINDINGS:    Comparison study: Ultrasound 7/5/2018, CT CAP 6/29/2018    Liver: Polycystic liver disease. Postoperative changes of hepatic cyst  unroofing of multiple sclerosis. Surgical clips adjacent to a right  dome hepatic cyst. T2 hyperintense and T2 intermediate cysts replacing  much of the right hepatic lobe. Dominant cyst in the medial right  hepatic lobe measuring 6.7 x 9.4 cm, previously 6.1 x 8.8 cm on  6/29/2018 demonstrates intermediate T2/T1 signal and restricted  diffusion (series 13, image 57). Additional T2/T1 intermediate  restricting cysts of the anterior superior right hepatic lobe  measuring 4.4 and 2.7 cm respectively (series 13, image 77). No  abnormal enhancement of the cysts are regional hyperemia within the  liver. Redemonstration of mass effect on the portal and hepatic veins.  No convincing evidence of portal venous thrombus. Massive hypertrophy  of the left hepatic lobe. Significant signal dropout on out of phase.    Numerous foci of T2 signal and peribiliary enhancement, (for example  series 23, image 9) with associated restricted diffusion. Mild diffuse  periportal edema, similar to CT 6/29/2018.    Mild fluid and stranding of right upper quadrant fat posterior to the  right hepatic lobe and bowel/mesentery interposed in the anterior  right upper quadrant. This does not appear significantly changed from  6/29/2018 or 11/14/2016.    The common bile duct measures 6 mm in the pancreatic head.  Mildly  stable prominence of the left hepatic bile ducts.    Gallbladder: Partially decompressed. Mass effect on the proximal  gallbladder by multiple hepatic cysts. No significant gallbladder wall  thickening. No visualized cholelithiasis.    Spleen: Mild splenomegaly. No focal mass.    Kidneys: Symmetric enhancement. No focal lesion. No hydronephrosis or  hydroureter. Tiny renal cysts. Downward mass effect on the right  kidney. Nonspecific bilateral perinephric fluid signal.    Adrenal glands: Unremarkable.    Pancreas: Preserved T1 signal. Mass effect on the pancreas from the  hypertrophied left hepatic lobe. No significant pancreatic ductal  dilatation.     Bowel: Effacement of the hepatic flexure without proximal dilatation.  Moderate colonic stool. No small bowel dilatation. The appendix is  unremarkable. Posterior and inferior displacement of the stomach.    Lymph nodes: Mildly prominent retroperitoneal lymph nodes, within  normal limits. No significantly changed from 6/29/2018.    Blood vessels: Abdominal aorta is within normal limits. No aneurysmal  dilatation.    Lung bases: Small pleural effusions. Mild associated atelectasis of  the lung bases.    Bones and soft tissues: Small vertebral body hemangiomas. No  aggressive appearing osseous lesions.    Mesentery and abdominal wall: Small bowel and mesentery coursing into  the right upper quadrant anterior to the right hepatic lobe.    Ascites: Small ascites.      Impression    IMPRESSION:  1. Areas of peribiliary T2 signal/enhancement of the left hepatic lobe  concerning for cholangitis.  2. Polycystic liver disease. Multiple intermediate signal cysts which  may represent proteinaceous/hemorrhagic cysts without ancillary MR  findings suggestive of infection.   3. Compensatory hypertrophy of the left hepatic segment. Stable mild  periportal edema. Small volume ascites.  4. Bibasilar pleural effusions with associated atelectasis.    [Result: Findings  concerning for cholangitis]    Finding was identified on 7/5/2018 3:39 PM.     Dr. Moscoso was contacted by Dr. Kenney at 7/6/2018 7:56 AM and verbalized  understanding of the urgent finding.     I have personally reviewed the examination and initial interpretation  and I agree with the findings.    RICH ZAPATA MD

## 2018-07-06 NOTE — PROVIDER NOTIFICATION
On call provider paged as pt had another episode of emesis; prn Zofran given at 2200 and Compazine not available until close to midnight. Sea bands on. Asked provider if possible to add another medication. Provider states they will review chart and order additional meds accordingly.

## 2018-07-06 NOTE — PLAN OF CARE
Problem: Infection, Risk/Actual (Adult)  Intervention: Manage Suspected/Actual Infection  [id number]    Temp max 99.9, OVSS, O2 sats dipped into the low 90s during sleep, high 90s while awake.     Denies pain. Reports intermittent nausea and overall malaise. Emesis x2 overnight, but none so far today. Took zofran x1, refused compazine. Initiating scopolamine patch this afternoon. Ate a few bites of breakfast this morning. Drinking a little <1L. Continues to receive ibuprofen and tylenol scheduled q6h. Urine is dark rubin, output adequate. Continues on D5NS @ 100cc/hr.  Plan is to continue IV abx (flagyl and rocephin) and to have pancreaticobiliary GI consult completed today. Probable cholangitis per team's notes.

## 2018-07-06 NOTE — PROGRESS NOTES
[facility], [place]    Internal Medicine Progress Note - [facility] Service    Main Plans for Today   - Panc/Bili Consult for possible ERCP  - c/w empiric ceftriaxone and flagyl per ID recs    Assessment & Plan   Jade Carcamo is a 52 year old female admitted on 7/2/2018. Jade Carcamo is a 52 year old female with hx of polycystic liver disease admitted on 7/2/2018. She was previously healthy now with 11 days of persistent fever up to 103F now s/p 7d doxycycline with negative outpatient infectious workup here now for supportive cares and further workup of fever.    Fever without source - unclear etiology  Ddx includes infections (fungal vs vector vs viral, less likely bacterial) vs autoimmune vs malignancy. Drug reaction less likely as no known exposure. On admission had leukocytosis of 14.1, up from 12.8 on 7/1 and 8.3 on 6/28, neutrophil predominant. Sed rate elevated at 102 (7/1) and . Has not improved after despite 7 days of doxycycline therapy. She has had a relatively negative infectious work up all of which has been NGTD. Fungal work-up pending. Autoimmune is also possible given elevated inflammatory markers and RF/ROBBY are negative and no exam findings. Malignancy is much less likely at this time. Had CT C/A/P that was unremarkable on 6/28. Although she does not meet the criteria for fever of unknown origin at this time, we are continuing with this workup to attempt to better elucidate the etiology of her symptoms. TTE without endocarditis. Hep A IgG positive, Hep A IgM negative Hep B, Hep C negative. HIV negative. MRI 7/5 demonstrates peripheral biliary dilation to the left hepatic lobe with mild enhancement suspicious for cholangitis - this was discussed at Liver multi-disciplinary conference 7/6.  No sign of infected hepatic cyst.   - Infectious disease consult, recs appreciated  - Surg On consulted, recs appreciated  - Rheum consult for possible seronegative autoimmune  disorder, recs appreciated.   - GI (Panc/Bili) consult, recs appreciated for possible atypical cholangitis per MRI finding  - will c/w ceftriaxone and metronidazole in light of MRI finding   - Histoplasma urine and blood ag pending  - Blastomyces urine and blood ag pending  - Fungal antibody panel pending  - consider TASNEEM if rest of work-up negative.     Workup to date:  - Erlichia - negative  - West Nile Virus serology negative  - Lyme screen - negative  - Anaplasma - negative  - Babesia - negative  - RMSF - negative  - CMV - negative  - EBV - low titer consistent with reactivation  - Parvovirus - previous infection  - CK - normal  - LDH - wnl  - Blood cultures x4 (6/25, 6/28, 7/1, 7/2) - NGTD  - Urine culture (6/23) - Negative  - Hepatitis work up negative   - Quantiferon Gold indeterminate, will need to repeat at later date.    Nausea  Likely 2/2 flagyl therapy   - scopolamine patch   - prn zofran    Hx of Polycystic Liver Disease  CT C/A/P (7/1) - previously known polycystic liver disease. No evidence of new or infected cyst. However, could be a ruptured cyst (sterile or infected fluid collection) that is causing fevers. See discussion above regarding abdominal US findings.     # Pain Assessment:  Current Pain Score 7/6/2018   Patient currently in pain? denies   Pain score (0-10) -   Pain location -   Pain descriptors -   - Jade is experiencing pain due to fevers. Pain management was discussed and the plan was created in a collaborative fashion.  Jade's response to the current recommendations: engaged  - Please see the plan for pain management as documented above    Diet: Regular Diet Adult  Fluids: mIVF D5NS @100ml/h  DVT Prophylaxis: Enoxaparin (Lovenox) SQ  Code Status: Full Code  Physical Therapy given deconditioning/weakness    Disposition Plan   Expected discharge: 2 - 3 days, recommended to prior living arrangement once adequate pain management/ tolerating PO medications and SIRS/Sepsis treated.      Entered: Anusha Mocsoso 07/06/2018, 3:48 PM   Information in the above section will display in the discharge planner report.      The patient's care was discussed with the Attending Physician, Dr. Strickland, Care Coordinator/ and Patient.    Anusha Moscoso  [facility]  [place]: 5  Pager: [phone]  Please see sticky note for cross cover information    Interval History   Overnight events and notes reviewed.     Febrile to ~101 this AM. Pt states she is very bloated, nauseous since flagyl started. Requiring frequent antiemetics.   Denies sob, headache, vision changes.     4 point ROS negative except as above.     Physical Exam   Vital Signs: Temp: 99  F (37.2  C) Temp src: Oral BP: 115/65 Pulse: 70   Resp: 18 SpO2: 98 % O2 Device: None (Room air)    Weight: 163 lbs 8 oz    General Appearance: Awake and alert  Eyes: No scleral icterus. PERRL  HEENT: NC/AT. Oropharynx clear. No lymphadenopathy. MMM  Respiratory: CTAB.  Cardiovascular: RRR. No murmurs, rubs or gallops.  GI: BS+. Soft, nondistended. No tenderness to palpation. No guarding or rebound tenderness. No organomegaly.  Skin: No rash. No ecchymoses or petechiae.  Musculoskeletal: Normal muscle bulk and tone. Extremities warm and well perfused. DP and radial pulses 2+ bilaterally.  Neurologic: AOx3. CN II-XII intact. No focal deficits.       I personally reviewed medications, labs and imaging.

## 2018-07-06 NOTE — PLAN OF CARE
Problem: Patient Care Overview  Goal: Plan of Care/Patient Progress Review  Outcome: No Change  T max 100, OVSS on RA. Reports malaise, but otherwise denies pain. Nausea continues to be significant issue. PRN Zofran and Compazine administered as if scheduled; pt had additional episode of emesis between available doses, and one-time dose of Reglan ordered. By the time Reglan arrived from pharmacy, Compazine was available and pt declined the Reglan. Voiding, not saving. One loose stool reported. Pt reports feeling full despite eating very little. D5NS continues at 100mL/hr between doses of Flagyl. Up independently. MRI results still pending. Will continue to monitor and follow plan of care.    Problem: Infection, Risk/Actual (Adult)  Goal: Identify Related Risk Factors and Signs and Symptoms  Related risk factors and signs and symptoms are identified upon initiation of Human Response Clinical Practice Guideline (CPG).   Outcome: No Change  Patient remains intermittently febrile despite around-the-clock dosing of ibuprofen and tylenol.

## 2018-07-06 NOTE — PROGRESS NOTES
Surgical Oncology progress note    No acute events overnight, continues to have intermittent low grade fever.  Mild pain/ache to the right upper abdomen.  Minimal appetite, ambulating in room.    /65 (BP Location: Right arm)  Pulse 70  Temp 99  F (37.2  C) (Oral)  Resp 18  Wt 74.2 kg (163 lb 8 oz)  LMP 11/14/2016  SpO2 98%  BMI 24.86 kg/m2    AAOx3, NAD  NLB on RA  RRR  Abdomen mildly distended, soft, no guarding  Extremities non-edematous    Labs:  - WBC 10.8  - Alk P 153  - T.Bili 0.5    A/P:  53 y/o F with h/o PCLD s/p laparoscopic cyst fenestration in 2016, now admitted with FUO.  Imaging concerning for possible hepatic cyst etiology of fevers.  MRI 7/5 demonstrates peripheral biliary dilation to the left hepatic lobe with mild enhancement suspicious for cholangitis - this was discussed at Liver multi-disciplinary conference 7/6.  No sign of infected hepatic cyst.    - continue antibiotics  - recommend GI consult for recommendations on further work-up --> possible MRCP or ERCP?  - no indication for surgical intervention at this time, will follow peripherally, call with questions    Discussed with Dr. Yolanda Wooten MD  Surgery, PGY5  [phone]

## 2018-07-07 ENCOUNTER — APPOINTMENT (OUTPATIENT)
Dept: PHYSICAL THERAPY | Facility: CLINIC | Age: 53
DRG: 445 | End: 2018-07-07
Attending: PEDIATRICS
Payer: COMMERCIAL

## 2018-07-07 LAB
ALBUMIN SERPL-MCNC: 2 G/DL (ref 3.4–5)
ALP SERPL-CCNC: 141 U/L (ref 40–150)
ALT SERPL W P-5'-P-CCNC: 13 U/L (ref 0–50)
ANION GAP SERPL CALCULATED.3IONS-SCNC: 9 MMOL/L (ref 3–14)
AST SERPL W P-5'-P-CCNC: 12 U/L (ref 0–45)
BILIRUB SERPL-MCNC: 0.4 MG/DL (ref 0.2–1.3)
BUN SERPL-MCNC: 7 MG/DL (ref 7–30)
CALCIUM SERPL-MCNC: 7.9 MG/DL (ref 8.5–10.1)
CHLORIDE SERPL-SCNC: 106 MMOL/L (ref 94–109)
CO2 SERPL-SCNC: 26 MMOL/L (ref 20–32)
CREAT SERPL-MCNC: 0.6 MG/DL (ref 0.52–1.04)
ERYTHROCYTE [DISTWIDTH] IN BLOOD BY AUTOMATED COUNT: 13.3 % (ref 10–15)
GFR SERPL CREATININE-BSD FRML MDRD: >90 ML/MIN/1.7M2
GLUCOSE SERPL-MCNC: 113 MG/DL (ref 70–99)
HCT VFR BLD AUTO: 24.3 % (ref 35–47)
HGB BLD-MCNC: 7.7 G/DL (ref 11.7–15.7)
INR PPP: 1.29 (ref 0.86–1.14)
MAGNESIUM SERPL-MCNC: 2.2 MG/DL (ref 1.6–2.3)
MCH RBC QN AUTO: 29.8 PG (ref 26.5–33)
MCHC RBC AUTO-ENTMCNC: 31.7 G/DL (ref 31.5–36.5)
MCV RBC AUTO: 94 FL (ref 78–100)
PHOSPHATE SERPL-MCNC: 2.4 MG/DL (ref 2.5–4.5)
PLATELET # BLD AUTO: 304 10E9/L (ref 150–450)
POTASSIUM SERPL-SCNC: 2.8 MMOL/L (ref 3.4–5.3)
POTASSIUM SERPL-SCNC: 3.3 MMOL/L (ref 3.4–5.3)
PROT SERPL-MCNC: 5.7 G/DL (ref 6.8–8.8)
RBC # BLD AUTO: 2.58 10E12/L (ref 3.8–5.2)
SODIUM SERPL-SCNC: 141 MMOL/L (ref 133–144)
WBC # BLD AUTO: 10.8 10E9/L (ref 4–11)

## 2018-07-07 PROCEDURE — 25000132 ZZH RX MED GY IP 250 OP 250 PS 637: Performed by: STUDENT IN AN ORGANIZED HEALTH CARE EDUCATION/TRAINING PROGRAM

## 2018-07-07 PROCEDURE — 97162 PT EVAL MOD COMPLEX 30 MIN: CPT | Mod: GP

## 2018-07-07 PROCEDURE — 12000026 ZZH R&B TRANSPLANT

## 2018-07-07 PROCEDURE — 93005 ELECTROCARDIOGRAM TRACING: CPT

## 2018-07-07 PROCEDURE — 80053 COMPREHEN METABOLIC PANEL: CPT | Performed by: INTERNAL MEDICINE

## 2018-07-07 PROCEDURE — 40000556 ZZH STATISTIC PERIPHERAL IV START W US GUIDANCE

## 2018-07-07 PROCEDURE — 83735 ASSAY OF MAGNESIUM: CPT | Performed by: INTERNAL MEDICINE

## 2018-07-07 PROCEDURE — 93010 ELECTROCARDIOGRAM REPORT: CPT | Performed by: INTERNAL MEDICINE

## 2018-07-07 PROCEDURE — 99233 SBSQ HOSP IP/OBS HIGH 50: CPT | Mod: GC | Performed by: INTERNAL MEDICINE

## 2018-07-07 PROCEDURE — 25000125 ZZHC RX 250: Performed by: INTERNAL MEDICINE

## 2018-07-07 PROCEDURE — 40000193 ZZH STATISTIC PT WARD VISIT

## 2018-07-07 PROCEDURE — 85610 PROTHROMBIN TIME: CPT | Performed by: INTERNAL MEDICINE

## 2018-07-07 PROCEDURE — 36415 COLL VENOUS BLD VENIPUNCTURE: CPT | Performed by: INTERNAL MEDICINE

## 2018-07-07 PROCEDURE — 84100 ASSAY OF PHOSPHORUS: CPT | Performed by: INTERNAL MEDICINE

## 2018-07-07 PROCEDURE — 84132 ASSAY OF SERUM POTASSIUM: CPT | Performed by: INTERNAL MEDICINE

## 2018-07-07 PROCEDURE — 25000128 H RX IP 250 OP 636: Performed by: HOSPITALIST

## 2018-07-07 PROCEDURE — 97530 THERAPEUTIC ACTIVITIES: CPT | Mod: GP

## 2018-07-07 PROCEDURE — 25000128 H RX IP 250 OP 636: Performed by: INTERNAL MEDICINE

## 2018-07-07 PROCEDURE — 25000132 ZZH RX MED GY IP 250 OP 250 PS 637: Performed by: INTERNAL MEDICINE

## 2018-07-07 PROCEDURE — 25000128 H RX IP 250 OP 636: Performed by: STUDENT IN AN ORGANIZED HEALTH CARE EDUCATION/TRAINING PROGRAM

## 2018-07-07 PROCEDURE — 40000257 ZZH STATISTIC CONSULT NO CHARGE VASC ACCESS

## 2018-07-07 PROCEDURE — 85027 COMPLETE CBC AUTOMATED: CPT | Performed by: INTERNAL MEDICINE

## 2018-07-07 RX ORDER — POTASSIUM CHLORIDE 1.5 G/1.58G
20-40 POWDER, FOR SOLUTION ORAL
Status: DISCONTINUED | OUTPATIENT
Start: 2018-07-07 | End: 2018-07-13 | Stop reason: HOSPADM

## 2018-07-07 RX ORDER — POTASSIUM CHLORIDE 29.8 MG/ML
20 INJECTION INTRAVENOUS
Status: DISCONTINUED | OUTPATIENT
Start: 2018-07-07 | End: 2018-07-13 | Stop reason: HOSPADM

## 2018-07-07 RX ORDER — MAGNESIUM SULFATE HEPTAHYDRATE 40 MG/ML
2 INJECTION, SOLUTION INTRAVENOUS DAILY PRN
Status: DISCONTINUED | OUTPATIENT
Start: 2018-07-07 | End: 2018-07-13 | Stop reason: HOSPADM

## 2018-07-07 RX ORDER — LORAZEPAM 0.5 MG/1
0.5 TABLET ORAL EVERY 4 HOURS PRN
Status: DISCONTINUED | OUTPATIENT
Start: 2018-07-07 | End: 2018-07-13 | Stop reason: HOSPADM

## 2018-07-07 RX ORDER — METOCLOPRAMIDE HYDROCHLORIDE 5 MG/ML
10 INJECTION INTRAMUSCULAR; INTRAVENOUS EVERY 6 HOURS PRN
Status: DISCONTINUED | OUTPATIENT
Start: 2018-07-07 | End: 2018-07-13 | Stop reason: HOSPADM

## 2018-07-07 RX ORDER — MAGNESIUM SULFATE HEPTAHYDRATE 40 MG/ML
4 INJECTION, SOLUTION INTRAVENOUS EVERY 4 HOURS PRN
Status: DISCONTINUED | OUTPATIENT
Start: 2018-07-07 | End: 2018-07-13 | Stop reason: HOSPADM

## 2018-07-07 RX ORDER — POTASSIUM CL/LIDO/0.9 % NACL 10MEQ/0.1L
10 INTRAVENOUS SOLUTION, PIGGYBACK (ML) INTRAVENOUS
Status: DISCONTINUED | OUTPATIENT
Start: 2018-07-07 | End: 2018-07-13 | Stop reason: HOSPADM

## 2018-07-07 RX ORDER — POTASSIUM CHLORIDE 750 MG/1
20-40 TABLET, EXTENDED RELEASE ORAL
Status: DISCONTINUED | OUTPATIENT
Start: 2018-07-07 | End: 2018-07-13 | Stop reason: HOSPADM

## 2018-07-07 RX ORDER — ERTAPENEM 1 G/1
1 INJECTION, POWDER, LYOPHILIZED, FOR SOLUTION INTRAMUSCULAR; INTRAVENOUS EVERY 24 HOURS
Status: DISCONTINUED | OUTPATIENT
Start: 2018-07-07 | End: 2018-07-13 | Stop reason: HOSPADM

## 2018-07-07 RX ORDER — POTASSIUM CHLORIDE 7.45 MG/ML
10 INJECTION INTRAVENOUS
Status: DISCONTINUED | OUTPATIENT
Start: 2018-07-07 | End: 2018-07-13 | Stop reason: HOSPADM

## 2018-07-07 RX ADMIN — RANITIDINE 150 MG: 150 TABLET, FILM COATED ORAL at 20:36

## 2018-07-07 RX ADMIN — DEXTROSE AND SODIUM CHLORIDE: 5; 900 INJECTION, SOLUTION INTRAVENOUS at 21:15

## 2018-07-07 RX ADMIN — ACETAMINOPHEN 975 MG: 325 TABLET, FILM COATED ORAL at 18:00

## 2018-07-07 RX ADMIN — ENOXAPARIN SODIUM 40 MG: 40 INJECTION SUBCUTANEOUS at 20:36

## 2018-07-07 RX ADMIN — Medication 10 MEQ: at 16:46

## 2018-07-07 RX ADMIN — ACETAMINOPHEN 975 MG: 325 TABLET, FILM COATED ORAL at 12:59

## 2018-07-07 RX ADMIN — RANITIDINE 150 MG: 150 TABLET, FILM COATED ORAL at 07:40

## 2018-07-07 RX ADMIN — Medication 10 MEQ: at 19:11

## 2018-07-07 RX ADMIN — IBUPROFEN 400 MG: 400 TABLET ORAL at 10:14

## 2018-07-07 RX ADMIN — Medication 10 MEQ: at 23:02

## 2018-07-07 RX ADMIN — ONDANSETRON HYDROCHLORIDE 4 MG: 2 INJECTION, SOLUTION INTRAMUSCULAR; INTRAVENOUS at 14:31

## 2018-07-07 RX ADMIN — ACETAMINOPHEN 975 MG: 325 TABLET, FILM COATED ORAL at 05:58

## 2018-07-07 RX ADMIN — PROCHLORPERAZINE EDISYLATE 5 MG: 5 INJECTION INTRAMUSCULAR; INTRAVENOUS at 10:14

## 2018-07-07 RX ADMIN — METRONIDAZOLE 500 MG: 500 INJECTION, SOLUTION INTRAVENOUS at 10:14

## 2018-07-07 RX ADMIN — DEXTROSE AND SODIUM CHLORIDE: 5; 900 INJECTION, SOLUTION INTRAVENOUS at 03:34

## 2018-07-07 RX ADMIN — METOCLOPRAMIDE 10 MG: 5 INJECTION, SOLUTION INTRAMUSCULAR; INTRAVENOUS at 16:22

## 2018-07-07 RX ADMIN — IBUPROFEN 400 MG: 400 TABLET ORAL at 22:17

## 2018-07-07 RX ADMIN — ONDANSETRON HYDROCHLORIDE 4 MG: 2 INJECTION, SOLUTION INTRAMUSCULAR; INTRAVENOUS at 21:48

## 2018-07-07 RX ADMIN — ONDANSETRON HYDROCHLORIDE 4 MG: 2 INJECTION, SOLUTION INTRAMUSCULAR; INTRAVENOUS at 09:40

## 2018-07-07 RX ADMIN — LORAZEPAM 0.5 MG: 0.5 TABLET ORAL at 13:07

## 2018-07-07 RX ADMIN — ERTAPENEM SODIUM 1 G: 1 INJECTION, POWDER, LYOPHILIZED, FOR SOLUTION INTRAMUSCULAR; INTRAVENOUS at 11:59

## 2018-07-07 RX ADMIN — LORAZEPAM 0.5 MG: 0.5 TABLET ORAL at 20:36

## 2018-07-07 RX ADMIN — METRONIDAZOLE 500 MG: 500 INJECTION, SOLUTION INTRAVENOUS at 02:05

## 2018-07-07 RX ADMIN — Medication 10 MEQ: at 17:59

## 2018-07-07 RX ADMIN — Medication 10 MEQ: at 15:41

## 2018-07-07 RX ADMIN — Medication 10 MEQ: at 13:58

## 2018-07-07 RX ADMIN — ONDANSETRON HYDROCHLORIDE 4 MG: 2 INJECTION, SOLUTION INTRAMUSCULAR; INTRAVENOUS at 17:57

## 2018-07-07 RX ADMIN — Medication 10 MEQ: at 12:50

## 2018-07-07 RX ADMIN — PROCHLORPERAZINE EDISYLATE 5 MG: 5 INJECTION INTRAMUSCULAR; INTRAVENOUS at 23:09

## 2018-07-07 RX ADMIN — ONDANSETRON HYDROCHLORIDE 4 MG: 2 INJECTION, SOLUTION INTRAMUSCULAR; INTRAVENOUS at 02:04

## 2018-07-07 RX ADMIN — IBUPROFEN 400 MG: 400 TABLET ORAL at 15:46

## 2018-07-07 RX ADMIN — IBUPROFEN 400 MG: 400 TABLET ORAL at 03:34

## 2018-07-07 RX ADMIN — ONDANSETRON HYDROCHLORIDE 4 MG: 2 INJECTION, SOLUTION INTRAMUSCULAR; INTRAVENOUS at 05:55

## 2018-07-07 NOTE — PROGRESS NOTES
CLINICAL NUTRITION SERVICES - BRIEF NOTE  *See RD note 7/3 for nutrition POC and full assessment    Provider Consult RD with following comments: poor appetite, low albumin     Nutrition Prescription    Recommendations already ordered by Registered Dietitian (RD):  - Calorie counts 7/8-7/10    Future/Additional Recommendations:  - If pt consistently eating <75% of nutrition needs (1275 kcal/day and 40 g PRO/day), would recommend nutrition support via feeding tube. Re-consult RD for recommendations if appropriate.  - Consider Thera-Vit-M if PO intake <75% of needs.  - Encourage intake  - Monitor weight     Pt see by RD 7/3 and 7/6 (yesterday) and oral nutrition supplements discussed and ordered. Pt has little appetite r/t N/V. Encourage pt to order small frequent meals (4-6 snacks/small meals per day) and supplements. Albumin is an acute phase protein which may be low r/t inflammation. Albumin is not used as a reliable marker of nutrition status given this response to inflammation in the body.     RD will continue to monitor and follow up per protocol.     Martha Zapien RD, LD  Weekend pager: [phone]

## 2018-07-07 NOTE — PROGRESS NOTES
[facility], [place]    Internal Medicine Progress Note - [facility] Service    Main Plans for Today   - Panc/Bili Consult for possible ERCP on 7/10  - Switch from empiric ceftriaxone and flagyl to ertapenem given extreme nausea and abdominal discomfort on flagyl.  - prn ativan for nausea   - encourage ambulation/IS    Assessment & Plan   Jade Carcamo is a 52 year old female admitted on 7/2/2018. Jade Carcamo is a 52 year old female with hx of polycystic liver disease admitted on 7/2/2018. She was previously healthy now with 11 days of persistent fever up to 103F now s/p 7d doxycycline with negative outpatient infectious workup here now for supportive cares and further workup of fever.    Fever without source - unclear etiology  Ddx includes infections (fungal vs vector vs viral, less likely bacterial) vs autoimmune vs malignancy. Drug reaction less likely as no known exposure. On admission had leukocytosis of 14.1, up from 12.8 on 7/1 and 8.3 on 6/28, neutrophil predominant. Sed rate elevated at 102 (7/1) and . Has not improved after despite 7 days of doxycycline therapy. She has had a relatively negative infectious work up all of which has been NGTD. Fungal work-up pending. Autoimmune is also possible given elevated inflammatory markers and RF/ROBBY are negative and no exam findings. Malignancy is much less likely at this time. Had CT C/A/P that was unremarkable on 6/28. Although she does not meet the criteria for fever of unknown origin at this time, we are continuing with this workup to attempt to better elucidate the etiology of her symptoms. TTE without endocarditis. Hep A IgG positive, Hep A IgM negative Hep B, Hep C negative. HIV negative. MRI 7/5 demonstrates peripheral biliary dilation to the left hepatic lobe with mild enhancement suspicious for cholangitis - this was discussed at Liver multi-disciplinary conference 7/6.  No sign of infected hepatic cyst. A decision was made  in favor for evaluation and possible management of biliary disease by ERCP tentatively planned for 7/10.   - Infectious disease consult, recs appreciated  - Surg On consulted, recs appreciated  - GI (Panc/Bili) consult, recs appreciated for possible atypical cholangitis per MRI finding  - Rheum consult for possible seronegative autoimmune disorder, recs appreciated (have signed off, will consult if needed).   - switch from empiric ceftriaxone and flagyl to ertapenem given extreme nausea and abdominal discomfort on flagyl.  - consider TASNEEM if rest of work-up negative.   - Histoplasma urine and blood ag pending  - Blastomyces urine and blood ag pending  - Fungal antibody panel pending    Workup to date:  - Erlichia - negative  - West Nile Virus serology negative  - Lyme screen - negative  - Anaplasma - negative  - Babesia - negative  - RMSF - negative  - CMV - negative  - EBV - low titer consistent with reactivation  - Parvovirus - previous infection  - CK - normal  - LDH - wnl  - Blood cultures x4 (6/25, 6/28, 7/1, 7/2) - NGTD  - Urine culture (6/23) - Negative  - Hepatitis work up negative   - Quantiferon Gold indeterminate, will need to repeat at later date.    Nausea  Likely 2/2 flagyl therapy   - discontinue flagyl as above  - ativan q4h prn  - scopolamine patch   - schedule zofran q6h    Diarrhea  No abdominal pain, worsening leukocytosis. Likely 2/2 antibiotic therapy  - continue to monitor  - check c diff if concerns     Hx of Polycystic Liver Disease  CT C/A/P (7/1) - previously known polycystic liver disease. No evidence of new or infected cyst. However, could be a ruptured cyst (sterile or infected fluid collection) that is causing fevers. See discussion above regarding abdominal US findings.     # Pain Assessment:  Current Pain Score 7/7/2018   Patient currently in pain? denies   Pain score (0-10) -   Pain location -   Pain descriptors -   - Jade is experiencing pain due to fevers. Pain management was  discussed and the plan was created in a collaborative fashion.  Jade's response to the current recommendations: engaged  - Please see the plan for pain management as documented above    Diet: Regular Diet Adult  Fluids: mIVF D5NS @100ml/h  DVT Prophylaxis: Enoxaparin (Lovenox) SQ  Code Status: Full Code  Physical Therapy given deconditioning/weakness      Disposition Plan   Expected discharge: 4 - 7 days, recommended to prior living arrangement once adequate pain management/ tolerating PO medications and SIRS/Sepsis treated.     Entered: Anusha Moscoso 07/07/2018, 1:49 PM   Information in the above section will display in the discharge planner report.      The patient's care was discussed with the Attending Physician, Dr. Strickland, Care Coordinator/ and Patient.    Anusha Moscoso  [facility]  Pager: [phone]  Please see sticky note for cross cover information    Interval History   Overnight events and notes reviewed.     Febrile to ~102 over last 24 hours. Pt still c/o lots of nausea/ vomiting x2, abdominal bloating. No increase in abdominal pain.      Denies sob, headache, vision changes, or rashes.     4 point ROS negative except as above.     Physical Exam   Vital Signs: Temp: 99.4  F (37.4  C) Temp src: Oral BP: 116/61 Pulse: 61   Resp: 14 SpO2: 97 % O2 Device: None (Room air)    Weight: 163 lbs 8 oz    General Appearance: Awake and alert. Laying in bed, appears distressed   Eyes: No scleral icterus. PERRL  HEENT: NC/AT. Oropharynx clear. No lymphadenopathy. MMM  Respiratory: CTAB.  Cardiovascular: RRR. No murmurs, rubs or gallops.  GI: BS+. Soft, nondistended. No tenderness to palpation. No guarding or rebound tenderness. No organomegaly.  Skin: No rash. No ecchymoses or petechiae.  Musculoskeletal: Extremities warm and well perfused. DP and radial pulses 2+ bilaterally.  Neurologic: No focal deficits.       I personally reviewed medications, labs and imaging.

## 2018-07-07 NOTE — PLAN OF CARE
Problem: Patient Care Overview  Goal: Plan of Care/Patient Progress Review  Discharge Planner PT   Patient plan for discharge: Home   Current status: EVal complete. PT will cont to follow pt to manage deconditioning while in hospital. Pt IND with bed mobility and transfers and amb with HHA from SO for 400 ft, demonstrating minor balance problems but no overt LOBs  Barriers to return to prior living situation: medical needs  Recommendations for discharge: Home with SO and OP PT for cont strengthening/endurance training to return to PLOF  Rationale for recommendations: see abive       Entered by: Daphne Carbone 07/07/2018 3:36 PM

## 2018-07-07 NOTE — PLAN OF CARE
Problem: Patient Care Overview  Goal: Plan of Care/Patient Progress Review  1900-0730: Tmax 102.0-98.6. AOVSS on RA. Scheduled tylenol, ibuprofen, zofran IVP, ice packs, and scopolamine patch to help with ongoing symptoms. One dose of zofran IVP was PRN at 2345. Regular diet with poor intake. D5NS running at 100ml/hr through L lower peripheral. Voided 300ml overnight with bladder scan of 98ml at 0630; pt saying she had no need/urge to void. No BM overnight. Up with minimal assistance. Pt did not sleep well overnight. Will continue to monitor and follow POC.

## 2018-07-07 NOTE — PLAN OF CARE
Problem: Patient Care Overview  Goal: Plan of Care/Patient Progress Review  [id number]    Temp max 99.9 at 1200 vitals, OVSS. Temperature is with pt taking scheduled tylenol (975mg q8h) and ibuprofen (400mg q6h).     Pt overall had a worse day today compared to yesterday. She had 3 emesis with dry heaving and feeling of constant nausea the majority of the day. Has been unable to drink or eat anything. Ativan PO 0.5mg q4h PRN order was added, continuing zofran IV 4mg q4h (scheduled) and compazine q6h PRN. A one time dose of reglan was given this evening, pending pt response.     Pt had 1 large, soft, looser stool (not mucous and did not smell abnormal). Urine output continues to be on the lower end, MDs aware. D5NS continues at 100mL/hr.     K+ 2.8, on electrolyte replacement protocol. Receiving 10mEq KCl IV x 6 doses. Recheck 2 hours after last dose. Will check magnesium and phosphorus at this time too, per orders.     WBC remains the same as yesterday (10.8). Flagyl and rocephin dc'd d/t nausea, ertapenem started.     Given poor PO intake, nutrition consult placed. Pt had very poor intake today. May need to consider feeding tube if does not improve.

## 2018-07-07 NOTE — PROGRESS NOTES
07/07/18 1527   Quick Adds   Type of Visit Initial PT Evaluation   Living Environment   Lives With spouse;child(veronica), adult   Living Arrangements house   Transportation Available car;family or friend will provide   Living Environment Comment Pt lives in house with  and 18 y/o daughter   Self-Care   Dominant Hand right   Usual Activity Tolerance excellent   Current Activity Tolerance moderate   Regular Exercise yes   Activity/Exercise Type running/jogging;walking   Exercise Amount/Frequency 3-5 times/wk   Equipment Currently Used at Home none   Activity/Exercise/Self-Care Comment IND with all self cares, likes run and walk around the [facility]   Functional Level Prior   Ambulation 0-->independent   Transferring 0-->independent   Toileting 0-->independent   Bathing 0-->independent   Dressing 0-->independent   Eating 0-->independent   Communication 0-->understands/communicates without difficulty   Swallowing 0-->swallows foods/liquids without difficulty   Cognition 0 - no cognition issues reported   Fall history within last six months no   Which of the above functional risks had a recent onset or change? none   Prior Functional Level Comment IND with all mobility and ADLs   General Information   Onset of Illness/Injury or Date of Surgery - Date 07/02/18   Referring Physician  Anusha Moscoso MD    Pertinent History of Current Problem (include personal factors and/or comorbidities that impact the POC) Jade Carcamo is a 52 year old female admitted on 7/2/2018. Jade Carcamo is a 52 year old female with hx of polycystic liver disease admitted on 7/2/2018. She was previously healthy now with 11 days of persistent fever up to 103F now s/p 7d doxycycline with negative outpatient infectious workup here now for supportive cares and further workup of fever.   Precautions/Limitations no known precautions/limitations   Weight-Bearing Status - LUE full weight-bearing   Weight-Bearing Status - RUE full weight-bearing  "  Weight-Bearing Status - LLE full weight-bearing   Weight-Bearing Status - RLE full weight-bearing   Cognitive Status Examination   Orientation orientation to person, place and time   Level of Consciousness alert   Follows Commands and Answers Questions 100% of the time   Personal Safety and Judgment intact   Memory intact   Pain Assessment   Patient Currently in Pain No   Integumentary/Edema   Integumentary/Edema no deficits were identifed   Posture    Posture Not impaired   Range of Motion (ROM)   ROM Comment WFL   Strength   Strength Comments BLEs demonstartes WFL with mobility   Bed Mobility   Bed Mobility Comments IND   Transfer Skills   Transfer Comments IND for sit<>stand   Gait   Gait Comments SBA for 400 ft, no AD   Balance   Balance Comments slightly impaired with amb-lateral deviation noted but no overt LOBs   Sensory Examination   Sensory Perception no deficits were identified   General Therapy Interventions   Planned Therapy Interventions balance training;progressive activity/exercise;home program guidelines;risk factor education;strengthening;gait training   Clinical Impression   Criteria for Skilled Therapeutic Intervention yes, treatment indicated   PT Diagnosis Impaired functional mobility   Influenced by the following impairments decreased endurance, general weakness/fatigue   Functional limitations due to impairments amb, stairs,   Clinical Presentation Evolving/Changing   Clinical Presentation Rationale Per pts PMHx and current medical and functional status   Clinical Decision Making (Complexity) Low complexity   Therapy Frequency` 2 times/week   Predicted Duration of Therapy Intervention (days/wks) 1 week   Anticipated Discharge Disposition Home with Assist;Home with Outpatient Therapy   Risk & Benefits of therapy have been explained Yes   Patient, Family & other staff in agreement with plan of care Yes   [facility] AM-PAC TM \"6 Clicks\"   2016, Trustees of [facility], under " "license to [employer].  All rights reserved.   6 Clicks Short Forms Basic Mobility Inpatient Short Form   [facility] AM-PAC  \"6 Clicks\" V.2 Basic Mobility Inpatient Short Form   1. Turning from your back to your side while in a flat bed without using bedrails? 4 - None   2. Moving from lying on your back to sitting on the side of a flat bed without using bedrails? 4 - None   3. Moving to and from a bed to a chair (including a wheelchair)? 4 - None   4. Standing up from a chair using your arms (e.g., wheelchair, or bedside chair)? 4 - None   5. To walk in hospital room? 4 - None   6. Climbing 3-5 steps with a railing? 3 - A Little   Basic Mobility Raw Score (Score out of 24.Lower scores equate to lower levels of function) 23   Total Evaluation Time   Total Evaluation Time (Minutes) 10     "

## 2018-07-08 LAB
ALBUMIN SERPL-MCNC: 2.2 G/DL (ref 3.4–5)
ALP SERPL-CCNC: 153 U/L (ref 40–150)
ALT SERPL W P-5'-P-CCNC: 12 U/L (ref 0–50)
ANION GAP SERPL CALCULATED.3IONS-SCNC: 8 MMOL/L (ref 3–14)
AST SERPL W P-5'-P-CCNC: 16 U/L (ref 0–45)
BACTERIA SPEC CULT: NO GROWTH
BACTERIA SPEC CULT: NO GROWTH
BILIRUB SERPL-MCNC: 0.5 MG/DL (ref 0.2–1.3)
BUN SERPL-MCNC: 5 MG/DL (ref 7–30)
CALCIUM SERPL-MCNC: 8.3 MG/DL (ref 8.5–10.1)
CHLORIDE SERPL-SCNC: 106 MMOL/L (ref 94–109)
CO2 SERPL-SCNC: 26 MMOL/L (ref 20–32)
CREAT SERPL-MCNC: 0.68 MG/DL (ref 0.52–1.04)
ERYTHROCYTE [DISTWIDTH] IN BLOOD BY AUTOMATED COUNT: 13.4 % (ref 10–15)
GFR SERPL CREATININE-BSD FRML MDRD: >90 ML/MIN/1.7M2
GLUCOSE SERPL-MCNC: 95 MG/DL (ref 70–99)
HCT VFR BLD AUTO: 27.3 % (ref 35–47)
HGB BLD-MCNC: 8.6 G/DL (ref 11.7–15.7)
INR PPP: 1.31 (ref 0.86–1.14)
Lab: NORMAL
Lab: NORMAL
MAGNESIUM SERPL-MCNC: 2.3 MG/DL (ref 1.6–2.3)
MCH RBC QN AUTO: 29.9 PG (ref 26.5–33)
MCHC RBC AUTO-ENTMCNC: 31.5 G/DL (ref 31.5–36.5)
MCV RBC AUTO: 95 FL (ref 78–100)
PHOSPHATE SERPL-MCNC: 3 MG/DL (ref 2.5–4.5)
PLATELET # BLD AUTO: 429 10E9/L (ref 150–450)
PLATELET # BLD AUTO: 455 10E9/L (ref 150–450)
POTASSIUM SERPL-SCNC: 3.9 MMOL/L (ref 3.4–5.3)
PROT SERPL-MCNC: 6.4 G/DL (ref 6.8–8.8)
RBC # BLD AUTO: 2.88 10E12/L (ref 3.8–5.2)
SODIUM SERPL-SCNC: 140 MMOL/L (ref 133–144)
SPECIMEN SOURCE: NORMAL
SPECIMEN SOURCE: NORMAL
WBC # BLD AUTO: 11.9 10E9/L (ref 4–11)

## 2018-07-08 PROCEDURE — 25000128 H RX IP 250 OP 636: Performed by: INTERNAL MEDICINE

## 2018-07-08 PROCEDURE — 85610 PROTHROMBIN TIME: CPT | Performed by: STUDENT IN AN ORGANIZED HEALTH CARE EDUCATION/TRAINING PROGRAM

## 2018-07-08 PROCEDURE — 84100 ASSAY OF PHOSPHORUS: CPT | Performed by: STUDENT IN AN ORGANIZED HEALTH CARE EDUCATION/TRAINING PROGRAM

## 2018-07-08 PROCEDURE — 85027 COMPLETE CBC AUTOMATED: CPT | Performed by: INTERNAL MEDICINE

## 2018-07-08 PROCEDURE — 25000128 H RX IP 250 OP 636: Performed by: STUDENT IN AN ORGANIZED HEALTH CARE EDUCATION/TRAINING PROGRAM

## 2018-07-08 PROCEDURE — 12000025 ZZH R&B TRANSPLANT INTERMEDIATE

## 2018-07-08 PROCEDURE — 80053 COMPREHEN METABOLIC PANEL: CPT | Performed by: STUDENT IN AN ORGANIZED HEALTH CARE EDUCATION/TRAINING PROGRAM

## 2018-07-08 PROCEDURE — 12000026 ZZH R&B TRANSPLANT

## 2018-07-08 PROCEDURE — 25000132 ZZH RX MED GY IP 250 OP 250 PS 637: Performed by: INTERNAL MEDICINE

## 2018-07-08 PROCEDURE — 25000125 ZZHC RX 250: Performed by: INTERNAL MEDICINE

## 2018-07-08 PROCEDURE — 83735 ASSAY OF MAGNESIUM: CPT | Performed by: STUDENT IN AN ORGANIZED HEALTH CARE EDUCATION/TRAINING PROGRAM

## 2018-07-08 PROCEDURE — 25000132 ZZH RX MED GY IP 250 OP 250 PS 637: Performed by: STUDENT IN AN ORGANIZED HEALTH CARE EDUCATION/TRAINING PROGRAM

## 2018-07-08 PROCEDURE — 36415 COLL VENOUS BLD VENIPUNCTURE: CPT | Performed by: STUDENT IN AN ORGANIZED HEALTH CARE EDUCATION/TRAINING PROGRAM

## 2018-07-08 PROCEDURE — 99233 SBSQ HOSP IP/OBS HIGH 50: CPT | Performed by: INTERNAL MEDICINE

## 2018-07-08 PROCEDURE — 85049 AUTOMATED PLATELET COUNT: CPT | Performed by: HOSPITALIST

## 2018-07-08 RX ADMIN — Medication 10 MEQ: at 00:07

## 2018-07-08 RX ADMIN — ONDANSETRON HYDROCHLORIDE 4 MG: 2 INJECTION, SOLUTION INTRAMUSCULAR; INTRAVENOUS at 09:32

## 2018-07-08 RX ADMIN — ACETAMINOPHEN 975 MG: 325 TABLET, FILM COATED ORAL at 00:01

## 2018-07-08 RX ADMIN — ONDANSETRON HYDROCHLORIDE 4 MG: 2 INJECTION, SOLUTION INTRAMUSCULAR; INTRAVENOUS at 19:56

## 2018-07-08 RX ADMIN — Medication 10 MEQ: at 09:01

## 2018-07-08 RX ADMIN — ACETAMINOPHEN 975 MG: 325 TABLET, FILM COATED ORAL at 12:34

## 2018-07-08 RX ADMIN — IBUPROFEN 400 MG: 400 TABLET ORAL at 04:14

## 2018-07-08 RX ADMIN — LORAZEPAM 0.5 MG: 0.5 TABLET ORAL at 09:01

## 2018-07-08 RX ADMIN — ONDANSETRON HYDROCHLORIDE 4 MG: 2 INJECTION, SOLUTION INTRAMUSCULAR; INTRAVENOUS at 06:23

## 2018-07-08 RX ADMIN — RANITIDINE 150 MG: 150 TABLET, FILM COATED ORAL at 19:56

## 2018-07-08 RX ADMIN — ACETAMINOPHEN 975 MG: 325 TABLET, FILM COATED ORAL at 23:50

## 2018-07-08 RX ADMIN — POTASSIUM PHOSPHATE, MONOBASIC AND POTASSIUM PHOSPHATE, DIBASIC 15 MMOL: 224; 236 INJECTION, SOLUTION INTRAVENOUS at 00:02

## 2018-07-08 RX ADMIN — IBUPROFEN 400 MG: 400 TABLET ORAL at 15:58

## 2018-07-08 RX ADMIN — IBUPROFEN 400 MG: 400 TABLET ORAL at 22:11

## 2018-07-08 RX ADMIN — ONDANSETRON HYDROCHLORIDE 4 MG: 2 INJECTION, SOLUTION INTRAMUSCULAR; INTRAVENOUS at 16:05

## 2018-07-08 RX ADMIN — Medication 10 MEQ: at 10:24

## 2018-07-08 RX ADMIN — ACETAMINOPHEN 975 MG: 325 TABLET, FILM COATED ORAL at 06:23

## 2018-07-08 RX ADMIN — Medication 10 MEQ: at 02:30

## 2018-07-08 RX ADMIN — ONDANSETRON HYDROCHLORIDE 4 MG: 2 INJECTION, SOLUTION INTRAMUSCULAR; INTRAVENOUS at 02:27

## 2018-07-08 RX ADMIN — ENOXAPARIN SODIUM 40 MG: 40 INJECTION SUBCUTANEOUS at 19:56

## 2018-07-08 RX ADMIN — ERTAPENEM SODIUM 1 G: 1 INJECTION, POWDER, LYOPHILIZED, FOR SOLUTION INTRAMUSCULAR; INTRAVENOUS at 11:58

## 2018-07-08 RX ADMIN — SODIUM CHLORIDE 1000 ML: 9 INJECTION, SOLUTION INTRAVENOUS at 15:58

## 2018-07-08 RX ADMIN — Medication 10 MEQ: at 01:29

## 2018-07-08 RX ADMIN — IBUPROFEN 400 MG: 400 TABLET ORAL at 09:32

## 2018-07-08 RX ADMIN — DEXTROSE AND SODIUM CHLORIDE: 5; 900 INJECTION, SOLUTION INTRAVENOUS at 13:55

## 2018-07-08 RX ADMIN — ONDANSETRON HYDROCHLORIDE 4 MG: 2 INJECTION, SOLUTION INTRAMUSCULAR; INTRAVENOUS at 23:50

## 2018-07-08 RX ADMIN — ACETAMINOPHEN 975 MG: 325 TABLET, FILM COATED ORAL at 18:30

## 2018-07-08 RX ADMIN — RANITIDINE 150 MG: 150 TABLET, FILM COATED ORAL at 09:01

## 2018-07-08 NOTE — PROGRESS NOTES
[facility] SERVICE: ONGOING CONSULTATION  Patient:  Jade Carcamo, Date of birth 1965, Medical record number [id number]  Date of Admission: 7/2/2018  Date of Visit:  7/7/2018         Assessment and Recommendations:     Problem List:  1. Fevers, evaluating as FUO.  Fevers slightly lower last 24 hours.  2. Leukocytosis with neutrophilic predominance - developed after initial mild leukopenia. Patient also had slight monocytosis noted on peripheral blood smear.   3. Elevated CRP and ESR  4. Polycystic liver disease - no evidence of infected cysts on CT, but repeat US shows enlarging, newly complex cyst since 6/23.   5. Question of cholangitis on abdominal MRI. Alk phos has been elevated. AST and ALT currently normal. No bili elevation. GI evaluation pending, they are reviewing MRI scan and considering possible ERCP.   5. Listed penicillin allergy. Had limited rash 20 years ago. Tolerating ertapenem.   6. Anemia - progressive  7. 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   8. Bicuspid aortic valve - discovered on TTE during this admission.     Discussion:  Ms. Carcamo continues to have fevers without an etiology discovered to date. She does have an evolving hepatic cyst not thought to be infected and concern for cholangitis on MRI, but with very atypical presentation if this is cholangitis. We will await GI evaluation. Since she does seem to be responding to antibiotics, will plan to continue beyond originally planned 48 hour trial. Given severe nausea/vomiting with metronidazole, would like to switch away from this. Given listed penicillin allergy, next best option to continue anaerobic coverage for GI jayna is ertapenem. Since we are continuing antibiotics in the short term, consider pharmacy evaluation for penicillin allergy to see if we could change to amp/sulbactam or pip/tazo since ertapenem is unnecessarily broad. We did consider challenging her with a penicillin but did not want potential  allergic reaction to further confuse her underlying process. Patient states today that she is concerned that she could have a blood cancer as the cause of the fever. Her father-in -law had lymphoma. No one om her family had blood cancer.      Recommendations:  1. Continue ertapenem for now.   2. Consider penicillin allergy skin test assessment by pharmacy to see if she could be switched for ampicillin-sulbactam or piperacillin-tazobactam.   3. GI considering MRCP (or re-analysis of previous MRI) and possibly ERCP.  4. Consider TASNEEM  5. If TASNEEM is negative and she still seems to be responding to antibiotics, consider PET (if insurance will cover) or tagged WBC scan to see if there is any other focus of infection we are not addressing.  6. If above diagnostic work-up remains negative and fevers continue then would consider bone marrow biopsy to help determine cause of fevers of unknown origin.      Recommendations discussed with primary team.    Thank you for this consult. The [facility] team will continue to follow this patient. Dr. Escamilla will take over on Monday.     Francisca Núñez MD  Infectious Diseases  [phone]     Interval History:   Ongoing fevers, but perhaps diminished overnight WBC is continuing to trend down a little. MRI showed possible cholangitis - GI follow-up pending. Ongoing abdominal distension. No new symptoms.     4 point ROS including Respiratory, CV, GI and , other than that noted in the HPI,  is negative         History of Present Illness:   Jade Carcamo is a 52 year old female who I first met briefly in the emergency department on 5/28 and saw in clinic on 5/29.   Ms. Carcamo has polycystic liver disease but has otherwise been in good health.  She recently went on a trip that involved camping and hiking along the [facility].  Then, on 6/25 she developed fevers as high as 103 and diffuse muscle and joint pains. These have continued since with limited response to antipyretics and no etiology  found.  She has been seen by multiple providers in both her primary care clinic and in the emergency department.  Her initial labs are remarkable for mild thrombocytopenia, normal total white count but with some lymphopenia, mildly elevated LFTs, and markedly elevated CRP.  Her CK was normal.  She initially underwent a evaluation for tickborne workup with Lyme serology which was negative, Babesia serology which is still pending, and a parasite smear which showed some conclusions that could not rule out Ehrlichia.  An Ehrlichia PCR was sent to [facility] in the meantime she was started on doxycycline.  The Ehrlichia PCR returned as negative and she had no response to doxycycline.  She does not have significant photophobia and has no neck pain or stiffness.  She has not had any lethargy or confusion.  She intermittently reports some mild stomach pain. A CT C/A/P on 6/29 showed only her known (impressive) liver cysts. Please see remainder of negative workup to date below.     She had an additional ED visit on 7/1 due to unbearable symptoms at home at which time she was found to have a new neutrophilic predominant leukocytsis. CRP remained very high but had actually decreased slightly. She was discharged to home again. On 7/2 I spoke with her and arranged for admission due to ongoing highly symptomatic fevers to expedite fever/pain control and workup.       Allergies:      Allergies   Allergen Reactions     Bee Venom      Penicillins Rash     Rash limited to arm ~20 years ago.           Recent Antimicrobials::   Ertapenem 7/6-present    Ceftriaxone 7/4-7/6  Metronidazole 7/4-7/6    Doxycycline prior to admission.            Physical Exam:   /72 (BP Location: Right arm)  Pulse 82  Temp 99.1  F (37.3  C) (Oral)  Resp 18  Wt 79.4 kg (175 lb 1.6 oz)  LMP 11/14/2016  SpO2 97%  BMI 26.62 kg/m2   Exam:  GENERAL:  Well-developed, well-nourished, sitting in bed in no acute distress.   ENT:  Head is normocephalic,  atraumatic. Anterior oropharynx without ulcers.  EYES:  Eyes have anicteric sclerae.    NECK:  Supple.  LUNGS:  Normal respiratory effort.   ABDOMEN:  Increasing distension. Somewhat tense. Not tender.   EXT: Extremities without visible edema.   SKIN:  No acute rashes.  Line is in place without any surrounding erythema.  NEUROLOGIC:  Grossly nonfocal.          Laboratory Data:     Creatinine   Date Value Ref Range Status   07/08/2018 0.68 0.52 - 1.04 mg/dL Final   07/07/2018 0.60 0.52 - 1.04 mg/dL Final   07/03/2018 0.70 0.52 - 1.04 mg/dL Final   07/02/2018 0.69 0.52 - 1.04 mg/dL Final   07/02/2018 0.68 0.52 - 1.04 mg/dL Final     WBC   Date Value Ref Range Status   07/08/2018 11.9 (H) 4.0 - 11.0 10e9/L Final   07/07/2018 10.8 4.0 - 11.0 10e9/L Final   07/06/2018 10.8 4.0 - 11.0 10e9/L Final   07/05/2018 12.7 (H) 4.0 - 11.0 10e9/L Final   07/04/2018 14.4 (H) 4.0 - 11.0 10e9/L Final     Hemoglobin   Date Value Ref Range Status   07/08/2018 8.6 (L) 11.7 - 15.7 g/dL Final     Platelet Count   Date Value Ref Range Status   07/08/2018 455 (H) 150 - 450 10e9/L Final   07/08/2018 429 150 - 450 10e9/L Final     Lab Results   Component Value Date     07/08/2018    BUN 5 (L) 07/08/2018    CO2 26 07/08/2018     CRP Inflammation   Date Value Ref Range Status   07/06/2018 230.0 (H) 0.0 - 8.0 mg/L Final   07/04/2018 230.0 (H) 0.0 - 8.0 mg/L Final   07/01/2018 236.0 (H) 0.0 - 8.0 mg/L Final   06/28/2018 330.0 (H) 0.0 - 8.0 mg/L Final   06/23/2018 81.0 (H) 0.0 - 8.0 mg/L Final           Pertinent Recent Microbiology Data:       Recent Labs  Lab 07/03/18  0116 07/02/18  0115 07/01/18  2300   CULT No growth after 5 days  No growth after 5 days No growth No growth   SDES Blood Left Hand  Blood Left Arm Blood Right Arm Blood Right Arm     To date she has had the following evaluation:   Ehrlichia  - negative  Lyme screen - negative  Anaplasma - negative   Babesia - negative  RMSF - negative  CMV - negative  EBV - low titer  consistent with reactivation  Parvovirus - previous infection  CK - normal  Blood cultures x 4 negative to date (only first one was prior to doxycycline)  Urine culture low levels mixed urogenital jayna  ROBBY - negative  TTE - bicuspid aortic valve. No evidence endocarditis  Hepatititis A - IgG positive. Reflex IgM negative.   Hep B - negative surface ag and core ab. Surface ab not done.   Hep C - negative  HIV - negative  Quantiferon Gold - indeterminate due to impaired response  Histoplasma urine and blood ag - pending  Blastomyces urine and blood ag - pending  Fungal antibody panel - pending  West Nile Virus serology negative  RF - negative  LDH - normal  TSH - slightly high  Ferritin - 210         Imagin18 abdominal US showed simple hepatic cysts  18 CT C/A/P showed previously known polycystic liver disease. No evidence of infected cyst on CT - discussed with radiology 18.     Recent Results (from the past 48 hour(s))   US Abdomen Limited    Narrative    EXAM: Ultrasound abdomen limited  2018 8:56 AM      HISTORY: History of polycystic liver, evaluate ascites/cysts    COMPARISON: CT 2018, ultrasound 2018    FINDINGS:   Multicystic liver. The majority of the pancreatic cystic lesions  appear simple by sonographic imaging. There is however a dominant  lesion in the right hepatic lobe which shows thick and avascular  septations with internal echogenicity, measuring approximately 11.4 x  8.1 x 7.1 cm. Previously there was a right hepatic lobe cyst measuring  up to 7.5 cm on the ultrasound 2018. On ultrasound 2018, a  dominant complex cyst measures 7.5 x 4.6 x 5.2 cm.    No ascites. Trace left pleural effusion.      Impression    IMPRESSION:   1. Multicystic liver; the majority of the hepatic cysts appear to be  simple by ultrasound evaluation. There is however a dominant complex  cyst which which has increased in size since 2018. Appearance and  growth suggest an  internal hemorrhagic or proteinaceous component.  2. No ascites as questioned. Trace left pleural effusion.    I have personally reviewed the examination and initial interpretation  and I agree with the findings.    CELIA JOHNSON MD   MR Abdomen w/o & w Contrast    Narrative    MRI ABDOMEN    CLINICAL HISTORY:  Polycystic liver disease, question infected cyst    TECHNIQUE:  Images were acquired with and without intravenous contrast  through the abdomen. The following MR images were acquired: TrueFISP,  multiplanar T2 weighted, axial T1 in/out of phase, axial fat-saturated  T1, diffusion-weighted. Multiplanar T1-weighted images with fat  saturation were before contrast administration and at multiple time  points following the administration of intravenous contrast. Contrast  dose: 7.5ml Gadavist    FINDINGS:    Comparison study: Ultrasound 7/5/2018, CT CAP 6/29/2018    Liver: Polycystic liver disease. Postoperative changes of hepatic cyst  unroofing of multiple sclerosis. Surgical clips adjacent to a right  dome hepatic cyst. T2 hyperintense and T2 intermediate cysts replacing  much of the right hepatic lobe. Dominant cyst in the medial right  hepatic lobe measuring 6.7 x 9.4 cm, previously 6.1 x 8.8 cm on  6/29/2018 demonstrates intermediate T2/T1 signal and restricted  diffusion (series 13, image 57). Additional T2/T1 intermediate  restricting cysts of the anterior superior right hepatic lobe  measuring 4.4 and 2.7 cm respectively (series 13, image 77). No  abnormal enhancement of the cysts are regional hyperemia within the  liver. Redemonstration of mass effect on the portal and hepatic veins.  No convincing evidence of portal venous thrombus. Massive hypertrophy  of the left hepatic lobe. Significant signal dropout on out of phase.    Numerous foci of T2 signal and peribiliary enhancement, (for example  series 23, image 9) with associated restricted diffusion. Mild diffuse  periportal edema, similar to CT  6/29/2018.    Mild fluid and stranding of right upper quadrant fat posterior to the  right hepatic lobe and bowel/mesentery interposed in the anterior  right upper quadrant. This does not appear significantly changed from  6/29/2018 or 11/14/2016.    The common bile duct measures 6 mm in the pancreatic head. Mildly  stable prominence of the left hepatic bile ducts.    Gallbladder: Partially decompressed. Mass effect on the proximal  gallbladder by multiple hepatic cysts. No significant gallbladder wall  thickening. No visualized cholelithiasis.    Spleen: Mild splenomegaly. No focal mass.    Kidneys: Symmetric enhancement. No focal lesion. No hydronephrosis or  hydroureter. Tiny renal cysts. Downward mass effect on the right  kidney. Nonspecific bilateral perinephric fluid signal.    Adrenal glands: Unremarkable.    Pancreas: Preserved T1 signal. Mass effect on the pancreas from the  hypertrophied left hepatic lobe. No significant pancreatic ductal  dilatation.     Bowel: Effacement of the hepatic flexure without proximal dilatation.  Moderate colonic stool. No small bowel dilatation. The appendix is  unremarkable. Posterior and inferior displacement of the stomach.    Lymph nodes: Mildly prominent retroperitoneal lymph nodes, within  normal limits. No significantly changed from 6/29/2018.    Blood vessels: Abdominal aorta is within normal limits. No aneurysmal  dilatation.    Lung bases: Small pleural effusions. Mild associated atelectasis of  the lung bases.    Bones and soft tissues: Small vertebral body hemangiomas. No  aggressive appearing osseous lesions.    Mesentery and abdominal wall: Small bowel and mesentery coursing into  the right upper quadrant anterior to the right hepatic lobe.    Ascites: Small ascites.      Impression    IMPRESSION:  1. Areas of peribiliary T2 signal/enhancement of the left hepatic lobe  concerning for cholangitis.  2. Polycystic liver disease. Multiple intermediate signal cysts  which  may represent proteinaceous/hemorrhagic cysts without ancillary MR  findings suggestive of infection.   3. Compensatory hypertrophy of the left hepatic segment. Stable mild  periportal edema. Small volume ascites.  4. Bibasilar pleural effusions with associated atelectasis.    [Result: Findings concerning for cholangitis]    Finding was identified on 7/5/2018 3:39 PM.     Dr. Moscoso was contacted by Dr. Kenney at 7/6/2018 7:56 AM and verbalized  understanding of the urgent finding.     I have personally reviewed the examination and initial interpretation  and I agree with the findings.    RICH ZAPATA MD

## 2018-07-08 NOTE — PROGRESS NOTES
[facility], [place]    Internal Medicine Progress Note - Mil 5 Service    Main Plans for Today   - possible ERCP on 7/10, pending panc/bili decision after reconstructing the MRI into an MRCP  - encourage ambulation/IS  - NS bolus    Assessment & Plan   Jade Carcamo is a 52 year old female admitted on 7/2/2018. Jade Carcamo is a 52 year old female with hx of polycystic liver disease admitted on 7/2/2018. She was previously healthy now with 11 days of persistent fever up to 103F now s/p 7d doxycycline with negative outpatient infectious workup here now for supportive cares and further workup of fever.    Fever without source - unclear etiology  Ddx includes infections (fungal vs vector vs viral, less likely bacterial) vs autoimmune vs malignancy. Drug reaction less likely as no known exposure. On admission had leukocytosis of 14.1, up from 12.8 on 7/1 and 8.3 on 6/28, neutrophil predominant. Sed rate elevated at 102 (7/1) and . Has not improved after despite 7 days of doxycycline therapy. She has had a relatively negative infectious work up all of which has been NGTD. Fungal work-up pending. Autoimmune is also possible given elevated inflammatory markers and RF/ROBBY are negative and no exam findings. Malignancy is much less likely at this time. Had CT C/A/P that was unremarkable on 6/28. Although she does not meet the criteria for fever of unknown origin at this time, we are continuing with this workup to attempt to better elucidate the etiology of her symptoms. TTE without endocarditis. Hep A IgG positive, Hep A IgM negative Hep B, Hep C negative. HIV negative. MRI 7/5 demonstrates peripheral biliary dilation to the left hepatic lobe with mild enhancement suspicious for cholangitis - this was discussed at Liver multi-disciplinary conference 7/6.  No sign of infected hepatic cyst. A decision was made in favor for evaluation and possible management of biliary disease by ERCP  tentatively planned for 7/10.   - Infectious disease consult, recs appreciated- could consider BM Bx in future if would like to r/o lymphoma/leukemia (not now-- only after other possibilities have been ruled out)  - Surg Onc consulted, recs appreciated  - GI (Panc/Bili) consult, recs appreciated for possible atypical cholangitis per MRI finding  - Rheum consult for possible seronegative autoimmune disorder, recs appreciated (have signed off, will consult if needed).   - switched from empiric ceftriaxone and metronidazole to ertapenem given nausea/abdominal discomfort on metronidazole (better on erta so far)  - consider TASNEEM if rest of work-up negative. Then consider PET and WBC tagged scan. Stepwise process for evaluating   - Histoplasma urine and blood ag pending  - Blastomyces urine and blood ag pending  - Fungal antibody panel pending  - EBV repeat titer to be drawn on 7/9 per Jade's request    Workup to date:  - Erlichia - negative  - West Nile Virus serology negative  - Lyme screen - negative  - Anaplasma - negative  - Babesia - negative  - RMSF - negative  - CMV - negative  - EBV - low titer consistent with reactivation  - Parvovirus - previous infection  - CK - normal  - LDH - wnl  - Blood cultures x4 (6/25, 6/28, 7/1, 7/2) - NGTD  - Urine culture (6/23) - Negative  - Hepatitis work up negative   - Quantiferon Gold indeterminate, will need to repeat at later date.    Nausea- improved 7/8/2018   Likely 2/2 metronidazole therapy   - ativan q4h prn  - scopolamine patch   - scheduled zofran q4h    Diarrhea  No abdominal pain, worsening leukocytosis. Likely 2/2 antibiotic therapy  - continue to monitor    Hx of Polycystic Liver Disease  CT C/A/P (7/1) - previously known polycystic liver disease. Evidence of larger cyst with possible hemorrhagic or proteinaceous component, but not likely infected per MRI and surg/onc expertise.  A ruptured cyst (sterile or infected fluid collection) that is causing fevers. See  discussion above regarding abdominal US findings.     # Pain Assessment:  Current Pain Score 7/8/2018   Patient currently in pain? denies   Pain score (0-10) -   Pain location -   Pain descriptors -   - Jade is experiencing pain due to fevers. Pain management was discussed and the plan was created in a collaborative fashion.  Jade's response to the current recommendations: engaged  - Please see the plan for pain management as documented above    Diet: Regular Diet Adult  Fluids: mIVF D5NS @100ml/h + a NS bolus   DVT Prophylaxis: Enoxaparin  SQ  Code Status: Full Code  Physical Therapy given deconditioning/weakness      Disposition Plan   Expected discharge: 4 - 7 days, recommended to prior living arrangement once adequate pain management/ tolerating PO medications and SIRS/Sepsis treated.     Entered: Sarah Strickland 07/08/2018, 8:30 AM   Information in the above section will display in the discharge planner report.      Sarah Strickland  [facility]  Maroon: 5  Pager: [phone]  Please see sticky note for cross cover information    Interval History   Overnight events and notes reviewed.     Tmax 100.8 over last 24 hours, with nausea being decreased on 7/8/2018.  Also has dry cough and feels dry. No dyspnea, headache, rash, chest pain. We discussed our stepwise approach to her fever, with our plan being to continue ertapenem while we monitor her fever and symptoms while looking at the possibility of cholangitis first with the panc/bili team.  If the ERCP is not deemed necessary or it is unrevealing, our next step would be TASNEEM, followed by PET scan or tagged WBC scan. Jade shared her concerns about living now for a few weeks feeling debilitated, febrile, and scared...with no etiology revealed. She asked if we could recheck her EBV as she is concerned it may be incorrect and that a procedure would be dangerous if we missed this diagnosis as it was for a 15 yo she knew.  She is also beginning to  lose some hope of getting better and back to her baseline, so we chatted about how she is very stable, with mild improvement but in a stuttering course.  Her family brought up the concern of HLH and checking a ferritin, but we discussed how this wouldn't necessarily fit her picture. We will chat with ID about the utility of repeating titers or viral labs after time has passed.    4 point ROS negative except as above.     Physical Exam   Vital Signs: Temp: 99.4  F (37.4  C) Temp src: Oral BP: 125/71 Pulse: 73   Resp: 18 SpO2: 92 % O2 Device: None (Room air)    Weight: 163 lbs 8 oz    General Appearance: Awake and alert. Lying in bed, frustrated but trying to remain optimistic, tearful at times  Eyes: No scleral icterus or injection helen. No rhinorrhea  HEENT: NC/AT.dryMM  Respiratory: CTAB, no w/c, profound breaths cause her to have a dry cough  Cardiovascular: RRR. No murmurs, rubs or gallops.  GI: BS+. Soft, nondistended. NT, distended   Skin: No rash. No ecchymoses or petechiae.  Musculoskeletal: Extremities warm and well perfused. DP and radial pulses 2+ bilaterally.  Neurologic: No focal deficits.       I personally reviewed medications, labs and imaging.

## 2018-07-08 NOTE — PLAN OF CARE
Problem: Patient Care Overview  Goal: Plan of Care/Patient Progress Review  Outcome: No Change  /77 (BP Location: Left arm)  Pulse 73  Temp 98.7  F (37.1  C) (Oral)  Resp 18  Wt 74.2 kg (163 lb 8 oz)  LMP 11/14/2016  SpO2 90%  BMI 24.86 kg/m2    A/Ox4. Tmax 100.8; scheduled Tylenol and Ibuprofen given. OVSS on RA. Regular diet; very poor PO intake. Up independently. Voiding, not saving. No BM overnight. Denies pain. Nausea well controlled overnight with scheduled IV zofran and IV compazine x1. PO ativan given x1. K+ recheck 3.3; replaced with 40mEq KCl; recheck this AM. Phos was 2.4; replaced with 15mmol KPhos; recheck this AM. L PIV with D5NS @ 100mL/hr. L PIV SL. Will continue with plan of care and notify the team of any acute changes.

## 2018-07-08 NOTE — PLAN OF CARE
Problem: Patient Care Overview  Goal: Plan of Care/Patient Progress Review  [id number]    Tmax 99.4 (with scheduled tylenol and ibuprofen on board). OVSS.     Nausea is improved from yesterday, no emesis. Continues on scheduled zofran. Took PRN ativan this morning. Ate simple carbs throughout the day, drinking better today. Urine output is not accurate (does not place hat).     K+ 3.9, replaced with 20mEq IV, recheck in the AM, Mg2+ and Phos WNL, WBC 11.9 (10.8), MDs aware, believe that it is a variable within the lab.     D5 NS @ 100mL/hr, also got 1000mL NS bolus (over 4 hours).     Encouraging ambulation, took two long walks. Overall, had a much better day today than yesterday.     Being evaluated for possible ERCP on Monday. May also need a PET scan and/or TASNEEM if ERCP is not done/ if comes up with no findings. At the last resort, a bone marrow biopsy may need to be done (see internal med notes).

## 2018-07-09 ENCOUNTER — APPOINTMENT (OUTPATIENT)
Dept: GENERAL RADIOLOGY | Facility: CLINIC | Age: 53
DRG: 445 | End: 2018-07-09
Attending: INTERNAL MEDICINE
Payer: COMMERCIAL

## 2018-07-09 ENCOUNTER — ANESTHESIA EVENT (OUTPATIENT)
Dept: SURGERY | Facility: CLINIC | Age: 53
DRG: 445 | End: 2018-07-09
Payer: COMMERCIAL

## 2018-07-09 ENCOUNTER — ANESTHESIA (OUTPATIENT)
Dept: SURGERY | Facility: CLINIC | Age: 53
DRG: 445 | End: 2018-07-09
Payer: COMMERCIAL

## 2018-07-09 LAB
ALBUMIN SERPL-MCNC: 2 G/DL (ref 3.4–5)
ALP SERPL-CCNC: 131 U/L (ref 40–150)
ALT SERPL W P-5'-P-CCNC: 10 U/L (ref 0–50)
ANION GAP SERPL CALCULATED.3IONS-SCNC: 11 MMOL/L (ref 3–14)
AST SERPL W P-5'-P-CCNC: 15 U/L (ref 0–45)
B MICROTI IGG TITR SER: NORMAL {TITER}
B MICROTI IGM TITR SER: NORMAL {TITER}
BACTERIA SPEC CULT: NO GROWTH
BACTERIA SPEC CULT: NO GROWTH
BILIRUB SERPL-MCNC: 0.4 MG/DL (ref 0.2–1.3)
BUN SERPL-MCNC: 2 MG/DL (ref 7–30)
CALCIUM SERPL-MCNC: 8.3 MG/DL (ref 8.5–10.1)
CHLORIDE SERPL-SCNC: 106 MMOL/L (ref 94–109)
CO2 SERPL-SCNC: 24 MMOL/L (ref 20–32)
CREAT SERPL-MCNC: 0.57 MG/DL (ref 0.52–1.04)
CRP SERPL-MCNC: 150 MG/L (ref 0–8)
ERCP: NORMAL
ERYTHROCYTE [DISTWIDTH] IN BLOOD BY AUTOMATED COUNT: 13.4 % (ref 10–15)
GFR SERPL CREATININE-BSD FRML MDRD: >90 ML/MIN/1.7M2
GLUCOSE BLDC GLUCOMTR-MCNC: 102 MG/DL (ref 70–99)
GLUCOSE SERPL-MCNC: 98 MG/DL (ref 70–99)
HCT VFR BLD AUTO: 24.9 % (ref 35–47)
HGB BLD-MCNC: 8 G/DL (ref 11.7–15.7)
INR PPP: 1.26 (ref 0.86–1.14)
INTERPRETATION ECG - MUSE: NORMAL
Lab: NORMAL
Lab: NORMAL
MCH RBC QN AUTO: 29.7 PG (ref 26.5–33)
MCHC RBC AUTO-ENTMCNC: 32.1 G/DL (ref 31.5–36.5)
MCV RBC AUTO: 93 FL (ref 78–100)
PLATELET # BLD AUTO: 357 10E9/L (ref 150–450)
POTASSIUM SERPL-SCNC: 3.4 MMOL/L (ref 3.4–5.3)
PROT SERPL-MCNC: 5.7 G/DL (ref 6.8–8.8)
RBC # BLD AUTO: 2.69 10E12/L (ref 3.8–5.2)
SODIUM SERPL-SCNC: 140 MMOL/L (ref 133–144)
SPECIMEN SOURCE: NORMAL
SPECIMEN SOURCE: NORMAL
WBC # BLD AUTO: 10.3 10E9/L (ref 4–11)

## 2018-07-09 PROCEDURE — 25000128 H RX IP 250 OP 636: Performed by: INTERNAL MEDICINE

## 2018-07-09 PROCEDURE — 87799 DETECT AGENT NOS DNA QUANT: CPT | Performed by: INTERNAL MEDICINE

## 2018-07-09 PROCEDURE — C1877 STENT, NON-COAT/COV W/O DEL: HCPCS | Performed by: INTERNAL MEDICINE

## 2018-07-09 PROCEDURE — 0F798ZZ DILATION OF COMMON BILE DUCT, VIA NATURAL OR ARTIFICIAL OPENING ENDOSCOPIC: ICD-10-PCS | Performed by: INTERNAL MEDICINE

## 2018-07-09 PROCEDURE — 25000132 ZZH RX MED GY IP 250 OP 250 PS 637: Performed by: STUDENT IN AN ORGANIZED HEALTH CARE EDUCATION/TRAINING PROGRAM

## 2018-07-09 PROCEDURE — 80053 COMPREHEN METABOLIC PANEL: CPT | Performed by: INTERNAL MEDICINE

## 2018-07-09 PROCEDURE — 25000125 ZZHC RX 250: Performed by: INTERNAL MEDICINE

## 2018-07-09 PROCEDURE — 40000170 ZZH STATISTIC PRE-PROCEDURE ASSESSMENT II: Performed by: INTERNAL MEDICINE

## 2018-07-09 PROCEDURE — 85027 COMPLETE CBC AUTOMATED: CPT | Performed by: INTERNAL MEDICINE

## 2018-07-09 PROCEDURE — 25000128 H RX IP 250 OP 636: Performed by: STUDENT IN AN ORGANIZED HEALTH CARE EDUCATION/TRAINING PROGRAM

## 2018-07-09 PROCEDURE — 85610 PROTHROMBIN TIME: CPT | Performed by: INTERNAL MEDICINE

## 2018-07-09 PROCEDURE — 25000128 H RX IP 250 OP 636: Performed by: ANESTHESIOLOGY

## 2018-07-09 PROCEDURE — C1876 STENT, NON-COA/NON-COV W/DEL: HCPCS | Performed by: INTERNAL MEDICINE

## 2018-07-09 PROCEDURE — 99233 SBSQ HOSP IP/OBS HIGH 50: CPT | Mod: GC | Performed by: INTERNAL MEDICINE

## 2018-07-09 PROCEDURE — 25000128 H RX IP 250 OP 636: Performed by: NURSE ANESTHETIST, CERTIFIED REGISTERED

## 2018-07-09 PROCEDURE — 25500064 ZZH RX 255 OP 636: Performed by: INTERNAL MEDICINE

## 2018-07-09 PROCEDURE — 27210794 ZZH OR GENERAL SUPPLY STERILE: Performed by: INTERNAL MEDICINE

## 2018-07-09 PROCEDURE — 36000059 ZZH SURGERY LEVEL 3 EA 15 ADDTL MIN UMMC: Performed by: INTERNAL MEDICINE

## 2018-07-09 PROCEDURE — 86140 C-REACTIVE PROTEIN: CPT | Performed by: INTERNAL MEDICINE

## 2018-07-09 PROCEDURE — 25000132 ZZH RX MED GY IP 250 OP 250 PS 637: Performed by: INTERNAL MEDICINE

## 2018-07-09 PROCEDURE — 0F768DZ DILATION OF LEFT HEPATIC DUCT WITH INTRALUMINAL DEVICE, VIA NATURAL OR ARTIFICIAL OPENING ENDOSCOPIC: ICD-10-PCS | Performed by: INTERNAL MEDICINE

## 2018-07-09 PROCEDURE — 36415 COLL VENOUS BLD VENIPUNCTURE: CPT | Performed by: INTERNAL MEDICINE

## 2018-07-09 PROCEDURE — 37000008 ZZH ANESTHESIA TECHNICAL FEE, 1ST 30 MIN: Performed by: INTERNAL MEDICINE

## 2018-07-09 PROCEDURE — 37000009 ZZH ANESTHESIA TECHNICAL FEE, EACH ADDTL 15 MIN: Performed by: INTERNAL MEDICINE

## 2018-07-09 PROCEDURE — 25000566 ZZH SEVOFLURANE, EA 15 MIN: Performed by: INTERNAL MEDICINE

## 2018-07-09 PROCEDURE — C1726 CATH, BAL DIL, NON-VASCULAR: HCPCS | Performed by: INTERNAL MEDICINE

## 2018-07-09 PROCEDURE — 40000277 XR SURGERY CARM FLUORO LESS THAN 5 MIN W STILLS: Mod: TC

## 2018-07-09 PROCEDURE — C9399 UNCLASSIFIED DRUGS OR BIOLOG: HCPCS | Performed by: NURSE ANESTHETIST, CERTIFIED REGISTERED

## 2018-07-09 PROCEDURE — 71000015 ZZH RECOVERY PHASE 1 LEVEL 2 EA ADDTL HR: Performed by: INTERNAL MEDICINE

## 2018-07-09 PROCEDURE — 71000014 ZZH RECOVERY PHASE 1 LEVEL 2 FIRST HR: Performed by: INTERNAL MEDICINE

## 2018-07-09 PROCEDURE — 0F7D8DZ DILATION OF PANCREATIC DUCT WITH INTRALUMINAL DEVICE, VIA NATURAL OR ARTIFICIAL OPENING ENDOSCOPIC: ICD-10-PCS | Performed by: INTERNAL MEDICINE

## 2018-07-09 PROCEDURE — 25000125 ZZHC RX 250: Performed by: NURSE ANESTHETIST, CERTIFIED REGISTERED

## 2018-07-09 PROCEDURE — 27211024 ZZHC OR SUPPLY OTHER OPNP: Performed by: INTERNAL MEDICINE

## 2018-07-09 PROCEDURE — 12000026 ZZH R&B TRANSPLANT

## 2018-07-09 PROCEDURE — 00000146 ZZHCL STATISTIC GLUCOSE BY METER IP

## 2018-07-09 PROCEDURE — 27210995 ZZH RX 272: Performed by: INTERNAL MEDICINE

## 2018-07-09 PROCEDURE — 36000061 ZZH SURGERY LEVEL 3 W FLUORO 1ST 30 MIN - UMMC: Performed by: INTERNAL MEDICINE

## 2018-07-09 DEVICE — STENT JOHLIN PANCREA WEDGE 08.5FRX22CM WINTRO G26832
Type: IMPLANTABLE DEVICE | Site: BILE DUCT | Status: NON-FUNCTIONAL
Removed: 2018-09-10

## 2018-07-09 DEVICE — STENT FREEMAN PANCREA FLEX 4FRX11CM W/O FLANGE SGL PIGTAIL
Type: IMPLANTABLE DEVICE | Site: PANCREATIC DUCT | Status: NON-FUNCTIONAL
Removed: 2018-09-10

## 2018-07-09 DEVICE — STENT ZIMMON PANCREA 7FRX08CM SGL PIGTAIL
Type: IMPLANTABLE DEVICE | Site: BILE DUCT | Status: NON-FUNCTIONAL
Removed: 2018-09-10

## 2018-07-09 RX ORDER — ONDANSETRON 2 MG/ML
INJECTION INTRAMUSCULAR; INTRAVENOUS PRN
Status: DISCONTINUED | OUTPATIENT
Start: 2018-07-09 | End: 2018-07-09

## 2018-07-09 RX ORDER — PROPOFOL 10 MG/ML
INJECTION, EMULSION INTRAVENOUS PRN
Status: DISCONTINUED | OUTPATIENT
Start: 2018-07-09 | End: 2018-07-09

## 2018-07-09 RX ORDER — NALOXONE HYDROCHLORIDE 0.4 MG/ML
.1-.4 INJECTION, SOLUTION INTRAMUSCULAR; INTRAVENOUS; SUBCUTANEOUS
Status: ACTIVE | OUTPATIENT
Start: 2018-07-09 | End: 2018-07-10

## 2018-07-09 RX ORDER — INDOMETHACIN 50 MG/1
SUPPOSITORY RECTAL PRN
Status: DISCONTINUED | OUTPATIENT
Start: 2018-07-09 | End: 2018-07-09 | Stop reason: HOSPADM

## 2018-07-09 RX ORDER — SODIUM CHLORIDE, SODIUM LACTATE, POTASSIUM CHLORIDE, CALCIUM CHLORIDE 600; 310; 30; 20 MG/100ML; MG/100ML; MG/100ML; MG/100ML
INJECTION, SOLUTION INTRAVENOUS CONTINUOUS PRN
Status: DISCONTINUED | OUTPATIENT
Start: 2018-07-09 | End: 2018-07-09

## 2018-07-09 RX ORDER — ONDANSETRON 2 MG/ML
4 INJECTION INTRAMUSCULAR; INTRAVENOUS EVERY 30 MIN PRN
Status: DISCONTINUED | OUTPATIENT
Start: 2018-07-09 | End: 2018-07-09 | Stop reason: HOSPADM

## 2018-07-09 RX ORDER — NALOXONE HYDROCHLORIDE 0.4 MG/ML
.1-.4 INJECTION, SOLUTION INTRAMUSCULAR; INTRAVENOUS; SUBCUTANEOUS
Status: DISCONTINUED | OUTPATIENT
Start: 2018-07-09 | End: 2018-07-09

## 2018-07-09 RX ORDER — ONDANSETRON 4 MG/1
4 TABLET, ORALLY DISINTEGRATING ORAL EVERY 30 MIN PRN
Status: DISCONTINUED | OUTPATIENT
Start: 2018-07-09 | End: 2018-07-09 | Stop reason: HOSPADM

## 2018-07-09 RX ORDER — FENTANYL CITRATE 50 UG/ML
INJECTION, SOLUTION INTRAMUSCULAR; INTRAVENOUS PRN
Status: DISCONTINUED | OUTPATIENT
Start: 2018-07-09 | End: 2018-07-09

## 2018-07-09 RX ORDER — HYDROMORPHONE HYDROCHLORIDE 1 MG/ML
.3-.5 INJECTION, SOLUTION INTRAMUSCULAR; INTRAVENOUS; SUBCUTANEOUS EVERY 5 MIN PRN
Status: DISCONTINUED | OUTPATIENT
Start: 2018-07-09 | End: 2018-07-09 | Stop reason: HOSPADM

## 2018-07-09 RX ORDER — FENTANYL CITRATE 50 UG/ML
25-50 INJECTION, SOLUTION INTRAMUSCULAR; INTRAVENOUS EVERY 5 MIN PRN
Status: DISCONTINUED | OUTPATIENT
Start: 2018-07-09 | End: 2018-07-09 | Stop reason: HOSPADM

## 2018-07-09 RX ORDER — FLUMAZENIL 0.1 MG/ML
0.2 INJECTION, SOLUTION INTRAVENOUS
Status: DISCONTINUED | OUTPATIENT
Start: 2018-07-09 | End: 2018-07-09

## 2018-07-09 RX ORDER — LIDOCAINE HYDROCHLORIDE 20 MG/ML
INJECTION, SOLUTION INFILTRATION; PERINEURAL PRN
Status: DISCONTINUED | OUTPATIENT
Start: 2018-07-09 | End: 2018-07-09

## 2018-07-09 RX ORDER — LIDOCAINE 40 MG/G
CREAM TOPICAL
Status: DISCONTINUED | OUTPATIENT
Start: 2018-07-09 | End: 2018-07-13 | Stop reason: HOSPADM

## 2018-07-09 RX ORDER — CIPROFLOXACIN 2 MG/ML
INJECTION, SOLUTION INTRAVENOUS PRN
Status: DISCONTINUED | OUTPATIENT
Start: 2018-07-09 | End: 2018-07-09

## 2018-07-09 RX ORDER — DEXAMETHASONE SODIUM PHOSPHATE 10 MG/ML
INJECTION, SOLUTION INTRAMUSCULAR; INTRAVENOUS PRN
Status: DISCONTINUED | OUTPATIENT
Start: 2018-07-09 | End: 2018-07-09

## 2018-07-09 RX ORDER — SODIUM CHLORIDE, SODIUM LACTATE, POTASSIUM CHLORIDE, CALCIUM CHLORIDE 600; 310; 30; 20 MG/100ML; MG/100ML; MG/100ML; MG/100ML
INJECTION, SOLUTION INTRAVENOUS CONTINUOUS
Status: DISCONTINUED | OUTPATIENT
Start: 2018-07-09 | End: 2018-07-09 | Stop reason: HOSPADM

## 2018-07-09 RX ORDER — SODIUM CHLORIDE, SODIUM LACTATE, POTASSIUM CHLORIDE, CALCIUM CHLORIDE 600; 310; 30; 20 MG/100ML; MG/100ML; MG/100ML; MG/100ML
INJECTION, SOLUTION INTRAVENOUS CONTINUOUS
Status: DISCONTINUED | OUTPATIENT
Start: 2018-07-09 | End: 2018-07-09

## 2018-07-09 RX ORDER — ONDANSETRON 2 MG/ML
4 INJECTION INTRAMUSCULAR; INTRAVENOUS EVERY 6 HOURS PRN
Status: DISCONTINUED | OUTPATIENT
Start: 2018-07-09 | End: 2018-07-13 | Stop reason: HOSPADM

## 2018-07-09 RX ORDER — IOPAMIDOL 510 MG/ML
INJECTION, SOLUTION INTRAVASCULAR PRN
Status: DISCONTINUED | OUTPATIENT
Start: 2018-07-09 | End: 2018-07-09 | Stop reason: HOSPADM

## 2018-07-09 RX ADMIN — ACETAMINOPHEN 975 MG: 325 TABLET, FILM COATED ORAL at 05:56

## 2018-07-09 RX ADMIN — ACETAMINOPHEN 975 MG: 325 TABLET, FILM COATED ORAL at 20:43

## 2018-07-09 RX ADMIN — Medication 1 MG: at 22:45

## 2018-07-09 RX ADMIN — FENTANYL CITRATE 100 MCG: 50 INJECTION, SOLUTION INTRAMUSCULAR; INTRAVENOUS at 15:50

## 2018-07-09 RX ADMIN — ONDANSETRON HYDROCHLORIDE 4 MG: 2 INJECTION, SOLUTION INTRAMUSCULAR; INTRAVENOUS at 11:32

## 2018-07-09 RX ADMIN — MIDAZOLAM 2 MG: 1 INJECTION INTRAMUSCULAR; INTRAVENOUS at 15:40

## 2018-07-09 RX ADMIN — SCOPALAMINE 1 PATCH: 1 PATCH, EXTENDED RELEASE TRANSDERMAL at 20:45

## 2018-07-09 RX ADMIN — ACETAMINOPHEN 975 MG: 325 TABLET, FILM COATED ORAL at 12:14

## 2018-07-09 RX ADMIN — PROCHLORPERAZINE EDISYLATE 10 MG: 5 INJECTION INTRAMUSCULAR; INTRAVENOUS at 18:04

## 2018-07-09 RX ADMIN — ERTAPENEM SODIUM 1 G: 1 INJECTION, POWDER, LYOPHILIZED, FOR SOLUTION INTRAMUSCULAR; INTRAVENOUS at 11:33

## 2018-07-09 RX ADMIN — ONDANSETRON HYDROCHLORIDE 4 MG: 2 INJECTION, SOLUTION INTRAMUSCULAR; INTRAVENOUS at 03:55

## 2018-07-09 RX ADMIN — DEXTROSE AND SODIUM CHLORIDE: 5; 900 INJECTION, SOLUTION INTRAVENOUS at 03:56

## 2018-07-09 RX ADMIN — SODIUM CHLORIDE, POTASSIUM CHLORIDE, SODIUM LACTATE AND CALCIUM CHLORIDE: 600; 310; 30; 20 INJECTION, SOLUTION INTRAVENOUS at 15:40

## 2018-07-09 RX ADMIN — DEXAMETHASONE SODIUM PHOSPHATE 8 MG: 10 INJECTION, SOLUTION INTRAMUSCULAR; INTRAVENOUS at 16:05

## 2018-07-09 RX ADMIN — Medication 10 MEQ: at 12:51

## 2018-07-09 RX ADMIN — ENOXAPARIN SODIUM 40 MG: 40 INJECTION SUBCUTANEOUS at 23:50

## 2018-07-09 RX ADMIN — SUGAMMADEX 160 MG: 100 INJECTION, SOLUTION INTRAVENOUS at 17:10

## 2018-07-09 RX ADMIN — IBUPROFEN 400 MG: 400 TABLET ORAL at 23:49

## 2018-07-09 RX ADMIN — RANITIDINE 150 MG: 150 TABLET, FILM COATED ORAL at 07:46

## 2018-07-09 RX ADMIN — PROPOFOL 130 MG: 10 INJECTION, EMULSION INTRAVENOUS at 15:56

## 2018-07-09 RX ADMIN — IBUPROFEN 400 MG: 400 TABLET ORAL at 03:55

## 2018-07-09 RX ADMIN — ONDANSETRON 4 MG: 2 INJECTION INTRAMUSCULAR; INTRAVENOUS at 17:40

## 2018-07-09 RX ADMIN — ROCURONIUM BROMIDE 40 MG: 10 INJECTION INTRAVENOUS at 16:05

## 2018-07-09 RX ADMIN — RANITIDINE 150 MG: 150 TABLET, FILM COATED ORAL at 20:43

## 2018-07-09 RX ADMIN — IBUPROFEN 400 MG: 400 TABLET ORAL at 09:56

## 2018-07-09 RX ADMIN — DEXTROSE AND SODIUM CHLORIDE: 5; 900 INJECTION, SOLUTION INTRAVENOUS at 17:56

## 2018-07-09 RX ADMIN — LIDOCAINE HYDROCHLORIDE 60 MG: 20 INJECTION, SOLUTION INFILTRATION; PERINEURAL at 15:56

## 2018-07-09 RX ADMIN — CIPROFLOXACIN 400 MG: 2 INJECTION INTRAVENOUS at 16:05

## 2018-07-09 RX ADMIN — Medication 100 MG: at 15:57

## 2018-07-09 RX ADMIN — ONDANSETRON 4 MG: 2 INJECTION INTRAMUSCULAR; INTRAVENOUS at 17:09

## 2018-07-09 RX ADMIN — ONDANSETRON HYDROCHLORIDE 4 MG: 2 INJECTION, SOLUTION INTRAMUSCULAR; INTRAVENOUS at 07:46

## 2018-07-09 NOTE — OP NOTE
ERCP 07/09/2018  3:53 PM [facility], [address] [place] [address] [phone]     Endoscopy Department   _______________________________________________________________________________   Patient Name: Jade Carcamo             Procedure Date: 7/9/2018 3:53 PM   MRN: [id number]                       Account Number: [id number]   YOB: 1965             Admit Type: Inpatient   Age: 52                                Gender: Female   Note Status: Finalized                Attending MD: Olvin Hart MD   Total Sedation Time:                     _______________________________________________________________________________       Procedure:           ERCP   Indications:         Suspected ascending cholangitis   Providers:           Olvin Hart MD, Ricco Molina MD   Patient Profile:     Ms Carcamo is a 51yo woman with polycystic liver disease                        who has been struggling with nightly fevers for some                        time now. MRI suggesting intrahepatic enhancement                        suggestive of cholangitis. She now proceeds to ERCP for                        interrogation and management.   Referring MD:        Greg Guerrero   Medicines:           Indomethacin 100 mg NC, General Anesthesia, Antibiotics                        as scheduled   Complications:       No immediate complications.   _______________________________________________________________________________   Procedure:           Pre-Anesthesia Assessment:                        - Prior to the procedure, a History and Physical was                        performed, and patient medications and allergies were                        reviewed. The patient is competent. The risks and                        benefits of the procedure and the sedation options and                        risks were discussed with the patient. All questions                        were answered and  informed consent was obtained. Patient                        identification and proposed procedure were verified by                        the nurse in the pre-procedure area. Mental Status                        Examination: alert and oriented. Airway Examination:                        Mallampati Class II (the uvula but not tonsillar pillars                        visualized). Respiratory Examination: clear to                        auscultation. CV Examination: normal. ASA Grade                        Assessment: II - A patient with mild systemic disease.                        After reviewing the risks and benefits, the patient was                        deemed in satisfactory condition to undergo the                        procedure. The anesthesia plan was to use general                        anesthesia. Immediately prior to administration of                        medications, the patient was re-assessed for adequacy to                        receive sedatives. The heart rate, respiratory rate,                        oxygen saturations, blood pressure, adequacy of                        pulmonary ventilation, and response to care were                        monitored throughout the procedure. The physical status                        of the patient was re-assessed after the procedure.                        After obtaining informed consent, the scope was passed                        under direct vision. Throughout the procedure, the                        patient's blood pressure, pulse, and oxygen saturations                        were monitored continuously. The duodenoscope was                        introduced through the mouth, and used to inject                        contrast into and used to inject contrast into the bile                        duct and ventral pancreatic duct. The ERCP was                        accomplished without difficulty. The patient tolerated                        the  "procedure well.                                                                                     Findings:        A  film of the right upper quadrant was unremarkable. Limited white        light views of the foregut were notable for a mildly distorted sweep and        normal appearing ampulla. The ventral pancreatic duct was first deeply        cannulated with the short-nosed traction sphincterotome in concert with        an 0.025\" Acro2. Contrast was injected and I personally interpreted the        pancreatic duct images. Ductal flow of contrast was adequate, image        quality was excellent and contrast extended to the pancreatic duct. The        entire pancreatic duct was normal appearing and dual wire cannulation        was pursued. The bile duct was deeply cannulated with the short-nosed        traction sphincterotome and 12 mm balloon in concert with a second        Acro2. Contrast was injected. The right intrahepatics appeared        completely absent and or truncated at the just above the bifurcation,        while the left primary was long and meandering to the left lower        quadrant. The upper two thirds of the main bile duct was dilated and the        distal common duct narrowed normally. The cystic was patent and low        inserting. A 6 mm biliary sphincterotomy was made with a monofilament        traction (standard) sphincterotome using ERBE electrocautery. There was        no post-sphincterotomy bleeding. A 4 Fr by 11 cm Mcnamara stent with a        3/4 external pigtail and no internal flaps was placed 11 cm into the        ventral pancreatic duct. Clear fluid flowed through the stent and the        stent was in good position. An 8.5 Fr by 22 cm transpapillary Johlin        stent with no external flaps and no internal flaps was placed 21 cm into        the left hepatic duct. A 7 Fr by 8 cm transpapillary Zimmon stent with        an external pigtail and a single internal flap was placed 8 " cm into the        left hepatic duct. Bile flowed through the stents and the stents were in        good position.                                                                                     Impression:          - Mild distortion of the duodenal sweep                        - Normal ampulla status post biliary sphincterotomy                        - Dual wire cannulation required, with normal                        pancreatography and placement of a prophylactic                        pancreatic stent                        - Upper 2/3s dilation of the common duct upstream of an                        unremarkable distal duct                        - Truncation of the right intrahepatics with impressive                        elongation of the left primary meandering to the left                        lower quadrant                        - Successful placement of an 8.5F 22cm stent to the left                        intrehaptics and a 7F 8cm stent to the common duct   Recommendation:      - Standard inpatient general anesthesia recovery with                        return to the floor when appropriate                        - Follow clinical course; with improvement of fevers                        likely poor biliary drianage underlies the process,                        perhaps secondary to subtle compression of the common;                        with continued fevers other etiologies should be                        considered though the right intrahepatics appeared                        truncated (absent rather than strictured) and this                        should be confirmed on MRCP                        - Repeat ERCP in 8 weeks for repeat interrogation and                        possible stent free trial pending course                        - The findings and recommendations were discussed with                        the patient and their family                                                                                        electronically signed by MARCELO Hart

## 2018-07-09 NOTE — PROGRESS NOTES
"CLINICAL NUTRITION SERVICES - REASSESSMENT NOTE     Nutrition Prescription    RECOMMENDATIONS FOR MDs/PROVIDERS TO ORDER:  Consider appetite stimulant if appropriate.     If unable to consume at least 1050 kcal, 40 grams of protein (~60% needs) within next few days, recommend placement of FT for nutrition therapy/to supplement oral diet.     Malnutrition Status:    Non-severe malnutrition in the context of acute illness    Recommendations already ordered by Registered Dietitian (RD):  Trial of Boost Plus and Boost Breeze  Magic Cup with meals (Each provides 290 kcal, 9 grams protein)  Calorie counts (7/9-7/11)    Future/Additional Recommendations:  Tube feeding recommendations will be provided as needed, pending calorie counts (ordered for 7/9-7/11).       EVALUATION OF THE PROGRESS TOWARD GOALS   Diet: Regular    Intake: Minimal and/or variable oral intake since admission.  Was experiencing nausea, but is greatly improved with medication adjustment.  Currently has a poor appetite and is NPO for a possible procedure?  She did not order anything from the kitchen over the weekend, but notes she had a slice of pizza brought in by her  yesterday.  She also ate a chocolate candy (bar?) this morning but has now been told she is NPO for a procedure.  Tried the Magic Cup supplement and tolerated it, \"not too bad\".  Resistant to trying regular Boost Plus.         NEW FINDINGS   Patient is concerned re: her edema in knees and legs/overall amount of weight gain since admission.  Weight up ~14# in 5 days.      BUN 2 (low) - suggestive of inadequate oral intake    Albumin 2 (low),  (high) - Albumin not currently a reliable indicator for nutritional status.  Currently low d/t inflammation (is a negative acute phase reactant).  This does, however, indicate a high risk for malnutrition at this time.     MALNUTRITION  % Intake: </= 50% for >/= 5 days (severe)  % Weight Loss: Unable to assess (weight gain d/t fluid " status)  Subcutaneous Fat Loss: None observed  Muscle Loss: None observed  Fluid Accumulation/Edema: Mild (at least) in bilateral LE, small volume ascites per MRCP and notable on physical exam  Malnutrition Diagnosis: Non-severe malnutrition in the context of acute illness    Previous Goals   Patient to consume % of nutritionally adequate meal trays TID, or the equivalent with supplements/snacks.  Evaluation: Not met    Previous Nutrition Diagnosis  Inadequate oral intake  Evaluation: No change    CURRENT NUTRITION DIAGNOSIS  Inadequate oral intake related to poor appetite as evidenced by patient eating minimally per review of food ordered and discussion with patient regarding food consumed.      INTERVENTIONS  Implementation  Medical food supplement therapy - reviewed oral supplements available, encouraged oral intake as tolerated and trial of these oral supplements    Nutrition education for nutrition relationship to health/disease - discussed nutritional relationship to fluid retention and illness.  Used this as a motivating factor to consume more calories/protein as able.     Goals  Patient to consume at least 1050 kcal, 40 grams of protein (~60% of needs) per calorie counts    Monitoring/Evaluation  Progress toward goals will be monitored and evaluated per protocol.      Estefany Stratton MS, RD, LD  Pager [phone]

## 2018-07-09 NOTE — PLAN OF CARE
Problem: Patient Care Overview  Goal: Plan of Care/Patient Progress Review  Patient's VSS, T-max 99.7. Denies pain. States nausea is better, switched zofran to PRN, still wearing a scopolamine patch. Tylenol and Advil scheduled. Patient voiding with no issues, MIV infusing, 1 out of 2 bags of Potassium given. Patient resting in bed this morning; encouraged to get up and ambulate. Patient NPO since 0830 for ERCP. Patient went down for an ERCP around 1330. Continue to monitor and treat. Notify MD with any changes or concerns.

## 2018-07-09 NOTE — PROGRESS NOTES
[facility], [place]    Internal Medicine Progress Note - Mil 5 Service    Main Plans for Today   - ERCP on 7/9/2018, pending panc/bili decision after reconstructing the MRI into an MRCP  - If ERCP is not helpful, next step is TASNEEM  - encourage ambulation/IS    Assessment & Plan   Jade Carcamo is a 52 year old female admitted on 7/2/2018. Jade Carcamo is a 52 year old female with hx of polycystic liver disease admitted on 7/2/2018. She was previously healthy and presented with 11 days of persistent fever up to 103F now s/p 7d doxycycline with negative outpatient infectious workup here now for supportive cares and further workup of fever.    Fever without source - unclear etiology  Ddx includes infections (fungal vs vector vs viral, less likely bacterial) vs autoimmune vs malignancy. Drug reaction less likely as no known exposure. On admission had leukocytosis of 14.1, up from 12.8 on 7/1 and 8.3 on 6/28, neutrophil predominant. Sed rate elevated at 102 (7/1) and . Has not improved after despite 7 days of doxycycline therapy. She has had a relatively negative infectious work up all of which has been NGTD. Fungal work-up pending. Autoimmune is also possible given elevated inflammatory markers and RF/ROBBY are negative and no exam findings. Malignancy is much less likely at this time. Had CT C/A/P that was unremarkable on 6/28. Although she does not meet the criteria for fever of unknown origin at this time, we are continuing with this workup to attempt to better elucidate the etiology of her symptoms. TTE without endocarditis. Hep A IgG positive, Hep A IgM negative Hep B, Hep C negative. HIV negative. MRI 7/5 demonstrates peripheral biliary dilation to the left hepatic lobe with mild enhancement suspicious for cholangitis - this was discussed at Liver multi-disciplinary conference 7/6.  No sign of infected hepatic cyst. A decision was made in favor for evaluation and possible  management of biliary disease by ERCP tentatively planned for 7/10.   - Infectious disease consult, recs appreciated- could consider BM Bx in future if would like to r/o lymphoma/leukemia (not now-- only after other possibilities have been ruled out)  - Surg Onc consulted, recs appreciated  - GI (Panc/Bili) consult, recs appreciated for possible atypical cholangitis per MRI finding  - Rheum consult for possible seronegative autoimmune disorder, recs appreciated (have signed off, will consult if needed).   - switched from empiric ceftriaxone and metronidazole to ertapenem given nausea/abdominal discomfort on metronidazole (better on erta so far)  - possible ERCP on 7/10, pending panc/bili decision after reconstructing the MRI into an MRCP  - If ERCP is not indicated, will get TASNEEM 7/10  - If TASNEEM is negative and she still seems to be responding to antibiotics, consider PET (if insurance will cover) or tagged WBC scan to see if there is any other focus of infection we are not addressing.Stepwise process for evaluating   - Fungal antibody panel pending  - EBV repeat titer pending (repeated on 7/9 per Jade's request)    Workup to date:  - Erlichia - negative  - West Nile Virus serology negative  - Lyme screen - negative  - Anaplasma - negative  - Babesia - negative  - RMSF - negative  - CMV - negative  - EBV - low titer consistent with reactivation  - Parvovirus - previous infection  - CK - normal  - LDH - wnl  - Histoplasma - negative  - Blastomycosis - negative    - Blood cultures x4 (6/25, 6/28, 7/1, 7/2) - NGTD  - Urine culture (6/23) - Negative  - Hepatitis work up negative   - Quantiferon Gold indeterminate, will need to repeat at later date.    Nausea- improved 7/8/2018   Likely 2/2 metronidazole therapy   - ativan q4h prn  - scopolamine patch   - Zofran q4h prn     Diarrhea  No abdominal pain, worsening leukocytosis. Likely 2/2 antibiotic therapy  - continue to monitor    Hx of Polycystic Liver Disease  CT C/A/P  (7/1) - previously known polycystic liver disease. Evidence of larger cyst with possible hemorrhagic or proteinaceous component, but not likely infected per MRI and surg/onc expertise.  A ruptured cyst (sterile or infected fluid collection) could hypothetically cause fevers. See discussion above regarding abdominal US findings.     # Pain Assessment:  Current Pain Score 7/8/2018   Patient currently in pain? denies   Pain score (0-10) -   Pain location -   Pain descriptors -   - Jade is experiencing pain due to fevers. Pain management was discussed and the plan was created in a collaborative fashion.  Jade's response to the current recommendations: engaged  - Please see the plan for pain management as documented above    Diet: Regular Diet Adult  Fluids: mIVF D5NS @100ml/h  DVT Prophylaxis: Enoxaparin SQ  Code Status: Full Code  Physical Therapy given deconditioning/weakness      Disposition Plan   Expected discharge: 4 - 7 days, recommended to prior living arrangement once adequate pain management/ tolerating PO medications and SIRS/Sepsis treated.     Entered: Anusha Moscoso 07/09/2018, 11:34 AM   Information in the above section will display in the discharge planner report.      Pt discussed with staff, Dr. Em Moscoso  [facility]  Pager: [phone]  Please see sticky note for cross cover information    Interval History   Overnight events and notes reviewed.     Tmax 101degF over last 24 hours. No nausea this AM. No dyspnea, headache, rash, chest pain. Again, we discussed our stepwise approach to her fever, with our plan being to continue ertapenem while we monitor her fever and symptoms while looking at the possibility of cholangitis first with the panc/bili team.  If the ERCP is not deemed necessary or it is unrevealing, discussed that we will get a  TASNEEM.     We discussed that although we do not know the etiology of her fevers, work-up thus far has been reassuring, ruling  out many of the worrisome etiologies. We discussed the importance of a step-wise approach.     4 point ROS negative except as above.   Que/concerns addressed    Physical Exam   Vital Signs: Temp: 99.7  F (37.6  C) Temp src: Oral BP: 133/77 Pulse: 70   Resp: 18 SpO2: 91 % O2 Device: None (Room air)    Weight: 175 lbs 1.6 oz    General Appearance: Awake and alert. Tearful at times  Eyes: No scleral icterus or injection helen. No rhinorrhea  HEENT: NC/AT. PERRL. MMM  Respiratory: CTAB, with dry cough   Cardiovascular: RRR. No murmurs, rubs or gallops. DP and radial pulses 2+ bilaterally.  GI: BS+. Soft, nondistended. NT, distended   Skin: No rash. No ecchymoses or petechiae.  Musculoskeletal: PROM and AROM. Extremities warm and well perfused.   Neurologic: No focal deficits.       I personally reviewed medications, labs and imaging.  Physician Attestation   I, Sarah Strickland, saw this patient with the resident and agree with the resident and/or medical student's findings and plan of care as documented in the note.      I personally reviewed vital signs, medications and labs.    Sarah Strickland MD  Date of Service (when I saw the patient): 07/09/18

## 2018-07-09 NOTE — ANESTHESIA POSTPROCEDURE EVALUATION
Patient: Jade Carcamo    Procedure(s):  Endoscopic Retrograde Cholangiopancreatogram, Biliary Sphincterotomy, Pancreatic Duct Stent Placement, and Bile Duct Stent Placement x 2 - Wound Class: II-Clean Contaminated    Diagnosis:Cholangitis   Diagnosis Additional Information: No value filed.    Anesthesia Type:  General, ETT, RSI    Note:  Anesthesia Post Evaluation    Patient location during evaluation: PACU  Patient participation: Able to fully participate in evaluation  Level of consciousness: awake and alert  Pain management: adequate  Airway patency: patent  Cardiovascular status: hemodynamically stable  Respiratory status: acceptable  Hydration status: acceptable  PONV: none     Anesthetic complications: None          Last vitals:  Vitals:    07/09/18 1745 07/09/18 1800 07/09/18 1815   BP: 150/87 150/85 147/81   Pulse:      Resp: 18 20 18   Temp:  37.1  C (98.7  F)    SpO2: 99% 98% 92%         Electronically Signed By: Sheeba Mondragon MD  July 9, 2018  6:27 PM

## 2018-07-09 NOTE — PROGRESS NOTES
[facility] ID SERVICE: Progress Note     Patient:  Jade Carcamo, Date of birth 1965, Medical record number [id number]  Date of Visit:  July 9, 2018         Assessment and Recommendations:     Problem List:  1. Fevers - 101 at 2345 on 6/8; evaluating as FUO  1. Approx 18 days of fever  2. Leukocytosis w/ neutrophilic predominance - improved  3. Elevated CRP - improved  4. Polycystic liver disease - U/S on 6/23 showed enlarging, newly complex cyst  5. Questionable cholangitis on abdominal MRI - per GI ERCP potentially this afternoon  1. Alk Phos, ALT, AST, Dustin w/in reference  2. NPO  6. Abdominal distension  7. Penicillin allergy - rxn of limited rash; tolerating ertapenem  8. EBV viremia - low titer  9. Lymphadenopathy - prominent lymph nodes < 1 cm chest, abdomen ( CT )  And palpable cervical lymphadenopathy on exam      Recommendations:  1. Continue ertapenem  1. Consider penicillin allergy skin test assessment by pharmacy to see if could tolerate switch to Unasyn or Zosyn  2. Per GI, potential ERCP w/ stent placement later today (7/9)  3. Consider TASNEEM, PET and/or tagged WBC to attempt to find focus of infxn    Discussion:  Jade Carcamo is a 51 y/o woman admitted on 7/2 for persistent fever with associated symptoms including fatigue, diffuse myalgias, body aches, chills, nausea and vomiting.  PMHx includes polycystic liver disease s/p laparoscopic un-karina (2016).  Fevers are continuing, but less frequently and not as severely (only 101; previous fevers up to 103).  Continue ertapenem as she has improved on antibiotic.  Ertapenem is overly broad coverage, but concern w/ penicillin allergy.  Consider penicillin challenge to determine tolerance to penicillins; would be preferable to switch to Unasyn or Zosyn.  Concerns for cholangitis on MRI.  GI seeing patient and is considering ERCP w/ stent placement.  If ERCP does not help, consider further workup listed above.    The [facility] ID team will  continue to follow this patient. Please feel free to call with any questions.   Patient seen and discussed with Dr. Escaimlla.    [facility]  [facility] ( scribe)       Attestation:  This patient has been seen and evaluated by me.  I discussed this patient with Good Helms MS4 who served as scribe, and agree with the findings and plan in this note. I also personally edited this note to reflect my findings. I have reviewed today's vital signs, medications, labs and imaging.    Radames Tesfaye MD,M.Med.Sc.  Attending, Infectious Diseases  Pager: [phone]           Interval History:     Patient experienced a fever overnight of approx 101.  Abdomen still distended and uncomfortable.  Able to eat small amount this morning, but made NPO due to potentially undergoing ERCP.  Lower extremity edema is worsening and bothersome as well.  Occasional unproductive cough; no N/V today.  Denies SOB, CP and dysuria.    WBC decreased (10.3) and w/in reference range.  CRP elevated (150) but trending down.  Dustin, ALT/AST and Alk Phos w/in reference.             Review of Systems:   10 point ROS.  Negative findings other than mentioned above         Current Antimicrobials     Ertapenem IV 1 g q24hrs (began 7/7)    Previous    Ceftriaxone (7/4-7/6)    Metronidazole (7/4-7/6)    Doxycycline prior to admission         Physical Exam:   Ranges for vital signs:  Temp:  [98.8  F (37.1  C)-101  F (38.3  C)] 99.1  F (37.3  C)  Pulse:  [70-82] 73  Resp:  [16-18] 16  BP: (117-138)/(68-84) 138/84  SpO2:  [91 %-98 %] 95 %    Intake/Output Summary (Last 24 hours) at 07/09/18 1311  Last data filed at 07/09/18 0659   Gross per 24 hour   Intake          2991.67 ml   Output              700 ml   Net          2291.67 ml     Exam:  GENERAL:  well-developed, well-nourished, sitting in bed; tearful and frustrated with duration of symptoms  ENT:  Head is normocephalic, atraumatic. Oropharynx is moist without exudates or ulcers.  EYES:  Eyes have anicteric  sclerae.    NECK:  Supple.  Palpable lymph nodes in cervical chain  LUNGS:  Clear to auscultation.  CARDIOVASCULAR:  Regular rate and rhythm with no murmurs, gallops or rubs.  ABDOMEN:  Normal bowel sounds; distended; discomfort with palpation all over  EXT: Extremities warm w/ 1+edema.  SKIN:  No acute rashes.   NEUROLOGIC:  Grossly nonfocal.  Lymph nodes - palpable cervical lymphadenopathy, no sig enlarged / palpable axillary, inguinal lymphadenopathy          Laboratory Data:     Creatinine   Date Value Ref Range Status   07/09/2018 0.57 0.52 - 1.04 mg/dL Final   07/08/2018 0.68 0.52 - 1.04 mg/dL Final   07/07/2018 0.60 0.52 - 1.04 mg/dL Final   07/03/2018 0.70 0.52 - 1.04 mg/dL Final   07/02/2018 0.69 0.52 - 1.04 mg/dL Final     WBC   Date Value Ref Range Status   07/09/2018 10.3 4.0 - 11.0 10e9/L Final   07/08/2018 11.9 (H) 4.0 - 11.0 10e9/L Final   07/07/2018 10.8 4.0 - 11.0 10e9/L Final   07/06/2018 10.8 4.0 - 11.0 10e9/L Final   07/05/2018 12.7 (H) 4.0 - 11.0 10e9/L Final     Hemoglobin   Date Value Ref Range Status   07/09/2018 8.0 (L) 11.7 - 15.7 g/dL Final     Platelet Count   Date Value Ref Range Status   07/09/2018 357 150 - 450 10e9/L Final     Sed Rate   Date Value Ref Range Status   07/04/2018 98 (H) 0 - 30 mm/h Final   07/01/2018 102 (H) 0 - 30 mm/h Final     CRP Inflammation   Date Value Ref Range Status   07/09/2018 150.0 (H) 0.0 - 8.0 mg/L Final   07/06/2018 230.0 (H) 0.0 - 8.0 mg/L Final   07/04/2018 230.0 (H) 0.0 - 8.0 mg/L Final   07/01/2018 236.0 (H) 0.0 - 8.0 mg/L Final   06/28/2018 330.0 (H) 0.0 - 8.0 mg/L Final     AST   Date Value Ref Range Status   07/09/2018 15 0 - 45 U/L Final   07/08/2018 16 0 - 45 U/L Final   07/07/2018 12 0 - 45 U/L Final   07/06/2018 15 0 - 45 U/L Final   07/02/2018 18 0 - 45 U/L Final     ALT   Date Value Ref Range Status   07/09/2018 10 0 - 50 U/L Final   07/08/2018 12 0 - 50 U/L Final   07/07/2018 13 0 - 50 U/L Final   07/06/2018 13 0 - 50 U/L Final   07/02/2018  "29 0 - 50 U/L Final     Bilirubin Total   Date Value Ref Range Status   07/09/2018 0.4 0.2 - 1.3 mg/dL Final   07/08/2018 0.5 0.2 - 1.3 mg/dL Final   07/07/2018 0.4 0.2 - 1.3 mg/dL Final   07/06/2018 0.5 0.2 - 1.3 mg/dL Final   07/02/2018 0.8 0.2 - 1.3 mg/dL Final     Lab Results   Component Value Date     07/09/2018    BUN 2 (L) 07/09/2018    CO2 24 07/09/2018            Microbiology Data:   Blastomyces Agn Quant EIA - pending  Histoplasma capsulatum - pending  Blastomyces Agn Quant EIA - pending    Ehrlichia  - negative  Lyme screen - negative  Anaplasma - negative   Babesia - negative  RMSF - negative  CMV - negative  EBV - low titer consistent with reactivation  Parvovirus - previous infection  CK - normal  Blood cultures x 4 negative to date (only first one was prior to doxycycline)  Urine culture low levels mixed urogenital jayna  ROBBY - negative  TTE - bicuspid aortic valve. No evidence endocarditis  Hepatititis A - IgG positive. Reflex IgM negative.   Hep B - negative surface ag and core ab. Surface ab not done.   Hep C - negative  HIV - negative  West Nile Virus serology negative         Imaging:   MRI Abdomin w/o Contrast - 7/5  \"1. Areas of peribiliary T2 signal/enhancement of the left hepatic lobe  concerning for cholangitis.  2. Polycystic liver disease. Multiple intermediate signal cysts which  may represent proteinaceous/hemorrhagic cysts without ancillary MR  findings suggestive of infection.   3. Compensatory hypertrophy of the left hepatic segment. Stable mild  periportal edema. Small volume ascites  4. Bibasilar pleural effusions with associated atelectasis\"    Abdominal U/S - 7/5  \"1. Multicystic liver; the majority of the hepatic cysts appear to be  simple by ultrasound evaluation. There is however a dominant complex  cyst which which has increased in size since 6/23/2018. Appearance and  growth suggest an internal hemorrhagic or proteinaceous component.  2. No ascites as questioned. Trace " "left pleural effusion.\"  "

## 2018-07-09 NOTE — PROGRESS NOTES
GASTROENTEROLOGY PROGRESS NOTE    ASSESSMENT:  52 year old female with a history of GERD and Polycystic liver disease s/p laparoscopic unroofing x 19 cysts and fenestration of additional 10 cysts by Dr. Cruz on 11/4/16 and readmitted on 7/2/18 with fever of unknown origin.  Etiology of patient's fever from GI perspective is unclear.  Mild T bili elevation 6/28/2018 since normalized with only mild alk phos elevation is noted.  Imaging was reviewed in liver tumor board raising the question of cholangitis.  Patient's symptoms and labs are not consistent with typical findings of cholangitis however given the clinical scenario it seems reasonable to evaluate further.  ERCP to be completed on 7/9/2018 given persistent fevers and no source.      RECOMMENDATIONS:  -Trend LFT's  -NPO  -ERCP today      The patient was discussed and plan agreed upon with GI staff.    Ronny Luciano  [facility]  Gastroenterology Fellow  _______________________________________________________________  S: No acute events overnight.  Patient reports fever last night to 101.  Associated intermittent nausea and abdominal distention noted.  Patient has no appetite but is able to tolerate some food intake.    O:  Blood pressure 137/81, pulse 73, temperature 99.9  F (37.7  C), temperature source Oral, resp. rate 16, weight 78.8 kg (173 lb 11.2 oz), last menstrual period 11/14/2016, SpO2 94 %, not currently breastfeeding.    Gen: No acute distress  HEENT: Moist mucous membranes no scleral icterus no sublingual jaundice  CV: Regular rate and rhythm no murmurs rubs or gallops  Lungs: Clear to auscultation bilaterally no wheezes crackles or rubs  Abd: Soft, bowel sounds positive, distended,, nontender to palpation no rebound  Skin: No jaundice  MS: Warm and well-perfused  Neuro: Grossly intact        LABS:  BMP  Recent Labs  Lab 07/09/18  0644 07/08/18  0649 07/07/18  2153 07/07/18  0901 07/03/18  0649    140  --  141 139    POTASSIUM 3.4 3.9 3.3* 2.8* 3.8   CHLORIDE 106 106  --  106 103   THUAN 8.3* 8.3*  --  7.9* 8.8   CO2 24 26  --  26 27   BUN 2* 5*  --  7 7   CR 0.57 0.68  --  0.60 0.70   GLC 98 95  --  113* 96     CBC  Recent Labs  Lab 07/09/18  0644 07/08/18  0649 07/07/18  0643 07/06/18  0553   WBC 10.3 11.9* 10.8 10.8   RBC 2.69* 2.88* 2.58* 2.68*   HGB 8.0* 8.6* 7.7* 8.1*   HCT 24.9* 27.3* 24.3* 25.1*   MCV 93 95 94 94   MCH 29.7 29.9 29.8 30.2   MCHC 32.1 31.5 31.7 32.3   RDW 13.4 13.4 13.3 13.2    429  455* 304 246     INR  Recent Labs  Lab 07/09/18  0644 07/08/18  0649 07/07/18  0901 07/06/18  1328   INR 1.26* 1.31* 1.29* 1.24*     LFTs  Recent Labs  Lab 07/09/18  0644 07/08/18  0649 07/07/18  0901 07/06/18  0553   ALKPHOS 131 153* 141 153*   AST 15 16 12 15   ALT 10 12 13 13   BILITOTAL 0.4 0.5 0.4 0.5   PROTTOTAL 5.7* 6.4* 5.7* 5.7*   ALBUMIN 2.0* 2.2* 2.0* 1.8*      PANCNo lab results found in last 7 days.

## 2018-07-09 NOTE — ANESTHESIA PREPROCEDURE EVALUATION
Anesthesia Evaluation     . Pt has had prior anesthetic. Type: General    No history of anesthetic complications          ROS/MED HX    ENT/Pulmonary:  - neg pulmonary ROS     Neurologic:  - neg neurologic ROS     Cardiovascular:  - neg cardiovascular ROS       METS/Exercise Tolerance:     Hematologic:     (+) Anemia (Hgb: 8.0), -      Musculoskeletal:  - neg musculoskeletal ROS       GI/Hepatic:     (+) GERD liver disease (polycystic liver disease),       Renal/Genitourinary:  - ROS Renal section negative       Endo:  - neg endo ROS       Psychiatric:  - neg psychiatric ROS       Infectious Disease:   (+) Recent Fever (fever of unknown origin. workup ongoing),       Malignancy:      - no malignancy   Other:                     Physical Exam  Normal systems: cardiovascular, pulmonary and dental    Airway   Mallampati: II  TM distance: >3 FB  Neck ROM: full    Dental     Cardiovascular       Pulmonary                     Anesthesia Plan      History & Physical Review  History and physical reviewed and following examination; no interval change.    ASA Status:  2 .    NPO Status:  > 8 hours    Plan for General, ETT and RSI with Intravenous induction. Maintenance will be Balanced.    PONV prophylaxis:  Ondansetron (or other 5HT-3) and Dexamethasone or Solumedrol       Postoperative Care  Postoperative pain management:  Multi-modal analgesia.      Consents  Anesthetic plan, risks, benefits and alternatives discussed with:  Patient..                          .

## 2018-07-09 NOTE — ANESTHESIA CARE TRANSFER NOTE
Patient: Jade Carcamo    Procedure(s):  Endoscopic Retrograde Cholangiopancreatogram, Biliary Sphincterotomy, Pancreatic Duct Stent Placement, and Bile Duct Stent Placement x 2 - Wound Class: II-Clean Contaminated    Diagnosis: Cholangitis   Diagnosis Additional Information: No value filed.    Anesthesia Type:   General, ETT, RSI     Note:  Airway :Nasal Cannula  Patient transferred to:PACU  Handoff Report: Identifed the Patient, Identified the Reponsible Provider, Reviewed the pertinent medical history, Discussed the surgical course, Reviewed Intra-OP anesthesia mangement and issues during anesthesia, Set expectations for post-procedure period and Allowed opportunity for questions and acknowledgement of understanding      Vitals: (Last set prior to Anesthesia Care Transfer)    CRNA VITALS  7/9/2018 1651 - 7/9/2018 1725      7/9/2018             Resp Rate (observed): 18                Electronically Signed By: ELIE Bui CRNA  July 9, 2018  5:25 PM

## 2018-07-09 NOTE — OR NURSING
Dr. Hart paged once on patient arrival for pre op orders and notified again when he came to consent patient at 1500. No orders received yet.

## 2018-07-10 ENCOUNTER — APPOINTMENT (OUTPATIENT)
Dept: PHYSICAL THERAPY | Facility: CLINIC | Age: 53
DRG: 445 | End: 2018-07-10
Attending: PEDIATRICS
Payer: COMMERCIAL

## 2018-07-10 ENCOUNTER — CARE COORDINATION (OUTPATIENT)
Dept: GASTROENTEROLOGY | Facility: CLINIC | Age: 53
End: 2018-07-10

## 2018-07-10 DIAGNOSIS — K83.09 CHOLANGITIS (H): Primary | ICD-10-CM

## 2018-07-10 LAB
ALBUMIN SERPL-MCNC: 2 G/DL (ref 3.4–5)
ALP SERPL-CCNC: 137 U/L (ref 40–150)
ALT SERPL W P-5'-P-CCNC: 13 U/L (ref 0–50)
ANION GAP SERPL CALCULATED.3IONS-SCNC: 9 MMOL/L (ref 3–14)
AST SERPL W P-5'-P-CCNC: 12 U/L (ref 0–45)
BILIRUB SERPL-MCNC: 0.2 MG/DL (ref 0.2–1.3)
BUN SERPL-MCNC: 5 MG/DL (ref 7–30)
CALCIUM SERPL-MCNC: 8.5 MG/DL (ref 8.5–10.1)
CHLORIDE SERPL-SCNC: 105 MMOL/L (ref 94–109)
CO2 SERPL-SCNC: 27 MMOL/L (ref 20–32)
CREAT SERPL-MCNC: 0.57 MG/DL (ref 0.52–1.04)
EBV DNA # SPEC NAA+PROBE: 592 {COPIES}/ML
EBV DNA SPEC NAA+PROBE-LOG#: 2.8 {LOG_COPIES}/ML
ERYTHROCYTE [DISTWIDTH] IN BLOOD BY AUTOMATED COUNT: 13.3 % (ref 10–15)
GFR SERPL CREATININE-BSD FRML MDRD: >90 ML/MIN/1.7M2
GLUCOSE SERPL-MCNC: 132 MG/DL (ref 70–99)
HCT VFR BLD AUTO: 29.2 % (ref 35–47)
HGB BLD-MCNC: 9.4 G/DL (ref 11.7–15.7)
LAB SCANNED RESULT: NORMAL
MCH RBC QN AUTO: 30.1 PG (ref 26.5–33)
MCHC RBC AUTO-ENTMCNC: 32.2 G/DL (ref 31.5–36.5)
MCV RBC AUTO: 94 FL (ref 78–100)
PLATELET # BLD AUTO: 517 10E9/L (ref 150–450)
POTASSIUM SERPL-SCNC: 4.2 MMOL/L (ref 3.4–5.3)
PROT SERPL-MCNC: 6.2 G/DL (ref 6.8–8.8)
RBC # BLD AUTO: 3.12 10E12/L (ref 3.8–5.2)
SODIUM SERPL-SCNC: 141 MMOL/L (ref 133–144)
WBC # BLD AUTO: 12.3 10E9/L (ref 4–11)

## 2018-07-10 PROCEDURE — 25000132 ZZH RX MED GY IP 250 OP 250 PS 637: Performed by: STUDENT IN AN ORGANIZED HEALTH CARE EDUCATION/TRAINING PROGRAM

## 2018-07-10 PROCEDURE — 36415 COLL VENOUS BLD VENIPUNCTURE: CPT | Performed by: INTERNAL MEDICINE

## 2018-07-10 PROCEDURE — 25000128 H RX IP 250 OP 636: Performed by: INTERNAL MEDICINE

## 2018-07-10 PROCEDURE — 97110 THERAPEUTIC EXERCISES: CPT | Mod: GP

## 2018-07-10 PROCEDURE — 85027 COMPLETE CBC AUTOMATED: CPT | Performed by: INTERNAL MEDICINE

## 2018-07-10 PROCEDURE — 80053 COMPREHEN METABOLIC PANEL: CPT | Performed by: INTERNAL MEDICINE

## 2018-07-10 PROCEDURE — 97530 THERAPEUTIC ACTIVITIES: CPT | Mod: GP

## 2018-07-10 PROCEDURE — 99232 SBSQ HOSP IP/OBS MODERATE 35: CPT | Mod: GC

## 2018-07-10 PROCEDURE — 12000026 ZZH R&B TRANSPLANT

## 2018-07-10 PROCEDURE — 25000132 ZZH RX MED GY IP 250 OP 250 PS 637: Performed by: INTERNAL MEDICINE

## 2018-07-10 PROCEDURE — 25000125 ZZHC RX 250: Performed by: INTERNAL MEDICINE

## 2018-07-10 PROCEDURE — 40000193 ZZH STATISTIC PT WARD VISIT

## 2018-07-10 PROCEDURE — 25000128 H RX IP 250 OP 636: Performed by: STUDENT IN AN ORGANIZED HEALTH CARE EDUCATION/TRAINING PROGRAM

## 2018-07-10 RX ORDER — AMOXICILLIN 250 MG/1
250 CAPSULE ORAL
Status: DISCONTINUED | OUTPATIENT
Start: 2018-07-11 | End: 2018-07-12 | Stop reason: CLARIF

## 2018-07-10 RX ORDER — MEPERIDINE HYDROCHLORIDE 25 MG/ML
25 INJECTION INTRAMUSCULAR; INTRAVENOUS; SUBCUTANEOUS EVERY 30 MIN PRN
Status: DISCONTINUED | OUTPATIENT
Start: 2018-07-11 | End: 2018-07-10

## 2018-07-10 RX ORDER — IBUPROFEN 400 MG/1
400 TABLET, FILM COATED ORAL EVERY 6 HOURS PRN
Status: DISCONTINUED | OUTPATIENT
Start: 2018-07-10 | End: 2018-07-13 | Stop reason: HOSPADM

## 2018-07-10 RX ORDER — MEPERIDINE HYDROCHLORIDE 50 MG/ML
25 INJECTION INTRAMUSCULAR; INTRAVENOUS; SUBCUTANEOUS EVERY 30 MIN PRN
Status: DISCONTINUED | OUTPATIENT
Start: 2018-07-10 | End: 2018-07-13 | Stop reason: HOSPADM

## 2018-07-10 RX ORDER — ALBUTEROL SULFATE 0.83 MG/ML
2.5 SOLUTION RESPIRATORY (INHALATION)
Status: DISCONTINUED | OUTPATIENT
Start: 2018-07-11 | End: 2018-07-12 | Stop reason: CLARIF

## 2018-07-10 RX ORDER — DIPHENHYDRAMINE HYDROCHLORIDE 50 MG/ML
50 INJECTION INTRAMUSCULAR; INTRAVENOUS
Status: DISCONTINUED | OUTPATIENT
Start: 2018-07-11 | End: 2018-07-12 | Stop reason: CLARIF

## 2018-07-10 RX ORDER — SODIUM CHLORIDE 9 MG/ML
INJECTION, SOLUTION INTRAVENOUS CONTINUOUS PRN
Status: DISCONTINUED | OUTPATIENT
Start: 2018-07-11 | End: 2018-07-12 | Stop reason: CLARIF

## 2018-07-10 RX ORDER — METHYLPREDNISOLONE SODIUM SUCCINATE 125 MG/2ML
125 INJECTION, POWDER, LYOPHILIZED, FOR SOLUTION INTRAMUSCULAR; INTRAVENOUS
Status: DISCONTINUED | OUTPATIENT
Start: 2018-07-11 | End: 2018-07-12 | Stop reason: CLARIF

## 2018-07-10 RX ORDER — ACETAMINOPHEN 325 MG/1
975 TABLET ORAL EVERY 6 HOURS PRN
Status: DISCONTINUED | OUTPATIENT
Start: 2018-07-10 | End: 2018-07-13 | Stop reason: HOSPADM

## 2018-07-10 RX ORDER — ALBUTEROL SULFATE 90 UG/1
1-2 AEROSOL, METERED RESPIRATORY (INHALATION)
Status: DISCONTINUED | OUTPATIENT
Start: 2018-07-11 | End: 2018-07-12 | Stop reason: CLARIF

## 2018-07-10 RX ADMIN — ENOXAPARIN SODIUM 40 MG: 40 INJECTION SUBCUTANEOUS at 21:01

## 2018-07-10 RX ADMIN — IBUPROFEN 400 MG: 400 TABLET ORAL at 11:51

## 2018-07-10 RX ADMIN — SCOPALAMINE 1 PATCH: 1 PATCH, EXTENDED RELEASE TRANSDERMAL at 21:01

## 2018-07-10 RX ADMIN — ACETAMINOPHEN 975 MG: 325 TABLET, FILM COATED ORAL at 08:43

## 2018-07-10 RX ADMIN — ERTAPENEM SODIUM 1 G: 1 INJECTION, POWDER, LYOPHILIZED, FOR SOLUTION INTRAMUSCULAR; INTRAVENOUS at 11:51

## 2018-07-10 RX ADMIN — ACETAMINOPHEN 975 MG: 325 TABLET, FILM COATED ORAL at 03:12

## 2018-07-10 RX ADMIN — IBUPROFEN 400 MG: 400 TABLET ORAL at 06:22

## 2018-07-10 RX ADMIN — DEXTROSE AND SODIUM CHLORIDE: 5; 900 INJECTION, SOLUTION INTRAVENOUS at 03:18

## 2018-07-10 NOTE — PROGRESS NOTES
[facility] ID SERVICE: Progress Note     Patient:  Jade Carcamo, Date of birth 1965, Medical record number [id number]  Date of Visit:  July 10, 2018         Assessment and Recommendations:   Problem List:   1. Fevers - evaluating as FUO  1. Approx 18 days of fever; last on 7/8 - 101  2. No fevers last ~32 hours  2. Leukocytosis w/ neutrophilic predominance - improved  3. Elevated CRP - improved  4. Polycystic liver disease - U/S on 6/23 showed enlarging, newly complex cyst  5. Questionable cholangitis on abdominal MRI  1. ERCP (7/9/18)  w/ stent placement x3 (left intrahepatics, common duct and prophylactic pancreatic stent); plan to repeat in 8 weeks  6. Alk Phos, ALT, AST, Dustin w/in reference  7. Abdominal distension  8. Penicillin allergy - rxn of limited rash; tolerating ertapenem  9. EBV viremia - low titer  10. Lymphadenopathy - prominent lymph nodes < 1 cm chest, abdomen ( CT )  And palpable cervical lymphadenopathy on exam       Recommendations:   1. Continue ertapenem (began 7/7)  1. Penicillin allergy skin test assessment tomorrow (7/11)   2. Hold on further diagnostic workup while patient remains afebrile (TASNEEM, PET, tagged WBC, etc.)  3. If patient has diarrhea, recommend C diff stool study  4. Encouraged incentive spirometry  5. If fever returns or cough worsens, recommend CXR    Discussion:  Jade Carcamo is a 51 y/o woman admitted on 7/2 for persistent fever with associated symptoms including fatigue, diffuse myalgias, body aches, chills, nausea and vomiting.  PMHx includes polycystic liver disease s/p laparoscopic un-karina (2016).  No fever for approx 32 hrs; last fever 2350 on 7/8. Continue ertapenem as patient continues to improve.  [place] broad coverage, but concern for penicillin allergy.  Patient agreeable to penicillin challenge, which will occur tomorrow (7/11); if tolerate penicillin, will switch to Unasyn or Zosyn.  No fever s/p ERCP w/ stent placement x3; will hold off further  "workup if patient remains afebrile.  Will treat as cholangitis, recommend 7-10 days of abx. Previously had diarrhea (no stool for 2-3 days); if has diarrhea, test for C. Diff.  Complains of mild cough and chest congestion; encouraged incentive spirometry.  If fever returns or pulmonary symptoms worsen, recommend CXR.    The [facility] team will continue to follow this patient. Please feel free to call with any questions.     Patient seen and discussed with Dr. Escamilla.    [facility]  [facility]    Attestation:  This patient has been seen and evaluated by me.  I discussed this patient with Good Helms MS4  (Served as scribe) and agree with the findings and plan in this note. I also personally edited this note to reflect my findings. I have reviewed today's vital signs, medications, labs and imaging.    Radames Tesfaye MD,M.Med.Sc.  Attending, Infectious Diseases  Pager: [phone]           Interval History:   Feels better today.  No fever overnight.  Looking forward to walking to halls and getting out of bed.  Feels \"crud\" in chest and has occasional unproductive cough.  Has eaten some today.  No bowel movement for 2-3 days, will drink prune juice.      ERCP w/ stent placement x3 (7/9) - \"likely poor biliary drianage underlies the process, perhaps secondary to subtle compression of the common\"; will repeat ERCP in 8 wks         Review of Systems:   10 point ROS. Negative findings other than mentioned above.         Current Antimicrobials     Ertapenem IV 1 g q24hrs (began 7/7)    Previous    Ceftriaxone (7/4-7/6)    Metronidazole (7/4-7/6)    Doxycycline prior to admission           Physical Exam:   Ranges for vital signs:  Temp:  [98.3  F (36.8  C)-99.9  F (37.7  C)] 98.5  F (36.9  C)  Pulse:  [63-73] 63  Heart Rate:  [60-77] 63  Resp:  [14-21] 16  BP: (126-152)/(79-96) 128/96  SpO2:  [88 %-100 %] 95 %    Intake/Output Summary (Last 24 hours) at 07/10/18 0819  Last data filed at 07/10/18 0659   Gross per 24 " hour   Intake             3580 ml   Output             1550 ml   Net             2030 ml     Exam:   GENERAL:  well-developed, well-nourished, sitting in bed in no acute distress.   ENT:  Head is normocephalic, atraumatic. Oropharynx is moist without exudates or ulcers.  EYES:  Eyes have anicteric sclerae.    NECK:  Supple. Palpable lymph nodes in cervical chain  LUNGS:  Clear to auscultation; diminished breath sounds at bases  CARDIOVASCULAR:  Regular rate and rhythm with no murmurs, gallops or rubs.  ABDOMEN:  Normal bowel sounds; distended but improved from prior exam; minimal discomfort w/ palpation  EXT: Extremities warm w/ 1+ edema.  SKIN:  No acute rashes or bruises  NEUROLOGIC:  Grossly nonfocal.  LYMPHATICS - palpable cervical lymphadenopathy; no grossly enlarged axillary or inguinal nodes         Laboratory Data:     Creatinine   Date Value Ref Range Status   07/09/2018 0.57 0.52 - 1.04 mg/dL Final   07/08/2018 0.68 0.52 - 1.04 mg/dL Final   07/07/2018 0.60 0.52 - 1.04 mg/dL Final   07/03/2018 0.70 0.52 - 1.04 mg/dL Final   07/02/2018 0.69 0.52 - 1.04 mg/dL Final     WBC   Date Value Ref Range Status   07/09/2018 10.3 4.0 - 11.0 10e9/L Final   07/08/2018 11.9 (H) 4.0 - 11.0 10e9/L Final   07/07/2018 10.8 4.0 - 11.0 10e9/L Final   07/06/2018 10.8 4.0 - 11.0 10e9/L Final   07/05/2018 12.7 (H) 4.0 - 11.0 10e9/L Final     Hemoglobin   Date Value Ref Range Status   07/09/2018 8.0 (L) 11.7 - 15.7 g/dL Final     Platelet Count   Date Value Ref Range Status   07/09/2018 357 150 - 450 10e9/L Final     Sed Rate   Date Value Ref Range Status   07/04/2018 98 (H) 0 - 30 mm/h Final   07/01/2018 102 (H) 0 - 30 mm/h Final     CRP Inflammation   Date Value Ref Range Status   07/09/2018 150.0 (H) 0.0 - 8.0 mg/L Final   07/06/2018 230.0 (H) 0.0 - 8.0 mg/L Final   07/04/2018 230.0 (H) 0.0 - 8.0 mg/L Final   07/01/2018 236.0 (H) 0.0 - 8.0 mg/L Final   06/28/2018 330.0 (H) 0.0 - 8.0 mg/L Final     AST   Date Value Ref Range  "Status   07/09/2018 15 0 - 45 U/L Final   07/08/2018 16 0 - 45 U/L Final   07/07/2018 12 0 - 45 U/L Final   07/06/2018 15 0 - 45 U/L Final   07/02/2018 18 0 - 45 U/L Final     ALT   Date Value Ref Range Status   07/09/2018 10 0 - 50 U/L Final   07/08/2018 12 0 - 50 U/L Final   07/07/2018 13 0 - 50 U/L Final   07/06/2018 13 0 - 50 U/L Final   07/02/2018 29 0 - 50 U/L Final     Bilirubin Total   Date Value Ref Range Status   07/09/2018 0.4 0.2 - 1.3 mg/dL Final   07/08/2018 0.5 0.2 - 1.3 mg/dL Final   07/07/2018 0.4 0.2 - 1.3 mg/dL Final   07/06/2018 0.5 0.2 - 1.3 mg/dL Final   07/02/2018 0.8 0.2 - 1.3 mg/dL Final     Lab Results   Component Value Date     07/09/2018    BUN 2 (L) 07/09/2018    CO2 24 07/09/2018            Microbiology Data:   Blastomyces Agn Quant EIA - none detected  Histoplasma capsulatum - none detected  Coccidiodes Agn Quant EIA - none detected  Ehrlichia  - negative  Lyme screen - negative  Anaplasma - negative   Babesia - negative  RMSF - negative  CMV - negative  EBV - low titer consistent with reactivation  Parvovirus - previous infection  CK - normal  Blood cultures x 4 negative to date (only first one was prior to doxycycline)  Urine culture low levels mixed urogenital jayna  ROBBY - negative  TTE - bicuspid aortic valve. No evidence endocarditis  Hepatititis A - IgG positive. Reflex IgM negative.   Hep B - negative surface ag and core ab. Surface ab not done.   Hep C - negative  HIV - negative  West Nile Virus serology negative       Imaging:   MRI Abdomin w/o Contrast - 7/5  \"1. Areas of peribiliary T2 signal/enhancement of the left hepatic lobe  concerning for cholangitis.  2. Polycystic liver disease. Multiple intermediate signal cysts which  may represent proteinaceous/hemorrhagic cysts without ancillary MR  findings suggestive of infection.   3. Compensatory hypertrophy of the left hepatic segment. Stable mild  periportal edema. Small volume ascites  4. Bibasilar pleural effusions " "with associated atelectasis\"     Abdominal U/S - 7/5  \"1. Multicystic liver; the majority of the hepatic cysts appear to be  simple by ultrasound evaluation. There is however a dominant complex  cyst which which has increased in size since 6/23/2018. Appearance and  growth suggest an internal hemorrhagic or proteinaceous component.  2. No ascites as questioned. Trace left pleural effusion.\"    "

## 2018-07-10 NOTE — PLAN OF CARE
Problem: Infection, Risk/Actual (Adult)  Goal: Identify Related Risk Factors and Signs and Symptoms  Related risk factors and signs and symptoms are identified upon initiation of [employer] Clinical Practice Guideline (CPG).   Outcome: Improving  Pt afebrile, vitals stable.  Requiring oxygen while sleeping.   Pt sleeping well, denies pain and nausea.    Scheduled ibuprofen and tylenol given as ordered.  D5NS infusing at 100cc/hr.  Pt voiding with no problems.  Plan to advance diet to regular this morning, pt is on calorie counts.    Continue plan of care.

## 2018-07-10 NOTE — PLAN OF CARE
Problem: Patient Care Overview  Goal: Plan of Care/Patient Progress Review  Outcome: Improving  Problem: Nausea/Vomiting (Adult)  Intervention: Promote/Maintain Hydration  ../71 (BP Location: Right arm)  Pulse 63  Temp 98.5  F (36.9  C) (Oral)  Resp 16  Wt 78.8 kg (173 lb 11.2 oz)  LMP 11/14/2016  SpO2 95%  BMI 26.41 kg/m2  A&O. Pt stated that today is the best she has felt since being admitted. No c/o N/V and is eating a regular diet/ John cts. IV fluids continue @ rate 100 ml/hr. Up ad ramon and worked with PT this morning. No c/o pain. Continue with plan of cares and medicate per orders.

## 2018-07-10 NOTE — PROGRESS NOTES
Calorie Counts  Intake recorded for: 7/9  Kcals: 0  Protein: 0g  # Meals Recorded: no meals ordered from kitchen, no intake recorded.   # Supplements Recorded: no intake recorded.

## 2018-07-10 NOTE — PLAN OF CARE
Problem: Patient Care Overview  Goal: Plan of Care/Patient Progress Review  Discharge Planner PT  7A  Patient plan for discharge: Home, open to OP PT  Current status: Pt ambulates 1250' SBA-ind, mild LOB upon initially beginning to ambulate though able to recover independently. Mild SOB and fatigue noted during ambulation. Pt ascends/descends 4 stairs x3 Cathie with 1 hand rail.   Barriers to return to prior living situation: Medical status  Recommendations for discharge: Home with SO and OP PT  Rationale for recommendations: Pt deemed safe to return home with assist as needed from SO according to pt's current functional status. Pt would benefit from OP PT services to progress strengthening and endurance to return to PLOF.       Entered by: Yoana Ruffin 07/10/2018 10:02 AM

## 2018-07-10 NOTE — PROGRESS NOTES
Post ERCP (7/9/2018) with Dr. Hart: Follow-up    Post procedure recommendations: Repeat ERCP in 8 weeks for repeat interrogation and possible stent free trial pending course    Orders placed: ERCP and sent to scheduling.     Inpatient Panc/Bili team to follow: Will follow outpatient as appropriate.    Katie SALGUERO RN Coordinator  Dr. Mcnamara, Dr. Hart & Dr. Cavazos  [facility]  [phone]

## 2018-07-10 NOTE — PROGRESS NOTES
[facility], [place]    Internal Medicine Progress Note - Mil 5 Service    Main Plans for Today   - Change Tylenol/Ibuprofen to prn for fevers >100.4  - Monitor for fevers  - c/w abx  - PCN skin test 7/11    Assessment & Plan   Jade Carcamo is a 52 year old female admitted on 7/2/2018. Jade Carcamo is a 52 year old female with hx of polycystic liver disease admitted on 7/2/2018. She was previously healthy and presented with 11 days of persistent fever up to 103F now s/p 7d doxycycline with negative outpatient infectious workup here now for supportive cares and further workup of fever.    Fever without source - unclear etiology  Ddx includes infections (fungal vs vector vs viral, less likely bacterial) vs autoimmune vs malignancy. Drug reaction less likely as no known exposure. On admission had leukocytosis of 14.1, up from 12.8 on 7/1 and 8.3 on 6/28, neutrophil predominant. Sed rate elevated at 102 (7/1) and . Has not improved after despite 7 days of doxycycline therapy. She has had a relatively negative infectious work up all of which has been NGTD. Fungal work-up pending. Autoimmune is also possible given elevated inflammatory markers and RF/ROBBY are negative and no exam findings. Malignancy is much less likely at this time. Had CT C/A/P that was unremarkable on 6/28. Although she does not meet the criteria for fever of unknown origin at this time, we are continuing with this workup to attempt to better elucidate the etiology of her symptoms. TTE without endocarditis. Hep A IgG positive, Hep A IgM negative Hep B, Hep C negative. HIV negative. MRI 7/5 demonstrates peripheral biliary dilation to the left hepatic lobe with mild enhancement suspicious for cholangitis - this was discussed at Liver multi-disciplinary conference 7/6. She is now s/p ERCP (7/9/18)  w/ stent placement x3 (left intrahepatics, common duct and prophylactic pancreatic stent). No fevers for about last 32  hours.   - Infectious disease consult, recs appreciated- could consider BM Bx in future if would like to r/o lymphoma/leukemia (not now-- only after other possibilities have been ruled out)  - Surg Onc consulted, recs appreciated  - GI (Panc/Bili) consult, recs appreciated for possible atypical cholangitis per MRI finding   - ERCP (7/9/18)  w/ stent placement x3 (left intrahepatics, common duct and prophylactic pancreatic stent); plan to repeat in 8 weeks.   - Rheum consult for possible seronegative autoimmune disorder, recs appreciated (have signed off, will consult if needed).   - c/w IV ertapenem  - PCN allergy skin test 7/11 per pharmacy  - Fungal antibody panel pending  - EBV repeat titer from 7/9 decreased from previous  - Holding further work-up but If  Persistent fevers, will get TASNEEM and if this is negative and she still seems to be responding to antibiotics, consider PET (if insurance will cover) or tagged WBC scan to see if there is any other focus of infection we are not addressing.Stepwise process for evaluating   - CXR if febrile or worsening cough. IS while awake.     Workup to date:  - Erlichia - negative  - West Nile Virus serology negative  - Lyme screen - negative  - Anaplasma - negative  - Babesia - negative  - RMSF - negative  - CMV - negative  - EBV - low titer consistent with reactivation  - Parvovirus - previous infection  - CK - normal  - LDH - wnl  - Histoplasma - negative  - Blastomycosis - negative    - Blood cultures x4 (6/25, 6/28, 7/1, 7/2) - NGTD  - Urine culture (6/23) - Negative  - Hepatitis work up negative   - Quantiferon Gold indeterminate, will need to repeat at later date.    Nausea- improved 7/8/2018   Likely 2/2 metronidazole therapy   - ativan q4h prn  - scopolamine patch   - Zofran q4h prn     Diarrhea, resolved  No abdominal pain, worsening leukocytosis. Likely 2/2 antibiotic therapy  - continue to monitor  - consider c diff testing if returns    Hx of Polycystic Liver  Disease  CT C/A/P (7/1) - previously known polycystic liver disease. Evidence of larger cyst with possible hemorrhagic or proteinaceous component, but not likely infected per MRI and surg/onc expertise.  A ruptured cyst (sterile or infected fluid collection) could hypothetically cause fevers. See discussion above regarding abdominal US findings.     # Pain Assessment:  Current Pain Score 7/10/2018   Patient currently in pain? denies   Pain score (0-10) -   Pain location -   Pain descriptors -   - Jade is experiencing pain due to fevers. Pain management was discussed and the plan was created in a collaborative fashion.  Jade's response to the current recommendations: engaged  - Please see the plan for pain management as documented above    Diet: Regular Diet Adult  Fluids: mIVF D5NS @100ml/h  DVT Prophylaxis: Enoxaparin SQ  Code Status: Full Code  Physical Therapy given deconditioning/weakness      Disposition Plan   Expected discharge: 2 - 3 days, recommended to prior living arrangement once adequate pain management/ tolerating PO medications and SIRS/Sepsis treated.     Entered: Anusha Moscoso 07/10/2018, 2:49 PM   Information in the above section will display in the discharge planner report.      Pt discussed with staff, Dr. Bennett Moscoso  [facility]  [place]: 5  Pager: [phone]  Please see sticky note for cross cover information    Interval History   Overnight events and notes reviewed.     Tmax 99.9degF over last 24 hours. Very happy this AM. No dyspnea, nausea, headache, chest pain. Abdomen feels softer. Discussed changed anti-pyretics from scheduled to prn.     Overnight did receive ERCP s/p 3 stents.     4 point ROS negative except as above.     Que/concerns addressed    Physical Exam   Vital Signs: Temp: 99  F (37.2  C) Temp src: Oral BP: 121/87 Pulse: 63 Heart Rate: 92 Resp: 16 SpO2: 95 % O2 Device: None (Room air) Oxygen Delivery: 2 LPM  Weight: 173 lbs 11.2 oz    General Appearance:  Awake and alert. In good spirits.   Eyes: No scleral icterus or injection helen.   HEENT: NC/AT. PERRL. MMM  Respiratory: CTAB, with intermittent non-productive cough. No increased WOB  Cardiovascular: RRR. No murmurs, rubs or gallops. DP and radial pulses 2+ bilaterally.  GI: BS+. Soft, nondistended. NT   Skin: No rash. No ecchymoses or petechiae.  Musculoskeletal: PROM and AROM. Extremities warm and well perfused.   Neurologic: No focal deficits.       I personally reviewed medications, labs and imaging.

## 2018-07-10 NOTE — PROGRESS NOTES
"[facility], [place]   Allergy Assessment and Penicillin Allergy Skin Test (PAST)   Antimicrobial Management Team (AMT) Note                Allergy History:   Question Response   What was the name of agent which caused the event?  Penicillin    When did the reaction occur?  \"Over 20 years ago\"   What was the nature of the event? Including symptoms and severity  (rash, hives, anaphylaxis, SOB, hospitalization) She stated that she got a small rash on her left forearm. She said that since the reaction was so long ago it's hard to remember what the rash looked like. She thinks the rash was flat and discolored. She doesn't remember any bumps, itching, or pain. She had to go back to the doctor for something unrelated and showed her doctor the rash. The doctor told her she was allergic to Penicillins. She did not experience any SOB nor have to be hospitalized. She describes the reaction as \"uneventful.\"   What was the course of the reaction?  She doesn't remember the timeline of the reaction. She isn't sure if it was after the first dose or later on.    How long after taking the medication did it take for the symptoms to begin?  She didn't remember.   How was the reaction treated?   (Antihistamines, steroids, inhalers, etc)  She thinks she stopped taking the medication, but didn't remember receiving any treatment for the reaction.   Did you ever experience symptoms like this before?  She reports no.   Can you name other antibiotics you remember taking and tolerated?  She couldn't name any. Reminded the patient she has had ceftriaxone and ertapenem here and she agreed.   Have you ever taken drugs similar to penicillin?   Cephalosporins such as Cephalexin (Keflex), Cefepime (Maxipime), Ceftriaxone (Rocephin)?Carbapenems such as Imipenem (Primaxin), Meropenem (Merrem), Etrapenem (Invanz)    She reports she doesn't know.       She said she would be interested in receiving the skin testing as she " doesn't think she has a true allergy.       Procedure was reviewed and discussed with the patient and or family member: Yes  The patient met [facility] Penicillin Skin Test Protocol for Penicillin Allergy Skin Testing: Yes. However given the patient's ongoing fevers it was determined to defer testing until the patient stabilized.      Maday Padilla, PharmD  Antimicrobial Stewardship & Infectious Diseases Pharmacist  Office Ph: [phone]  Pager: [phone]

## 2018-07-10 NOTE — PLAN OF CARE
Problem: Nausea/Vomiting (Adult)  Intervention: Promote/Maintain Hydration  ../71 (BP Location: Right arm)  Pulse 63  Temp 98.5  F (36.9  C) (Oral)  Resp 16  Wt 78.8 kg (173 lb 11.2 oz)  LMP 11/14/2016  SpO2 95%  BMI 26.41 kg/m2  A&O. Pt stated that today is the best she has felt since being admitted. No c/o N/V and is eating a regular diet/ John cts. IV fluids continue @ rate 100 ml/hr. Up ad ramon and worked with PT this morning. No c/o pain. Continue with plan of cares and medicate per orders.

## 2018-07-11 ENCOUNTER — APPOINTMENT (OUTPATIENT)
Dept: GENERAL RADIOLOGY | Facility: CLINIC | Age: 53
DRG: 445 | End: 2018-07-11
Attending: PEDIATRICS
Payer: COMMERCIAL

## 2018-07-11 ENCOUNTER — APPOINTMENT (OUTPATIENT)
Dept: ULTRASOUND IMAGING | Facility: CLINIC | Age: 53
DRG: 445 | End: 2018-07-11
Attending: PEDIATRICS
Payer: COMMERCIAL

## 2018-07-11 LAB
ALBUMIN SERPL-MCNC: 2.2 G/DL (ref 3.4–5)
ALBUMIN UR-MCNC: NEGATIVE MG/DL
ALP SERPL-CCNC: 137 U/L (ref 40–150)
ALT SERPL W P-5'-P-CCNC: 12 U/L (ref 0–50)
ANION GAP SERPL CALCULATED.3IONS-SCNC: 9 MMOL/L (ref 3–14)
APPEARANCE UR: CLEAR
AST SERPL W P-5'-P-CCNC: 23 U/L (ref 0–45)
BILIRUB SERPL-MCNC: 0.3 MG/DL (ref 0.2–1.3)
BILIRUB UR QL STRIP: NEGATIVE
BUN SERPL-MCNC: 5 MG/DL (ref 7–30)
CALCIUM SERPL-MCNC: 8.4 MG/DL (ref 8.5–10.1)
CHLORIDE SERPL-SCNC: 104 MMOL/L (ref 94–109)
CO2 SERPL-SCNC: 26 MMOL/L (ref 20–32)
COLOR UR AUTO: ABNORMAL
CREAT SERPL-MCNC: 0.66 MG/DL (ref 0.52–1.04)
CRP SERPL-MCNC: 75 MG/L (ref 0–8)
ERYTHROCYTE [DISTWIDTH] IN BLOOD BY AUTOMATED COUNT: 13.6 % (ref 10–15)
GFR SERPL CREATININE-BSD FRML MDRD: >90 ML/MIN/1.7M2
GLUCOSE SERPL-MCNC: 102 MG/DL (ref 70–99)
GLUCOSE UR STRIP-MCNC: NEGATIVE MG/DL
HCT VFR BLD AUTO: 27.9 % (ref 35–47)
HGB BLD-MCNC: 9 G/DL (ref 11.7–15.7)
HGB UR QL STRIP: NEGATIVE
KETONES UR STRIP-MCNC: NEGATIVE MG/DL
LEUKOCYTE ESTERASE UR QL STRIP: ABNORMAL
MCH RBC QN AUTO: 30.2 PG (ref 26.5–33)
MCHC RBC AUTO-ENTMCNC: 32.3 G/DL (ref 31.5–36.5)
MCV RBC AUTO: 94 FL (ref 78–100)
NITRATE UR QL: NEGATIVE
PH UR STRIP: 7.5 PH (ref 5–7)
PLATELET # BLD AUTO: 423 10E9/L (ref 150–450)
POTASSIUM SERPL-SCNC: 3.3 MMOL/L (ref 3.4–5.3)
PROT SERPL-MCNC: 6.2 G/DL (ref 6.8–8.8)
RBC # BLD AUTO: 2.98 10E12/L (ref 3.8–5.2)
RBC #/AREA URNS AUTO: 0 /HPF (ref 0–2)
SODIUM SERPL-SCNC: 139 MMOL/L (ref 133–144)
SOURCE: ABNORMAL
SP GR UR STRIP: 1 (ref 1–1.03)
SQUAMOUS #/AREA URNS AUTO: 1 /HPF (ref 0–1)
UROBILINOGEN UR STRIP-MCNC: NORMAL MG/DL (ref 0–2)
WBC # BLD AUTO: 11.4 10E9/L (ref 4–11)
WBC #/AREA URNS AUTO: <1 /HPF (ref 0–5)

## 2018-07-11 PROCEDURE — 87040 BLOOD CULTURE FOR BACTERIA: CPT | Performed by: INTERNAL MEDICINE

## 2018-07-11 PROCEDURE — 85027 COMPLETE CBC AUTOMATED: CPT | Performed by: INTERNAL MEDICINE

## 2018-07-11 PROCEDURE — 25000132 ZZH RX MED GY IP 250 OP 250 PS 637: Performed by: INTERNAL MEDICINE

## 2018-07-11 PROCEDURE — 99233 SBSQ HOSP IP/OBS HIGH 50: CPT | Mod: GC

## 2018-07-11 PROCEDURE — 93970 EXTREMITY STUDY: CPT

## 2018-07-11 PROCEDURE — 25000132 ZZH RX MED GY IP 250 OP 250 PS 637: Performed by: STUDENT IN AN ORGANIZED HEALTH CARE EDUCATION/TRAINING PROGRAM

## 2018-07-11 PROCEDURE — 25000125 ZZHC RX 250: Performed by: STUDENT IN AN ORGANIZED HEALTH CARE EDUCATION/TRAINING PROGRAM

## 2018-07-11 PROCEDURE — 86140 C-REACTIVE PROTEIN: CPT | Performed by: INTERNAL MEDICINE

## 2018-07-11 PROCEDURE — 80053 COMPREHEN METABOLIC PANEL: CPT | Performed by: INTERNAL MEDICINE

## 2018-07-11 PROCEDURE — 81001 URINALYSIS AUTO W/SCOPE: CPT | Performed by: INTERNAL MEDICINE

## 2018-07-11 PROCEDURE — 40000141 ZZH STATISTIC PERIPHERAL IV START W/O US GUIDANCE

## 2018-07-11 PROCEDURE — 36415 COLL VENOUS BLD VENIPUNCTURE: CPT | Performed by: INTERNAL MEDICINE

## 2018-07-11 PROCEDURE — 25000128 H RX IP 250 OP 636: Performed by: STUDENT IN AN ORGANIZED HEALTH CARE EDUCATION/TRAINING PROGRAM

## 2018-07-11 PROCEDURE — 12000026 ZZH R&B TRANSPLANT

## 2018-07-11 PROCEDURE — 71046 X-RAY EXAM CHEST 2 VIEWS: CPT

## 2018-07-11 PROCEDURE — 25000128 H RX IP 250 OP 636: Performed by: INTERNAL MEDICINE

## 2018-07-11 RX ORDER — CALCIUM CARBONATE 500 MG/1
500 TABLET, CHEWABLE ORAL 3 TIMES DAILY PRN
Status: DISCONTINUED | OUTPATIENT
Start: 2018-07-11 | End: 2018-07-13 | Stop reason: HOSPADM

## 2018-07-11 RX ORDER — NYSTATIN 100000/ML
500000 SUSPENSION, ORAL (FINAL DOSE FORM) ORAL 4 TIMES DAILY
Status: DISCONTINUED | OUTPATIENT
Start: 2018-07-11 | End: 2018-07-13 | Stop reason: HOSPADM

## 2018-07-11 RX ADMIN — NYSTATIN 500000 UNITS: 100000 SUSPENSION ORAL at 20:27

## 2018-07-11 RX ADMIN — IBUPROFEN 400 MG: 400 TABLET ORAL at 07:28

## 2018-07-11 RX ADMIN — DEXTROSE AND SODIUM CHLORIDE: 5; 900 INJECTION, SOLUTION INTRAVENOUS at 01:00

## 2018-07-11 RX ADMIN — ACETAMINOPHEN 975 MG: 325 TABLET, FILM COATED ORAL at 07:26

## 2018-07-11 RX ADMIN — ERTAPENEM SODIUM 1 G: 1 INJECTION, POWDER, LYOPHILIZED, FOR SOLUTION INTRAMUSCULAR; INTRAVENOUS at 12:00

## 2018-07-11 RX ADMIN — POTASSIUM CHLORIDE 20 MEQ: 1.5 POWDER, FOR SOLUTION ORAL at 20:27

## 2018-07-11 RX ADMIN — BENZOCAINE 0.5 ML: 220 SPRAY, METERED PERIODONTAL at 04:52

## 2018-07-11 RX ADMIN — POLYETHYLENE GLYCOL 3350 17 G: 17 POWDER, FOR SOLUTION ORAL at 08:03

## 2018-07-11 RX ADMIN — DOCUSATE SODIUM 100 MG: 100 CAPSULE, LIQUID FILLED ORAL at 08:03

## 2018-07-11 RX ADMIN — ACETAMINOPHEN 650 MG: 325 TABLET, FILM COATED ORAL at 18:15

## 2018-07-11 RX ADMIN — CALCIUM CARBONATE (ANTACID) CHEW TAB 500 MG 500 MG: 500 CHEW TAB at 04:26

## 2018-07-11 RX ADMIN — ACETAMINOPHEN 325 MG: 325 TABLET, FILM COATED ORAL at 19:13

## 2018-07-11 RX ADMIN — POTASSIUM CHLORIDE 40 MEQ: 1.5 POWDER, FOR SOLUTION ORAL at 18:14

## 2018-07-11 RX ADMIN — ENOXAPARIN SODIUM 40 MG: 40 INJECTION SUBCUTANEOUS at 20:26

## 2018-07-11 NOTE — PROGRESS NOTES
Calorie Counts    Intake recorded for: 7/10 Kcals: 571 Protein: 26g    # Meals recorded: 3 meals (First: 100% chicken broth, crackers)        (Second: 100% scrambled eggs, tomato soup, prune juice, 50% mashed potatoes)         (Third: 2 slices Punch Pizza)     # Supplements recorded: 0

## 2018-07-11 NOTE — PLAN OF CARE
Problem: Patient Care Overview  Goal: Plan of Care/Patient Progress Review    Blood pressure 145/85, pulse 65, temperature 99  F (37.2  C), temperature source Oral, resp. rate 16, weight 78.8 kg (173 lb 11.2 oz), last menstrual period 11/14/2016, SpO2 96 %, not currently breastfeeding.    [id number]    Tmax 99; OVSS on RA, no s/s of distress. A&Ox4, pleasant and cooperative with cares. Up ad ramon--ambulated halls x2 with  this evening; voiding adequate amounts; no BMs this shift. Denied pain; denied n/v--reg diet on sonny counts; tolerated takeout pizza with family tonight. Scopolamine patch behind left ear. PIV patent with D5NS@100ml/hr. Family at bedside for most of evening; pt happy and in good spirits. Pt resting quietly now; call light and belongings within reach; will continue to monitor and continue POC/notify team as needed.

## 2018-07-11 NOTE — PROGRESS NOTES
[facility], [place]    Internal Medicine Progress Note - Mil 5 Service    Main Plans for Today   - c/w abx  - d/c IVF  - UA, CXR, Blood Cx, BL US for DVT given new fever  - will follow-up with ID for next recs: clinical monitoring vs TASNEEM    Assessment & Plan   Jade Carcamo is a 52 year old female admitted on 7/2/2018. Jade Carcamo is a 52 year old female with hx of polycystic liver disease admitted on 7/2/2018. She was previously healthy and presented with 11 days of persistent fever up to 103F now s/p 7d doxycycline with negative outpatient infectious workup here now for supportive cares and further workup of fever.    Fever without source - unclear etiology  Ddx includes infections (fungal vs vector vs viral, less likely bacterial) vs autoimmune vs malignancy. Drug reaction less likely as no known exposure. On admission had leukocytosis of 14.1, up from 12.8 on 7/1 and 8.3 on 6/28, neutrophil predominant. Sed rate elevated at 102 (7/1) and . Has not improved after despite 7 days of doxycycline therapy. She has had a relatively negative infectious work up all of which has been NGTD. Fungal work-up pending. Autoimmune is also possible given elevated inflammatory markers and RF/ROBBY are negative and no exam findings. Malignancy is much less likely at this time. Had CT C/A/P that was unremarkable on 6/28. Although she does not meet the criteria for fever of unknown origin at this time, we are continuing with this workup to attempt to better elucidate the etiology of her symptoms. TTE without endocarditis. Hep A IgG positive, Hep A IgM negative Hep B, Hep C negative. HIV negative. MRI 7/5 demonstrates peripheral biliary dilation to the left hepatic lobe with mild enhancement suspicious for cholangitis - this was discussed at Liver multi-disciplinary conference 7/6. She is now s/p ERCP (7/9/18)  w/ stent placement x3 (left intrahepatics, common duct and prophylactic pancreatic  stent). No fevers for about last 32 hours.   - Infectious disease consult, recs appreciated- could consider BM Bx in future if would like to r/o lymphoma/leukemia (not now-- only after other possibilities have been ruled out)  - Surg Onc consulted, recs appreciated. ERCP (7/9/18)  w/ stent placement x3 (left intrahepatics, common duct and prophylactic pancreatic stent); plan to repeat in 8 weeks. Working diagnosis is that patient had an atypical presentation of cholangitis. After procedure, patient was afebrile for >24 hours; however, spiked a fever to 101.8deg F on 7/11. Very possible that this is a new fever as her fever had effervesced for >24 hours granted this is the first day she was not on scheduled Tylenol/ibuprofen.  - GI (Panc/Bili) consult, recs appreciated for possible atypical cholangitis per MRI finding  - Rheum consult for possible seronegative autoimmune disorder, recs appreciated (have signed off, will consult if needed).   - c/w IV ertapenem  - PCN allergy skin test deferred for later date.   - Fungal antibody panel pending  - EBV repeat titer from 7/9 decreased from previous  - CXR, UA/UCx to evaluate fever  - BL LE US to evaluate for DVT at Jade's request; however, likelihood that her fevers are 2/2 DVT  - Will discuss with ID re: [email or web address]. If this is negative and she still seems to be responding to antibiotics, consider PET (if insurance will cover) or tagged WBC scan to see if there is any other focus of infection we are not addressing.Stepwise process for evaluating   - IS while awake.     Workup to date:  - Erlichia - negative  - West Nile Virus serology negative  - Lyme screen - negative  - Anaplasma - negative  - Babesia - negative  - RMSF - negative  - CMV - negative  - EBV - low titer consistent with reactivation  - Parvovirus - previous infection  - CK - normal  - LDH - wnl  - Histoplasma - negative  - Blastomycosis - negative    - Blood cultures x4 (6/25, 6/28, 7/1, 7/2) - NGTD  - Urine  culture (6/23) - Negative  - Hepatitis work up negative   - Quantiferon Gold indeterminate, will need to repeat at later date.    Nausea- improved 7/8/2018   Likely 2/2 metronidazole therapy   - ativan q4h prn  - scopolamine patch   - Zofran q4h prn     Diarrhea, resolved  No abdominal pain, worsening leukocytosis. Likely 2/2 antibiotic therapy  - continue to monitor  - consider c diff testing if returns    Hx of Polycystic Liver Disease  CT C/A/P (7/1) - previously known polycystic liver disease. Evidence of larger cyst with possible hemorrhagic or proteinaceous component, but not likely infected per MRI and surg/onc expertise.  A ruptured cyst (sterile or infected fluid collection) could hypothetically cause fevers. See discussion above regarding abdominal US findings.     # Pain Assessment:  Current Pain Score 7/11/2018   Patient currently in pain? denies   Pain score (0-10) -   Pain location -   Pain descriptors -   - Jade is experiencing pain due to fevers. Pain management was discussed and the plan was created in a collaborative fashion.  Jade's response to the current recommendations: engaged  - Please see the plan for pain management as documented above    Diet: Regular Diet Adult  Fluids: none  DVT Prophylaxis: Enoxaparin SQ  Code Status: Full Code  Physical Therapy given deconditioning/weakness      Disposition Plan   Expected discharge: 2 - 3 days, recommended to prior living arrangement once adequate pain management/ tolerating PO medications and SIRS/Sepsis treated.     Entered: Anusha Moscoso 07/11/2018, 12:13 PM   Information in the above section will display in the discharge planner report.      Pt discussed with staff, Dr. Bennett Moscoso  [facility]: 5  Pager: [phone]  Please see sticky note for cross cover information    Interval History   Overnight events and notes reviewed.     Tmax 101.8degF this AM. Pt very upset understandably as she would like to find out what  is causing her fevers and really wants to go home. We discussed going outside for a little while, continuing a step-wise approach. Discussed that this may be a new fever or new infection as she was fever free for >24 hours.     Pt c/o right knee swelling, sore throat.     4 point ROS negative except as above.     Que/concerns addressed    Physical Exam   Vital Signs: Temp: 98.8  F (37.1  C) Temp src: Oral BP: 148/77 Pulse: 65 Heart Rate: 75 Resp: 18 SpO2: 95 % O2 Device: None (Room air)    Weight: 173 lbs 11.2 oz    General Appearance: Awake and alert.Tearful.  Eyes: No scleral icterus or injection helen.   HEENT: NC/AT. PERRL. MMM without lesions  Respiratory: CTAB, with intermittent non-productive cough. No increased WOB. No crackles.   Cardiovascular: RRR. No murmurs, rubs or gallops. DP and radial pulses 2+ bilaterally.  GI: BS+. Soft, nondistended. NT   Skin: No rash. No ecchymoses or petechiae.  Musculoskeletal: PROM and AROM. Extremities warm and well perfused. No fluid shift of knees appreciated. No swelling of calves or erythema       I personally reviewed medications, labs and imaging.

## 2018-07-11 NOTE — PROGRESS NOTES
"  [facility] ID SERVICE: Progress Note     Patient:  Jade Carcamo, Date of birth 1965, Medical record number [id number]  Date of Visit:  July 11, 2018         Assessment and Recommendations:   Problem List:  1. Fever - likely from cholangitis   1. Fever on 7/11 at 0722 101.4 after approx 48 hrs w/out fever  2. Approx 21 days recurrent fevers  - blood cultures negative 6/25/18, 6/28, 7/1, 7/2, 7/3  Blood culture (7/11) - pending   Urinalysis (7/11) - negative  Blastomyces Agn Quant EIA - none detected  Histoplasma capsulatum - none detected  Coccidiodes Agn Quant EIA - none detected  Ehrlichia  - negative  Lyme screen - negative  Anaplasma - negative   Babesia - negative  RMSF - negative  CMV - negative  EBV - low titer (1863 --> 592 copies / ml )   Parvovirus - previous infection  CK - normal  Blood cultures x 4 negative to date (only first one was prior to doxycycline)  Urine culture low levels mixed urogenital jayna  ROBBY - negative  TTE - bicuspid aortic valve. No evidence endocarditis  Hepatititis A - IgG positive. Reflex IgM negative.   Hep B - negative surface ag and core ab. Surface ab not done.   Hep C - negative  HIV - negative  West Nile Virus serology negative    2. Leukocytosis w/ neutrophilic predominance - improving  3. Elevated CRP - improving  4. Polycystic liver disease - U/S on 6/23 showed enlarging, newly complex cyst  5. Questionable cholangitis on abdominal MRI  1. ERCP (7/9/18) w/ stent placement x3 (left intrahepatics, common duct and prophylactic pancreatic stent); plan repeat ERCP 8 wks  6. Abdominal distension  7. Penicillin allergy - rxn of limited skin rash 20 yrs ago; tolerating ertapenem  8. EBV viremia - low titer  9. Lymphadenopathy - prominent lymph nodes < 1 cm chest, abdomen (CT) and palpable cervical lymphadenopathy on exam  10. Mild splenomegaly  1. MRI (7/5) \"mild splenomegaly.  No focal mass\"  2. CT chest/ab/pelvis (6/29) spleen appears normal  11. Constipation - no BM " "in ~ 72 hours; bowel regimen added  12.  Anasarca  13. Sore throat     Recommendations:  1. Continue ertapenem (began 7/7)  2. Add ciprofloxacin to regimen if fever recurs  3. CXR   - Small pleural effusions have increased in size when compared to the prior. Minimal associated opacities, likely atelectasis (cannot completely exclude pneumonia).  4. U/A; U/C if indicated  1. Updated - U/A negative  5. Differential on CBC from 7/11  6. Blood culture  1. Updated - blood culture no growth x2   7. Nystatin swish and swallow  8. Doppler U/S of right lower extremity - updated - no DVT bilateral legs   9. Penicillin allergy skin test held for today due to fever  10. Encourage incentive spirometry   11. Consider diuretic   12. If fever persists , TASNEEM. recent dental cleaning prior to fever      Discussion:  Jade Carcamo is a 53 y/o woman admitted on 7/2 for persistent fever with associated symptoms including fatigue, diffuse myalgias, body aches, chills, nausea and vomiting.  PMHx includes polycystic liver disease s/p laparoscopic un-karina (2016).  Fever occurred this morning (7/11) at 0722.  Will continue ertapenem for now as penicillin test has been held due to fever.  Will add ciprofloxacin to regimen; patient given 1 dose  ciprofloxacin on 7/9 for GI procedure and did not have fever for approx 36 hours after so could've positively responded to that.  Due to cough and chest congestion, recommend CXR.  States increased urinary frequency over last 24 hours so will check U/A and want U/C if indicated.  Request a differential on CBC from 7/11.  Recommend blood cultures.  Due to sore throat and prolonged hospital stay and antibotics, recommend nystatin swish and swallow for 7 days.  Bilateral leg swelling, but patient states right > left and has occasional \"stabbing\" sensation in right leg.  If all above negative, consider continuing diagnostic plan of TASNEEM     The [facility] ID team will continue to follow this patient. " Please feel free to call with any questions.     Patient seen and discussed with Dr. Escamilla.    Good Helms   [facility]    Attestation:  This patient has been seen and evaluated by me.  I discussed this patient with Good Helms MS4 , who served as scribe and agree with the findings and plan in this note. I also personally edited this note to reflect my findings. I have reviewed today's vital signs, medications, labs and imaging.    Radames Tesfaye MD,M.Med.Sc.  Attending, Infectious Diseases  Pager: [phone]                 Interval History:   Fever overnight.  States increasing lower extremity edema with occasional stabbing sensation in right leg.  Concerned about swelling in right hand, but was reassured is was likely due to IV injection site; is not erythematous or indurated.  No BM for approx 72 hrs.  States increased urinary frequency but denies discoloration or dysuria.  States sore throat and occasional nausea.  Denies HA, vomiting, diarrhea.      Penicillin challenge test will be held today due to fever.  WBC 11.4 (12.3) and CRP 75 (150).           Review of Systems:   10 point ROS.  Negative findings other than mentioned above.         Current Antimicrobials   Ertapenem IV 1g q24hrs (began 7/7)    Previous    Ciprofloxacin (1 dose on 7/9)    Ceftriaxone (7/4-7/6)    Metronidazole (7/4-7/6)    Doxycycline prior to admission           Physical Exam:   Ranges for vital signs:  Temp:  [98.8  F (37.1  C)-101.4  F (38.6  C)] 98.8  F (37.1  C)  Pulse:  [65-73] 65  Heart Rate:  [71-75] 75  Resp:  [16-18] 18  BP: (121-148)/(57-87) 148/77  SpO2:  [95 %-96 %] 95 %    Intake/Output Summary (Last 24 hours) at 07/11/18 1155  Last data filed at 07/11/18 1149   Gross per 24 hour   Intake          3941.67 ml   Output             4200 ml   Net          -258.33 ml     Exam:  GENERAL:  Alert, oriented,  Tearful, sitting in bed in no acute distress.   ENT:  Head is normocephalic, atraumatic. Oropharynx is moist,  erythematous.  EYES:  Eyes have anicteric sclerae.    NECK:  Supple.   LUNGS:  Clear to auscultation; diminished breath sounds at bases  CARDIOVASCULAR:  Regular rate and rhythm with no murmurs, gallops or rubs.  ABDOMEN:  Normal bowel sounds; distended but stable; minimal discomfort w/ palpation.  EXT: Extremities warm w/ 1+ edema in lower extremities; right hand swollen due to IV injection site but is not erythematous or indurated  SKIN:  No acute rashes.    NEUROLOGIC:  Grossly nonfocal.         Laboratory Data:     Creatinine   Date Value Ref Range Status   07/11/2018 0.66 0.52 - 1.04 mg/dL Final   07/10/2018 0.57 0.52 - 1.04 mg/dL Final   07/09/2018 0.57 0.52 - 1.04 mg/dL Final   07/08/2018 0.68 0.52 - 1.04 mg/dL Final   07/07/2018 0.60 0.52 - 1.04 mg/dL Final     WBC   Date Value Ref Range Status   07/11/2018 11.4 (H) 4.0 - 11.0 10e9/L Final   07/10/2018 12.3 (H) 4.0 - 11.0 10e9/L Final   07/09/2018 10.3 4.0 - 11.0 10e9/L Final   07/08/2018 11.9 (H) 4.0 - 11.0 10e9/L Final   07/07/2018 10.8 4.0 - 11.0 10e9/L Final     Hemoglobin   Date Value Ref Range Status   07/11/2018 9.0 (L) 11.7 - 15.7 g/dL Final     Platelet Count   Date Value Ref Range Status   07/11/2018 423 150 - 450 10e9/L Final     Sed Rate   Date Value Ref Range Status   07/04/2018 98 (H) 0 - 30 mm/h Final   07/01/2018 102 (H) 0 - 30 mm/h Final     CRP Inflammation   Date Value Ref Range Status   07/11/2018 75.0 (H) 0.0 - 8.0 mg/L Final   07/09/2018 150.0 (H) 0.0 - 8.0 mg/L Final   07/06/2018 230.0 (H) 0.0 - 8.0 mg/L Final   07/04/2018 230.0 (H) 0.0 - 8.0 mg/L Final   07/01/2018 236.0 (H) 0.0 - 8.0 mg/L Final     AST   Date Value Ref Range Status   07/11/2018 23 0 - 45 U/L Final   07/10/2018 12 0 - 45 U/L Final   07/09/2018 15 0 - 45 U/L Final   07/08/2018 16 0 - 45 U/L Final   07/07/2018 12 0 - 45 U/L Final     ALT   Date Value Ref Range Status   07/11/2018 12 0 - 50 U/L Final   07/10/2018 13 0 - 50 U/L Final   07/09/2018 10 0 - 50 U/L Final    07/08/2018 12 0 - 50 U/L Final   07/07/2018 13 0 - 50 U/L Final     Bilirubin Total   Date Value Ref Range Status   07/11/2018 0.3 0.2 - 1.3 mg/dL Final   07/10/2018 0.2 0.2 - 1.3 mg/dL Final   07/09/2018 0.4 0.2 - 1.3 mg/dL Final   07/08/2018 0.5 0.2 - 1.3 mg/dL Final   07/07/2018 0.4 0.2 - 1.3 mg/dL Final     Lab Results   Component Value Date     07/11/2018    BUN 5 (L) 07/11/2018    CO2 26 07/11/2018        Microbiology Data:   Blood culture (7/11) - no growth x2 so far   Urinalysis (7/11) - negative    Blastomyces Agn Quant EIA - none detected  Histoplasma capsulatum - none detected  Coccidiodes Agn Quant EIA - none detected  Ehrlichia  - negative  Lyme screen - negative  Anaplasma - negative   Babesia - negative  RMSF - negative  CMV - negative  EBV - low titer (1863 --> 592 copies / ml )   Parvovirus - previous infection  CK - normal  Blood cultures x 4 negative to date (only first one was prior to doxycycline)  Urine culture low levels mixed urogenital jayna  ROBBY - negative  TTE - bicuspid aortic valve. No evidence endocarditis  Hepatititis A - IgG positive. Reflex IgM negative.   Hep B - negative surface ag and core ab. Surface ab not done.   Hep C - negative  HIV - negative  West Nile Virus serology negative       Imaging:   CXR - to be obtained

## 2018-07-11 NOTE — PROGRESS NOTES
GASTROENTEROLOGY PROGRESS NOTE    ASSESSMENT:  52 year old female with a history of GERD and Polycystic liver disease s/p laparoscopic unroofing x 19 cysts and fenestration of additional 10 cysts by Dr. Cruz on 11/4/16 and readmitted on 7/2/18 with fever of unknown origin.  Etiology of patient's fever from GI perspective is unclear.  Mild T bili elevation 6/28/2018 since normalized with only mild alk phos elevation is noted.  Imaging was reviewed in liver tumor board raising the question of cholangitis. Patient's symptoms and labs are not consistent with typical findings of cholangitis however given the clinical scenario evaluation warranted.  ERCP completed with no obvious findings of cholangitis.   One 8.5 F 22 cm stent was placed to the left intrahepatics, one 7 Fr 8 cm stent to the common duct and one prophylactic PD stent were placed.  Patient went 36 hours without fever but they recurred on 7/10/18 with no associated symptoms or LFT' changes.  it seems reasonable to evaluate further.  ERCP to be completed on 7/9/2018 given persistent fevers and no source.  Suspecting cholangitis not the source.  MRCP recommended to further evaluate the right hepatic ducts as truncated on cholangiogram (suspect related to cysts but will evaluate with MRCP to be sure).    RECOMMENDATIONS:  -Trend LFT's  -MRCP  -ADAT after imaging  -defer antibiotics and infectious work up to primary team.        The patient was discussed and plan agreed upon with GI staff.    Ronny Luciano  [facility]  Gastroenterology Fellow  _______________________________________________________________  S: No acute events overnight.  Patient reports fever last night.  No nausea, vomiting, tearful and frustrated.  Reports some left sided leg swelling.     O:  Blood pressure 143/83, pulse 65, temperature 98.8  F (37.1  C), temperature source Oral, resp. rate 18, weight 78.8 kg (173 lb 11.2 oz), last menstrual period 11/14/2016, SpO2  95 %, not currently breastfeeding.    Gen: No acute distress  HEENT: Moist mucous membranes no scleral icterus no sublingual jaundice  CV: Regular rate and rhythm no murmurs rubs or gallops  Lungs: Clear to auscultation bilaterally no wheezes crackles or rubs  Abd: Soft, bowel sounds positive, distended, nontender to palpation no rebound  Skin: No jaundice  MS: Warm and well-perfused  Neuro: Grossly intact        LABS:  BMP    Recent Labs  Lab 07/11/18 0622 07/10/18  1355 07/09/18  0644 07/08/18  0649    141 140 140   POTASSIUM 3.3* 4.2 3.4 3.9   CHLORIDE 104 105 106 106   THUAN 8.4* 8.5 8.3* 8.3*   CO2 26 27 24 26   BUN 5* 5* 2* 5*   CR 0.66 0.57 0.57 0.68   * 132* 98 95     CBC    Recent Labs  Lab 07/11/18  0622 07/10/18  1355 07/09/18  0644 07/08/18  0649   WBC 11.4* 12.3* 10.3 11.9*   RBC 2.98* 3.12* 2.69* 2.88*   HGB 9.0* 9.4* 8.0* 8.6*   HCT 27.9* 29.2* 24.9* 27.3*   MCV 94 94 93 95   MCH 30.2 30.1 29.7 29.9   MCHC 32.3 32.2 32.1 31.5   RDW 13.6 13.3 13.4 13.4    517* 357 429  455*     INR    Recent Labs  Lab 07/09/18  0644 07/08/18  0649 07/07/18  0901 07/06/18  1328   INR 1.26* 1.31* 1.29* 1.24*     LFTs    Recent Labs  Lab 07/11/18  0622 07/10/18  1355 07/09/18  0644 07/08/18  0649   ALKPHOS 137 137 131 153*   AST 23 12 15 16   ALT 12 13 10 12   BILITOTAL 0.3 0.2 0.4 0.5   PROTTOTAL 6.2* 6.2* 5.7* 6.4*   ALBUMIN 2.2* 2.0* 2.0* 2.2*      PANCNo lab results found in last 7 days.

## 2018-07-11 NOTE — PLAN OF CARE
Problem: Patient Care Overview  Goal: Plan of Care/Patient Progress Review  Outcome: Declining  ../77 (BP Location: Right arm)  Pulse 65  Temp 100.8  F (38.2  C)  Resp 18  Wt 78.8 kg (173 lb 11.2 oz)  LMP 11/14/2016  SpO2 95%  BMI 26.41 kg/m2  On rounds this morning her team did spend time discussing the plan due to pt having fevers again. Pt very upset and worried about her status and not knowing why she continues to have fevers. Blood cx done, chest x-ray and urine testing ordered and completed.Encouraging pt to use her incentive spirometer for her lungs since she has some wheezes and decrease sounds in bases. New IV to be place for IV antibiotics but not fluids. Paged team about possible U/S of her right leg/knee swollen to r/o DVT and pt also had U/S done.  NOTE: Pt will need oral K+ replacement this evening for lab result K+= 3.3 per prn protocol.Continue with plan of cares and medicate per orders.

## 2018-07-11 NOTE — PROVIDER NOTIFICATION
../77 (BP Location: Right arm)  Pulse 65  Temp 101.4  F (38.6  C) (Oral)  Resp 16  Wt 78.8 kg (173 lb 11.2 oz)  LMP 11/14/2016  SpO2 95%  BMI 26.41 kg/m2  Paged team about Fever and infiltrated IV. Blood cx and new IV ordered.Pt very upset and tearful about this mornings fever. Medicated pt with tylenol and motrin. Offered fluids and provided supportive cares. Continue with plan of cares and medicate per orders.

## 2018-07-11 NOTE — PLAN OF CARE
Problem: Patient Care Overview  Goal: Plan of Care/Patient Progress Review  Outcome: No Change  Pt appeared to sleep soundly between cares overnight. Is a/o x 4. UAL in room. Temp 99.0 at midnight and 100.0 at 0400. Pt c/o throat pain and heartburn this am. Order obtained for Tums and Benzocaine throat spray. IVF continue at 100cc/hr. Pt to have a PCN allergy skin test today. Makes needs known.

## 2018-07-11 NOTE — PLAN OF CARE
Problem: Patient Care Overview  Goal: Plan of Care/Patient Progress Review  Outcome: No change    This nurse cared for patient from [id number]. Temp 100.5 orally at 1820 (last dose Tylenol had been at 0730 this morning); Tylenol 650mg given at 1830. Other VSS, denies pain, denies nausea. Eating Chipotle brought in by sister. K+ level this morning. K+ level 3.3 this morning; pt declined replacement at beginning of evening, then KCl 40mEq powder administered at 1815; additional 20mEq PRN dose scheduled as reminder for 2015. Patient will need K+ recheck scheduled for MN.  Appetite fair/good, urine output good.  NPO at midnight for ERCP tomorrow.

## 2018-07-12 ENCOUNTER — APPOINTMENT (OUTPATIENT)
Dept: GENERAL RADIOLOGY | Facility: CLINIC | Age: 53
DRG: 445 | End: 2018-07-12
Attending: PEDIATRICS
Payer: COMMERCIAL

## 2018-07-12 ENCOUNTER — APPOINTMENT (OUTPATIENT)
Dept: MRI IMAGING | Facility: CLINIC | Age: 53
DRG: 445 | End: 2018-07-12
Attending: PEDIATRICS
Payer: COMMERCIAL

## 2018-07-12 ENCOUNTER — HOME INFUSION (PRE-WILLOW HOME INFUSION) (OUTPATIENT)
Dept: PHARMACY | Facility: CLINIC | Age: 53
End: 2018-07-12

## 2018-07-12 LAB
ANION GAP SERPL CALCULATED.3IONS-SCNC: 10 MMOL/L (ref 3–14)
BASOPHILS # BLD AUTO: 0 10E9/L (ref 0–0.2)
BASOPHILS NFR BLD AUTO: 0.2 %
BUN SERPL-MCNC: 4 MG/DL (ref 7–30)
CALCIUM SERPL-MCNC: 8.8 MG/DL (ref 8.5–10.1)
CHLORIDE SERPL-SCNC: 100 MMOL/L (ref 94–109)
CO2 SERPL-SCNC: 28 MMOL/L (ref 20–32)
CREAT SERPL-MCNC: 0.62 MG/DL (ref 0.52–1.04)
DIFFERENTIAL METHOD BLD: ABNORMAL
EOSINOPHIL # BLD AUTO: 0.1 10E9/L (ref 0–0.7)
EOSINOPHIL NFR BLD AUTO: 1.1 %
ERYTHROCYTE [DISTWIDTH] IN BLOOD BY AUTOMATED COUNT: 13.5 % (ref 10–15)
ERYTHROCYTE [DISTWIDTH] IN BLOOD BY AUTOMATED COUNT: 13.7 % (ref 10–15)
GFR SERPL CREATININE-BSD FRML MDRD: >90 ML/MIN/1.7M2
GLUCOSE SERPL-MCNC: 93 MG/DL (ref 70–99)
HCT VFR BLD AUTO: 31 % (ref 35–47)
HCT VFR BLD AUTO: 31.6 % (ref 35–47)
HGB BLD-MCNC: 9.8 G/DL (ref 11.7–15.7)
HGB BLD-MCNC: 9.8 G/DL (ref 11.7–15.7)
IMM GRANULOCYTES # BLD: 0.1 10E9/L (ref 0–0.4)
IMM GRANULOCYTES NFR BLD: 1.4 %
INR PPP: 1.09 (ref 0.86–1.14)
LYMPHOCYTES # BLD AUTO: 1.3 10E9/L (ref 0.8–5.3)
LYMPHOCYTES NFR BLD AUTO: 13.8 %
MCH RBC QN AUTO: 29.6 PG (ref 26.5–33)
MCH RBC QN AUTO: 30 PG (ref 26.5–33)
MCHC RBC AUTO-ENTMCNC: 31 G/DL (ref 31.5–36.5)
MCHC RBC AUTO-ENTMCNC: 31.6 G/DL (ref 31.5–36.5)
MCV RBC AUTO: 95 FL (ref 78–100)
MCV RBC AUTO: 96 FL (ref 78–100)
MONOCYTES # BLD AUTO: 0.9 10E9/L (ref 0–1.3)
MONOCYTES NFR BLD AUTO: 8.9 %
NEUTROPHILS # BLD AUTO: 7.1 10E9/L (ref 1.6–8.3)
NEUTROPHILS NFR BLD AUTO: 74.6 %
NRBC # BLD AUTO: 0 10*3/UL
NRBC BLD AUTO-RTO: 0 /100
PLATELET # BLD AUTO: 404 10E9/L (ref 150–450)
PLATELET # BLD AUTO: 440 10E9/L (ref 150–450)
POTASSIUM SERPL-SCNC: 3.9 MMOL/L (ref 3.4–5.3)
POTASSIUM SERPL-SCNC: 4.2 MMOL/L (ref 3.4–5.3)
PROCALCITONIN SERPL-MCNC: 0.11 NG/ML
RBC # BLD AUTO: 3.27 10E12/L (ref 3.8–5.2)
RBC # BLD AUTO: 3.31 10E12/L (ref 3.8–5.2)
SODIUM SERPL-SCNC: 138 MMOL/L (ref 133–144)
WBC # BLD AUTO: 10 10E9/L (ref 4–11)
WBC # BLD AUTO: 9.6 10E9/L (ref 4–11)

## 2018-07-12 PROCEDURE — 12000026 ZZH R&B TRANSPLANT

## 2018-07-12 PROCEDURE — A9585 GADOBUTROL INJECTION: HCPCS

## 2018-07-12 PROCEDURE — 84132 ASSAY OF SERUM POTASSIUM: CPT | Performed by: STUDENT IN AN ORGANIZED HEALTH CARE EDUCATION/TRAINING PROGRAM

## 2018-07-12 PROCEDURE — 25000132 ZZH RX MED GY IP 250 OP 250 PS 637: Performed by: INTERNAL MEDICINE

## 2018-07-12 PROCEDURE — 25000128 H RX IP 250 OP 636

## 2018-07-12 PROCEDURE — 85027 COMPLETE CBC AUTOMATED: CPT | Performed by: INTERNAL MEDICINE

## 2018-07-12 PROCEDURE — 85610 PROTHROMBIN TIME: CPT | Performed by: INTERNAL MEDICINE

## 2018-07-12 PROCEDURE — 36415 COLL VENOUS BLD VENIPUNCTURE: CPT | Performed by: INTERNAL MEDICINE

## 2018-07-12 PROCEDURE — 85025 COMPLETE CBC W/AUTO DIFF WBC: CPT | Performed by: INTERNAL MEDICINE

## 2018-07-12 PROCEDURE — 84145 PROCALCITONIN (PCT): CPT | Performed by: INTERNAL MEDICINE

## 2018-07-12 PROCEDURE — 76604 US EXAM CHEST: CPT | Mod: 26 | Performed by: INTERNAL MEDICINE

## 2018-07-12 PROCEDURE — 40000986 XR CHEST 1 VW

## 2018-07-12 PROCEDURE — 36569 INSJ PICC 5 YR+ W/O IMAGING: CPT

## 2018-07-12 PROCEDURE — 25000132 ZZH RX MED GY IP 250 OP 250 PS 637: Performed by: STUDENT IN AN ORGANIZED HEALTH CARE EDUCATION/TRAINING PROGRAM

## 2018-07-12 PROCEDURE — 27210208 ZZH KIT VALVED DOUBLE LUMEN

## 2018-07-12 PROCEDURE — 25000125 ZZHC RX 250: Performed by: INTERNAL MEDICINE

## 2018-07-12 PROCEDURE — 25000128 H RX IP 250 OP 636: Performed by: STUDENT IN AN ORGANIZED HEALTH CARE EDUCATION/TRAINING PROGRAM

## 2018-07-12 PROCEDURE — 25000128 H RX IP 250 OP 636: Performed by: INTERNAL MEDICINE

## 2018-07-12 PROCEDURE — 80048 BASIC METABOLIC PNL TOTAL CA: CPT | Performed by: INTERNAL MEDICINE

## 2018-07-12 PROCEDURE — 36415 COLL VENOUS BLD VENIPUNCTURE: CPT | Performed by: STUDENT IN AN ORGANIZED HEALTH CARE EDUCATION/TRAINING PROGRAM

## 2018-07-12 PROCEDURE — 74183 MRI ABD W/O CNTR FLWD CNTR: CPT

## 2018-07-12 RX ORDER — LIDOCAINE HYDROCHLORIDE 10 MG/ML
10 INJECTION, SOLUTION EPIDURAL; INFILTRATION; INTRACAUDAL; PERINEURAL ONCE
Status: DISCONTINUED | OUTPATIENT
Start: 2018-07-12 | End: 2018-07-13 | Stop reason: CLARIF

## 2018-07-12 RX ORDER — CIPROFLOXACIN 500 MG/1
500 TABLET, FILM COATED ORAL EVERY 12 HOURS SCHEDULED
Status: DISCONTINUED | OUTPATIENT
Start: 2018-07-12 | End: 2018-07-13 | Stop reason: HOSPADM

## 2018-07-12 RX ORDER — GADOBUTROL 604.72 MG/ML
7.5 INJECTION INTRAVENOUS ONCE
Status: COMPLETED | OUTPATIENT
Start: 2018-07-12 | End: 2018-07-12

## 2018-07-12 RX ORDER — LIDOCAINE 40 MG/G
CREAM TOPICAL
Status: DISCONTINUED | OUTPATIENT
Start: 2018-07-12 | End: 2018-07-13 | Stop reason: HOSPADM

## 2018-07-12 RX ORDER — HEPARIN SODIUM,PORCINE 10 UNIT/ML
2-5 VIAL (ML) INTRAVENOUS
Status: COMPLETED | OUTPATIENT
Start: 2018-07-12 | End: 2018-07-13

## 2018-07-12 RX ADMIN — ENOXAPARIN SODIUM 40 MG: 40 INJECTION SUBCUTANEOUS at 20:15

## 2018-07-12 RX ADMIN — POTASSIUM CHLORIDE 20 MEQ: 1.5 POWDER, FOR SOLUTION ORAL at 18:08

## 2018-07-12 RX ADMIN — CALCIUM CARBONATE (ANTACID) CHEW TAB 500 MG 500 MG: 500 CHEW TAB at 17:48

## 2018-07-12 RX ADMIN — GADOBUTROL 7.5 ML: 604.72 INJECTION INTRAVENOUS at 11:51

## 2018-07-12 RX ADMIN — CIPROFLOXACIN HYDROCHLORIDE 500 MG: 500 TABLET, FILM COATED ORAL at 13:38

## 2018-07-12 RX ADMIN — CIPROFLOXACIN HYDROCHLORIDE 500 MG: 500 TABLET, FILM COATED ORAL at 22:58

## 2018-07-12 RX ADMIN — NYSTATIN 500000 UNITS: 100000 SUSPENSION ORAL at 13:39

## 2018-07-12 RX ADMIN — ERTAPENEM SODIUM 1 G: 1 INJECTION, POWDER, LYOPHILIZED, FOR SOLUTION INTRAMUSCULAR; INTRAVENOUS at 13:40

## 2018-07-12 RX ADMIN — NYSTATIN 500000 UNITS: 100000 SUSPENSION ORAL at 15:49

## 2018-07-12 RX ADMIN — SCOPALAMINE 1 PATCH: 1 PATCH, EXTENDED RELEASE TRANSDERMAL at 13:53

## 2018-07-12 RX ADMIN — NYSTATIN 500000 UNITS: 100000 SUSPENSION ORAL at 20:15

## 2018-07-12 RX ADMIN — LIDOCAINE HYDROCHLORIDE 3 ML: 10 INJECTION, SOLUTION EPIDURAL; INFILTRATION; INTRACAUDAL; PERINEURAL at 15:27

## 2018-07-12 RX ADMIN — ACETAMINOPHEN 650 MG: 325 TABLET, FILM COATED ORAL at 16:01

## 2018-07-12 RX ADMIN — ACETAMINOPHEN 975 MG: 325 TABLET, FILM COATED ORAL at 03:14

## 2018-07-12 NOTE — PROGRESS NOTES
"  [facility] ID SERVICE: Progress Note     Patient:  Jade Carcamo, Date of birth 1965, Medical record number [id number]  Date of Visit:  July 12, 2018         Assessment and Recommendations:   Problem List:  1. Fever - unclear aetiology for now, possible cholangitis , work-up pending   1. Fever on 7/12 at 0304 101.2   2. Approx 22 days recurrent fevers  3. - blood cultures negative 6/25/18, 6/28, 7/1, 7/2, 7/3  Blood culture (7/11) - pending   Urinalysis (7/11) - negative  Blastomyces Agn Quant EIA - none detected  Histoplasma capsulatum - none detected  Coccidiodes Agn Quant EIA - none detected  Ehrlichia  - negative  Lyme screen - negative  Anaplasma - negative   Babesia - negative  RMSF - negative  CMV - negative  EBV - low titer (1863 --> 592 copies / ml )   Parvovirus - previous infection  CK - normal  Blood cultures x 4 negative to date (only first one was prior to doxycycline)  Urine culture low levels mixed urogenital jayna  ROBBY - negative  TTE - bicuspid aortic valve. No evidence endocarditis  Hepatititis A - IgG positive. Reflex IgM negative.   Hep B - negative surface ag and core ab. Surface ab not done.   Hep C - negative  HIV - negative  West Nile Virus serology negative     2. Leukocytosis w/ neutrophilic predominance - improved  3. Elevated CRP - improving  4. Polycystic liver disease - U/S on 6/23 showed enlarging, newly complex cyst  5. Questionable cholangitis on abdominal MRI  1. ERCP (7/9/18) w/ stent placement x3 (left intrahepatics, common duct and prophylactic pancreatic stent); plan repeat ERCP 8 wks  2. MRCP planned today (7/12) per GI  6. Abdominal distension - improved  7. Penicillin allergy - rxn of limited skin rash 20 yrs ago; tolerating ertapenem  8. EBV viremia - low titer  9. Lymphadenopathy - prominent lymph nodes < 1 cm chest, abdomen (CT) and palpable cervical lymphadenopathy on exam  10. Mild splenomegaly  1. MRI (7/5) \"mild splenomegaly.  No focal mass\"  2. CT " chest/ab/pelvis (6/29) spleen appears normal  11. Constipation - 1 BM, diarrhea yesterday (7/11); on bowel regimen  12.  Anasarca - improved  13. Sore throat - improved    Recommendations:  1. Continue ertapenem (began 7/7)  2. Add ciprofloxacin to regimen  3. Add differential to CBC  4. BNP  5. Continue nystatin swish and swallow  6. Penicillin allergy skin test   7. Encourage incentive spirometry  8.  If fever persists, recommend TASNEEM;  Recent dental cleaning prior to fever.  9.    MRCP today   10. Check CRP, ESR   11. Blood cx with fever     Discussion:  Jade Carcamo is a 51 y/o woman admitted on 7/2 for persistent fever with associated symptoms including fatigue, diffuse myalgias, body aches, chills, nausea and vomiting.  PMHx includes polycystic liver disease s/p laparoscopic un-karina (2016).  Fever occurred again over night.  Will continue ertapenem; add ciprofloxacin to regimen.  1 dose cipro given 7/9 for GI procedure and did not have fever for 36 hrs after, so unsure if patient responded positively to that.  Request differential on CBC.  Request BNP due to anasarca.  Sore throat has improved and oral mucosa appears less erythematous, but will continue with nystatin.  Continue incentive spirometry use due to effusions and atelectasis on CXR (7/11).  MRCP planned for today per GI.  If above workup negative, consider progressing with TASNEEM.    The [facility] general ID team will continue to follow this patient. Please feel free to call with any questions.     Patient seen and discussed with Dr. Escamilla.    Good Helms  [facility]    Attestation:  This patient has been seen and evaluated by me.  I discussed this patient with Good Helms MS4 and agree with the findings and plan in this note. I also personally edited this note to reflect my findings. I have reviewed today's vital signs, medications, labs and imaging.    Radames Tesfaye MD,M.Med.Sc.  Attending, Infectious Diseases  Pager: [phone]               Interval  "History:   Fever overnight. Abdominal distension has improved.  Lower extremity edema improved as well due to decreased IV fluids.  Sore throat improved with nystatin use.  Had one bowel movement that she described as \"diarrhea\".  Urinary frequency has continued, but U/A on 7/11 negative.      7/11 - U/A, blood culture and doppler U/S all negative; CXR showed small pleural effusions increased in size when compared to prior.  Minimal associated opacities, likely atelectasis    MRCP planned today (7/12) per GI         Review of Systems:   10 point ROS.  Negative findings other than mentioned above.         Current Antimicrobials   Ertapenem IV 1g q24hrs (began 7/7)     Previous    Ciprofloxacin (1 dose on 7/9)    Ceftriaxone (7/4-7/6)    Metronidazole (7/4-7/6)    Doxycycline prior to admission         Physical Exam:   Ranges for vital signs:  Temp:  [98.8  F (37.1  C)-101.2  F (38.4  C)] 98.9  F (37.2  C)  Heart Rate:  [65-91] 65  Resp:  [18] 18  BP: (130-143)/(74-86) 140/83  SpO2:  [92 %-96 %] 93 %    Intake/Output Summary (Last 24 hours) at 07/12/18 0925  Last data filed at 07/12/18 0600   Gross per 24 hour   Intake              540 ml   Output             4250 ml   Net            -3710 ml     Exam:  GENERAL:  well-developed, well-nourished, sitting in bed in no acute distress.   ENT:  Head is normocephalic, atraumatic. Oropharynx is moist without exudates or ulcers. Decreased erythema from prior exam  EYES:  Eyes have anicteric sclerae.    NECK:  Supple.  LUNGS:  Clear to auscultation.  CARDIOVASCULAR:  Regular rate and rhythm with no murmurs, gallops or rubs.  ABDOMEN:  Normal bowel sounds, decreased distension form prior exam; minimal discomfort w/ palpation  EXT: Extremities warm and with trace edema, much improved from prior exam.  SKIN:  No acute rashes or bruising  NEUROLOGIC:  Grossly nonfocal.         Laboratory Data:     Creatinine   Date Value Ref Range Status   07/12/2018 0.62 0.52 - 1.04 mg/dL Final "   07/11/2018 0.66 0.52 - 1.04 mg/dL Final   07/10/2018 0.57 0.52 - 1.04 mg/dL Final   07/09/2018 0.57 0.52 - 1.04 mg/dL Final   07/08/2018 0.68 0.52 - 1.04 mg/dL Final     WBC   Date Value Ref Range Status   07/12/2018 10.0 4.0 - 11.0 10e9/L Final   07/11/2018 11.4 (H) 4.0 - 11.0 10e9/L Final   07/10/2018 12.3 (H) 4.0 - 11.0 10e9/L Final   07/09/2018 10.3 4.0 - 11.0 10e9/L Final   07/08/2018 11.9 (H) 4.0 - 11.0 10e9/L Final     Hemoglobin   Date Value Ref Range Status   07/12/2018 9.8 (L) 11.7 - 15.7 g/dL Final     Platelet Count   Date Value Ref Range Status   07/12/2018 440 150 - 450 10e9/L Final     Sed Rate   Date Value Ref Range Status   07/04/2018 98 (H) 0 - 30 mm/h Final   07/01/2018 102 (H) 0 - 30 mm/h Final     CRP Inflammation   Date Value Ref Range Status   07/11/2018 75.0 (H) 0.0 - 8.0 mg/L Final   07/09/2018 150.0 (H) 0.0 - 8.0 mg/L Final   07/06/2018 230.0 (H) 0.0 - 8.0 mg/L Final   07/04/2018 230.0 (H) 0.0 - 8.0 mg/L Final   07/01/2018 236.0 (H) 0.0 - 8.0 mg/L Final     AST   Date Value Ref Range Status   07/11/2018 23 0 - 45 U/L Final   07/10/2018 12 0 - 45 U/L Final   07/09/2018 15 0 - 45 U/L Final   07/08/2018 16 0 - 45 U/L Final   07/07/2018 12 0 - 45 U/L Final     ALT   Date Value Ref Range Status   07/11/2018 12 0 - 50 U/L Final   07/10/2018 13 0 - 50 U/L Final   07/09/2018 10 0 - 50 U/L Final   07/08/2018 12 0 - 50 U/L Final   07/07/2018 13 0 - 50 U/L Final     Bilirubin Total   Date Value Ref Range Status   07/11/2018 0.3 0.2 - 1.3 mg/dL Final   07/10/2018 0.2 0.2 - 1.3 mg/dL Final   07/09/2018 0.4 0.2 - 1.3 mg/dL Final   07/08/2018 0.5 0.2 - 1.3 mg/dL Final   07/07/2018 0.4 0.2 - 1.3 mg/dL Final     Lab Results   Component Value Date     07/12/2018    BUN 4 (L) 07/12/2018    CO2 28 07/12/2018            Microbiology Data:   Blood culture (7/11) - no growth x2 so far   Urinalysis (7/11) - negative  Blastomyces Agn Quant EIA - none detected  Histoplasma capsulatum - none  detected  Coccidiodes Agn Quant EIA - none detected  Ehrlichia  - negative  Lyme screen - negative  Anaplasma - negative   Babesia - negative  RMSF - negative  CMV - negative  EBV - low titer (1863 --> 592 copies / ml )   Parvovirus - previous infection  CK - normal  Blood cultures x 4 negative to date (only first one was prior to doxycycline)  Urine culture low levels mixed urogenital jayna  ROBBY - negative  TTE - bicuspid aortic valve. No evidence endocarditis  Hepatititis A - IgG positive. Reflex IgM negative.   Hep B - negative surface ag and core ab. Surface ab not done.   Hep C - negative  HIV - negative  West Nile Virus serology negative       Imaging:   CXR (7/11)  - Small pleural effusions have increased in size when compared to the prior. Minimal associated opacities, likely atelectasis (cannot completely exclude pneumonia).    Doppler U/S lower extremity (7/11)  - no signs of DVT

## 2018-07-12 NOTE — PROGRESS NOTES
[facility] [place]    Internal Medicine Progress Note - Mil 5 Service    Main Plans for Today   - c/w abx, will add cipro for empiric pseudomonal coverage   - PICC line placement for home abx infusion    Assessment & Plan   Jade Carcamo is a 52 year old female admitted on 7/2/2018. Jade Carcamo is a 52 year old female with hx of polycystic liver disease admitted on 7/2/2018. She was previously healthy and presented with 11 days of persistent fever up to 103F now s/p 7d doxycycline with negative outpatient infectious workup here now for supportive cares and further workup of fever.    Fever without source - unclear etiology  Ddx includes infections (fungal vs vector vs viral, less likely bacterial) vs autoimmune vs malignancy. Drug reaction less likely as no known exposure. On admission had leukocytosis of 14.1, up from 12.8 on 7/1 and 8.3 on 6/28, neutrophil predominant. Sed rate elevated at 102 (7/1) and . Has not improved after despite 7 days of doxycycline therapy. She has had a relatively negative infectious work up all of which has been NGTD. Fungal work-up pending. Autoimmune is also possible given elevated inflammatory markers and RF/ROBBY are negative and no exam findings. Malignancy is much less likely at this time. Had CT C/A/P that was unremarkable on 6/28. Although she does not meet the criteria for fever of unknown origin at this time, we are continuing with this workup to attempt to better elucidate the etiology of her symptoms. TTE without endocarditis. Hep A IgG positive, Hep A IgM negative Hep B, Hep C negative. HIV negative. MRI 7/5 demonstrates peripheral biliary dilation to the left hepatic lobe with mild enhancement suspicious for cholangitis - this was discussed at Liver multi-disciplinary conference 7/6. She is now s/p ERCP (7/9/18)  w/ stent placement x3 (left intrahepatics, common duct and prophylactic pancreatic stent). No fevers for about last 32  hours.   ERCP (7/9/18)  w/ stent placement x3 (left intrahepatics, common duct and prophylactic pancreatic stent); plan to repeat in 8 weeks. Working diagnosis is that patient had an atypical presentation of cholangitis. After procedure, patient was afebrile for >24 hours; however, spiked a fever to 101.8deg F on 7/11. Very possible that this is a new fever as her fever had effervesced for >24 hours granted this is the first day she was not on scheduled Tylenol/ibuprofen. Today, discussed work-up extensively. Discussed small effusions on US and having our procedure team look to see if anything could be drained. Extensively discussed current abx treatment and adding cipro for pseudomonal coverage; however, risk factors very low. Will have MRCP today and if negative, will re-evaluate clinical status tomorrow AM. However, will plan for treating for presumptive cholangitis (for total of 14 days until 7/23) with IV ertapenem and cipro. If fevers continue despite these abx, will continue with outpatient work-up with TASNEEM and possible BM biopsy.   - Infectious disease consult, recs appreciated  - Surg Onc consulted, recs appreciated.   - GI (Panc/Bili) consult, recs appreciated for possible atypical cholangitis per MRI finding  - Rheum consult for possible seronegative autoimmune disorder, recs appreciated (have signed off, will consult if needed).   - c/w IV ertapenem, will add ciprofloxacin -- plan for total of 14 days since stent placement (end date 7/16/18). PICC placement today  - PCN allergy skin test deferred for later date.   - BL LE US to evaluate for DVT at Jade's request -- negative  - IS while awake.     Workup to date:  - Erlichia - negative  - West Nile Virus serology negative  - Lyme screen - negative  - Anaplasma - negative  - Babesia - negative  - RMSF - negative  - CMV - negative  - EBV - low titer consistent with reactivation  - Parvovirus - previous infection  - CK - normal  - LDH - wnl  - Histoplasma -  negative  - Blastomycosis - negative  - EBV repeat titer from 7/9 decreased from previous    - Blood cultures x4 (6/25, 6/28, 7/1, 7/2) - NGTD  - Urine culture (6/23) - Negative  - UA 7/12 negative for UTI  - Hepatitis work up negative   - Quantiferon Gold indeterminate, will need to repeat at later date.    Nausea- improved 7/8/2018   Likely 2/2 metronidazole therapy   - ativan q4h prn  - scopolamine patch   - Zofran q4h prn     Diarrhea, resolved  No abdominal pain, worsening leukocytosis. Likely 2/2 antibiotic therapy  - continue to monitor  - consider c diff testing if returns    Hx of Polycystic Liver Disease  CT C/A/P (7/1) - previously known polycystic liver disease. Evidence of larger cyst with possible hemorrhagic or proteinaceous component, but not likely infected per MRI and surg/onc expertise.  A ruptured cyst (sterile or infected fluid collection) could hypothetically cause fevers. See discussion above regarding abdominal US findings.     # Pain Assessment:  Current Pain Score 7/12/2018   Patient currently in pain? denies   Pain score (0-10) -   Pain location -   Pain descriptors -   - Jade is experiencing discomfort due to fevers. Pain management was discussed and the plan was created in a collaborative fashion.  Jade's response to the current recommendations: engaged  - Please see the plan for pain management as documented above    Diet: Regular Diet Adult  Fluids: none  DVT Prophylaxis: Enoxaparin SQ  Code Status: Full Code  Physical Therapy given deconditioning/weakness      Disposition Plan   Expected discharge: 2 - 3 days, recommended to prior living arrangement once adequate pain management/ tolerating PO medications and SIRS/Sepsis treated.     Entered: Anusha Moscoso 07/12/2018, 5:54 PM   Information in the above section will display in the discharge planner report.      Pt discussed with staff, Dr. Bennett Moscoso  [facility]: 5  Pager: [phone]  Please see sticky  note for cross cover information    Interval History   Overnight events and notes reviewed.     Extensively discussed plan of care and all results with patient, , and family friend. Discussed plans for today and plan for discharge tomorrow if patient clinically stable and MRCP re-assuring with abx as above. Pt and family agreeable to plant.     Pt feels distention has improved, no nausea, feels that edema has improved.     4 point ROS negative except as above.     Que/concerns addressed    Physical Exam   Vital Signs: Temp: 98.8  F (37.1  C) Temp src: Oral BP: 125/85   Heart Rate: 76 Resp: 16 SpO2: 95 % O2 Device: None (Room air)    Weight: 173 lbs 11.2 oz    General Appearance: Awake and alert.  Eyes: No scleral icterus or injection helen. PERRL   HEENT: NC/AT. PERRL. MMM without lesions  Respiratory: CTAB. No increased WOB.   Cardiovascular: RRR. No murmurs, rubs or gallops. DP and radial pulses 2+ bilaterally.  GI: BS+. Soft, nondistended. NT   Skin: No rash. No ecchymoses or petechiae.  Musculoskeletal: PROM and AROM. Extremities warm and well perfused.      I personally reviewed medications, labs and imaging.

## 2018-07-12 NOTE — PROGRESS NOTES
Therapy: Invanz  Insurance: [employer]   Ded: $7,050  Met: $0    Co-Insurance: 70% (Once deductible met, pt pays 30% until OOP Max is met)  Max Out of Pocket: $7,350  Met: $0    In reference to referral made 7/12/18.    Please contact Intake with any questions, [phone] or In Basket pool, [facility] ([phone]).

## 2018-07-12 NOTE — PROGRESS NOTES
CLINICAL NUTRITION SERVICES - BRIEF NOTE     Nutrition Prescription    RECOMMENDATIONS FOR MDs/PROVIDERS TO ORDER:  Calorie count intake was likely inadequate, however overall intake is poor.  Encouraged trial of oral supplements, which she has yet to do.  If ongoing poor intake, would recommend placing FT for supplemental TF.     Recommendations already ordered by Registered Dietitian (RD):  Encouraged trial of Boost Plus, Boost Breeze and Magic Cups.  Patient agreeable, says she will try.      Future/Additional Recommendations:  Tube Feeding Recommendations:  Recommend starting Isosource 1.5 @ 15 ml/hr with advancement of 10 ml/hr q 8 hours to goal rate of 65 ml/hr.  Once goal rate met, run @ 65 ml/hr x 12 hours (8pm-8am).  This will provide 1170 kcal, 53 grams protein, 138 grams CHO, 593 ml free water, 12 grams fiber.  This regimen would provide ~70% kcal and protein needs.      EVALUATION OF THE PROGRESS TOWARD GOALS   Diet: Regular + supplements with meals + Boost Plus for HS snack    Intake: Calorie counts were not well recorded (7/9-7/11).  On 7/10, ate 30-50% of assessed needs.  Yesterday ate 1/3 of a chicken and rice bowl from [employer] (~250 kcal, 17 grams of protein).  She has not tried any of the supplements we've talked about.  She notes that her appetite is still poor but is possibly getting better.    Interventions:  Reviewed the need for increased calorie/protein intake d/t hypercatabolic state.    Monitoring/Evaluation  Progress toward goals will be monitored and evaluated per protocol.    Estefany Stratton MS, RD, LD  Pager [phone]

## 2018-07-12 NOTE — PLAN OF CARE
Problem: Patient Care Overview  Goal: Plan of Care/Patient Progress Review  PT / 7C - Pt declining PT session this AM 2/2 fatigue and anticipation of leaving for procedure soon. Pt requesting PT to return this PM - will reattempt as schedule allows otherwise reschedule.

## 2018-07-12 NOTE — PROGRESS NOTES
Brief GI note.    Chart reviewed.  Fever overnight.  MRCP to be completed today.    No changes in plan from GI perspective.  Please call if questions.    Ronny Luciano  [facility]   Gastroenterology Fellow

## 2018-07-12 NOTE — PROGRESS NOTES
Care Coordinator Progress Note    Admission Date/Time:  7/2/2018  Attending MD:  Davis Guajardo MD    Data  Chart reviewed, discussed with interdisciplinary team.   Patient was admitted for: fever of unknown origin, probable Cholangitis    Concerns with insurance coverage for discharge needs: None.  Current Living Situation: Patient lives with spouse.  Support System: Supportive and Involved  Services Involved: none  Transportation at Discharge: Family or friend will provide  Transportation to Medical Appointments:  - Not applicable  Barriers to Discharge: PICC and Home IV ABX arrangements    Coordination of Care  D: Chart reviewed and plan of care discussed with MD team. Plan for patient to discharge tomorrow. Patient will require Home Infusion due to IV ABX Ertapenem (for total of 14 days, end date 7/23/18).    I/A: Met with patient and spouse at bedside to discuss discharge needs, offered choice of Home Infusion Agency. Discussed PICC, bedside teaching of IV ABX and Home Infusion. Patient is agreeable and has no preference of agency. Benefit Check called into [facility]. Notified [facility] Liaison Kenzie. Patient must receive her IV Ertapenem dose Friday before she discharges.  Benefit check received from [facility]: Pt has [employer]. Ded $7050 ($0 met) then 70% coverage up to out pf pocket max $7350 ($0 met).    Patient to have PICC placed Friday 7/13.    P: Care coordinator will remain available for discharge needs that may arise.       Referrals: Provided patient/spouse with options for Home Infusion; no preference as to agency choice.         Plan  Anticipated Discharge Date:  7/13 (after IV ABX dose is given)  Anticipated Discharge Plan:  Home w/ spouse      Felicity Hanley RN BSN PHN  Patient Care Management Coordinator  Mil [address], and [facility]  Phone: [phone] / Pager: [phone]

## 2018-07-12 NOTE — PROGRESS NOTES
Calorie Counts  Intake recorded for: 7/11  Kcals: 0  Protein: 0g  # Meals Recorded: 1 meal ordered from kitchen, no intake recorded.   # Supplements Recorded: no intake recorded.

## 2018-07-12 NOTE — PLAN OF CARE
Problem: Patient Care Overview  Goal: Plan of Care/Patient Progress Review  Outcome: No Change  /74 (BP Location: Right arm)  Pulse 65  Temp 99.9  F (37.7  C) (Oral)  Resp 18  Wt 78.8 kg (173 lb 11.2 oz)  LMP 11/14/2016  SpO2 94%  BMI 26.41 kg/m2    A&Ox4. T max 101.2. Maroon crosscover notified. Tylenol given. Recheck 99.9. AOVSS on RA. Up ad ramon. Denies pain, nausea, or SOB. Good UOP. Last BM yesterday. PIV saline locked.  Pt and spouse expressed frustration with not knowing what is going on with her health and POC. Emotional support provided. Pt states that she has a family reunion this weekend that she would like to attend. Possible plan for penicillin allergy skin test once fevers resolve. TASNEEM may need to be done as well per MD notes. NPO since midnight for MRCP today. MRI is scheduled for 10am. Checklist given to pt to complete. Will continue to monitor and follow POC.

## 2018-07-12 NOTE — PROGRESS NOTES
CAPS Team Note    Asked by Dr. Anusha Moscoso to evaluate Ms. Carcamo for a possible diagnostic thoracentesis.  Indication:  Fever of unclear etiology in the setting of polycystic liver disease with bilateral pleural effusions.    Performed POCUS, results as follows:  Left pleural effusion:  Trace pleural effusion  Right pleural effusion:  Small to medium effusion with marked loculations    Discussed with primary team that if a diagnostic thoracentesis is desired, it would be best pursued with Interventional Radiology.    Ashley Rios MD  Internal Medicine Hospitalist  [phone]

## 2018-07-12 NOTE — PLAN OF CARE
Tmax 100.3 at 1430 (pt on way to PICC placement and declined Tylenol), other VSS. Denies pain, denies nausea. New scopolamine patch behind R ear. Cipro started this afternoon. MRCP performed this morning.  Thoracentesis MD assessed lungs with portable US, but did not perform thoracentesis. Possible plan for this to be done in IR tomorrow.  Appetite good. Urine output in large amounts. No BM today. Pt currently having PICC placed downstairs.  ---K+ level 3.9 this morning. Pt still needs KCl 20mEq powder, prefers it mixed in apple juice.

## 2018-07-13 ENCOUNTER — HOME INFUSION (PRE-WILLOW HOME INFUSION) (OUTPATIENT)
Dept: PHARMACY | Facility: CLINIC | Age: 53
End: 2018-07-13

## 2018-07-13 ENCOUNTER — TELEPHONE (OUTPATIENT)
Dept: OTHER | Facility: CLINIC | Age: 53
End: 2018-07-13
Payer: COMMERCIAL

## 2018-07-13 VITALS
OXYGEN SATURATION: 94 % | SYSTOLIC BLOOD PRESSURE: 138 MMHG | HEART RATE: 87 BPM | WEIGHT: 173.7 LBS | TEMPERATURE: 99.3 F | BODY MASS INDEX: 26.41 KG/M2 | RESPIRATION RATE: 18 BRPM | DIASTOLIC BLOOD PRESSURE: 84 MMHG

## 2018-07-13 DIAGNOSIS — Z46.89 ENCOUNTER FOR REMOVAL OF BILIARY STENT: Primary | ICD-10-CM

## 2018-07-13 LAB
ANION GAP SERPL CALCULATED.3IONS-SCNC: 10 MMOL/L (ref 3–14)
BASOPHILS # BLD AUTO: 0 10E9/L (ref 0–0.2)
BASOPHILS NFR BLD AUTO: 0.4 %
BUN SERPL-MCNC: 6 MG/DL (ref 7–30)
CALCIUM SERPL-MCNC: 9 MG/DL (ref 8.5–10.1)
CHLORIDE SERPL-SCNC: 100 MMOL/L (ref 94–109)
CO2 SERPL-SCNC: 27 MMOL/L (ref 20–32)
CREAT SERPL-MCNC: 0.61 MG/DL (ref 0.52–1.04)
CRP SERPL-MCNC: 73 MG/L (ref 0–8)
DIFFERENTIAL METHOD BLD: ABNORMAL
EOSINOPHIL # BLD AUTO: 0.1 10E9/L (ref 0–0.7)
EOSINOPHIL NFR BLD AUTO: 1.2 %
ERYTHROCYTE [DISTWIDTH] IN BLOOD BY AUTOMATED COUNT: 13.4 % (ref 10–15)
GFR SERPL CREATININE-BSD FRML MDRD: >90 ML/MIN/1.7M2
GLUCOSE SERPL-MCNC: 91 MG/DL (ref 70–99)
HCT VFR BLD AUTO: 30.8 % (ref 35–47)
HGB BLD-MCNC: 9.6 G/DL (ref 11.7–15.7)
IMM GRANULOCYTES # BLD: 0.2 10E9/L (ref 0–0.4)
IMM GRANULOCYTES NFR BLD: 1.6 %
LDH SERPL L TO P-CCNC: 224 U/L (ref 81–234)
LYMPHOCYTES # BLD AUTO: 1.6 10E9/L (ref 0.8–5.3)
LYMPHOCYTES NFR BLD AUTO: 17.9 %
MCH RBC QN AUTO: 29.5 PG (ref 26.5–33)
MCHC RBC AUTO-ENTMCNC: 31.2 G/DL (ref 31.5–36.5)
MCV RBC AUTO: 95 FL (ref 78–100)
MONOCYTES # BLD AUTO: 0.8 10E9/L (ref 0–1.3)
MONOCYTES NFR BLD AUTO: 8.3 %
NEUTROPHILS # BLD AUTO: 6.4 10E9/L (ref 1.6–8.3)
NEUTROPHILS NFR BLD AUTO: 70.6 %
NRBC # BLD AUTO: 0 10*3/UL
NRBC BLD AUTO-RTO: 0 /100
PLATELET # BLD AUTO: 353 10E9/L (ref 150–450)
POTASSIUM SERPL-SCNC: 4.3 MMOL/L (ref 3.4–5.3)
PROT SERPL-MCNC: 6.8 G/DL (ref 6.8–8.8)
RBC # BLD AUTO: 3.25 10E12/L (ref 3.8–5.2)
SODIUM SERPL-SCNC: 137 MMOL/L (ref 133–144)
WBC # BLD AUTO: 9.1 10E9/L (ref 4–11)

## 2018-07-13 PROCEDURE — 80048 BASIC METABOLIC PNL TOTAL CA: CPT | Performed by: INTERNAL MEDICINE

## 2018-07-13 PROCEDURE — 25000128 H RX IP 250 OP 636: Performed by: INTERNAL MEDICINE

## 2018-07-13 PROCEDURE — 84155 ASSAY OF PROTEIN SERUM: CPT | Performed by: INTERNAL MEDICINE

## 2018-07-13 PROCEDURE — 25000132 ZZH RX MED GY IP 250 OP 250 PS 637: Performed by: INTERNAL MEDICINE

## 2018-07-13 PROCEDURE — 85025 COMPLETE CBC W/AUTO DIFF WBC: CPT | Performed by: INTERNAL MEDICINE

## 2018-07-13 PROCEDURE — 36592 COLLECT BLOOD FROM PICC: CPT | Performed by: INTERNAL MEDICINE

## 2018-07-13 PROCEDURE — 83615 LACTATE (LD) (LDH) ENZYME: CPT | Performed by: INTERNAL MEDICINE

## 2018-07-13 PROCEDURE — 86140 C-REACTIVE PROTEIN: CPT | Performed by: INTERNAL MEDICINE

## 2018-07-13 PROCEDURE — 99239 HOSP IP/OBS DSCHRG MGMT >30: CPT | Mod: GC

## 2018-07-13 RX ORDER — NYSTATIN 100000/ML
500000 SUSPENSION, ORAL (FINAL DOSE FORM) ORAL 4 TIMES DAILY
Qty: 240 ML | Refills: 0 | Status: SHIPPED | OUTPATIENT
Start: 2018-07-13 | End: 2018-07-25

## 2018-07-13 RX ORDER — CIPROFLOXACIN 500 MG/1
500 TABLET, FILM COATED ORAL EVERY 12 HOURS
Qty: 18 TABLET | Refills: 0 | Status: SHIPPED | OUTPATIENT
Start: 2018-07-13 | End: 2018-07-18

## 2018-07-13 RX ORDER — ERTAPENEM 1 G/1
1 INJECTION, POWDER, LYOPHILIZED, FOR SOLUTION INTRAMUSCULAR; INTRAVENOUS EVERY 24 HOURS
Qty: 90 ML | Refills: 0
Start: 2018-07-13 | End: 2018-07-22

## 2018-07-13 RX ORDER — CALCIUM CARBONATE 500 MG/1
1 TABLET, CHEWABLE ORAL DAILY
Qty: 150 TABLET | Refills: 0 | Status: SHIPPED | OUTPATIENT
Start: 2018-07-13 | End: 2018-08-16

## 2018-07-13 RX ADMIN — ACETAMINOPHEN 975 MG: 325 TABLET, FILM COATED ORAL at 00:28

## 2018-07-13 RX ADMIN — CIPROFLOXACIN HYDROCHLORIDE 500 MG: 500 TABLET, FILM COATED ORAL at 10:46

## 2018-07-13 RX ADMIN — DOCUSATE SODIUM 100 MG: 100 CAPSULE, LIQUID FILLED ORAL at 08:44

## 2018-07-13 RX ADMIN — NYSTATIN 500000 UNITS: 100000 SUSPENSION ORAL at 08:36

## 2018-07-13 RX ADMIN — ERTAPENEM SODIUM 1 G: 1 INJECTION, POWDER, LYOPHILIZED, FOR SOLUTION INTRAMUSCULAR; INTRAVENOUS at 12:47

## 2018-07-13 RX ADMIN — NYSTATIN 500000 UNITS: 100000 SUSPENSION ORAL at 12:57

## 2018-07-13 RX ADMIN — SODIUM CHLORIDE, PRESERVATIVE FREE 5 ML: 5 INJECTION INTRAVENOUS at 06:44

## 2018-07-13 NOTE — PLAN OF CARE
Problem: Patient Care Overview  Goal: Plan of Care/Patient Progress Review  /65 (BP Location: Right arm)  Pulse 86  Temp 99.4  F (37.4  C) (Oral)  Resp 18  Wt 78.8 kg (173 lb 11.2 oz)  LMP 11/14/2016  SpO2 92%  BMI 26.41 kg/m2    Febrile; AOVSS on RA. T max 100.3. PRN tylenol given x 1 with fever reduction. Temp @ 2300 99.3 F. Denies pain & nausea. Given PRN tums x 1 for indigestion. C/o drowsiness and requested that scopolamine patch be removed. Pt educated on effects of medication, asked to acknowledge any onset of nausea to RN promptly. L double-lumen PICC placed, both lumens NS locked. L PIV NS locked. IS use @ 750 mL, 20 reps per pt. Infrequent, dry, non-productive cough. PO Cipro given at 2300.    Pt's spouse had lots of questions regarding current IV AB treatment, fluid accumulation in lungs, and MRCP results. Pt and spouse wondering if possible to d/c for the weekend so pt can attend family reunion that she planned. Pt encouraged to speak with MDs during rounds about her concerns and to await results of fluid cultures from thoracentesis tomorrow. Continue to monitor and w POC.

## 2018-07-13 NOTE — PROGRESS NOTES
Care Coordinator Progress Note    Admission Date/Time:  7/2/2018  Attending MD:  Davis Guajardo MD    Data  Chart reviewed, discussed with interdisciplinary team.   Patient was admitted for: fever of unknown origin, probable Cholangitis    Concerns with insurance coverage for discharge needs: None.  Current Living Situation: Patient lives with spouse.  Support System: Supportive and Involved  Services Involved: none  Transportation at Discharge: Family or friend will provide  Transportation to Medical Appointments:  - Not applicable  Barriers to Discharge: none    Coordination of Care  7/13: PICC confirmed in place. Patient will have teaching at bedside from \[facility]\"" Leonie Espino for noon dose of Ertapenem. Kenzie will provide patient will details regarding Home IV ABX medication delivery and further teaching. Patients spouse will transport home. No further RN CC needs.    7/12: D: Chart reviewed and plan of care discussed with MD team. Plan for patient to discharge tomorrow. Patient will require Home Infusion due to IV ABX Ertapenem (for total of 14 days, end date 7/23/18).    I/A: Met with patient and spouse at bedside to discuss discharge needs, offered choice of Home Infusion Agency. Discussed PICC, bedside teaching of IV ABX and Home Infusion. Patient is agreeable and has no preference of agency. Benefit Check called into [facility]. Notified \[facility]\"" Leonie Espino. Patient must receive her IV Ertapenem dose Friday before she discharges.  Benefit check received from \[facility]\"": Pt has [employer]. Ded $7050 ($0 met) then 70% coverage up to out pf pocket max $7350 ($0 met). Patient to have PICC placed Friday 7/13.    P: Care coordinator will remain available for discharge needs that may arise.       Referrals: Provided patient/spouse with options for Home Infusion; no preference as to agency choice (orders completed for \[facility]\"")    [facility]  Phone # [phone]  Fax # [phone]     Anticipated  Length of Therapy: per MD discharge order, expected last dose is 7/23    Home Infusion Pharmacist to adjust therapy based on labs and clinical assessments: No (Home Infusion will call for order)    Labs:  May draw labs from Venous Catheter: Yes  Home Infusion Pharmacist to order labs based on therapy type and clinical assessments: Yes    Agency Staff to assess nursing needs for Infusion Therapy.    Access Device Management:  IV Access Type: PICC (placed 7/12/18)  Flush with Heparin and Normal Saline IVP PRN and routine site care (per agency protocol) to maintain access device? Yes          Plan  Anticipated Discharge Date:  7/13 (after IV ABX dose is given)  Anticipated Discharge Plan:  Home w/ spouse    CTS Handoff Completed: YES    Felicity Hanley RN BSN PHN  Patient Care Management Coordinator  Mil [address], and [facility]  Phone: [phone] / Pager: [phone]

## 2018-07-13 NOTE — PROGRESS NOTES
GASTROENTEROLOGY PROGRESS NOTE    ASSESSMENT:  52 year old female with a history of GERD and Polycystic liver disease s/p laparoscopic unroofing x 19 cysts and fenestration of additional 10 cysts by Dr. Cruz on 11/4/16 and readmitted on 7/2/18 with fever of unknown origin.  Etiology of patient's fever from GI perspective is unclear.  Mild T bili elevation 6/28/2018 since normalized with only mild alk phos elevation is noted.  Imaging was reviewed in liver tumor board raising the question of cholangitis. Patient's symptoms and labs are not consistent with typical findings of cholangitis however given the clinical scenario evaluation warranted.  ERCP completed with no obvious findings of cholangitis.   One 8.5 F 22 cm stent was placed to the left intrahepatics, one 7 Fr 8 cm stent to the common duct and one prophylactic PD stent were placed.  Patient went 36 hours without fever but they recurred on 7/10/18 with no associated symptoms or LFT' changes.  ERCP completed on 7/9/2018 with empiric biliary stenting.  Not convinced this was cholangitis but treating empirically as such and patient having clinical improvement.  Recommend completion of course for presumed cholangitis.        RECOMMENDATIONS:  --complete antibiotic course for presumed cholangitis  -repeat ERCP w/ GA and stent removal vs exchange in 8 weeks from last stent placement.  (we will schedule)  -GI will sign off        The patient was discussed and plan agreed upon with GI staff.    Ronny Luciano  [facility]  Gastroenterology Fellow  _______________________________________________________________  S: No acute events overnight.  Patient reports fever last night.  No nausea, vomiting, tearful and frustrated.  Reports some left sided leg swelling.     O:  Blood pressure 138/84, pulse 87, temperature 99.3  F (37.4  C), temperature source Oral, resp. rate 18, weight 78.8 kg (173 lb 11.2 oz), last menstrual period 11/14/2016, SpO2 94  %, not currently breastfeeding.    Gen: No acute distress  HEENT: Moist mucous membranes no scleral icterus no sublingual jaundice  CV: Regular rate and rhythm no murmurs rubs or gallops  Lungs: Clear to auscultation bilaterally no wheezes crackles or rubs  Abd: Soft, bowel sounds positive, non-distended, nontender to palpation no rebound  Skin: No jaundice  MS: Warm and well-perfused  Neuro: Grossly intact        LABS:  BMP    Recent Labs  Lab 07/13/18  0651 07/12/18  0655 07/12/18  0117 07/11/18  0622 07/10/18  1355    138  --  139 141   POTASSIUM 4.3 3.9 4.2 3.3* 4.2   CHLORIDE 100 100  --  104 105   THUAN 9.0 8.8  --  8.4* 8.5   CO2 27 28  --  26 27   BUN 6* 4*  --  5* 5*   CR 0.61 0.62  --  0.66 0.57   GLC 91 93  --  102* 132*     CBC    Recent Labs  Lab 07/13/18  0651 07/12/18  1706 07/12/18  0655 07/11/18  0622   WBC 9.1 9.6 10.0 11.4*   RBC 3.25* 3.27* 3.31* 2.98*   HGB 9.6* 9.8* 9.8* 9.0*   HCT 30.8* 31.0* 31.6* 27.9*   MCV 95 95 96 94   MCH 29.5 30.0 29.6 30.2   MCHC 31.2* 31.6 31.0* 32.3   RDW 13.4 13.5 13.7 13.6    404 440 423     INR    Recent Labs  Lab 07/12/18  0655 07/09/18  0644 07/08/18  0649 07/07/18  0901   INR 1.09 1.26* 1.31* 1.29*     LFTs    Recent Labs  Lab 07/13/18  0651 07/11/18  0622 07/10/18  1355 07/09/18  0644 07/08/18  0649   ALKPHOS  --  137 137 131 153*   AST  --  23 12 15 16   ALT  --  12 13 10 12   BILITOTAL  --  0.3 0.2 0.4 0.5   PROTTOTAL 6.8 6.2* 6.2* 5.7* 6.4*   ALBUMIN  --  2.2* 2.0* 2.0* 2.2*      PANCNo lab results found in last 7 days.

## 2018-07-13 NOTE — DISCHARGE SUMMARY
[facility], [place]    Internal Medicine Discharge Summary- Mil Service    Date of Admission:  7/2/2018  Date of Discharge:  7/13/2018  3:42 PM  Discharging Attending Provider: Dr. Guajardo   Discharge Team: Mil 5    Discharge Diagnoses   Fever of unknown etiology  Presumed Atypical Cholangitis  Hx of Polycystic Liver Disease    Follow-ups Needed After Discharge   - follow-up with PCP within 1 week for post-hospital follow-up and CBC, CRP  - follow-up with Infectious Disease for fever of unknown etiology  - Repeat ERCP in 8 weeks      Hospital Course   Jade Carcamo was admitted on 7/2/2018.     Jade Carcamo is a 52 year old female admitted on 7/2/2018. Jade Carcamo is a 52 year old female with hx of polycystic liver disease admitted on 7/2/2018. She was previously healthy and presented with 11 days of persistent fever up to 103F now s/p 7d doxycycline with negative outpatient infectious workup here now for supportive cares and further workup of fever.     Fever without source - unclear etiology  Presumptive Atypical Cholangitis  Trush  Ddx includes infections (fungal vs vector vs viral, less likely bacterial) vs autoimmune vs malignancy. Drug reaction less likely as no known exposure. On admission had leukocytosis of 14.1, up from 12.8 on 7/1 and 8.3 on 6/28, neutrophil predominant. Sed rate elevated at 102 (7/1) and . Has not improved after despite 7 days of doxycycline therapy. She has had a relatively negative infectious work up all of which has been NGTD. Fungal work-up pending. Autoimmune is also possible given elevated inflammatory markers and RF/ROBBY are negative and no exam findings. Malignancy is much less likely at this time. Had CT C/A/P that was unremarkable on 6/28. Although she does not meet the criteria for fever of unknown origin at this time, we are continuing with this workup to attempt to better elucidate the etiology of her symptoms. TTE without  endocarditis. Hep A IgG positive, Hep A IgM negative Hep B, Hep C negative. HIV negative. MRI 7/5 demonstrates peripheral biliary dilation to the left hepatic lobe with mild enhancement suspicious for cholangitis - this was discussed at Liver multi-disciplinary conference 7/6. She is now s/p ERCP (7/9/18)  w/ stent placement x3 (left intrahepatics, common duct and prophylactic pancreatic stent). Working diagnosis is that patient had an atypical presentation of cholangitis. After procedure, patient was afebrile for >24 hours; however, spiked a fever to 101.8deg F on 7/11. Very possible that this is a new fever as her fever had effervesced for >24 hours granted this is the first day she was not on scheduled Tylenol/ibuprofen. Work-up at this point with CXR, Blood Cx, UA, BLE US for DVT negative to date. MRCP done on 7/12 without evidence of abscess, evidence of improved findings of cholangitis from previous imaging. Pt was clinically improving at time of discharge in terms of fever curve, resolution of leukocytosis, and improvement of CRP. She was also clinically improving.     After lengthy discussion with family and patient, several medical sub-specialities, decision was made to discharge patient with treatment for presumptive cholangitis (for total of 14 days until 7/23) with IV ertapenem and cipro. If fevers continue despite these abx, will continue with outpatient work-up with TASNEEM and possible BM biopsy with ID.   - c/w IV ertapenem, will add ciprofloxacin for pseudomonal coverage -- plan for total of 14 days since stent placement (end date 7/16/18). PICC placement placed 7/12 for home antibiotic infusion  - c/w Nystatin for Thrush for total of 14 days  - Repeat ERCP in 8 weeks with GI  - Outpatient ID follow-up     Infectious Workup to date:  - Erlichia - negative  - West Nile Virus serology negative  - Lyme screen - negative  - Anaplasma - negative  - Babesia - negative  - RMSF - negative  - CMV - negative  -  EBV - low titer consistent with reactivation  - Parvovirus - previous infection  - CK - normal  - LDH - wnl  - Histoplasma - negative  - Blastomycosis - negative  - EBV repeat titer from 7/9 decreased from previous     - Blood cultures x4 (6/25, 6/28, 7/1, 7/2) - NGTD  - Urine culture (6/23) - Negative  - UA 7/12 negative for UTI  - Hepatitis work up negative   - Quantiferon Gold indeterminate, will need to repeat at later date.     Nausea- improved 7/8/2018   Likely 2/2 metronidazole therapy      Diarrhea, resolved  No abdominal pain, worsening leukocytosis. Likely 2/2 antibiotic therapy     Hx of Polycystic Liver Disease  CT C/A/P (7/1) - previously known polycystic liver disease. Evidence of larger cyst with possible hemorrhagic or proteinaceous component, but not likely infected per MRI and surg/onc expertise.  A ruptured cyst (sterile or infected fluid collection) could hypothetically cause fevers. See discussion above regarding abdominal US findings.     # Discharge Pain Plan:- During her hospitalization, Jade experienced pain due to fevers.  The pain plan for discharge was discussed with Jade and her significant other and the plan was created in a collaborative fashion.    - Pharmacologic adjuvants:  Acetaminophen    Consultations This Hospital Stay   VASCULAR ACCESS CARE ADULT IP CONSULT  INFECTIOUS DISEASE GENERAL ADULT IP CONSULT  RHEUMATOLOGY IP CONSULT  SURGERY GENERAL ADULT IP CONSULT  SURGICAL ONCOLOGY ADULT IP CONSULT  GI HEPATOLOGY ADULT IP CONSULT  GI PANCREATICOBILIARY ADULT IP CONSULT  PHYSICAL THERAPY ADULT IP CONSULT  VASCULAR ACCESS CARE ADULT IP CONSULT  NUTRITION SERVICES ADULT IP CONSULT  VASCULAR ACCESS CARE ADULT IP CONSULT  PHARMACY IP CONSULT FOR PENICILLIN ALLERGY SKIN TEST   VASCULAR ACCESS CARE ADULT IP CONSULT  INTERNAL MEDICINE PROCEDURE TEAM ADULT IP CONSULT [place] - THORACENTESIS  VASCULAR ACCESS ADULT IP CONSULT     Code Status   Full Code       The patient was discussed  with Dr. Bennett Moscoso  [facility]  Pager: [phone]  ______________________________________________________________________    Physical Exam   Vital Signs: Temp: 99.3  F (37.4  C) Temp src: Oral BP: 138/84 Pulse: 87 Heart Rate: 68 Resp: 18 SpO2: 94 % O2 Device: None (Room air)    Weight: 173 lbs 11.2 oz  GENERAL:  well-developed, well-nourished, sitting in bed in no acute distress.   ENT:  Head is normocephalic, atraumatic. Oropharynx is moist without exudates or ulcers.   EYES:  anicteric sclerae.    NECK:  Supple.  LUNGS:  Clear to auscultation, decreased breath sounds in bases   CARDIOVASCULAR:  Regular rate and rhythm with no murmurs, gallops or rubs.  ABDOMEN:  Normal bowel sounds, softer and decreased distension from prior exam; no tenderness w/ palpation  EXT: Extremities warm and with trace edema, much improved from prior exam.  SKIN:  No acute rashes or bruising  NEUROLOGIC:  Grossly nonfocal.  Ln: no palpable, enlarged lymphnodes - cervical, axillary    Significant Results and Procedures   Most Recent 3 CBC's:  Recent Labs   Lab Test  07/13/18   0651  07/12/18   1706  07/12/18   0655   WBC  9.1  9.6  10.0   HGB  9.6*  9.8*  9.8*   MCV  95  95  96   PLT  353  404  440     Most Recent 3 BMP's:  Recent Labs   Lab Test  07/13/18   0651  07/12/18   0655  07/12/18   0117  07/11/18   0622   NA  137  138   --   139   POTASSIUM  4.3  3.9  4.2  3.3*   CHLORIDE  100  100   --   104   CO2  27  28   --   26   BUN  6*  4*   --   5*   CR  0.61  0.62   --   0.66   ANIONGAP  10  10   --   9   THUAN  9.0  8.8   --   8.4*   GLC  91  93   --   102*     Most Recent ESR & CRP:  Recent Labs   Lab Test  07/13/18   0651   07/04/18   0119   SED   --    --   98*   CRP  73.0*   < >  230.0*    < > = values in this interval not displayed.   ,   Results for orders placed or performed during the hospital encounter of 07/02/18   US Abdomen Limited    Narrative    EXAM: Ultrasound abdomen limited  7/5/2018 8:56 AM       HISTORY: History of polycystic liver, evaluate ascites/cysts    COMPARISON: CT 6/29/2018, ultrasound 6/23/2018    FINDINGS:   Multicystic liver. The majority of the pancreatic cystic lesions  appear simple by sonographic imaging. There is however a dominant  lesion in the right hepatic lobe which shows thick and avascular  septations with internal echogenicity, measuring approximately 11.4 x  8.1 x 7.1 cm. Previously there was a right hepatic lobe cyst measuring  up to 7.5 cm on the ultrasound 6/23/2018. On ultrasound 6/23/2018, a  dominant complex cyst measures 7.5 x 4.6 x 5.2 cm.    No ascites. Trace left pleural effusion.      Impression    IMPRESSION:   1. Multicystic liver; the majority of the hepatic cysts appear to be  simple by ultrasound evaluation. There is however a dominant complex  cyst which which has increased in size since 6/23/2018. Appearance and  growth suggest an internal hemorrhagic or proteinaceous component.  2. No ascites as questioned. Trace left pleural effusion.    I have personally reviewed the examination and initial interpretation  and I agree with the findings.    CELIA JOHNSON MD   MR Abdomen w/o & w Contrast    Narrative    MRI ABDOMEN    CLINICAL HISTORY:  Polycystic liver disease, question infected cyst    TECHNIQUE:  Images were acquired with and without intravenous contrast  through the abdomen. The following MR images were acquired: TrueFISP,  multiplanar T2 weighted, axial T1 in/out of phase, axial fat-saturated  T1, diffusion-weighted. Multiplanar T1-weighted images with fat  saturation were before contrast administration and at multiple time  points following the administration of intravenous contrast. Contrast  dose: 7.5ml Gadavist    FINDINGS:    Comparison study: Ultrasound 7/5/2018, CT CAP 6/29/2018    Liver: Polycystic liver disease. Postoperative changes of hepatic cyst  unroofing of multiple sclerosis. Surgical clips adjacent to a right  dome hepatic cyst. T2  hyperintense and T2 intermediate cysts replacing  much of the right hepatic lobe. Dominant cyst in the medial right  hepatic lobe measuring 6.7 x 9.4 cm, previously 6.1 x 8.8 cm on  6/29/2018 demonstrates intermediate T2/T1 signal and restricted  diffusion (series 13, image 57). Additional T2/T1 intermediate  restricting cysts of the anterior superior right hepatic lobe  measuring 4.4 and 2.7 cm respectively (series 13, image 77). No  abnormal enhancement of the cysts are regional hyperemia within the  liver. Redemonstration of mass effect on the portal and hepatic veins.  No convincing evidence of portal venous thrombus. Massive hypertrophy  of the left hepatic lobe. Significant signal dropout on out of phase.    Numerous foci of T2 signal and peribiliary enhancement, (for example  series 23, image 9) with associated restricted diffusion. Mild diffuse  periportal edema, similar to CT 6/29/2018.    Mild fluid and stranding of right upper quadrant fat posterior to the  right hepatic lobe and bowel/mesentery interposed in the anterior  right upper quadrant. This does not appear significantly changed from  6/29/2018 or 11/14/2016.    The common bile duct measures 6 mm in the pancreatic head. Mildly  stable prominence of the left hepatic bile ducts.    Gallbladder: Partially decompressed. Mass effect on the proximal  gallbladder by multiple hepatic cysts. No significant gallbladder wall  thickening. No visualized cholelithiasis.    Spleen: Mild splenomegaly. No focal mass.    Kidneys: Symmetric enhancement. No focal lesion. No hydronephrosis or  hydroureter. Tiny renal cysts. Downward mass effect on the right  kidney. Nonspecific bilateral perinephric fluid signal.    Adrenal glands: Unremarkable.    Pancreas: Preserved T1 signal. Mass effect on the pancreas from the  hypertrophied left hepatic lobe. No significant pancreatic ductal  dilatation.     Bowel: Effacement of the hepatic flexure without proximal  dilatation.  Moderate colonic stool. No small bowel dilatation. The appendix is  unremarkable. Posterior and inferior displacement of the stomach.    Lymph nodes: Mildly prominent retroperitoneal lymph nodes, within  normal limits. No significantly changed from 6/29/2018.    Blood vessels: Abdominal aorta is within normal limits. No aneurysmal  dilatation.    Lung bases: Small pleural effusions. Mild associated atelectasis of  the lung bases.    Bones and soft tissues: Small vertebral body hemangiomas. No  aggressive appearing osseous lesions.    Mesentery and abdominal wall: Small bowel and mesentery coursing into  the right upper quadrant anterior to the right hepatic lobe.    Ascites: Small ascites.      Impression    IMPRESSION:  1. Areas of peribiliary T2 signal/enhancement of the left hepatic lobe  concerning for cholangitis.  2. Polycystic liver disease. Multiple intermediate signal cysts which  may represent proteinaceous/hemorrhagic cysts without ancillary MR  findings suggestive of infection.   3. Compensatory hypertrophy of the left hepatic segment. Stable mild  periportal edema. Small volume ascites.  4. Bibasilar pleural effusions with associated atelectasis.    [Result: Findings concerning for cholangitis]    Finding was identified on 7/5/2018 3:39 PM.     Dr. Moscoso was contacted by Dr. Kenney at 7/6/2018 7:56 AM and verbalized  understanding of the urgent finding.     I have personally reviewed the examination and initial interpretation  and I agree with the findings.    RICH ZAPATA MD   XR Surgery SHERRI L/T 5 Min Fluoro w Stills    Narrative    This exam was marked as non-reportable because it will not be read by a   radiologist or a [place] non-radiologist provider.             XR Chest 2 Views    Narrative    Exam: XR CHEST 2 VW 7/11/2018 1:37 PM    History: Fever, potential pneumonia    Comparison: Chest CT 6/20/2018 and chest x-ray 6/28/2018    Findings: PA and lateral views of the chest were  evaluated.  Cardiomediastinal silhouette and pulmonary vasculature are within  normal limits. Lung lines are normal. Small pleural effusions have  increased since the prior. Mild associated bibasilar opacities. The  aerated portions of both lungs are clear. No pneumothorax.  Indeterminate catheter projects over the lumbar spine, partially  imaged. Loops of gas-filled small bowel interposed between the  anterior abdominal wall and colon. Surgical clips project over the  right upper quadrant.      Impression    Impression:   1. Small pleural effusions have increased in size when compared to the  prior. Minimal associated opacities, likely atelectasis (cannot  completely exclude pneumonia).  2. Aerated portions of both lungs are clear.    CELIA JOHNSON MD   US Lower Extremity Venous Duplex Bilateral    Narrative    EXAMINATION: DOPPLER VENOUS ULTRASOUND OF BILATERAL LOWER EXTREMITIES,  7/11/2018 1:21 PM     COMPARISON: None available    HISTORY: Leg pain/swelling    TECHNIQUE:  Gray-scale evaluation with compression, spectral flow and  color Doppler assessment of the deep venous system of both legs from  groin to knee, and then at the ankles.    FINDINGS:  In both lower extremities, the common femoral, femoral, popliteal and  posterior tibial veins demonstrate normal compressibility and blood  flow.    Subcutaneous edema.      Impression    IMPRESSION:  1.  No evidence of deep venous thrombosis in either lower extremity.    I have personally reviewed the examination and initial interpretation  and I agree with the findings.    ROBBY HUYNH MD   MR Abdomen MRCP w/o & w Contrast    Narrative    MRCP Without and With Contrast    CLINICAL HISTORY: 52Y F with hx of fever of unknown origin, polycystic  liver disease with persistent fevers. On ERCP found to have truncated  biliary tract. Evaluate further for persistent fever     DATE: 7/12/2018 11:51 AM    TECHNIQUE:     Images were acquired with and without  intravenous gadolinium contrast  through the upper abdomen. The following MR images were acquired  without intravenous contrast: TrueFISP, multiplanar T2-weighted, axial  T1 in/out of phase, T2-weighted MRCP images, axial diffusion-weighted  and axial apparent diffusion coefficient. T1-weighted images were  obtained before contrast at the multiple time points following  contrast injection. 3-D reformatted images were generated by the  technologist. Contrast dose: 7.5mL Gadavist    Comparison study: MRI 7/5/2018    FINDINGS:    Abdomen: Enlarged polycystic liver with innumerable T2 hyperintense  cysts, causing mass effect on the stomach, bowel, and right kidney.  There are additional hepatic cysts with intermediate signal intensity,  probably containing proteinaceous/hemorrhagic products. In the  peripheral left hepatic lobe, there is a T2 hyperintense 1.3 cm focus  which has homogeneous enhancement on arterial phase and persistent  enhancement on delayed phase imaging (axial series 20, image 12).    No gallstones or gallbladder distention. Although the central biliary  tree is poorly visualized, there is no intrahepatic or extrahepatic  biliary dilatation.     The spleen, kidneys, adrenal glands, and pancreas are normal.  Partially visualized small and large bowel are unremarkable. Scattered  small retroperitoneal lymph nodes, presumably reactive. Small volume  ascites.    Lung bases: Bilateral small layering pleural effusions.    Bones: Probable hemangiomas in the L2 and L4 vertebral bodies.      Impression    IMPRESSION:   1. Poor visualization of the central biliary tree. However, there is  no significant intrahepatic or extrahepatic biliary dilatation. The  prior peribiliary T2 hyperintensity/enhancement with peripheral ductal  prominence has improved from 7/5/2018, likely representing improving  cholangitis.   2. Enlarged polycystic liver without a suspicious liver lesion or  evidence for superinfected  cyst.  3. Small bilateral layering pleural effusions, mildly increased from  prior. Small volume ascites.    I have personally reviewed the examination and initial interpretation  and I agree with the findings.    RICH ZAPATA MD   XR Chest 1 View    Narrative    Exam:  Chest X-ray 7/12/2018 3:26 PM    History: RN placed PICC - verify tip placement;     Comparison: X-ray 7/11/2018    Findings: PA chest radiograph. Interval placement of left  approximately PICC with the tip projecting over the high right atrium.  The trachea is midline. Stable cardiomediastinal silhouette. Small  bilateral pleural effusions and associated basilar opacities. No  pneumothorax. Surgical clips project over the right upper quadrant.  Biliary stent projects over the mid abdomen.      Impression    Impression:   1. Interval placement of left upper extremity PICC with the tip  projecting over the high right atrium.  2. Unchanged small bilateral pleural effusions with associated  bibasilar opacities, favored to represent atelectasis.    I have personally reviewed the examination and initial interpretation  and I agree with the findings.    RAQUEL LEACH MD       Pending Results   These results will be followed up by Marina Owens    Unresulted Labs Ordered in the Past 30 Days of this Admission     Date and Time Order Name Status Description    7/11/2018 0737 Blood culture Preliminary     7/11/2018 0737 Blood culture Preliminary              Primary Care Physician   Marina Owens    Discharge Disposition   Discharged to home  Condition at discharge: Stable    Discharge Orders     Home infusion referral     Reason for your hospital stay   You were hospitalized for fever of unknown etiology with a presumptive diagnosis of atypical cholangitis     [facility] Follow-up and recommended labs and tests   Follow up with primary care provider, Marina Owens, within 4-5 days, for hospital follow- up. The following labs/tests  are recommended: CBC, CRP.    Follow-up with GI for repeat ERCP in 8 weeks for repeat ECRP.  Follow-up with Infectious disease within 2-4 weeks (Britt Dumas MD) for further outpatient work-up of fever of unknown etiology.     Appointments on [address] and/or [facility] (with [facility] [[email or web address]] or [facility] provider or service). Call [phone] if you haven't heard regarding these appointments within 7 days of discharge.     Activity   Your activity upon discharge: activity as tolerated     Full Code     Diet   Follow this diet upon discharge: Advance to a regular diet as tolerated       Discharge Medications   Discharge Medication List as of 7/13/2018  2:01 PM      START taking these medications    Details   calcium carbonate (TUMS) 500 MG chewable tablet Take 1 tablet (500 mg) by mouth daily, Disp-150 tablet, R-0, E-Prescribe      ciprofloxacin (CIPRO) 500 MG tablet Take 1 tablet (500 mg) by mouth every 12 hours for 9 days, Disp-18 tablet, R-0, E-Prescribe      ertapenem (INVANZ) 1 GM vial Inject 1 g into the vein every 24 hours for 9 days, Disp-90 mL, R-0, No Print Out      nystatin (MYCOSTATIN) 944464 UNIT/ML suspension Take 5 mLs (500,000 Units) by mouth 4 times daily for 12 daysDisp-240 mL, [id number]         CONTINUE these medications which have NOT CHANGED    Details   acetaminophen 500 MG CAPS Take 2 capsules by mouth every 8 hours as needed For aches, pain, fever, Disp-60 capsule, R-0, Local Print         STOP taking these medications       doxycycline (VIBRAMYCIN) 100 MG capsule Comments:   Reason for Stopping:         doxycycline (VIBRAMYCIN) 100 MG capsule Comments:   Reason for Stopping:         escitalopram (LEXAPRO) 20 MG tablet Comments:   Reason for Stopping:         ibuprofen (ADVIL/MOTRIN) 600 MG tablet Comments:   Reason for Stopping:         Multiple Vitamin (MULTI-VITAMINS) TABS Comments:   Reason for Stopping:         ondansetron (ZOFRAN ODT) 4 MG ODT tab Comments:   Reason for Stopping:          venlafaxine (EFFEXOR-XR) 37.5 MG 24 hr capsule Comments:   Reason for Stopping:         zolpidem (AMBIEN) 5 MG tablet Comments:   Reason for Stopping:             Allergies   Allergies   Allergen Reactions     Bee Venom      Penicillins Rash     Rash limited to arm ~20 years ago.     I visited/saw/examined the patient today. Feeling better; agree with resident history and physical documentation in discharge note which reflects my findings. I reviewed the discharge plan with the resident, and agree with the final assessment and plan for discharge as noted in the resident s discharge summary. Patient stable and medically appropriate for d/c today, with appropriate follow-up planned. Please see dictated d/c note for details.     Davis Guajardo MD

## 2018-07-13 NOTE — PLAN OF CARE
Problem: Patient Care Overview  Goal: Plan of Care/Patient Progress Review  Outcome: No Change  /82  Pulse 86  Temp 98.7  F (37.1  C) (Oral)  Resp 18  Wt 78.8 kg (173 lb 11.2 oz)  LMP 11/14/2016  SpO2 92%  BMI 26.41 kg/m2    T max 100.2. Tylenol given. Recheck 98.7. AOVSS on RA. Denies pain, nausea, or SOB. Up ad ramon. Voiding adequately. Last BM yesterday. Pt calls appropriately and is able to make needs known. Possible plan for thora to be completed in IR today. Will continue to monitor and follow POC.

## 2018-07-13 NOTE — PLAN OF CARE
Problem: Patient Care Overview  Goal: Plan of Care/Patient Progress Review  PT 7A: pt with another provider upon attempt in AM; d/c to home in PM.     Physical Therapy Discharge Summary    Reason for therapy discharge:    Discharged to home with outpatient therapy.    Progress towards therapy goal(s). See goals on Care Plan in [facility] electronic health record for goal details.  Goals partially met.  Barriers to achieving goals:   discharge from facility.    Therapy recommendation(s):    Continued therapy is recommended.  Rationale/Recommendations:  to improve strength, activity tolerance and functional mobility.

## 2018-07-13 NOTE — PLAN OF CARE
Problem: Patient Care Overview  Goal: Plan of Care/Patient Progress Review  Outcome: Therapy, progress toward functional goals is gradual  Patient discharged home with family. Discharge instructions reviewed and all questions answered. Prescriptions picked up at [facility] pharmacy. PIV removed. Patient will go home with PICC line and home infusion RN to go to patient's home tomorrow to complete infusion and teaching. Supplies given for showering with PICC line. All belongings accounted for. AVSS and patient denies pain.

## 2018-07-13 NOTE — PROGRESS NOTES
"  [facility] ID SERVICE: Progress Note     Patient:  Jade Carcamo, Date of birth 1965, Medical record number [id number]  Date of Visit:  July 13, 2018         Assessment and Recommendations:   Problem List:  1. Fever - unclear aetiology for now, possible cholangitis , work-up pending   1.  low grade fever   2. Approx 23 days recurrent fevers  3. - blood cultures negative 6/25/18, 6/28, 7/1, 7/2, 7/3  Blood culture (7/11) - pending   Urinalysis (7/11) - negative  Blastomyces Agn Quant EIA - none detected  Histoplasma capsulatum - none detected  Coccidiodes Agn Quant EIA - none detected  Ehrlichia  - negative  Lyme screen - negative  Anaplasma - negative   Babesia - negative  RMSF - negative  CMV - negative  EBV - low titer (1863 --> 592 copies / ml )   Parvovirus - previous infection  CK - normal  Blood cultures x 4 negative to date (only first one was prior to doxycycline)  Urine culture low levels mixed urogenital jayna  ROBBY - negative  TTE - bicuspid aortic valve. No evidence endocarditis  Hepatititis A - IgG positive. Reflex IgM negative.   Hep B - negative surface ag and core ab. Surface ab not done.   Hep C - negative  HIV - negative  West Nile Virus serology negative      2. Leukocytosis w/ neutrophilic predominance - resolved  3. Elevated CRP - improving  4. Polycystic liver disease - U/S on 6/23 showed enlarging, newly complex cyst  5. Questionable cholangitis on abdominal MRI  1. ERCP (7/9/18) w/ stent placement x3 (left intrahepatics, common duct and prophylactic pancreatic stent); plan repeat ERCP 8 wks  2. MRCP (7/12/18) no significant biliary dilation; likely representing improving cholangitis  6. Abdominal distension - improved  7. Penicillin allergy - rxn of limited skin rash 20 yrs ago; tolerating ertapenem  8. EBV viremia - low titer  9. Lymphadenopathy - prominent lymph nodes < 1 cm chest, abdomen (CT) and palpable cervical lymphadenopathy on exam  10. Mild splenomegaly  1. MRI (7/5) \"mild " "splenomegaly.  No focal mass\"  2. CT chest/ab/pelvis (6/29) spleen appears normal  11. Constipation - no BM over night; on bowel regimen  12. Anasarca - improved  13. Sore throat/ oral candidiasis  - improved with nystatin s/s    Recommendations:  1. Continue ertapenem (began7/7) and ciprofloxacin (began 7/12) x 2 weeks for presumptive cholangitis.   2. Continue nystatin swish and swallow (day 3) x 10-14 days  3. BNP  4. IgG EBV VCA   5. If fever persists/recurs will need TASNEEM. Primary team is reluctant to pursue TASNEEM and plans to d/c her today. Follow-up with Dr Dumas in 7-14 days. If any problems/ questions next week, patient is advised to contact ID clinic for me. [facility] is following. Daily probiotic while on antibiotics    6. If discharged, continue ertapenem and ciprofloxacin for 14 days from ERCP (7/9/18) and 14 day total course of nystatin (began 7/11/18)    Discussion:  Jade Carcamo is a 53 y/o woman admitted on 7/2 for persistent fever with associated symptoms including fatigue, diffuse myalgias, body aches, chills, nausea and vomiting.  PMHx includes polycystic liver disease s/p laparoscopic un-karina (2016).  No fever overnight.  Continue antibiotic regimen of ertapenem and ciprofloxacin.  Per primary team, will discharge patient this afternoon.  ID recommended TASNEEM prior to discharge due to repeat fever s/p ERCP, atypical presentation of presumed cholangitis and dental work prior to admission.  Advised patient that if has fever of 101 or higher, needs to go to ER.  Also told patient to call ID clinic next week to give an update on condition.  If has repeat fever, will likely move ahead with TASNEEM.  Upon discharge, recommend finishing 14 day course of ertapenem and ciprofloxacin beginning on 7/9/18.  Also, finish 14 day course of nystatin swish and swallow beginning on 7/11/18.       Please feel free to call with any questions.     Patient seen and discussed with Dr. Escamilla.    Good Helms  [facility]    Attestation:  This " patient has been seen and evaluated by me.  I discussed this patient with Good Helms MS4  and agree with the findings and plan in this note. I also personally edited this note to reflect my findings. I have reviewed today's vital signs, medications, labs and imaging.    Radames Tesfaye MD,M.Med.Sc.  Attending, Infectious Diseases  Pager: [phone]           Interval History:   MRCP and PICC placement on 7/12.  No fevers overnight; did reach 100.2.  Abdominal distension, lower extremity edema and sore throat all improved.  Still subjectively feels febrile at times.  No bowel movement.  Is eating.    CBC differential within reference range.  CXR (7/12/18) PICC line placed, unchanged small pleural effusions and likely atelectasis.  MRCP (7/12/18) showed no significant dilation and likely improved cholangitis.             Review of Systems:   10 point ROS. Negative findings other than mentioned above.         Current Antimicrobials   Ertapenem IV 1g q24hrs (began 7/7)  Ciprofloxacin  mg q12hrs (began 7/12)  Nystatin swish and swallow (began 7/11)      Previous    Ciprofloxacin (1 dose on 7/9)    Ceftriaxone (7/4-7/6)    Metronidazole (7/4-7/6)    Doxycycline prior to admission         Physical Exam:   Ranges for vital signs:  Temp:  [98.7  F (37.1  C)-100.3  F (37.9  C)] 99.3  F (37.4  C)  Pulse:  [86-87] 87  Heart Rate:  [68-97] 68  Resp:  [16-18] 18  BP: (111-138)/(65-85) 138/84  SpO2:  [92 %-95 %] 94 %    Intake/Output Summary (Last 24 hours) at 07/13/18 0911  Last data filed at 07/13/18 0643   Gross per 24 hour   Intake             1800 ml   Output             3700 ml   Net            -1900 ml     Exam:  GENERAL:  well-developed, well-nourished, sitting in bed in no acute distress.   ENT:  Head is normocephalic, atraumatic. Oropharynx is moist without exudates or ulcers. No erythema of mucosal tissue  EYES:  Eyes have anicteric sclerae.    NECK:  Supple.  LUNGS:  Clear to auscultation, decreased  breath sounds in bases   CARDIOVASCULAR:  Regular rate and rhythm with no murmurs, gallops or rubs.  ABDOMEN:  Normal bowel sounds, softer and decreased distension from prior exam; no tenderness w/ palpation  EXT: Extremities warm and with trace edema, much improved from prior exam.  SKIN:  No acute rashes or bruising  NEUROLOGIC:  Grossly nonfocal.  Ln: no palpable, enlarged lymphnodes - cervical, axillary, inguinal, epitrochlear          Laboratory Data:     Creatinine   Date Value Ref Range Status   07/13/2018 0.61 0.52 - 1.04 mg/dL Final   07/12/2018 0.62 0.52 - 1.04 mg/dL Final   07/11/2018 0.66 0.52 - 1.04 mg/dL Final   07/10/2018 0.57 0.52 - 1.04 mg/dL Final   07/09/2018 0.57 0.52 - 1.04 mg/dL Final     WBC   Date Value Ref Range Status   07/13/2018 9.1 4.0 - 11.0 10e9/L Final   07/12/2018 9.6 4.0 - 11.0 10e9/L Final   07/12/2018 10.0 4.0 - 11.0 10e9/L Final   07/11/2018 11.4 (H) 4.0 - 11.0 10e9/L Final   07/10/2018 12.3 (H) 4.0 - 11.0 10e9/L Final     Hemoglobin   Date Value Ref Range Status   07/13/2018 9.6 (L) 11.7 - 15.7 g/dL Final     Platelet Count   Date Value Ref Range Status   07/13/2018 353 150 - 450 10e9/L Final     Sed Rate   Date Value Ref Range Status   07/04/2018 98 (H) 0 - 30 mm/h Final   07/01/2018 102 (H) 0 - 30 mm/h Final     CRP Inflammation   Date Value Ref Range Status   07/13/2018 73.0 (H) 0.0 - 8.0 mg/L Final   07/11/2018 75.0 (H) 0.0 - 8.0 mg/L Final   07/09/2018 150.0 (H) 0.0 - 8.0 mg/L Final   07/06/2018 230.0 (H) 0.0 - 8.0 mg/L Final   07/04/2018 230.0 (H) 0.0 - 8.0 mg/L Final     AST   Date Value Ref Range Status   07/11/2018 23 0 - 45 U/L Final   07/10/2018 12 0 - 45 U/L Final   07/09/2018 15 0 - 45 U/L Final   07/08/2018 16 0 - 45 U/L Final   07/07/2018 12 0 - 45 U/L Final     ALT   Date Value Ref Range Status   07/11/2018 12 0 - 50 U/L Final   07/10/2018 13 0 - 50 U/L Final   07/09/2018 10 0 - 50 U/L Final   07/08/2018 12 0 - 50 U/L Final   07/07/2018 13 0 - 50 U/L Final  "    Bilirubin Total   Date Value Ref Range Status   07/11/2018 0.3 0.2 - 1.3 mg/dL Final   07/10/2018 0.2 0.2 - 1.3 mg/dL Final   07/09/2018 0.4 0.2 - 1.3 mg/dL Final   07/08/2018 0.5 0.2 - 1.3 mg/dL Final   07/07/2018 0.4 0.2 - 1.3 mg/dL Final     Lab Results   Component Value Date     07/13/2018    BUN 6 (L) 07/13/2018    CO2 27 07/13/2018        Microbiology Data:   Blood culture (7/11) - no growth x2 so far   Urinalysis (7/11) - negative  Blastomyces Agn Quant EIA - none detected  Histoplasma capsulatum - none detected  Coccidiodes Agn Quant EIA - none detected  Ehrlichia  - negative  Lyme screen - negative  Anaplasma - negative   Babesia - negative  RMSF - negative  CMV - negative  EBV - low titer (1863 --> 592 copies / ml )   Parvovirus - previous infection  CK - normal  Blood cultures x 4 negative to date (only first one was prior to doxycycline)  Urine culture low levels mixed urogenital jayna  ROBBY - negative  TTE - bicuspid aortic valve. No evidence endocarditis  Hepatititis A - IgG positive. Reflex IgM negative.   Hep B - negative surface ag and core ab. Surface ab not done.   Hep C - negative  HIV - negative  West Nile Virus serology negative       Imaging:   CXR (7/12):  \"1. Interval placement of left upper extremity PICC with the tip  projecting over the high right atrium.  2. Unchanged small bilateral pleural effusions with associated  bibasilar opacities, favored to represent atelectasis.\"    MRCP (7/12):  \"1. Poor visualization of the central biliary tree. However, there is no significant intrahepatic or extrahepatic biliary dilatation. The prior peribiliary T2 hyperintensity/enhancement with peripheral ductal prominence has improved from 7/5/2018, likely representing improving cholangitis.   2. Enlarged polycystic liver without a suspicious liver lesion or  evidence for superinfected cyst.  3. Small bilateral layering pleural effusions, mildly increased from prior. Small volume ascites.\"  "

## 2018-07-13 NOTE — TELEPHONE ENCOUNTER
Hi,    Patient is due for repeat ERCP for stent exchange vs removal in 8 weeks from 7/9/18.  Please schedule with Dr. Hart.    Ronny Luciano MD  GI fellow

## 2018-07-14 ENCOUNTER — HOME INFUSION (PRE-WILLOW HOME INFUSION) (OUTPATIENT)
Dept: PHARMACY | Facility: CLINIC | Age: 53
End: 2018-07-14

## 2018-07-16 ENCOUNTER — HOME INFUSION (PRE-WILLOW HOME INFUSION) (OUTPATIENT)
Dept: PHARMACY | Facility: CLINIC | Age: 53
End: 2018-07-16

## 2018-07-16 ENCOUNTER — CARE COORDINATION (OUTPATIENT)
Dept: CARE COORDINATION | Facility: CLINIC | Age: 53
End: 2018-07-16

## 2018-07-16 ENCOUNTER — OFFICE VISIT (OUTPATIENT)
Dept: INTERNAL MEDICINE | Facility: CLINIC | Age: 53
End: 2018-07-16
Payer: COMMERCIAL

## 2018-07-16 ENCOUNTER — TELEPHONE (OUTPATIENT)
Dept: INFECTIOUS DISEASES | Facility: CLINIC | Age: 53
End: 2018-07-16

## 2018-07-16 ENCOUNTER — TELEPHONE (OUTPATIENT)
Dept: PHARMACY | Facility: CLINIC | Age: 53
End: 2018-07-16

## 2018-07-16 VITALS
SYSTOLIC BLOOD PRESSURE: 89 MMHG | WEIGHT: 147.3 LBS | BODY MASS INDEX: 22.4 KG/M2 | TEMPERATURE: 99 F | DIASTOLIC BLOOD PRESSURE: 62 MMHG | HEART RATE: 99 BPM | OXYGEN SATURATION: 99 %

## 2018-07-16 DIAGNOSIS — R94.5 NONSPECIFIC ABNORMAL RESULTS OF LIVER FUNCTION STUDY: Primary | ICD-10-CM

## 2018-07-16 DIAGNOSIS — R50.9 FEVER, UNKNOWN ORIGIN: ICD-10-CM

## 2018-07-16 DIAGNOSIS — K83.09 CHOLANGITIS (H): ICD-10-CM

## 2018-07-16 DIAGNOSIS — Z09 HOSPITAL DISCHARGE FOLLOW-UP: ICD-10-CM

## 2018-07-16 ASSESSMENT — PAIN SCALES - GENERAL: PAINLEVEL: NO PAIN (0)

## 2018-07-16 NOTE — NURSING NOTE
Chief Complaint   Patient presents with     Hospital F/U     Patient here for hospital follow up.       Luther Deluca CMA at 3:43 PM on 7/16/2018.

## 2018-07-16 NOTE — PROGRESS NOTES
This is a recent snapshot of the patient's [facility] medical record.  For current drug dose and complete information and questions, call [phone] or In Basket pool, [facility] home infusion ([id number])  CSN Number:  [id number]

## 2018-07-16 NOTE — MR AVS SNAPSHOT
After Visit Summary   7/16/2018    Jade Carcamo    MRN: [id number]           Patient Information     Date Of Birth          1965        Visit Information        Provider Department      7/16/2018 3:40 PM Marina Owens MD [facility]        Today's Diagnoses     Nonspecific abnormal results of liver function study    -  1    Hospital discharge follow-up        Fever, unknown origin        Cholangitis          Care Instructions    Primary Care Center: [phone]     Primary Care Center Medication Refill Request Information:  * Please contact your pharmacy regarding ANY request for medication refills.  ** [facility] Prescription Fax = [phone]  * Please allow 3 business days for routine medication refills.  * Please allow 5 business days for controlled substance medication refills.     Primary Care Center Test Result notification information:  *You will be notified with in 7-10 days of your appointment day regarding the results of your test.  If you are on MyChart you will be notified as soon as the provider has reviewed the results and signed off on them.        Schedule with Liver doctor Dr. Cruz and Dr. Hobbs, Infectious disease with Dr. Dumas    Labs orders next week            Follow-ups after your visit        Your next 10 appointments already scheduled     Sep 05, 2018  2:00 PM CDT   Lab with  LAB   [facility] ([facility])    [address]  1st Floor  [place] [place] [address] [phone]            Sep 05, 2018  3:00 PM CDT   (Arrive by 2:45 PM)   New General Liver with Davis Hobbs MD   [facility] ([facility])    [address]  [address]  [place] [address] [phone]              Future tests that were ordered for you today     Open Future Orders        Priority Expected Expires Ordered    CRP inflammation Routine 7/16/2018 7/16/2019 7/16/2018    CBC with platelets Routine  7/16/2018 7/30/2018 7/16/2018    M Tuberculosis by Quantiferon Routine 7/16/2018 7/16/2019 7/16/2018            Who to contact     Please call your clinic at [phone] to:    Ask questions about your health    Make or cancel appointments    Discuss your medicines    Learn about your test results    Speak to your doctor            Additional Information About Your Visit        [employer] Information     [employer] [place] gives you secure access to your electronic health record. If you see a primary care provider, you can also send messages to your care team and make appointments. If you have questions, please call your primary care clinic.  If you do not have a primary care provider, please call [phone] and they will assist you.      [facility] [place] is an electronic gateway that provides easy, online access to your medical records. With [employer] [facility], you can request a clinic appointment, read your test results, renew a prescription or communicate with your care team.     To access your existing account, please contact your [facility] or call [phone] for assistance.        Care EveryWhere ID     This is your Care EveryWhere ID. This could be used by other organizations to access your [facility] medical records  [id number]        Your Vitals Were     Pulse Temperature Last Period Pulse Oximetry Breastfeeding? BMI (Body Mass Index)    99 99  F (37.2  C) (Oral) 11/14/2016 99% No 22.4 kg/m2       Blood Pressure from Last 3 Encounters:   07/16/18 (!) 89/62   07/13/18 138/84   07/02/18 108/64    Weight from Last 3 Encounters:   07/16/18 66.8 kg (147 lb 4.8 oz)   07/09/18 78.8 kg (173 lb 11.2 oz)   07/01/18 68 kg (150 lb)               Primary Care Provider Office Phone # Fax #    Marina Owens [phone]       [address] [address]        Equal Access to Services     HENRRY HALL : Susana Lees, chris cruz, dusty tovar,  nehemiah shinlakeisha solojacquelin abarca'aan ah. So [facility] [place] [phone].    ATENCIÓN: Si habla barry, tiene a francisco disposición servicios gratuitos de asistencia lingüística. Justin hinton [phone].    We comply with applicable federal civil rights laws and [place] laws. We do not discriminate on the basis of race, color, national origin, age, disability, sex, sexual orientation, or gender identity.            Thank you!     Thank you for choosing [facility]  for your care. Our goal is always to provide you with excellent care. Hearing back from our patients is one way we can continue to improve our services. Please take a few minutes to complete the written survey that you may receive in the mail after your visit with us. Thank you!             Your Updated Medication List - Protect others around you: Learn how to safely use, store and throw away your medicines at www.disposemymeds.org.          This list is accurate as of 7/16/18  5:02 PM.  Always use your most recent med list.                   Brand Name Dispense Instructions for use Diagnosis    acetaminophen 500 MG Caps     60 capsule    Take 2 capsules by mouth every 8 hours as needed For aches, pain, fever        calcium carbonate 500 MG chewable tablet    TUMS    150 tablet    Take 1 tablet (500 mg) by mouth daily    Acid indigestion       ciprofloxacin 500 MG tablet    CIPRO    18 tablet    Take 1 tablet (500 mg) by mouth every 12 hours for 9 days    Cholangitis       ertapenem 1 GM vial    INVanz    90 mL    Inject 1 g into the vein every 24 hours for 9 days    Cholangitis       nystatin 489138 UNIT/ML suspension    MYCOSTATIN    240 mL    Take 5 mLs (500,000 Units) by mouth 4 times daily for 12 days    Thrush of mouth and esophagus (H)

## 2018-07-16 NOTE — TELEPHONE ENCOUNTER
[facility] PRIOR AUTHORIZATION REQUEST     Drug: Invanz 1gm qd  J Code:  NDC: 8885-6887-83    ICD 10 code: K83.0    Date(s) of Service: 7/13/18-7/13/19      Previously tried and failed: NA  Provider: Dana Dumas  Provider NPI: [id number]      [facility]  NPI: [id number]

## 2018-07-16 NOTE — PROGRESS NOTES
This is a recent snapshot of the patient's [facility] medical record.  For current drug dose and complete information and questions, call [phone] or In Basket pool, [facility] home infusion ([id number])  CSN Number:  [id number]

## 2018-07-16 NOTE — PROGRESS NOTES
"[facility] [place]   Marina Owens MD  07/16/2018      Chief Complaint:   Hospital F/U       History of Present Illness:   Jade Carcamo is a 52 year old female with a history of polycystic liver disease who presents for a hospital follow-up, accompanied by her . Jade was hospitalized from 7/2/2018 to 7/13/2018 with a fever of unknown origin. Her illness was initially thought to be related to tick born illness but she did not respond to doxycycline and subsequent testing was negative.  She had low level EBV titers but extensive infectious testing was negative, including fungal etiologies, Tb, hepatitis, CMV, viral and RF/ROBBY. Blood cultures were negative.  She additionally had LE dopplers, TTE and CT scan.  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 While in the hospital she underwent an endoscopic retrograde cholangiopancreatography, during which stents were inserted (see complete results below). She was discharged to completed a 14 day course of cipro and ertapenem.    Jade reports that she is feeling better now, though she still has a low grade fever. She notes that her fever has not been over  F since her hospital discharge. She denies dizziness. Overall, Jade feels that her health has been stable. She will finish her antibiotic prescription next week, and was told to schedule an appointment with Infectious Disease soon. Jade reports that she sleeps for approximately 16 hours every day. Denies N/V. She is tolerating PO.    Jade is concerned about her liver, as she has noticed fluctuation in the size of the area of her lower abdomen where her liver is. Specifically, she noted that on June 22 her lower abdomen was distended, and a few days later it was normal again. At its most distended, Jade describes her abdomen as looking \"nine months pregnant.\" The area is a normal size today as well. While in the hospital " "Jade insisted that her liver was not acting normally, but reports that the liver specialists \"did not take her seriously\" and told her that her liver was healthy. Her CRP inflammation was 73 on 7/13/2018. Additionally, an MRI on 7/2/2018 indicated that the cysts on Jade's liver have increased in size. Jade's  wonders if menopause could explain this change in Jade's liver. Additionally, if Jade's abdominal enlargement is not due to a liver problem, Jade's  wonders what could be causing her distension.     Jade mentions that she needs her CRP inflammation checked again, as well as routine bloodwork. She is wondering if she should complete those labs today.     Other concerns discussed:  Jade mentions that a nurse will be visiting her at home tomorrow to do a home infusion, and again on Monday to remove her picc line.     Jade would like to schedule a Penicillin allergy test. She was previously told that she may be allergic to Penicillin after developing a rash on an application site. Her doctors wanted her to complete this test while in the hospital, but Jade reports that she did not do this because her fever did not sufficiently reduce.      Review of Systems:   Pertinent items are noted in HPI, remainder of complete ROS is negative.      Active Medications:   Current Outpatient Prescriptions:      acetaminophen 500 MG CAPS, Take 2 capsules by mouth every 8 hours as needed For aches, pain, fever, Disp: 60 capsule, Rfl: 0     calcium carbonate (TUMS) 500 MG chewable tablet, Take 1 tablet (500 mg) by mouth daily, Disp: 150 tablet, Rfl: 0     ciprofloxacin (CIPRO) 500 MG tablet, Take 1 tablet (500 mg) by mouth every 12 hours for 9 days, Disp: 18 tablet, Rfl: 0     ertapenem (INVANZ) 1 GM vial, Inject 1 g into the vein every 24 hours for 9 days, Disp: 90 mL, Rfl: 0     nystatin (MYCOSTATIN) 652236 UNIT/ML suspension, Take 5 mLs (500,000 Units) by mouth 4 times daily for 12 days, Disp: 240 mL, Rfl: " 0      Allergies:   Bee venom and Penicillins      Past Medical History:  GERD  Polycystic liver disease     Past Surgical History:  Endoscopic retrograde cholangiopancreatogram, place tube/stent, 2018  Laparoscopic unroofing liver cyst, 2016    Family History:   Mother: Alzheimer disease  Father: Heart disease  Sister: Thyroid cancer  Brother: Bradycardia requiring ablation  Maternal grandfather: Psoriasis      Social History:   Marital Status:   Presents to the clinic accompanied by her   Tobacco Use: Never smoker, never used  Alcohol Use: 2 drinks per week  PCP: Marina Owens     Physical Exam:   BP (!) 89/62  Pulse 99  Temp 99  F (37.2  C) (Oral)  Wt 66.8 kg (147 lb 4.8 oz)  LMP 11/14/2016  SpO2 99%  Breastfeeding? No  BMI 22.4 kg/m2   Constitutional: Alert, oriented, pleasant, no acute distress, nontoxic appearing  Head: Normocephalic, atraumatic  Eyes: Extra-ocular movements intact, no scleral icterus  ENT: Oropharynx clear, moist mucus membranes, good dentition  Neck: Supple, no lymphadenopathy, no thyromegaly   Cardiovascular: Regular rate and rhythm, no murmurs, rubs or gallops, peripheral pulses full/symmetric  Respiratory: Good air movement bilaterally, lungs clear, no wheezes/rales/rhonchi  GI: Abdomen soft, bowel sounds present, nondistended, nontender, marked hepatomegaly felt throughout the right and left upper quadrants, no rebound/guarding, no ascites  Musculoskeletal: No edema, normal muscle tone, normal gait  Neurologic: Alert and oriented, cranial nerves 2-12 intact.  Skin: No rashes/lesions  Psychiatric: normal mentation, affect and mood    CRP Inflammation, 7/13/2018:  Component      Latest Ref Rng & Units 7/13/2018   CRP Inflammation      0.0 - 8.0 mg/L 73.0 (H)     MR Abdomen MRCP w/o & w Contrast, 7/2/2018:  FINDINGS:  Abdomen: Enlarged polycystic liver with innumerable T2 hyperintense  cysts, causing mass effect on the stomach, bowel, and right kidney.  There are  "additional hepatic cysts with intermediate signal intensity,  probably containing proteinaceous/hemorrhagic products. In the  peripheral left hepatic lobe, there is a T2 hyperintense 1.3 cm focus  which has homogeneous enhancement on arterial phase and persistent  enhancement on delayed phase imaging (axial series 20, image 12).  No gallstones or gallbladder distention. Although the central biliary  tree is poorly visualized, there is no intrahepatic or extrahepatic  biliary dilatation.   The spleen, kidneys, adrenal glands, and pancreas are normal.  Partially visualized small and large bowel are unremarkable. Scattered  small retroperitoneal lymph nodes, presumably reactive. Small volume  ascites.  Lung bases: Bilateral small layering pleural effusions.  Bones: Probable hemangiomas in the L2 and L4 vertebral bodies.  IMPRESSION:   1. Poor visualization of the central biliary tree. However, there is  no significant intrahepatic or extrahepatic biliary dilatation. The  prior peribiliary T2 hyperintensity/enhancement with peripheral ductal  prominence has improved from 7/5/2018, likely representing improving  cholangitis.   2. Enlarged polycystic liver without a suspicious liver lesion or  evidence for superinfected cyst.  3. Small bilateral layering pleural effusions, mildly increased from  prior. Small volume ascites.  RICH ZAPATA MD    Endoscopic Retrograde Cholangiopancreatography, 7/2/2018:  Findings:        A  film of the right upper quadrant was unremarkable. Limited white        light views of the foregut were notable for a mildly distorted sweep and        normal appearing ampulla. The ventral pancreatic duct was first deeply        cannulated with the short-nosed traction sphincterotome in concert with        an 0.025\" Acro2. Contrast was injected and I personally interpreted the        pancreatic duct images. Ductal flow of contrast was adequate, image        quality was excellent and contrast " extended to the pancreatic duct. The        entire pancreatic duct was normal appearing and dual wire cannulation        was pursued. The bile duct was deeply cannulated with the short-nosed        traction sphincterotome and 12 mm balloon in concert with a second        Acro2. Contrast was injected. The right intrahepatics appeared        completely absent and or truncated at the just above the bifurcation,        while the left primary was long and meandering to the left lower        quadrant. The upper two thirds of the main bile duct was dilated and the        distal common duct narrowed normally. The cystic was patent and low        inserting. A 6 mm biliary sphincterotomy was made with a monofilament        traction (standard) sphincterotome using ERBE electrocautery. There was        no post-sphincterotomy bleeding. A 4 Fr by 11 cm Mcnamara stent with a        3/4 external pigtail and no internal flaps was placed 11 cm into the        ventral pancreatic duct. Clear fluid flowed through the stent and the        stent was in good position. An 8.5 Fr by 22 cm transpapillary Johlin        stent with no external flaps and no internal flaps was placed 21 cm into        the left hepatic duct. A 7 Fr by 8 cm transpapillary Zimmon stent with        an external pigtail and a single internal flap was placed 8 cm into the        left hepatic duct. Bile flowed through the stents and the stents were in        good position.                                                              Impression:        - Mild distortion of the duodenal sweep   - Normal ampulla status post biliary sphincterotomy   - Dual wire cannulation required, with normal pancreatography and placement of a prophylactic pancreatic stent   - Upper 2/3s dilation of the common duct upstream of an unremarkable distal duct   - Truncation of the right intrahepatics with impressive elongation of the left primary meandering to the left lower quadrant   -  Successful placement of an 8.5F 22cm stent to the left intrehaptics and a 7F 8cm stent to the common duct   SHERRIE WU MD    Assessment and Plan:  Hospital discharge follow-up  The patient's hospital course was reviewed.  The discharge medications were reviewed and reconciled.  Home care needs were assessed today and necessary orders placed.    Jade was hospitalized for eleven days for fever of unknown origin, which was ultimately felt to be related to atypical cholangitis in the setting of her severe polycystic liver disease. She had an ERCP with stent placement and was discharged on 2 weeks of IV ertapenem and oral cipro. Thus far she has not had any fevers over 100 F and seems to be improving except for marked fatigue. We discussed obtaining lab work towards the end of her antibiotic course, and if all is well we will stop antibiotics and pull her picc line next Monday. She will schedule follow-up appointments with Infectious Disease, Hepatology, and the Panc-bili team for repeat ERCP.     Fever, unknown origin  - CRP inflammation  - CBC with platelets  - M Tuberculosis by Quantiferon    Cholangitis  - CRP inflammation  - CBC with platelets  - M Tuberculosis by Quantiferon    Nonspecific abnormal results of liver function study  - CRP inflammation  - CBC with platelets  - M Tuberculosis by Quantiferon        #Routine Health Maintenance:  Immunizations (zoster, pneumovax, flu, Tdap, Hep A/B):        Most Recent Immunizations   Administered Date(s) Administered     Hepatitis A (Adult) 10/28/2008     Hepatitis B 05/29/2009     TDAP Vaccine (Boostrix) 09/26/2008     Zoster vaccine, live 06/24/2016      Lipids:       Recent Labs   Lab Test  05/22/17   1022   CHOL  195   HDL  77   LDL  106*   TRIG  63      Lung Ca Screening (>30 pk age 55-79 or >20 py age 50-79 + RF): n/a  Colonoscopy (50-75 yrs): 3/15, hemorrhoids, repeat 10 years  Dexa (>65W or 70M yrs): deferred  Mammogram (40-75 yrs): 6/16 Pathologic  findings:  RIGHT BREAST, 3 O'CLOCK, 2 CM FROM NIPPLE, ULTRASOUND-GUIDED CORE BIOPSY:   1. Non-proliferative fibrocystic change with cystic apocrine metaplasia   2. Calcifications: Present   3. Negative for atypia and malignancy  Pap (21-65 yrs): 6/16 NIL  Pelvic/Breast: 6/16  GC/Chlam (<25 yrs): n/a  HIV/HCV if risk factors: n/a  Safety/Lifestyle: not high risk  Tob/EtOH: n/a  Depression: screen neg  Advanced Directive: deferred       Follow-up: as indicated         Scribe Disclosure:   I, Hetal Agudelo, am serving as a scribe to document services personally performed by Marina Owens MD at this visit, based upon the provider's statements to me. All documentation has been reviewed by the aforementioned provider prior to being entered into the official medical record.     Portions of this medical record were completed by a scribe. UPON MY REVIEW AND AUTHENTICATION BY ELECTRONIC SIGNATURE, this confirms (a) I performed the applicable clinical services, and (b) the record is accurate.   Marina Owens MD  Internal Medicine

## 2018-07-16 NOTE — TELEPHONE ENCOUNTER
Health Call Center    Phone Message    May a detailed message be left on voicemail: yes    Reason for Call: Other: Patient was discharged from [facility] on 7/2/2018 and is to follow-up with Dr. Dumas next week. Patient declined to schedule the next available.      Action Taken: Message routed to:  [facility] Infectious Disease

## 2018-07-16 NOTE — PATIENT INSTRUCTIONS
[facility]: [phone]     [facility] Medication Refill Request Information:  * Please contact your pharmacy regarding ANY request for medication refills.  ** [facility] Prescription Fax = [phone]  * Please allow 3 business days for routine medication refills.  * Please allow 5 business days for controlled substance medication refills.     [facility] Test Result notification information:  *You will be notified with in 7-10 days of your appointment day regarding the results of your test.  If you are on MyChart you will be notified as soon as the provider has reviewed the results and signed off on them.        Schedule with Liver doctor Dr. Cruz and Dr. Hobbs, Infectious disease with Dr. Dumas    Labs orders next week

## 2018-07-17 ENCOUNTER — HOME INFUSION (PRE-WILLOW HOME INFUSION) (OUTPATIENT)
Dept: PHARMACY | Facility: CLINIC | Age: 53
End: 2018-07-17

## 2018-07-17 LAB
AST SERPL W P-5'-P-CCNC: NORMAL U/L (ref 0–45)
BACTERIA SPEC CULT: NO GROWTH
BACTERIA SPEC CULT: NO GROWTH
BASOPHILS # BLD AUTO: 0 10E9/L (ref 0–0.2)
BASOPHILS NFR BLD AUTO: 0.4 %
BUN SERPL-MCNC: 12 MG/DL (ref 7–30)
CREAT SERPL-MCNC: 0.48 MG/DL (ref 0.52–1.04)
CRP SERPL-MCNC: 36.3 MG/L (ref 0–8)
DIFFERENTIAL METHOD BLD: ABNORMAL
EOSINOPHIL # BLD AUTO: 0.2 10E9/L (ref 0–0.7)
EOSINOPHIL NFR BLD AUTO: 2.4 %
ERYTHROCYTE [DISTWIDTH] IN BLOOD BY AUTOMATED COUNT: 13.5 % (ref 10–15)
GFR SERPL CREATININE-BSD FRML MDRD: >90 ML/MIN/1.7M2
HCT VFR BLD AUTO: 34.2 % (ref 35–47)
HGB BLD-MCNC: 10.4 G/DL (ref 11.7–15.7)
IMM GRANULOCYTES # BLD: 0.2 10E9/L (ref 0–0.4)
IMM GRANULOCYTES NFR BLD: 1.8 %
LYMPHOCYTES # BLD AUTO: 1.1 10E9/L (ref 0.8–5.3)
LYMPHOCYTES NFR BLD AUTO: 12.8 %
Lab: NORMAL
Lab: NORMAL
MCH RBC QN AUTO: 28.7 PG (ref 26.5–33)
MCHC RBC AUTO-ENTMCNC: 30.4 G/DL (ref 31.5–36.5)
MCV RBC AUTO: 95 FL (ref 78–100)
MONOCYTES # BLD AUTO: 1.1 10E9/L (ref 0–1.3)
MONOCYTES NFR BLD AUTO: 12.6 %
NEUTROPHILS # BLD AUTO: 5.8 10E9/L (ref 1.6–8.3)
NEUTROPHILS NFR BLD AUTO: 70 %
NRBC # BLD AUTO: 0 10*3/UL
NRBC BLD AUTO-RTO: 0 /100
PLATELET # BLD AUTO: 378 10E9/L (ref 150–450)
RBC # BLD AUTO: 3.62 10E12/L (ref 3.8–5.2)
SPECIMEN SOURCE: NORMAL
SPECIMEN SOURCE: NORMAL
WBC # BLD AUTO: 8.3 10E9/L (ref 4–11)

## 2018-07-17 PROCEDURE — 85025 COMPLETE CBC W/AUTO DIFF WBC: CPT | Performed by: INTERNAL MEDICINE

## 2018-07-17 PROCEDURE — 84450 TRANSFERASE (AST) (SGOT): CPT | Performed by: INTERNAL MEDICINE

## 2018-07-17 PROCEDURE — 84520 ASSAY OF UREA NITROGEN: CPT | Performed by: INTERNAL MEDICINE

## 2018-07-17 PROCEDURE — 86140 C-REACTIVE PROTEIN: CPT | Performed by: INTERNAL MEDICINE

## 2018-07-17 PROCEDURE — 82565 ASSAY OF CREATININE: CPT | Performed by: INTERNAL MEDICINE

## 2018-07-17 NOTE — PROGRESS NOTES
This is a recent snapshot of the patient's [facility] medical record.  For current drug dose and complete information and questions, call [phone] or In Basket pool, fv home infusion ([id number])  CSN Number:  [id number]

## 2018-07-17 NOTE — TELEPHONE ENCOUNTER
[employer] called and asked if patient has tried/failed Meropenem.  Insurance was told patient had a reaction to penicillin per note and due to similar chemical structures physician does not want patient to be on Meropenem.

## 2018-07-17 NOTE — TELEPHONE ENCOUNTER
PA Initiation    Medication: Invanz 1gm   Insurance Company: [employer] - Phone [phone] Fax [phone]  Pharmacy Filling the Rx:    Filling Pharmacy Phone:    Filling Pharmacy Fax:    Start Date: 7/17/2018    THIS HAS BEEN SUBMITTED BY THE PRIOR-AUTHORIZATION TEAM. ANY QUESTIONS PLEASE CALL [phone]. THANK YOU    Initiated manual form and faxed to [employer] [phone]. Will await response.

## 2018-07-17 NOTE — TELEPHONE ENCOUNTER
[facility]    Phone Message    May a detailed message be left on voicemail: yes    Reason for Call: Other: PT's , Eleazar is following up on an earlier hosp. follow up with Dr. Dumas and he is also requesting clarification on   ciprofloxacin (CIPRO) 500 MG tablet and ertapenem (INVANZ) 1 GM vial.  Please follow up with Eleazar at [phone].     Action Taken: Message routed to:  [facility] ID

## 2018-07-18 ENCOUNTER — HOME INFUSION (PRE-WILLOW HOME INFUSION) (OUTPATIENT)
Dept: PHARMACY | Facility: CLINIC | Age: 53
End: 2018-07-18

## 2018-07-18 DIAGNOSIS — K83.09 CHOLANGITIS (H): ICD-10-CM

## 2018-07-18 RX ORDER — CIPROFLOXACIN 500 MG/1
500 TABLET, FILM COATED ORAL EVERY 12 HOURS
Qty: 10 TABLET | Refills: 0 | Status: SHIPPED | OUTPATIENT
Start: 2018-07-18 | End: 2018-07-18

## 2018-07-18 RX ORDER — CIPROFLOXACIN 500 MG/1
500 TABLET, FILM COATED ORAL EVERY 12 HOURS
Qty: 10 TABLET | Refills: 0 | Status: SHIPPED | OUTPATIENT
Start: 2018-07-18 | End: 2018-08-16

## 2018-07-18 NOTE — TELEPHONE ENCOUNTER
Called pt's  who reports his wife only received 9 days of cipro and was supposed to get 14 days. This is verified by pt's discharge note from Dr Escamilla so 5 more days of Cipro e-sent to pt's pharmacy. Pt also supposed to be done with IV Ertapenem on sat 7/21 so apt was made with Dr Escamilla for tomorrow to determine LOT for pt. Pt's  verbalized understanding of plan.  Stacy Coombs RN

## 2018-07-18 NOTE — TELEPHONE ENCOUNTER
[facility]    Phone Message    May a detailed message be left on voicemail: yes    Reason for Call: Other: PT's , Eleazar is following up on an earlier hosp. follow up with Dr. Dumas and he is also requesting clarification on   ciprofloxacin (CIPRO) 500 MG tablet and ertapenem (INVANZ) 1 GM vial.  Please follow up with Eleazar at [phone]     Action Taken: Message routed to:  [facility]): INFECTIOUS DISEASE

## 2018-07-18 NOTE — PROGRESS NOTES
This is a recent snapshot of the patient's [facility] medical record.  For current drug dose and complete information and questions, call [phone] or In Basket pool, fv home infusion ([id number])  CSN Number:  [id number]

## 2018-07-18 NOTE — TELEPHONE ENCOUNTER
Prior Authorization Approval    Authorization Effective Date: 6/17/2018  Authorization Expiration Date: 7/17/2019  Medication: Invanz 1gm  APPROVED   Approved Dose/Quantity:   Reference #:     Insurance Company: [email or web address] - Phone [phone] Fax [phone]  Expected CoPay:       CoPay Card Available:      Foundation Assistance Needed:    Which Pharmacy is filling the prescription (Not needed for infusion/clinic administered):    Pharmacy Notified: Yes  Patient Notified: No      Per call from [facility] medication is approved and is back-dated to 6/17/2018.

## 2018-07-19 ENCOUNTER — OFFICE VISIT (OUTPATIENT)
Dept: INFECTIOUS DISEASES | Facility: CLINIC | Age: 53
End: 2018-07-19
Attending: INTERNAL MEDICINE
Payer: COMMERCIAL

## 2018-07-19 VITALS
WEIGHT: 145.8 LBS | DIASTOLIC BLOOD PRESSURE: 71 MMHG | TEMPERATURE: 98.6 F | BODY MASS INDEX: 22.17 KG/M2 | HEART RATE: 110 BPM | OXYGEN SATURATION: 96 % | SYSTOLIC BLOOD PRESSURE: 100 MMHG

## 2018-07-19 DIAGNOSIS — K76.89 LIVER CYST: ICD-10-CM

## 2018-07-19 DIAGNOSIS — R50.9 FEVER, UNSPECIFIED FEVER CAUSE: ICD-10-CM

## 2018-07-19 DIAGNOSIS — R50.9 FUO (FEVER OF UNKNOWN ORIGIN): ICD-10-CM

## 2018-07-19 DIAGNOSIS — D72.810 LYMPHOPENIA: ICD-10-CM

## 2018-07-19 DIAGNOSIS — R50.9 FEVER, UNSPECIFIED FEVER CAUSE: Primary | ICD-10-CM

## 2018-07-19 LAB
ALBUMIN SERPL-MCNC: 3.4 G/DL (ref 3.4–5)
ALP SERPL-CCNC: 155 U/L (ref 40–150)
ALT SERPL W P-5'-P-CCNC: 27 U/L (ref 0–50)
ANION GAP SERPL CALCULATED.3IONS-SCNC: 7 MMOL/L (ref 3–14)
AST SERPL W P-5'-P-CCNC: 28 U/L (ref 0–45)
BILIRUB DIRECT SERPL-MCNC: 0.2 MG/DL (ref 0–0.2)
BILIRUB SERPL-MCNC: 0.8 MG/DL (ref 0.2–1.3)
BUN SERPL-MCNC: 11 MG/DL (ref 7–30)
CALCIUM SERPL-MCNC: 9.5 MG/DL (ref 8.5–10.1)
CHLORIDE SERPL-SCNC: 98 MMOL/L (ref 94–109)
CO2 SERPL-SCNC: 30 MMOL/L (ref 20–32)
CREAT SERPL-MCNC: 0.61 MG/DL (ref 0.52–1.04)
CRP SERPL-MCNC: 39.7 MG/L (ref 0–8)
ERYTHROCYTE [DISTWIDTH] IN BLOOD BY AUTOMATED COUNT: 12.8 % (ref 10–15)
GFR SERPL CREATININE-BSD FRML MDRD: >90 ML/MIN/1.7M2
GGT SERPL-CCNC: 104 U/L (ref 0–40)
GLUCOSE SERPL-MCNC: 86 MG/DL (ref 70–99)
HCT VFR BLD AUTO: 36.1 % (ref 35–47)
HGB BLD-MCNC: 11.5 G/DL (ref 11.7–15.7)
LDH SERPL L TO P-CCNC: 212 U/L (ref 81–234)
MCH RBC QN AUTO: 29.9 PG (ref 26.5–33)
MCHC RBC AUTO-ENTMCNC: 31.9 G/DL (ref 31.5–36.5)
MCV RBC AUTO: 94 FL (ref 78–100)
PLATELET # BLD AUTO: 377 10E9/L (ref 150–450)
POTASSIUM SERPL-SCNC: 3.7 MMOL/L (ref 3.4–5.3)
PROT SERPL-MCNC: 8.9 G/DL (ref 6.8–8.8)
RBC # BLD AUTO: 3.84 10E12/L (ref 3.8–5.2)
SODIUM SERPL-SCNC: 135 MMOL/L (ref 133–144)
WBC # BLD AUTO: 8.5 10E9/L (ref 4–11)

## 2018-07-19 PROCEDURE — 86606 ASPERGILLUS ANTIBODY: CPT | Performed by: INTERNAL MEDICINE

## 2018-07-19 PROCEDURE — 86803 HEPATITIS C AB TEST: CPT | Performed by: INTERNAL MEDICINE

## 2018-07-19 PROCEDURE — 36415 COLL VENOUS BLD VENIPUNCTURE: CPT | Performed by: INTERNAL MEDICINE

## 2018-07-19 PROCEDURE — 86704 HEP B CORE ANTIBODY TOTAL: CPT | Performed by: INTERNAL MEDICINE

## 2018-07-19 PROCEDURE — 86788 WEST NILE VIRUS AB IGM: CPT | Performed by: INTERNAL MEDICINE

## 2018-07-19 PROCEDURE — 86789 WEST NILE VIRUS ANTIBODY: CPT | Performed by: INTERNAL MEDICINE

## 2018-07-19 PROCEDURE — 86480 TB TEST CELL IMMUN MEASURE: CPT | Performed by: INTERNAL MEDICINE

## 2018-07-19 PROCEDURE — 87389 HIV-1 AG W/HIV-1&-2 AB AG IA: CPT | Performed by: INTERNAL MEDICINE

## 2018-07-19 PROCEDURE — 86747 PARVOVIRUS ANTIBODY: CPT | Performed by: INTERNAL MEDICINE

## 2018-07-19 PROCEDURE — 86708 HEPATITIS A ANTIBODY: CPT | Performed by: INTERNAL MEDICINE

## 2018-07-19 PROCEDURE — 86665 EPSTEIN-BARR CAPSID VCA: CPT | Performed by: INTERNAL MEDICINE

## 2018-07-19 PROCEDURE — 86431 RHEUMATOID FACTOR QUANT: CPT | Performed by: INTERNAL MEDICINE

## 2018-07-19 PROCEDURE — 86706 HEP B SURFACE ANTIBODY: CPT | Performed by: INTERNAL MEDICINE

## 2018-07-19 PROCEDURE — 87385 HISTOPLASMA CAPSUL AG IA: CPT | Performed by: INTERNAL MEDICINE

## 2018-07-19 PROCEDURE — 82248 BILIRUBIN DIRECT: CPT | Performed by: INTERNAL MEDICINE

## 2018-07-19 PROCEDURE — 86698 HISTOPLASMA ANTIBODY: CPT | Performed by: INTERNAL MEDICINE

## 2018-07-19 PROCEDURE — G0463 HOSPITAL OUTPT CLINIC VISIT: HCPCS | Mod: ZF

## 2018-07-19 PROCEDURE — 80053 COMPREHEN METABOLIC PANEL: CPT | Performed by: INTERNAL MEDICINE

## 2018-07-19 PROCEDURE — 83615 LACTATE (LD) (LDH) ENZYME: CPT | Performed by: INTERNAL MEDICINE

## 2018-07-19 PROCEDURE — 87449 NOS EACH ORGANISM AG IA: CPT | Performed by: INTERNAL MEDICINE

## 2018-07-19 PROCEDURE — 86635 COCCIDIOIDES ANTIBODY: CPT | Performed by: INTERNAL MEDICINE

## 2018-07-19 PROCEDURE — 87340 HEPATITIS B SURFACE AG IA: CPT | Performed by: INTERNAL MEDICINE

## 2018-07-19 PROCEDURE — 82977 ASSAY OF GGT: CPT | Performed by: INTERNAL MEDICINE

## 2018-07-19 PROCEDURE — 86612 BLASTOMYCES ANTIBODY: CPT | Performed by: INTERNAL MEDICINE

## 2018-07-19 ASSESSMENT — PAIN SCALES - GENERAL: PAINLEVEL: NO PAIN (0)

## 2018-07-19 NOTE — NURSING NOTE
Chief Complaint   Patient presents with     RECHECK     fevers     /71  Pulse 110  Temp 98.6  F (37  C) (Oral)  Wt 66.1 kg (145 lb 12.8 oz)  LMP 11/14/2016  SpO2 96%  BMI 22.17 kg/m2   Gaviota Silva

## 2018-07-19 NOTE — PATIENT INSTRUCTIONS
- lab today   - stop Ertapenem tomorrow Friday ( will complete 14 days)   - complete cipro  - repeat lab work - CMP, CBC, CRP, ESR ( after antibiotic)   - patient/ home health nurse to call on Monday to see if PICC can be removed   - Primary care physician - to follow- on thyroid nodule , TSH and free T4 in the future   - if fever persists/ recurs after you have completed antibiotics , consider TASNEEM ( transesophageal echo)

## 2018-07-19 NOTE — MR AVS SNAPSHOT
After Visit Summary   7/19/2018    Jade Carcamo    MRN: [id number]           Patient Information     Date Of Birth          1965        Visit Information        Provider Department      7/19/2018 11:00 AM Radames Tesfaye MD [facility]        Today's Diagnoses     Fever, unspecified fever cause    -  1    Liver cyst          Care Instructions    - lab today   - stop Ertapenem tomorrow Friday ( will complete 14 days)   - complete cipro  - repeat lab work - CMP, CBC, CRP, ESR ( after antibiotic)   - patient/ home health nurse to call on Monday to see if PICC can be removed   - Primary care physician - to follow- on thyroid nodule , TSH and free T4 in the future   - if fever persists/ recurs after you have completed antibiotics , consider TASNEEM ( transesophageal echo)               Follow-ups after your visit        Your next 10 appointments already scheduled     Jul 19, 2018  1:15 PM CDT   Lab with  LAB   [facility] ([facility])    [address] [place] [address]  1st Floor  [facility] [id number] [phone]            Aug 16, 2018  9:30 AM CDT   (Arrive by 9:15 AM)   Return Visit with Britt Dumas MD   [facility] ([facility])    [address] [place] [address]  [address]  [facility] [id number] [phone]            Sep 05, 2018  2:00 PM CDT   Lab with  LAB   [facility] ([facility])    [address] [place] [address]  1st Floor  [facility] [id number] [phone]            Sep 05, 2018  3:00 PM CDT   (Arrive by 2:45 PM)   New General Liver with Davis Hobbs MD   [facility] ([facility])    [address] [place] [address]  [address]  [facility] [id number] [phone]              Who to contact     If you have questions or need follow up information about today's clinic visit or your schedule please contact [facility] directly at [phone].  Normal or non-critical lab and imaging results will be communicated to you by MyChart, letter or phone within 4 business days after the clinic has received the results. If you do not hear from us within 7 days, please contact the clinic through MyChart or phone. If you have a critical or abnormal lab result, we will notify you by phone as soon as possible.  Submit refill requests through [employer] or call your pharmacy and they will forward the refill request to us. Please allow 3 business days for your refill to be completed.          Additional Information About Your Visit        [facility] Information     [employer] gives you secure access to your electronic health record. If you see a primary care provider, you can also send messages to your care team and make appointments. If you have questions, please call your primary care clinic.  If you do not have a primary care provider, please call [phone] and they will assist you.        Care EveryWhere ID     This is your Care EveryWhere ID. This could be used by other organizations to access your [facility] medical records  [id number]        Your Vitals Were     Pulse Temperature Last Period Pulse Oximetry BMI (Body Mass Index)       110 98.6  F (37  C) (Oral) 11/14/2016 96% 22.17 kg/m2        Blood Pressure from Last 3 Encounters:   07/19/18 100/71   07/16/18 (!) 89/62   07/13/18 138/84    Weight from Last 3 Encounters:   07/19/18 66.1 kg (145 lb 12.8 oz)   07/16/18 66.8 kg (147 lb 4.8 oz)   07/09/18 78.8 kg (173 lb 11.2 oz)               Primary Care Provider Office Phone # Fax #    Marina Owens [phone] [address] [address]        Equal Access to Services     HENRRY HALL : Susana Lees, waedy cruz, qaybstephanie fisheraloneal tovar, nehemiah worrell. So [facility] [phone].    ATENCIÓN: Radha huerta  español, tiene a francisco disposición servicios gratuitos de asistencia lingüística. Justin hinton [phone].    We comply with applicable federal civil rights laws and [place] laws. We do not discriminate on the basis of race, color, national origin, age, disability, sex, sexual orientation, or gender identity.            Thank you!     Thank you for choosing [facility]  for your care. Our goal is always to provide you with excellent care. Hearing back from our patients is one way we can continue to improve our services. Please take a few minutes to complete the written survey that you may receive in the mail after your visit with us. Thank you!             Your Updated Medication List - Protect others around you: Learn how to safely use, store and throw away your medicines at www.disposemymeds.org.          This list is accurate as of 7/19/18  1:02 PM.  Always use your most recent med list.                   Brand Name Dispense Instructions for use Diagnosis    acetaminophen 500 MG Caps     60 capsule    Take 2 capsules by mouth every 8 hours as needed For aches, pain, fever        calcium carbonate 500 MG chewable tablet    TUMS    150 tablet    Take 1 tablet (500 mg) by mouth daily    Acid indigestion       ciprofloxacin 500 MG tablet    CIPRO    10 tablet    Take 1 tablet (500 mg) by mouth every 12 hours    Cholangitis       ertapenem 1 GM vial    INVanz    90 mL    Inject 1 g into the vein every 24 hours for 9 days    Cholangitis       nystatin [id number] UNIT/ML suspension    MYCOSTATIN    240 mL    Take 5 mLs (500,000 Units) by mouth 4 times daily for 12 days    Thrush of mouth and esophagus (H)

## 2018-07-19 NOTE — PROGRESS NOTES
This is a recent snapshot of the patient's [facility] medical record.  For current drug dose and complete information and questions, call [phone] or In Basket pool, [facility] home infusion ([id number])  CSN Number:  [id number]

## 2018-07-19 NOTE — LETTER
7/19/2018      RE: Jade Carcamo  [address] [address]       INFECTIOUS DISEASES PROGRESS NOTE    Infectious Diseases Clinic     SUBJECTIVE:                                                      Jade Carcamo is a 52 year old female who presents to clinic today for the following health issues: fever, presumed cholangitis     She is here with her . They have requested clinic f/u today to discuss labs/ antibiotic management.     She has T max 99.5F ( oral) . No nightsweats. She is feeling stronger and better on a daily basis. No pain. She passed 1 stent recently. No abdominal pain/bloating. No oral sores/ pain. Appetite is improving. No nausea/vomiting/ diarrhea. No problems with PICC line.     ROS:  CONSTITUTIONAL:NEGATIVE for fever, chills, change in weight  INTEGUMENTARY/SKIN: NEGATIVE for worrisome rashes, moles or lesions  EYES: NEGATIVE for vision changes or irritation  ENT/MOUTH: NEGATIVE for ear, mouth and throat problems  RESP:NEGATIVE for significant cough or SOB  CV: NEGATIVE for chest pain, palpitations or peripheral edema  GI: NEGATIVE for nausea, abdominal pain, heartburn, or change in bowel habits  : NEGATIVE for urinary frequency, hematuria or dysuria  MUSCULOSKELETAL: NEGATIVE for significant arthralgias or myalgia  NEURO: NEGATIVE for weakness, dizziness or paresthesias  ENDOCRINE: NEGATIVE for temperature intolerance, skin/hair changes  HEME/ALLERGY/IMMUNE: NEGATIVE for bleeding problems  PSYCHIATRIC: NEGATIVE for changes in mood or affect    Problem list and histories reviewed & adjusted, as indicated.  Additional history: as documented    PAST MEDICAL HISTORY:  Patient  has a past medical history of GERD (gastroesophageal reflux disease) and Polycystic liver disease.    PAST SURGICAL HISTORY:  Patient  has a past surgical history that includes Laparoscopic unroofing liver cyst (N/A, 11/4/2016) and Endoscopic retrograde cholangiopancreatogram (N/A,  7/9/2018).    CURRENT MEDICATIONS:  Current Outpatient Prescriptions   Medication Sig Dispense Refill     acetaminophen 500 MG CAPS Take 2 capsules by mouth every 8 hours as needed For aches, pain, fever 60 capsule 0     calcium carbonate (TUMS) 500 MG chewable tablet Take 1 tablet (500 mg) by mouth daily 150 tablet 0     ciprofloxacin (CIPRO) 500 MG tablet Take 1 tablet (500 mg) by mouth every 12 hours 10 tablet 0     ertapenem (INVANZ) 1 GM vial Inject 1 g into the vein every 24 hours for 9 days 90 mL 0     nystatin (MYCOSTATIN) 940349 UNIT/ML suspension Take 5 mLs (500,000 Units) by mouth 4 times daily for 12 days 240 mL 0     ALLERGY:  Allergies   Allergen Reactions     Bee Venom      Penicillins Rash     Rash limited to arm ~20 years ago. See Antimicrobial Management Team allergy assessment note on 7/10/2018     IMMUNIZATION HISTORY:  Immunization History   Administered Date(s) Administered     HepA-Adult 09/26/2008, 10/28/2008     HepB 09/26/2008, 10/28/2008, 05/29/2009     TDAP Vaccine (Boostrix) 09/26/2008     Zoster vaccine, live 06/24/2016     SOCIAL HISTORY:  Patient  reports that she has never smoked. She has never used smokeless tobacco. She reports that she drinks alcohol. She reports that she does not use illicit drugs.    FAMILY HISTORY:  Patient's family history includes Alzheimer Disease in her mother; HEART DISEASE in her father; Other - See Comments in her brother; Psoriasis in her maternal grandfather; Thyroid Disease in her sister.    Labs reviewed in EPIC    OBJECTIVE:                                                    Vitals: /71  Pulse 110  Temp 98.6  F (37  C) (Oral)  Wt 66.1 kg (145 lb 12.8 oz)  LMP 11/14/2016  SpO2 96%  BMI 22.17 kg/m2  BMI= Body mass index is 22.17 kg/(m^2).  GENERAL: healthy, alert and no distress  EYES: Eyes grossly normal to inspection, PERRL and conjunctivae and sclerae normal  HENT: o/p clear   NECK: no adenopathy, no asymmetry, masses, or scars and  thyroid normal to palpation  RESP: lungs clear to auscultation - no rales, rhonchi or wheezes  CV: tachy and rhythm, normal S1 S2, no murmur, click or rub, no peripheral edema and peripheral pulses strong  ABDOMEN: soft, nontender, no hepatosplenomegaly, no masses and bowel sounds normal  MS: no gross musculoskeletal defects noted, no edema  SKIN: no suspicious lesions or rashes  NEURO: Normal strength and tone, mentation intact and speech normal  BACK: no CVA tenderness, no paralumbar tenderness  PSYCH: mentation appears normal, affect normal  LYMPH: no cervical, supraclavicular, axillary, or inguinal adenopathy       ASSESSMENT AND PLAN:                                                    Jade Carcamo is a 53 y/o woman admitted on 7/2 for persistent fever with associated symptoms including fatigue, diffuse myalgias, body aches, chills, nausea and vomiting.  PMHx includes polycystic liver disease s/p laparoscopic un-karina (2016).     1. Fever - for about 1 month  - DDX - presumed cholangistis, viral,   - since discharge from hospital on 7/13/18, her T max has been less than 99.5F without night sweats. Overall, feeling better and stronger  - denies any pain   - no diarrhea. Tolerates antibiotics . On Ertapenema nd cipro PO      Blood culture (7/11) - negative   Urinalysis (7/11) - negative  Blastomyces Agn Quant EIA - none detected  Histoplasma capsulatum - none detected  Coccidiodes Agn Quant EIA - none detected  Ehrlichia  - negative  Lyme screen - negative  Anaplasma - negative   Babesia - negative  RMSF - negative  CMV - negative  EBV - low titer (1863 --> 592 copies / ml )   Parvovirus - previous infection  CK - normal  Blood cultures x 4 negative to date (only first one was prior to doxycycline)  Urine culture low levels mixed urogenital jayna  ROBBY - negative  TTE - bicuspid aortic valve. No evidence endocarditis  Hepatititis A - IgG positive. Reflex IgM negative.   Hep B - negative surface ag and core ab.  "Surface ab not done.   Hep C - negative  HIV - negative  West Nile Virus serology negative    2. Leukocytosis w/ neutrophilic predominance - resolved  3. Elevated CRP - improving  4. Polycystic liver disease - U/S on 6/23 showed enlarging, newly complex cyst. Questionable cholangitis on abdominal MRI  - ERCP (7/9/18) w/ stent placement x3 (left intrahepatics, common duct and prophylactic pancreatic stent); plan repeat ERCP 8 wks  - MRCP (7/12/18) no significant biliary dilation; likely representing improving cholangitis  - Abdominal distension - improved    5. Penicillin allergy - rxn of limited skin rash 20 yrs ago; tolerating ertapenem  6. EBV viremia - low titer  7. Lymphadenopathy - prominent lymph nodes < 1 cm chest, abdomen (CT) and previous  palpable cervical lymphadenopathy - resolved   - Mild splenomegaly  - MRI (7/5) \"mild splenomegaly.  No focal mass\"  - CT chest/ab/pelvis (6/29) spleen appears normal  8. Anasarca - resolved   9. Sore throat/ oral candidiasis  -  resolved with nystatin s/s     Recommendations:  - will complete 14 days of Ertapenem tomorrow. And she has 1 more week of ciprofloxacin (began 7/12) - to complete course for  presumptive cholangitis.   - oral candidiasis - resolved  - IgM/ IgG EBV VCA   - If fever persists/recurs will need TASNEEM. .   - Daily probiotic while on antibiotics   . Discussed potential side effects of antibiotics  - repeat CBC, CMP, CRP after she completes her antibiotics. Patient is advised to call if fever recurs  - patient will call next week , and if no fever, remove PICC  . Discussed potential problems with PICC   - f/u with PCP - thyroid nodule, TSH and free T4     She has future appt with Dr laura Tesfaye MD, M.Med.Sc  Division of Infectious Diseases and International Medicine  [facility]            "

## 2018-07-19 NOTE — PROGRESS NOTES
INFECTIOUS DISEASES PROGRESS NOTE    Infectious Diseases Clinic     SUBJECTIVE:                                                      Jade Carcamo is a 52 year old female who presents to clinic today for the following health issues: fever, presumed cholangitis     She is here with her . They have requested clinic f/u today to discuss labs/ antibiotic management.     She has T max 99.5F ( oral) . No nightsweats. She is feeling stronger and better on a daily basis. No pain. She passed 1 stent recently. No abdominal pain/bloating. No oral sores/ pain. Appetite is improving. No nausea/vomiting/ diarrhea. No problems with PICC line.     ROS:  CONSTITUTIONAL:NEGATIVE for fever, chills, change in weight  INTEGUMENTARY/SKIN: NEGATIVE for worrisome rashes, moles or lesions  EYES: NEGATIVE for vision changes or irritation  ENT/MOUTH: NEGATIVE for ear, mouth and throat problems  RESP:NEGATIVE for significant cough or SOB  CV: NEGATIVE for chest pain, palpitations or peripheral edema  GI: NEGATIVE for nausea, abdominal pain, heartburn, or change in bowel habits  : NEGATIVE for urinary frequency, hematuria or dysuria  MUSCULOSKELETAL: NEGATIVE for significant arthralgias or myalgia  NEURO: NEGATIVE for weakness, dizziness or paresthesias  ENDOCRINE: NEGATIVE for temperature intolerance, skin/hair changes  HEME/ALLERGY/IMMUNE: NEGATIVE for bleeding problems  PSYCHIATRIC: NEGATIVE for changes in mood or affect    Problem list and histories reviewed & adjusted, as indicated.  Additional history: as documented    PAST MEDICAL HISTORY:  Patient  has a past medical history of GERD (gastroesophageal reflux disease) and Polycystic liver disease.    PAST SURGICAL HISTORY:  Patient  has a past surgical history that includes Laparoscopic unroofing liver cyst (N/A, 11/4/2016) and Endoscopic retrograde cholangiopancreatogram (N/A, 7/9/2018).    CURRENT MEDICATIONS:  Current Outpatient Prescriptions   Medication Sig Dispense Refill      acetaminophen 500 MG CAPS Take 2 capsules by mouth every 8 hours as needed For aches, pain, fever 60 capsule 0     calcium carbonate (TUMS) 500 MG chewable tablet Take 1 tablet (500 mg) by mouth daily 150 tablet 0     ciprofloxacin (CIPRO) 500 MG tablet Take 1 tablet (500 mg) by mouth every 12 hours 10 tablet 0     ertapenem (INVANZ) 1 GM vial Inject 1 g into the vein every 24 hours for 9 days 90 mL 0     nystatin (MYCOSTATIN) 199458 UNIT/ML suspension Take 5 mLs (500,000 Units) by mouth 4 times daily for 12 days 240 mL 0     ALLERGY:  Allergies   Allergen Reactions     Bee Venom      Penicillins Rash     Rash limited to arm ~20 years ago. See Antimicrobial Management Team allergy assessment note on 7/10/2018     IMMUNIZATION HISTORY:  Immunization History   Administered Date(s) Administered     HepA-Adult 09/26/2008, 10/28/2008     HepB 09/26/2008, 10/28/2008, 05/29/2009     TDAP Vaccine (Boostrix) 09/26/2008     Zoster vaccine, live 06/24/2016     SOCIAL HISTORY:  Patient  reports that she has never smoked. She has never used smokeless tobacco. She reports that she drinks alcohol. She reports that she does not use illicit drugs.    FAMILY HISTORY:  Patient's family history includes Alzheimer Disease in her mother; HEART DISEASE in her father; Other - See Comments in her brother; Psoriasis in her maternal grandfather; Thyroid Disease in her sister.    Labs reviewed in EPIC    OBJECTIVE:                                                    Vitals: /71  Pulse 110  Temp 98.6  F (37  C) (Oral)  Wt 66.1 kg (145 lb 12.8 oz)  LMP 11/14/2016  SpO2 96%  BMI 22.17 kg/m2  BMI= Body mass index is 22.17 kg/(m^2).  GENERAL: healthy, alert and no distress  EYES: Eyes grossly normal to inspection, PERRL and conjunctivae and sclerae normal  HENT: o/p clear   NECK: no adenopathy, no asymmetry, masses, or scars and thyroid normal to palpation  RESP: lungs clear to auscultation - no rales, rhonchi or wheezes  CV: tachy  and rhythm, normal S1 S2, no murmur, click or rub, no peripheral edema and peripheral pulses strong  ABDOMEN: soft, nontender, no hepatosplenomegaly, no masses and bowel sounds normal  MS: no gross musculoskeletal defects noted, no edema  SKIN: no suspicious lesions or rashes  NEURO: Normal strength and tone, mentation intact and speech normal  BACK: no CVA tenderness, no paralumbar tenderness  PSYCH: mentation appears normal, affect normal  LYMPH: no cervical, supraclavicular, axillary, or inguinal adenopathy       ASSESSMENT AND PLAN:                                                    Jade Carcamo is a 51 y/o woman admitted on 7/2 for persistent fever with associated symptoms including fatigue, diffuse myalgias, body aches, chills, nausea and vomiting.  PMHx includes polycystic liver disease s/p laparoscopic un-karina (2016).     1. Fever - for about 1 month  - DDX - presumed cholangistis, viral,   - since discharge from hospital on 7/13/18, her T max has been less than 99.5F without night sweats. Overall, feeling better and stronger  - denies any pain   - no diarrhea. Tolerates antibiotics . On Ertapenema nd cipro PO      Blood culture (7/11) - negative   Urinalysis (7/11) - negative  Blastomyces Agn Quant EIA - none detected  Histoplasma capsulatum - none detected  Coccidiodes Agn Quant EIA - none detected  Ehrlichia  - negative  Lyme screen - negative  Anaplasma - negative   Babesia - negative  RMSF - negative  CMV - negative  EBV - low titer (1863 --> 592 copies / ml )   Parvovirus - previous infection  CK - normal  Blood cultures x 4 negative to date (only first one was prior to doxycycline)  Urine culture low levels mixed urogenital jayna  ROBBY - negative  TTE - bicuspid aortic valve. No evidence endocarditis  Hepatititis A - IgG positive. Reflex IgM negative.   Hep B - negative surface ag and core ab. Surface ab not done.   Hep C - negative  HIV - negative  West Nile Virus serology negative    2.  "Leukocytosis w/ neutrophilic predominance - resolved  3. Elevated CRP - improving  4. Polycystic liver disease - U/S on 6/23 showed enlarging, newly complex cyst. Questionable cholangitis on abdominal MRI  - ERCP (7/9/18) w/ stent placement x3 (left intrahepatics, common duct and prophylactic pancreatic stent); plan repeat ERCP 8 wks  - MRCP (7/12/18) no significant biliary dilation; likely representing improving cholangitis  - Abdominal distension - improved    5. Penicillin allergy - rxn of limited skin rash 20 yrs ago; tolerating ertapenem  6. EBV viremia - low titer  7. Lymphadenopathy - prominent lymph nodes < 1 cm chest, abdomen (CT) and previous  palpable cervical lymphadenopathy - resolved   - Mild splenomegaly  - MRI (7/5) \"mild splenomegaly.  No focal mass\"  - CT chest/ab/pelvis (6/29) spleen appears normal  8. Anasarca - resolved   9. Sore throat/ oral candidiasis  - resolved with nystatin s/s     Recommendations:  - will complete 14 days of Ertapenem tomorrow. And she has 1 more week of ciprofloxacin (began 7/12) - to complete course for  presumptive cholangitis.   - oral candidiasis - resolved  - IgM/ IgG EBV VCA   - If fever persists/recurs will need TASNEEM. .   - Daily probiotic while on antibiotics  . Discussed potential side effects of antibiotics  - repeat CBC, CMP, CRP after she completes her antibiotics. Patient is advised to call if fever recurs  - patient will call next week , and if no fever, remove PICC  . Discussed potential problems with PICC   - f/u with PCP - thyroid nodule, TSH and free T4     She has future appt with Dr laura Tesfaye MD, M.Med.Sc  Division of Infectious Diseases and International Medicine  [facility]        "

## 2018-07-20 ENCOUNTER — HOME INFUSION (PRE-WILLOW HOME INFUSION) (OUTPATIENT)
Dept: PHARMACY | Facility: CLINIC | Age: 53
End: 2018-07-20

## 2018-07-20 ENCOUNTER — CARE COORDINATION (OUTPATIENT)
Dept: GASTROENTEROLOGY | Facility: CLINIC | Age: 53
End: 2018-07-20

## 2018-07-20 LAB
EBV VCA IGG SER QL IA: >8 AI (ref 0–0.8)
EBV VCA IGM SER QL IA: <0.2 AI (ref 0–0.8)
HAV IGG SER QL IA: REACTIVE
HBV CORE AB SERPL QL IA: NONREACTIVE
HBV SURFACE AB SERPL IA-ACNC: >1000 M[IU]/ML
HBV SURFACE AG SERPL QL IA: NONREACTIVE
HCV AB SERPL QL IA: NONREACTIVE
HIV 1+2 AB+HIV1 P24 AG SERPL QL IA: NONREACTIVE
RHEUMATOID FACT SER NEPH-ACNC: <20 IU/ML (ref 0–20)

## 2018-07-20 NOTE — PROGRESS NOTES
Plan for ERCP in 8 weeks.       Patient called and is amenable to plan as noted and aware of the following:  Procedure explained to patient.  This is a non-urgent procedure.   Can expect a call for date and time for procedure.   Will need a , someone to stay with them for 24 hours and stay in town for 24 hours (within 45 min of Hospital) post procedure, rational explained.   Will get pre-op physical done locally and will fax a copy to us along with bringing a hard copy with them. Fax number given. [phone]  She will get her Pre-Op done with Dr. Owens.     Blood thinner -  N/A  ASA - N/A  Diabetic - N/A  A pre-op nurse will call 1-2 days prior to the procedure.   Is advised to be NPO/solid food at midnight before the procedure in the event the procedure time is moved up. Ok to drink clear liquids up to 6 hours prior to procedure.     Advised post procedure to start with clear liquids and advance diet slowly as tolerated.  It is normal to have a sore throat after the procedure.   May also have some muscle tenderness from positioning on the table.     Aware RN will call within 2-3 days post procedure.    Verbalized understanding of all instructions. All questions answered.   Contact information verified for future questions/concerns.     Katie SALGUERO, RN Coordinator  Dr. Mcnamara, Dr. Hart & Dr. Cavazos   Advanced Endoscopy  [phone]

## 2018-07-21 LAB
B19V IGG SER IA-ACNC: 6.89 IV
WNV IGG SER-ACNC: 0.31 IV
WNV IGM SER-ACNC: 0 IV

## 2018-07-22 LAB
ASPERGILLUS AB SER QL ID: NORMAL
B DERMAT AB SER QL ID: NORMAL
COCCIDIOIDES AB SPEC QL ID: NORMAL
H CAPSUL AB TITR SER ID: NORMAL {TITER}
LAB SCANNED RESULT: NORMAL
LAB SCANNED RESULT: NORMAL

## 2018-07-23 ENCOUNTER — TELEPHONE (OUTPATIENT)
Dept: INTERNAL MEDICINE | Facility: CLINIC | Age: 53
End: 2018-07-23

## 2018-07-23 ENCOUNTER — CARE COORDINATION (OUTPATIENT)
Dept: GASTROENTEROLOGY | Facility: CLINIC | Age: 53
End: 2018-07-23

## 2018-07-23 ENCOUNTER — HOME INFUSION (PRE-WILLOW HOME INFUSION) (OUTPATIENT)
Dept: PHARMACY | Facility: CLINIC | Age: 53
End: 2018-07-23

## 2018-07-23 LAB
ALBUMIN SERPL-MCNC: 3 G/DL (ref 3.4–5)
ALP SERPL-CCNC: 127 U/L (ref 40–150)
ALT SERPL W P-5'-P-CCNC: 22 U/L (ref 0–50)
ANION GAP SERPL CALCULATED.3IONS-SCNC: 6 MMOL/L (ref 3–14)
AST SERPL W P-5'-P-CCNC: 43 U/L (ref 0–45)
BASOPHILS # BLD AUTO: 0.2 10E9/L (ref 0–0.2)
BASOPHILS NFR BLD AUTO: 2.5 %
BILIRUB SERPL-MCNC: 0.9 MG/DL (ref 0.2–1.3)
BUN SERPL-MCNC: 11 MG/DL (ref 7–30)
CALCIUM SERPL-MCNC: 9.1 MG/DL (ref 8.5–10.1)
CHLORIDE SERPL-SCNC: 100 MMOL/L (ref 94–109)
CO2 SERPL-SCNC: 28 MMOL/L (ref 20–32)
CREAT SERPL-MCNC: 0.6 MG/DL (ref 0.52–1.04)
CRP SERPL-MCNC: 40.5 MG/L (ref 0–8)
DIFFERENTIAL METHOD BLD: ABNORMAL
EOSINOPHIL # BLD AUTO: 0.3 10E9/L (ref 0–0.7)
EOSINOPHIL NFR BLD AUTO: 5.6 %
ERYTHROCYTE [DISTWIDTH] IN BLOOD BY AUTOMATED COUNT: 13 % (ref 10–15)
GAMMA INTERFERON BACKGROUND BLD IA-ACNC: 0.05 IU/ML
GFR SERPL CREATININE-BSD FRML MDRD: >90 ML/MIN/1.7M2
GLUCOSE SERPL-MCNC: 93 MG/DL (ref 70–99)
HCT VFR BLD AUTO: 33.6 % (ref 35–47)
HGB BLD-MCNC: 10.8 G/DL (ref 11.7–15.7)
IMM GRANULOCYTES # BLD: 0 10E9/L (ref 0–0.4)
IMM GRANULOCYTES NFR BLD: 0.5 %
LYMPHOCYTES # BLD AUTO: 1.5 10E9/L (ref 0.8–5.3)
LYMPHOCYTES NFR BLD AUTO: 25.6 %
M TB IFN-G BLD-IMP: NEGATIVE
M TB IFN-G CD4+ BCKGRND COR BLD-ACNC: >10 IU/ML
MCH RBC QN AUTO: 29.6 PG (ref 26.5–33)
MCHC RBC AUTO-ENTMCNC: 32.1 G/DL (ref 31.5–36.5)
MCV RBC AUTO: 92 FL (ref 78–100)
MITOGEN IGNF BCKGRD COR BLD-ACNC: 0 IU/ML
MITOGEN IGNF BCKGRD COR BLD-ACNC: 0 IU/ML
MONOCYTES # BLD AUTO: 0.6 10E9/L (ref 0–1.3)
MONOCYTES NFR BLD AUTO: 10 %
NEUTROPHILS # BLD AUTO: 3.4 10E9/L (ref 1.6–8.3)
NEUTROPHILS NFR BLD AUTO: 55.8 %
NRBC # BLD AUTO: 0 10*3/UL
NRBC BLD AUTO-RTO: 0 /100
PLATELET # BLD AUTO: 339 10E9/L (ref 150–450)
POTASSIUM SERPL-SCNC: 5.7 MMOL/L (ref 3.4–5.3)
PROT SERPL-MCNC: 7.9 G/DL (ref 6.8–8.8)
RBC # BLD AUTO: 3.65 10E12/L (ref 3.8–5.2)
SODIUM SERPL-SCNC: 134 MMOL/L (ref 133–144)
WBC # BLD AUTO: 6 10E9/L (ref 4–11)

## 2018-07-23 PROCEDURE — 85025 COMPLETE CBC W/AUTO DIFF WBC: CPT | Performed by: INTERNAL MEDICINE

## 2018-07-23 PROCEDURE — 80053 COMPREHEN METABOLIC PANEL: CPT | Performed by: INTERNAL MEDICINE

## 2018-07-23 PROCEDURE — 86140 C-REACTIVE PROTEIN: CPT | Performed by: INTERNAL MEDICINE

## 2018-07-23 NOTE — PROGRESS NOTES
This is a recent snapshot of the patient's [facility] medical record.  For current drug dose and complete information and questions, call [phone] or In Basket pool, [facility]v home infusion ([id number])  CSN Number:  [id number]

## 2018-07-23 NOTE — TELEPHONE ENCOUNTER
Please call patient for care coordination:  She was taking IV antibiotics after hospital discharge. She has seen ID clinic. I just want to make sure she has a plan for her PICC line and IV antibiotics, in terms of stopping and removing PICC. I can assist if ID has not already addressed.  Thanks,  Marina Owens MD  Internal Medicine

## 2018-07-23 NOTE — PROGRESS NOTES
Called to let Jade know she had a message in her i.TVt message with scheduling information for her upcoming procedure.   Date and time given with more details in her i.TVt message.     Katie SALGUERO RN Coordinator  Dr. Mcnamara, Dr. Hart & Dr. Cavazos   Advanced Endoscopy  [phone]

## 2018-07-24 ENCOUNTER — HOME INFUSION (PRE-WILLOW HOME INFUSION) (OUTPATIENT)
Dept: PHARMACY | Facility: CLINIC | Age: 53
End: 2018-07-24

## 2018-07-24 ENCOUNTER — TELEPHONE (OUTPATIENT)
Dept: INFECTIOUS DISEASES | Facility: CLINIC | Age: 53
End: 2018-07-24

## 2018-07-24 NOTE — TELEPHONE ENCOUNTER
[facility] Call Center    Phone Message    May a detailed message be left on voicemail: yes    Reason for Call: Other: Raquel from [facility] called expressing Patient has a Picc line needing to be removed. Raquel wants to know what  would like them to do. Patient is waiting and needing it removed today. Please follow up with Raquel at      Action Taken: Message routed to:  [facility] ([facility]): ID

## 2018-07-24 NOTE — TELEPHONE ENCOUNTER
Dr. Escamilla already sent message back to [facility] pharmacist and gave order to pull PICC. Called Raquel back at number below and let her know.  Christen Soto RN

## 2018-07-24 NOTE — PROGRESS NOTES
This is a recent snapshot of the patient's [facility] medical record.  For current drug dose and complete information and questions, call [phone] or In Basket pool, fv home infusion ([id number])  CSN Number:  [id number]

## 2018-07-26 NOTE — PROGRESS NOTES
This is a recent snapshot of the patient's [facility] medical record.  For current drug dose and complete information and questions, call [phone] or In Basket pool, fv home infusion ([id number])  CSN Number:  [id number]

## 2018-07-30 DIAGNOSIS — R50.9 FUO (FEVER OF UNKNOWN ORIGIN): ICD-10-CM

## 2018-07-30 DIAGNOSIS — R50.9 FUO (FEVER OF UNKNOWN ORIGIN): Primary | ICD-10-CM

## 2018-07-30 LAB
ANION GAP SERPL CALCULATED.3IONS-SCNC: 6 MMOL/L (ref 3–14)
BUN SERPL-MCNC: 14 MG/DL (ref 7–30)
CALCIUM SERPL-MCNC: 8.8 MG/DL (ref 8.5–10.1)
CHLORIDE SERPL-SCNC: 104 MMOL/L (ref 94–109)
CO2 SERPL-SCNC: 29 MMOL/L (ref 20–32)
CREAT SERPL-MCNC: 0.68 MG/DL (ref 0.52–1.04)
CRP SERPL-MCNC: 3.4 MG/L (ref 0–8)
ERYTHROCYTE [DISTWIDTH] IN BLOOD BY AUTOMATED COUNT: 13 % (ref 10–15)
GFR SERPL CREATININE-BSD FRML MDRD: >90 ML/MIN/1.7M2
GLUCOSE SERPL-MCNC: 104 MG/DL (ref 70–99)
HCT VFR BLD AUTO: 36.8 % (ref 35–47)
HGB BLD-MCNC: 11.9 G/DL (ref 11.7–15.7)
MCH RBC QN AUTO: 29.6 PG (ref 26.5–33)
MCHC RBC AUTO-ENTMCNC: 32.3 G/DL (ref 31.5–36.5)
MCV RBC AUTO: 92 FL (ref 78–100)
PLATELET # BLD AUTO: 294 10E9/L (ref 150–450)
POTASSIUM SERPL-SCNC: 3.8 MMOL/L (ref 3.4–5.3)
RBC # BLD AUTO: 4.02 10E12/L (ref 3.8–5.2)
SODIUM SERPL-SCNC: 138 MMOL/L (ref 133–144)
WBC # BLD AUTO: 6.1 10E9/L (ref 4–11)

## 2018-07-31 DIAGNOSIS — R79.89 ELEVATED LFTS: Primary | ICD-10-CM

## 2018-08-01 ENCOUNTER — TELEPHONE (OUTPATIENT)
Dept: GASTROENTEROLOGY | Facility: CLINIC | Age: 53
End: 2018-08-01

## 2018-08-01 NOTE — TELEPHONE ENCOUNTER
Jade is calling re: a couple questions she has re: her upcoming procedure on 09/05/2018 with Dr. Hart.   She is wondering if stents will be placed to prevent cholangititis, she does not want to ever get this again.   I read her Dr. Hart's last post procedural note:     - Repeat ERCP in 8 weeks for repeat interrogation and                        possible stent free trial pending course    Patient is wondering how Dr. Hart would know if she will be ok without stents, advised that I am not a nurse or an MD and I cannot answer that question.   I told her our RNCC is out of office until Mon 08/06/2018.  Patient would like to R/S ERCP procedure to 09/10/2018 so she will have ample time to meet with and discuss her case with Dr. Hobbs who she is scheduled to see also on 09/05/2018.  I will route a note to our RNCC to call and address patient's questions when she is back in town.    SR 08/01/2018 @ 415 p

## 2018-08-03 LAB
LAB SCANNED RESULT: NORMAL
LAB SCANNED RESULT: NORMAL

## 2018-08-07 ENCOUNTER — CARE COORDINATION (OUTPATIENT)
Dept: GASTROENTEROLOGY | Facility: CLINIC | Age: 53
End: 2018-08-07

## 2018-08-07 NOTE — PROGRESS NOTES
Message left for Jade to call back to discuss her concerns about her upcoming procedure with Dr. Hart.    Clinic contact left for her to call back.     Katie SALGUERO, RN Coordinator  Dr. Mcnamara, Dr. Hart & Dr. Cavazos   Advanced Endoscopy  [phone]

## 2018-08-15 ENCOUNTER — TELEPHONE (OUTPATIENT)
Dept: INFECTIOUS DISEASES | Facility: CLINIC | Age: 53
End: 2018-08-15

## 2018-08-15 NOTE — TELEPHONE ENCOUNTER
Left message for pt reminding them of upcoming appointment.  Instructed pt to bring updated medications list.    Kamaljit Snow MA

## 2018-08-16 ENCOUNTER — OFFICE VISIT (OUTPATIENT)
Dept: INFECTIOUS DISEASES | Facility: CLINIC | Age: 53
End: 2018-08-16
Attending: INTERNAL MEDICINE
Payer: COMMERCIAL

## 2018-08-16 VITALS
HEIGHT: 68 IN | BODY MASS INDEX: 21.98 KG/M2 | HEART RATE: 73 BPM | SYSTOLIC BLOOD PRESSURE: 110 MMHG | WEIGHT: 145 LBS | TEMPERATURE: 98.1 F | DIASTOLIC BLOOD PRESSURE: 73 MMHG

## 2018-08-16 DIAGNOSIS — Z88.1 HX OF ANTIBIOTIC ALLERGY: ICD-10-CM

## 2018-08-16 DIAGNOSIS — K83.09 CHOLANGITIS (H): Primary | ICD-10-CM

## 2018-08-16 DIAGNOSIS — Q44.6 POLYCYSTIC LIVER DISEASE: ICD-10-CM

## 2018-08-16 PROCEDURE — G0463 HOSPITAL OUTPT CLINIC VISIT: HCPCS | Mod: ZF

## 2018-08-16 ASSESSMENT — PAIN SCALES - GENERAL: PAINLEVEL: MODERATE PAIN (5)

## 2018-08-16 NOTE — NURSING NOTE
"/73  Pulse 73  Temp 98.1  F (36.7  C) (Oral)  Ht 1.727 m (5' 8\")  Wt 65.8 kg (145 lb)  LMP 11/14/2016  BMI 22.05 kg/m2  Chief Complaint   Patient presents with     RECHECK     follow up with fever, tzimmer cma       "

## 2018-08-16 NOTE — MR AVS SNAPSHOT
"              After Visit Summary   8/16/2018    Jade Carcamo    MRN: [id number]           Patient Information     Date Of Birth          1965        Visit Information        Provider Department      8/16/2018 9:30 AM Britt Dumas MD [facility] and Infectious Diseases        Care Instructions    1. I will touch base with pharmacy about allergy skin testing and let you know.   2. No further antibiotics needed for now.   3. Return to infectious disease clinic as needed.   4. 6/29/18 CT scan showed a thyroid nodule listed as:   \"Heterogeneous thyroid nodule in the inferior left thyroid lobe  measuring 2.8 cm.\" Talk with Dr. Owens about this.           Follow-ups after your visit        Your next 10 appointments already scheduled     Sep 05, 2018  2:00 PM CDT   Lab with  LAB   [facility] ([facility])    [address] [place] [address] Se  1st Floor  [facility] [address] [phone]            Sep 05, 2018  3:00 PM CDT   (Arrive by 2:45 PM)   New General Liver with Davis Hobbs MD   [facility] ([facility])    [address] [place] [address] Se  [address]  [facility] [address] [phone]            Sep 10, 2018   Procedure with Olvin Hart MD   [facility] [place], Same Day Surgery (--)    [address] [facility] [address] [phone]              Who to contact     If you have questions or need follow up information about today's clinic visit or your schedule please contact [facility] AND INFECTIOUS DISEASES directly at [phone].  Normal or non-critical lab and imaging results will be communicated to you by MyChart, letter or phone within 4 business days after the clinic has received the results. If you do not hear from us within 7 days, please contact the clinic through MyChart or phone. If you have a critical or abnormal lab result, we will notify you by phone as soon as possible.  Submit refill requests through " "MyChart or call your pharmacy and they will forward the refill request to us. Please allow 3 business days for your refill to be completed.          Additional Information About Your Visit        MyChart Information     [employer] gives you secure access to your electronic health record. If you see a primary care provider, you can also send messages to your care team and make appointments. If you have questions, please call your primary care clinic.  If you do not have a primary care provider, please call [phone] and they will assist you.        Care EveryWhere ID     This is your Care EveryWhere ID. This could be used by other organizations to access your [place] medical records  [id number]        Your Vitals Were     Pulse Temperature Height Last Period BMI (Body Mass Index)       73 98.1  F (36.7  C) (Oral) 1.727 m (5' 8\") 11/14/2016 22.05 kg/m2        Blood Pressure from Last 3 Encounters:   08/16/18 110/73   07/19/18 100/71   07/16/18 (!) 89/62    Weight from Last 3 Encounters:   08/16/18 65.8 kg (145 lb)   07/19/18 66.1 kg (145 lb 12.8 oz)   07/16/18 66.8 kg (147 lb 4.8 oz)              Today, you had the following     No orders found for display         Today's Medication Changes          These changes are accurate as of 8/16/18 10:20 AM.  If you have any questions, ask your nurse or doctor.               Stop taking these medicines if you haven't already. Please contact your care team if you have questions.     acetaminophen 500 MG Caps   Stopped by:  Britt Dumas MD           calcium carbonate 500 MG chewable tablet   Commonly known as:  TUMS   Stopped by:  Britt Dumas MD           ciprofloxacin 500 MG tablet   Commonly known as:  CIPRO   Stopped by:  Britt Dumas MD                    Primary Care Provider Office Phone # Fax #    Marina Owens [phone] [address] [address]        Equal Access to Services     HENRRY HALL AH: " Hadii laisha Lees, waarjunda carolynadaha, qajakubta kasekou kipmary ellen, nehemiah bari daniellerhona shinlakeisha solojuan luisaxel abarcariogrhona gm. So [facility] [phone].    ATENCIÓN: Si habla español, tiene a francisco disposición servicios gratuitos de asistencia lingüística. Llame al [phone].    We comply with applicable federal civil rights laws and [place] laws. We do not discriminate on the basis of race, color, national origin, age, disability, sex, sexual orientation, or gender identity.            Thank you!     Thank you for choosing [facility] AND INFECTIOUS DISEASES  for your care. Our goal is always to provide you with excellent care. Hearing back from our patients is one way we can continue to improve our services. Please take a few minutes to complete the written survey that you may receive in the mail after your visit with us. Thank you!             Your Updated Medication List - Protect others around you: Learn how to safely use, store and throw away your medicines at www.disposemymeds.org.      Notice  As of 8/16/2018 10:20 AM    You have not been prescribed any medications.

## 2018-08-16 NOTE — PATIENT INSTRUCTIONS
"1. I will touch base with pharmacy about allergy skin testing and let you know.   2. No further antibiotics needed for now.   3. Return to infectious disease clinic as needed.   4. 6/29/18 CT scan showed a thyroid nodule listed as:   \"Heterogeneous thyroid nodule in the inferior left thyroid lobe  measuring 2.8 cm.\" Talk with Dr. Owens about this.   "

## 2018-08-16 NOTE — LETTER
8/16/2018      RE: Jade Carcamo  [address] [address]       [facility]  Return Patient Visit  8/16/2018     Chief Complaint:  Fever, polycystic liver disease    HPI:  Jade Carcamo is a 52 year old female who I first met briefly in the emergency department on 5/28 and saw in clinic on 5/29.   Ms. Carcamo has polycystic liver disease but has otherwise been in good health.  She recently went on a trip that involved camping and hiking along the [facility].  Then, on 6/25 she developed fevers as high as 103 and diffuse muscle and joint pains. These continued since with limited response to antipyretics and no etiology found.  She has been seen by multiple providers in both her primary care clinic and in the emergency department.  Her initial labs were initially remarkable for mild thrombocytopenia, normal total white count but with some lymphopenia, mildly elevated LFTs, and markedly elevated CRP.  Her CK was normal.  She initially underwent a evaluation for tickborne workup including Lyme, Babesia, RMSF, and a parasite smear which showed some innclusions that could not rule out Ehrlichia.  An Anaplasma/Ehrlichia PCR was sent to [facility] in the meantime she was started on doxycycline.  The Ehrlichia PCR returned as negative and she had no response to doxycycline.  A CT C/A/P on 6/29 showed only her known (impressive) liver cysts    She then had an additional ED visit on 7/1 due to unbearable symptoms at home at which time she was found to have a new neutrophilic predominant leukocytsis. CRP remained very high. On 7/2 I spoke with her and arranged for admission due to ongoing highly symptomatic fevers to expedite fever/pain control and workup. Workup during her initial hospitalization was also unremarkable, except that repeat US showed enlargement and possible evolution of one liver cyst. GI and radiology did not feel this was consistent with cyst infection. MRI showed question of  "cholangitis. She was started on ceftriaxone and metronidazole but developed intractable nausea and was changed to ertapenem. Her fevers and WBC improved some. She then underwent ERCP with stent placement. Ciprofloxacin was added to her regimen. She continued to improve.     8/16/18 Update: Jade is now off all antibiotics and feeling well except for a \"stitch\" in her side that won't go away. It is annoying but not hindering her activities. No further fevers. She has questions about future need for antibiotics, plans for stent removal, penicillin allergy testing, and future care for polycystic liver disease.     Antibiotic History:   Ciprofloxacin 7/12-~7/26  Ertapenem 7/6-~7/20  Ceftriaxone 7/4-7/6  Metronidazole 7/4-7/6  Doxycycline prior to admission    ROS: 4 point ROS including Respiratory, CV, GI and skin, other than that noted in the HPI,  is negative      Past Medical History:  Past Medical History:   Diagnosis Date     GERD (gastroesophageal reflux disease)      Polycystic liver disease     in 40 years       Past Surgical History:  Past Surgical History:   Procedure Laterality Date     ENDOSCOPIC RETROGRADE CHOLANGIOPANCREATOGRAM N/A 7/9/2018    Procedure: COMBINED ENDOSCOPIC RETROGRADE CHOLANGIOPANCREATOGRAPHY, PLACE TUBE/STENT;  Endoscopic Retrograde Cholangiopancreatogram, Biliary Sphincterotomy, Pancreatic Duct Stent Placement, and Bile Duct Stent Placement x 2;  Surgeon: Olvin Hart MD;  Location: [facility] OR     LAPAROSCOPIC UNROOFING LIVER CYST N/A 11/4/2016    Procedure: LAPAROSCOPIC UNROOFING LIVER CYST;  Surgeon: Sonido Cruz MD;  Location:  OR       Social History:  Social History     Social History     Marital status:      Spouse name: N/A     Number of children: N/A     Years of education: N/A     Occupational History     Not on file.     Social History Main Topics     Smoking status: Never Smoker     Smokeless tobacco: Never Used     Alcohol use Yes      Comment: 2 per week "     Drug use: No     Sexual activity: Not on file     Other Topics Concern     Not on file     Social History Narrative    Works in [place], contracts with [employer]           Family Medical History:  Family History   Problem Relation Age of Onset     Alzheimer Disease Mother      Thyroid Disease Sister      thyroid cancer     HEART DISEASE Father      Other - See Comments Brother      bradycardia requiring ablation     Psoriasis Maternal Grandfather        Allergies:     Allergies   Allergen Reactions     Bee Venom      Penicillins Rash     Rash limited to arm ~20 years ago. See Antimicrobial Management Team allergy assessment note on 7/10/2018     Immunizations:  Immunization History   Administered Date(s) Administered     HepA-Adult 09/26/2008, 10/28/2008     HepB 09/26/2008, 10/28/2008, 05/29/2009     TDAP Vaccine (Boostrix) 09/26/2008     Zoster vaccine, live 06/24/2016     Exam:  B/P: 110/73, T: 98.1, P: 73, R: Data Unavailable, Weight: 145 lbs 0 oz  GENERAL:  Well-developed, well-nourished, sitting in bed in no acute distress.   ENT:  Head is normocephalic, atraumatic. Anterior oropharynx without ulcers.  EYES:  Eyes have anicteric sclerae.    NECK:  Supple.  LUNGS:  Normal respiratory effort.   ABDOMEN:  Non-distended.   EXT: Extremities without visible edema.   SKIN:  No acute rashes.  Line is in place without any surrounding erythema.  NEUROLOGIC:  Grossly nonfocal.     Labs:  Ehrlichia  (serology and pcr)  - negative  Lyme screen - negative  Anaplasma  (serology and pcr) - negative   Babesia (serology and pcr) - negative  RMSF - negative  CMV - negative  EBV - low titer consistent with reactivation  Parvovirus - previous infection  CK - normal  Blood cultures x multiple - negative (only first one was prior to doxycycline)  Urine culture low levels mixed urogenital jayna  ROBBY - negative  TTE - bicuspid aortic valve. No evidence endocarditis. TASNEEM discussed but not done when alternative  diagnosis was suggested.   Hepatititis A - IgG positive. Reflex IgM negative.   Hep B - negative surface ag and core ab. Surface ab positive (= immune)  Hep C - negative  HIV - negative  Quantiferon Gold - initially indeterminate due to impaired response. Repeat on  negative.   Histoplasma urine and blood ag - negative x 2 each  Blastomyces urine and blood ag - negative x 2 each  Fungal antibody panel - negative x 2  West Nile Virus serology negative  RF - negative  LDH - normal  TSH - slightly high  Ferritin - 210    Imagin18 Abdominal US showed simple hepatic cysts    18 CT C/A/P showed previously known polycystic liver disease. No evidence of infected cyst on CT - discussed with radiology 18. Incidental finding of thyroid nodule.     18 Liver ultrasound: 1. Multicystic liver; the majority of the hepatic cysts appear to be  simple by ultrasound evaluation. There is however a dominant complex  cyst which which has increased in size since 2018. Appearance and  growth suggest an internal hemorrhagic or proteinaceous component.  2. No ascites as questioned. Trace left pleural effusion.    18 MRCP: IMPRESSION:   1. Poor visualization of the central biliary tree. However, there is  no significant intrahepatic or extrahepatic biliary dilatation. The  prior peribiliary T2 hyperintensity/enhancement with peripheral ductal  prominence has improved from 2018, likely representing improving  cholangitis.   2. Enlarged polycystic liver without a suspicious liver lesion or  evidence for superinfected cyst.  3. Small bilateral layering pleural effusions, mildly increased from  prior. Small volume ascites.    WBC   Date Value Ref Range Status   2018 6.1 4.0 - 11.0 10e9/L Final       CRP Inflammation   Date Value Ref Range Status   2018 3.4 0.0 - 8.0 mg/L Final   2018 40.5 (H) 0.0 - 8.0 mg/L Final   2018 39.7 (H) 0.0 - 8.0 mg/L Final       Creatinine   Date Value Ref  Range Status   07/30/2018 0.68 0.52 - 1.04 mg/dL Final   07/23/2018 0.60 0.52 - 1.04 mg/dL Final   07/19/2018 0.61 0.52 - 1.04 mg/dL Final     Assessment and Plan:  1. 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 Polycystic liver disease with evolution of one cyst during illness, but imaging not consistent with infection.   3. Leukocytosis with neutrophilic predominance - developed after initial mild leukopenia. Now resolved.   4. Elevated CRP - now resolved.   5.  Listed penicillin allergy. Had limited rash 20 years ago. Identified as appropriate for skin testing if needed, but not completed as an inpatient due to ongoing fevers (which would confound assessment for reaction).   6. Anemia - now improving  7. 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   8. Bicuspid aortic valve - discovered on TTE during this admission. TASNEEM deferred due to detection of alternative source of fever (cholangitis).     Discussion:  Ms. Carcamo presented with an FUO-type picture very atypical for cholangitis due to its prolonged course. However, her underlying liver disease, abnormal imaging, and response to stenting and/or antibiotics suggests that a hepatobiliary etiology was the cause of her fevers. Possibilities include cholangitis due to an atypical, somewhat indolent organism or perhaps cholangitis with only intermittent obstruction due to liver cysts impinging on drainage system. Her presentation would fit with an infected hepatic cyst (according to limited literature available on the subject) except that the imaging was not highly suggestive of an infected cyst. If she has recurrence within a month of stopping antibiotics, this should be reconsidered. She will follow-up with Dr. Hobbs regarding her current stent. We discussed the advantages of removing stents (as foreign bodies prone to biofilms) if she does not need it any longer.      Plan/Patient  "Instructions:  1. I will touch base with pharmacy about allergy skin testing and let you know.   2. No further antibiotics needed for now.   3. Return to infectious disease clinic as needed.   4. 6/29/18 CT scan showed a thyroid nodule listed as:   \"Heterogeneous thyroid nodule in the inferior left thyroid lobe  measuring 2.8 cm.\" Talk with Dr. Owens about this.     Return to ID clinic as needed.    Britt Dumas MD  Infectious Diseases  [phone]           "

## 2018-08-17 NOTE — PROGRESS NOTES
[facility]  Return Patient Visit  8/16/2018     Chief Complaint:  Fever, polycystic liver disease    HPI:  Jade Carcamo is a 52 year old female who I first met briefly in the emergency department on 5/28 and saw in clinic on 5/29.   Ms. Carcamo has polycystic liver disease but has otherwise been in good health.  She recently went on a trip that involved camping and hiking along the [facility].  Then, on 6/25 she developed fevers as high as 103 and diffuse muscle and joint pains. These continued since with limited response to antipyretics and no etiology found.  She has been seen by multiple providers in both her primary care clinic and in the emergency department.  Her initial labs were initially remarkable for mild thrombocytopenia, normal total white count but with some lymphopenia, mildly elevated LFTs, and markedly elevated CRP.  Her CK was normal.  She initially underwent a evaluation for tickborne workup including Lyme, Babesia, RMSF, and a parasite smear which showed some innclusions that could not rule out Ehrlichia.  An Anaplasma/Ehrlichia PCR was sent to [place] in the meantime she was started on doxycycline.  The Ehrlichia PCR returned as negative and she had no response to doxycycline.  A CT C/A/P on 6/29 showed only her known (impressive) liver cysts    She then had an additional ED visit on 7/1 due to unbearable symptoms at home at which time she was found to have a new neutrophilic predominant leukocytsis. CRP remained very high. On 7/2 I spoke with her and arranged for admission due to ongoing highly symptomatic fevers to expedite fever/pain control and workup. Workup during her initial hospitalization was also unremarkable, except that repeat US showed enlargement and possible evolution of one liver cyst. GI and radiology did not feel this was consistent with cyst infection. MRI showed question of cholangitis. She was started on ceftriaxone and metronidazole but developed intractable  "nausea and was changed to ertapenem. Her fevers and WBC improved some. She then underwent ERCP with stent placement. Ciprofloxacin was added to her regimen. She continued to improve.     8/16/18 Update: Jade is now off all antibiotics and feeling well except for a \"stitch\" in her side that won't go away. It is annoying but not hindering her activities. No further fevers. She has questions about future need for antibiotics, plans for stent removal, penicillin allergy testing, and future care for polycystic liver disease.     Antibiotic History:   Ciprofloxacin 7/12-~7/26  Ertapenem 7/6-~7/20  Ceftriaxone 7/4-7/6  Metronidazole 7/4-7/6  Doxycycline prior to admission    ROS: Comprehensive 12 point review of systems negative except as noted in HPI.      Past Medical History:  Past Medical History:   Diagnosis Date     GERD (gastroesophageal reflux disease)      Polycystic liver disease     in 40 years       Past Surgical History:  Past Surgical History:   Procedure Laterality Date     ENDOSCOPIC RETROGRADE CHOLANGIOPANCREATOGRAM N/A 7/9/2018    Procedure: COMBINED ENDOSCOPIC RETROGRADE CHOLANGIOPANCREATOGRAPHY, PLACE TUBE/STENT;  Endoscopic Retrograde Cholangiopancreatogram, Biliary Sphincterotomy, Pancreatic Duct Stent Placement, and Bile Duct Stent Placement x 2;  Surgeon: Olvin Hart MD;  Location: [facility] OR     LAPAROSCOPIC UNROOFING LIVER CYST N/A 11/4/2016    Procedure: LAPAROSCOPIC UNROOFING LIVER CYST;  Surgeon: Sonido Cruz MD;  Location: [facility] OR       Social History:  Social History     Social History     Marital status:      Spouse name: N/A     Number of children: N/A     Years of education: N/A     Occupational History     Not on file.     Social History Main Topics     Smoking status: Never Smoker     Smokeless tobacco: Never Used     Alcohol use Yes      Comment: 2 per week     Drug use: No     Sexual activity: Not on file     Other Topics Concern     Not on file     Social History " Narrative    Works in [employer], contracts with [employer]           Family Medical History:  Family History   Problem Relation Age of Onset     Alzheimer Disease Mother      Thyroid Disease Sister      thyroid cancer     HEART DISEASE Father      Other - See Comments Brother      bradycardia requiring ablation     Psoriasis Maternal Grandfather        Allergies:     Allergies   Allergen Reactions     Bee Venom      Penicillins Rash     Rash limited to arm ~20 years ago. See Antimicrobial Management Team allergy assessment note on 7/10/2018     Immunizations:  Immunization History   Administered Date(s) Administered     HepA-Adult 09/26/2008, 10/28/2008     HepB 09/26/2008, 10/28/2008, 05/29/2009     TDAP Vaccine (Boostrix) 09/26/2008     Zoster vaccine, live 06/24/2016     Exam:  B/P: 110/73, T: 98.1, P: 73, R: Data Unavailable, Weight: 145 lbs 0 oz  GENERAL:  Well-developed, well-nourished, sitting in bed in no acute distress.   ENT:  Head is normocephalic, atraumatic. Anterior oropharynx without ulcers.  EYES:  Eyes have anicteric sclerae.    NECK:  Supple.  LUNGS:  Normal respiratory effort.   ABDOMEN:  Non-distended.   EXT: Extremities without visible edema.   SKIN:  No acute rashes.  Line is in place without any surrounding erythema.  NEUROLOGIC:  Grossly nonfocal.     Labs:  Ehrlichia  (serology and pcr)  - negative  Lyme screen - negative  Anaplasma  (serology and pcr) - negative   Babesia (serology and pcr) - negative  RMSF - negative  CMV - negative  EBV - low titer consistent with reactivation  Parvovirus - previous infection  CK - normal  Blood cultures x multiple - negative (only first one was prior to doxycycline)  Urine culture low levels mixed urogenital jayna  ROBBY - negative  TTE - bicuspid aortic valve. No evidence endocarditis. TASNEEM discussed but not done when alternative diagnosis was suggested.   Hepatititis A - IgG positive. Reflex IgM negative.   Hep B - negative surface ag and  core ab. Surface ab positive (= immune)  Hep C - negative  HIV - negative  Quantiferon Gold - initially indeterminate due to impaired response. Repeat on  negative.   Histoplasma urine and blood ag - negative x 2 each  Blastomyces urine and blood ag - negative x 2 each  Fungal antibody panel - negative x 2  West Nile Virus serology negative  RF - negative  LDH - normal  TSH - slightly high  Ferritin - 210    Imagin18 Abdominal US showed simple hepatic cysts    18 CT C/A/P showed previously known polycystic liver disease. No evidence of infected cyst on CT - discussed with radiology 18. Incidental finding of thyroid nodule.     18 Liver ultrasound: 1. Multicystic liver; the majority of the hepatic cysts appear to be  simple by ultrasound evaluation. There is however a dominant complex  cyst which which has increased in size since 2018. Appearance and  growth suggest an internal hemorrhagic or proteinaceous component.  2. No ascites as questioned. Trace left pleural effusion.    18 MRCP: IMPRESSION:   1. Poor visualization of the central biliary tree. However, there is  no significant intrahepatic or extrahepatic biliary dilatation. The  prior peribiliary T2 hyperintensity/enhancement with peripheral ductal  prominence has improved from 2018, likely representing improving  cholangitis.   2. Enlarged polycystic liver without a suspicious liver lesion or  evidence for superinfected cyst.  3. Small bilateral layering pleural effusions, mildly increased from  prior. Small volume ascites.    WBC   Date Value Ref Range Status   2018 6.1 4.0 - 11.0 10e9/L Final       CRP Inflammation   Date Value Ref Range Status   2018 3.4 0.0 - 8.0 mg/L Final   2018 40.5 (H) 0.0 - 8.0 mg/L Final   2018 39.7 (H) 0.0 - 8.0 mg/L Final       Creatinine   Date Value Ref Range Status   2018 0.68 0.52 - 1.04 mg/dL Final   2018 0.60 0.52 - 1.04 mg/dL Final   2018  0.61 0.52 - 1.04 mg/dL Final     Assessment and Plan:  1. 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 Polycystic liver disease with evolution of one cyst during illness, but imaging not consistent with infection.   3. Leukocytosis with neutrophilic predominance - developed after initial mild leukopenia. Now resolved.   4. Elevated CRP - now resolved.   5.  Listed penicillin allergy. Had limited rash 20 years ago. Identified as appropriate for skin testing if needed, but not completed as an inpatient due to ongoing fevers (which would confound assessment for reaction).   6. Anemia - now improving  7. 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   8. Bicuspid aortic valve - discovered on TTE during this admission. TASNEEM deferred due to detection of alternative source of fever (cholangitis).     Discussion:  Ms. Carcamo presented with an FUO-type picture very atypical for cholangitis due to its prolonged course. However, her underlying liver disease, abnormal imaging, and response to stenting and/or antibiotics suggests that a hepatobiliary etiology was the cause of her fevers. Possibilities include cholangitis due to an atypical, somewhat indolent organism or perhaps cholangitis with only intermittent obstruction due to liver cysts impinging on drainage system. Her presentation would fit with an infected hepatic cyst (according to limited literature available on the subject) except that the imaging was not highly suggestive of an infected cyst. If she has recurrence within a month of stopping antibiotics, this should be reconsidered. She will follow-up with Dr. Hobbs regarding her current stent. We discussed the advantages of removing stents (as foreign bodies prone to biofilms) if she does not need it any longer.      Plan/Patient Instructions:  1. I will touch base with pharmacy about allergy skin testing and let you know.   2. No further antibiotics  "needed for now.   3. Return to infectious disease clinic as needed.   4. 6/29/18 CT scan showed a thyroid nodule listed as:   \"Heterogeneous thyroid nodule in the inferior left thyroid lobe  measuring 2.8 cm.\" Talk with Dr. Owens about this.     Return to ID clinic as needed.    Britt Dumas MD  Infectious Diseases  [phone]   "

## 2018-09-04 ENCOUNTER — OFFICE VISIT (OUTPATIENT)
Dept: GASTROENTEROLOGY | Facility: CLINIC | Age: 53
End: 2018-09-04
Attending: INTERNAL MEDICINE
Payer: COMMERCIAL

## 2018-09-04 VITALS
SYSTOLIC BLOOD PRESSURE: 124 MMHG | BODY MASS INDEX: 22.2 KG/M2 | OXYGEN SATURATION: 98 % | TEMPERATURE: 98.3 F | WEIGHT: 146 LBS | HEART RATE: 65 BPM | DIASTOLIC BLOOD PRESSURE: 82 MMHG

## 2018-09-04 DIAGNOSIS — Q44.6 POLYCYSTIC LIVER DISEASE: Primary | ICD-10-CM

## 2018-09-04 DIAGNOSIS — R79.89 ELEVATED LFTS: ICD-10-CM

## 2018-09-04 LAB
ALBUMIN SERPL-MCNC: 3.9 G/DL (ref 3.4–5)
ALP SERPL-CCNC: 99 U/L (ref 40–150)
ALT SERPL W P-5'-P-CCNC: 19 U/L (ref 0–50)
ANION GAP SERPL CALCULATED.3IONS-SCNC: 7 MMOL/L (ref 3–14)
AST SERPL W P-5'-P-CCNC: 29 U/L (ref 0–45)
BILIRUB DIRECT SERPL-MCNC: 0.2 MG/DL (ref 0–0.2)
BILIRUB SERPL-MCNC: 1.4 MG/DL (ref 0.2–1.3)
BUN SERPL-MCNC: 13 MG/DL (ref 7–30)
CALCIUM SERPL-MCNC: 9.4 MG/DL (ref 8.5–10.1)
CHLORIDE SERPL-SCNC: 101 MMOL/L (ref 94–109)
CO2 SERPL-SCNC: 30 MMOL/L (ref 20–32)
CREAT SERPL-MCNC: 0.74 MG/DL (ref 0.52–1.04)
ERYTHROCYTE [DISTWIDTH] IN BLOOD BY AUTOMATED COUNT: 13 % (ref 10–15)
GFR SERPL CREATININE-BSD FRML MDRD: 81 ML/MIN/1.7M2
GLUCOSE SERPL-MCNC: 84 MG/DL (ref 70–99)
HCT VFR BLD AUTO: 43.9 % (ref 35–47)
HGB BLD-MCNC: 13.8 G/DL (ref 11.7–15.7)
INR PPP: 0.96 (ref 0.86–1.14)
MCH RBC QN AUTO: 29 PG (ref 26.5–33)
MCHC RBC AUTO-ENTMCNC: 31.4 G/DL (ref 31.5–36.5)
MCV RBC AUTO: 92 FL (ref 78–100)
PLATELET # BLD AUTO: 228 10E9/L (ref 150–450)
POTASSIUM SERPL-SCNC: 4.8 MMOL/L (ref 3.4–5.3)
PROT SERPL-MCNC: 7.9 G/DL (ref 6.8–8.8)
RBC # BLD AUTO: 4.76 10E12/L (ref 3.8–5.2)
SODIUM SERPL-SCNC: 138 MMOL/L (ref 133–144)
WBC # BLD AUTO: 4 10E9/L (ref 4–11)

## 2018-09-04 PROCEDURE — 36415 COLL VENOUS BLD VENIPUNCTURE: CPT | Performed by: INTERNAL MEDICINE

## 2018-09-04 PROCEDURE — 80076 HEPATIC FUNCTION PANEL: CPT | Performed by: INTERNAL MEDICINE

## 2018-09-04 PROCEDURE — G0463 HOSPITAL OUTPT CLINIC VISIT: HCPCS | Mod: ZF

## 2018-09-04 PROCEDURE — 85027 COMPLETE CBC AUTOMATED: CPT | Performed by: INTERNAL MEDICINE

## 2018-09-04 PROCEDURE — 80048 BASIC METABOLIC PNL TOTAL CA: CPT | Performed by: INTERNAL MEDICINE

## 2018-09-04 PROCEDURE — 85610 PROTHROMBIN TIME: CPT | Performed by: INTERNAL MEDICINE

## 2018-09-04 ASSESSMENT — PAIN SCALES - GENERAL: PAINLEVEL: MODERATE PAIN (4)

## 2018-09-04 NOTE — MR AVS SNAPSHOT
After Visit Summary   9/4/2018    Jade Carcamo    MRN: [id number]           Patient Information     Date Of Birth          1965        Visit Information        Provider Department      9/4/2018 10:45 AM Davis Hobbs MD [facility]        Today's Diagnoses     Polycystic liver disease    -  1       Follow-ups after your visit        Follow-up notes from your care team     Return in about 6 months (around 3/4/2019).      Your next 10 appointments already scheduled     Sep 10, 2018   Procedure with Olvin Hart MD   [facility], [place], Same Day Surgery (--)    [address]  [place] [id number] [phone]              Who to contact     If you have questions or need follow up information about today's clinic visit or your schedule please contact [facility] directly at [phone].  Normal or non-critical lab and imaging results will be communicated to you by MyChart, letter or phone within 4 business days after the clinic has received the results. If you do not hear from us within 7 days, please contact the clinic through [employer] or phone. If you have a critical or abnormal lab result, we will notify you by phone as soon as possible.  Submit refill requests through [employer] or call your pharmacy and they will forward the refill request to us. Please allow 3 business days for your refill to be completed.          Additional Information About Your Visit        MyChart Information     [employer] gives you secure access to your electronic health record. If you see a primary care provider, you can also send messages to your care team and make appointments. If you have questions, please call your primary care clinic.  If you do not have a primary care provider, please call [phone] and they will assist you.        Care EveryWhere ID     This is your Care EveryWhere ID. This could be used by other organizations to access your [place] medical records  [id number]        Your  Vitals Were     Pulse Temperature Last Period Pulse Oximetry BMI (Body Mass Index)       65 98.3  F (36.8  C) (Oral) 11/14/2016 98% 22.2 kg/m2        Blood Pressure from Last 3 Encounters:   09/04/18 124/82   08/16/18 110/73   07/19/18 100/71    Weight from Last 3 Encounters:   09/04/18 66.2 kg (146 lb)   08/16/18 65.8 kg (145 lb)   07/19/18 66.1 kg (145 lb 12.8 oz)              Today, you had the following     No orders found for display       Primary Care Provider Office Phone # Fax #    Marina Owens [phone]       [address] [address]        Equal Access to Services     HENRRY HALL : Susana rivera Soalvaro, waaxda luqadaha, qaybta kaalmada adeegyada, nehemiah rocha . So [facility] [phone].    ATENCIÓN: Si habla español, tiene a francisco disposición servicios gratuitos de asistencia lingüística. Llame al [phone].    We comply with applicable federal civil rights laws and [place] laws. We do not discriminate on the basis of race, color, national origin, age, disability, sex, sexual orientation, or gender identity.            Thank you!     Thank you for choosing [facility]PAT[facility]  for your care. Our goal is always to provide you with excellent care. Hearing back from our patients is one way we can continue to improve our services. Please take a few minutes to complete the written survey that you may receive in the mail after your visit with us. Thank you!             Your Updated Medication List - Protect others around you: Learn how to safely use, store and throw away your medicines at www.disposemymeds.org.      Notice  As of 9/4/2018  2:14 PM    You have not been prescribed any medications.

## 2018-09-04 NOTE — LETTER
9/4/2018      RE: Jade Carcamo  [address] [id number]       I had the pleasure of seeing Jade Carcamo for her follow up in the Liver Clinic at the [facility] on 09/04/2018.  Ms. Carcamo returns for followup of polycystic liver disease.  This occurs in the absence of polycystic kidney disease.  She is status post cyst unroofing back in 2015, which did give her pretty good relief from her increased abdominal girth.      The major new event that has happened is she did develop fevers, rigors and was going to have cholangitis.  She had an MRCP suggested in the presence of cholangitis, although there is no obvious reason for this.  Her bile ducts are distorted by her polycystic liver disease, but no stones, debris or bleeding of the biliary tree was seen.  She did undergo an ERCP and had 2 stents placed and is scheduled to have her stents replaced or removed this coming Monday.      At present she feels well.  She denies any abdominal pain, itching or skin rash or fatigue.  She denies any increased abdominal girth or lower extremity edema.  She denies any fevers or chills, cough or shortness of breath.  She denies any nausea or vomiting, diarrhea or constipation.  Her appetite has been good, and her weight has remained stable.     No current outpatient prescriptions on file.     No current facility-administered medications for this visit.      B/P: 124/82, T: 98.3, P: 65, R: Data Unavailable    In general she appears quite healthy.  HEENT exam shows no scleral icterus or temporal muscle wasting.  Her chest is clear.  Her abdominal exam shows no increase in girth, no masses or tenderness to palpation are present.  Her liver is 12-13 cm in span with a very prominent left lobe.  It is unclear whether her spleen is palpable or not.  Extremity exam shows no edema.  Skin exam shows no stigmata of chronic liver disease or chronic pruritus.  Neurologic exam shows no asterixis.      Recent Results (from the past 168 hour(s))   INR    Collection Time: 09/04/18 10:37 AM   Result Value Ref Range    INR 0.96 0.86 - 1.14   CBC with platelets    Collection Time: 09/04/18 10:37 AM   Result Value Ref Range    WBC 4.0 4.0 - 11.0 10e9/L    RBC Count 4.76 3.8 - 5.2 10e12/L    Hemoglobin 13.8 11.7 - 15.7 g/dL    Hematocrit 43.9 35.0 - 47.0 %    MCV 92 78 - 100 fl    MCH 29.0 26.5 - 33.0 pg    MCHC 31.4 (L) 31.5 - 36.5 g/dL    RDW 13.0 10.0 - 15.0 %    Platelet Count 228 150 - 450 10e9/L   Basic metabolic panel    Collection Time: 09/04/18 10:37 AM   Result Value Ref Range    Sodium 138 133 - 144 mmol/L    Potassium 4.8 3.4 - 5.3 mmol/L    Chloride 101 94 - 109 mmol/L    Carbon Dioxide 30 20 - 32 mmol/L    Anion Gap 7 3 - 14 mmol/L    Glucose 84 70 - 99 mg/dL    Urea Nitrogen 13 7 - 30 mg/dL    Creatinine 0.74 0.52 - 1.04 mg/dL    GFR Estimate 81 >60 mL/min/1.7m2    GFR Estimate If Black >90 >60 mL/min/1.7m2    Calcium 9.4 8.5 - 10.1 mg/dL   Hepatic panel    Collection Time: 09/04/18 10:37 AM   Result Value Ref Range    Bilirubin Direct 0.2 0.0 - 0.2 mg/dL    Bilirubin Total 1.4 (H) 0.2 - 1.3 mg/dL    Albumin 3.9 3.4 - 5.0 g/dL    Protein Total 7.9 6.8 - 8.8 g/dL    Alkaline Phosphatase 99 40 - 150 U/L    ALT 19 0 - 50 U/L    AST 29 0 - 45 U/L      My impression is that Ms. Carcamo has polycystic liver disease.  I will speak with Dr. Hart about the ERCP and his plans at this point in time.  I certainly have not seen much cholangitis in people with polycystic liver disease in spite of the fact that many of the disjointed bile ducts because of the cysts.  It does not appear that she bled into the system which occasionally can communicate with the biliary tree which could lead to cholangitis based on CT scan and MRI that she had done around the time of the episode of cholangitis.  I am hoping that we will simply be able to discontinue the scans and we will see how things go at that point in time.  If she  does look like she still has areas of obstruction, we could again go with regular stent placement and removal.  Finally, I did mention liver transplant tumor to her as an ultimate solution if the management of her cholangitis became refractory.      Thank you very much for allowing me to participate in the care of this patient.  If you have any questions regarding my recommendations, please do not hesitate to contact me.  More than 50% of this 45-minute visit was discussing her therapeutic options for management of cholangitis in the setting of polycystic liver disease.       Davis Hobbs MD      Professor of Medicine  [employer]      Executive Medical Director, Solid Organ Transplant Program  [facility]    Davis Hobbs MD

## 2018-09-04 NOTE — PROGRESS NOTES
I had the pleasure of seeing Jade Carcamo for her follow up in the Liver Clinic at the [facility] on 09/04/2018.  Ms. Carcamo returns for followup of polycystic liver disease.  This occurs in the absence of polycystic kidney disease.  She is status post cyst unroofing back in 2015, which did give her pretty good relief from her increased abdominal girth.      The major new event that has happened is she did develop fevers, rigors and was going to have cholangitis.  She had an MRCP suggested in the presence of cholangitis, although there is no obvious reason for this.  Her bile ducts are distorted by her polycystic liver disease, but no stones, debris or bleeding of the biliary tree was seen.  She did undergo an ERCP and had 2 stents placed and is scheduled to have her stents replaced or removed this coming Monday.      At present she feels well.  She denies any abdominal pain, itching or skin rash or fatigue.  She denies any increased abdominal girth or lower extremity edema.  She denies any fevers or chills, cough or shortness of breath.  She denies any nausea or vomiting, diarrhea or constipation.  Her appetite has been good, and her weight has remained stable.     No current outpatient prescriptions on file.     No current facility-administered medications for this visit.      B/P: 124/82, T: 98.3, P: 65, R: Data Unavailable    In general she appears quite healthy.  HEENT exam shows no scleral icterus or temporal muscle wasting.  Her chest is clear.  Her abdominal exam shows no increase in girth, no masses or tenderness to palpation are present.  Her liver is 12-13 cm in span with a very prominent left lobe.  It is unclear whether her spleen is palpable or not.  Extremity exam shows no edema.  Skin exam shows no stigmata of chronic liver disease or chronic pruritus.  Neurologic exam shows no asterixis.     Recent Results (from the past 168 hour(s))   INR    Collection Time: 09/04/18 10:37  AM   Result Value Ref Range    INR 0.96 0.86 - 1.14   CBC with platelets    Collection Time: 09/04/18 10:37 AM   Result Value Ref Range    WBC 4.0 4.0 - 11.0 10e9/L    RBC Count 4.76 3.8 - 5.2 10e12/L    Hemoglobin 13.8 11.7 - 15.7 g/dL    Hematocrit 43.9 35.0 - 47.0 %    MCV 92 78 - 100 fl    MCH 29.0 26.5 - 33.0 pg    MCHC 31.4 (L) 31.5 - 36.5 g/dL    RDW 13.0 10.0 - 15.0 %    Platelet Count 228 150 - 450 10e9/L   Basic metabolic panel    Collection Time: 09/04/18 10:37 AM   Result Value Ref Range    Sodium 138 133 - 144 mmol/L    Potassium 4.8 3.4 - 5.3 mmol/L    Chloride 101 94 - 109 mmol/L    Carbon Dioxide 30 20 - 32 mmol/L    Anion Gap 7 3 - 14 mmol/L    Glucose 84 70 - 99 mg/dL    Urea Nitrogen 13 7 - 30 mg/dL    Creatinine 0.74 0.52 - 1.04 mg/dL    GFR Estimate 81 >60 mL/min/1.7m2    GFR Estimate If Black >90 >60 mL/min/1.7m2    Calcium 9.4 8.5 - 10.1 mg/dL   Hepatic panel    Collection Time: 09/04/18 10:37 AM   Result Value Ref Range    Bilirubin Direct 0.2 0.0 - 0.2 mg/dL    Bilirubin Total 1.4 (H) 0.2 - 1.3 mg/dL    Albumin 3.9 3.4 - 5.0 g/dL    Protein Total 7.9 6.8 - 8.8 g/dL    Alkaline Phosphatase 99 40 - 150 U/L    ALT 19 0 - 50 U/L    AST 29 0 - 45 U/L      My impression is that Ms. Carcamo has polycystic liver disease.  I will speak with Dr. Hart about the ERCP and his plans at this point in time.  I certainly have not seen much cholangitis in people with polycystic liver disease in spite of the fact that many of the disjointed bile ducts because of the cysts.  It does not appear that she bled into the system which occasionally can communicate with the biliary tree which could lead to cholangitis based on CT scan and MRI that she had done around the time of the episode of cholangitis.  I am hoping that we will simply be able to discontinue the scans and we will see how things go at that point in time.  If she does look like she still has areas of obstruction, we could again go with regular stent  placement and removal.  Finally, I did mention liver transplant tumor to her as an ultimate solution if the management of her cholangitis became refractory.      Thank you very much for allowing me to participate in the care of this patient.  If you have any questions regarding my recommendations, please do not hesitate to contact me.  More than 50% of this 45-minute visit was discussing her therapeutic options for management of cholangitis in the setting of polycystic liver disease.       Davis Hobbs MD      Professor of Medicine  [employer]      Executive Medical Director, Solid Organ Transplant Program  [facility]

## 2018-09-04 NOTE — NURSING NOTE
Chief Complaint   Patient presents with     Consult     polycystitis of liver      /82 (BP Location: Right arm)  Pulse 65  Temp 98.3  F (36.8  C) (Oral)  Wt 66.2 kg (146 lb)  LMP 11/14/2016  SpO2 98%  BMI 22.2 kg/m2   Gaviota Silva

## 2018-09-07 ENCOUNTER — ANESTHESIA EVENT (OUTPATIENT)
Dept: SURGERY | Facility: CLINIC | Age: 53
End: 2018-09-07
Payer: COMMERCIAL

## 2018-09-09 ASSESSMENT — COPD QUESTIONNAIRES: COPD: 0

## 2018-09-09 ASSESSMENT — LIFESTYLE VARIABLES: TOBACCO_USE: 0

## 2018-09-09 NOTE — ANESTHESIA PREPROCEDURE EVALUATION
ANESTHESIA PREOP EVALUATION    NPO Status:      Procedure: ERCP by Tanner    HPI: Jade Carcamo for her follow up in the Liver Clinic at the [facility] on 09/04/2018.  Ms. Carcamo returns for followup of polycystic liver disease.  This occurs in the absence of polycystic kidney disease.  She is status post cyst unroofing back in 2015, which did give her pretty good relief from her increased abdominal girth.       The major new event that has happened is she did develop fevers, rigors and was going to have cholangitis.  She had an MRCP suggested in the presence of cholangitis, although there is no obvious reason for this.  Her bile ducts are distorted by her polycystic liver disease, but no stones, debris or bleeding of the biliary tree was seen.  She did undergo an ERCP and had 2 stents placed and is scheduled to have her stents replaced or removed this coming Monday.     PMHx/PSHx/ROS:  PAST MEDICAL HISTORY:   Past Medical History:   Diagnosis Date     GERD (gastroesophageal reflux disease)      Polycystic liver disease     in 40 years       PAST SURGICAL HISTORY:   Past Surgical History:   Procedure Laterality Date     ENDOSCOPIC RETROGRADE CHOLANGIOPANCREATOGRAM N/A 7/9/2018    Procedure: COMBINED ENDOSCOPIC RETROGRADE CHOLANGIOPANCREATOGRAPHY, PLACE TUBE/STENT;  Endoscopic Retrograde Cholangiopancreatogram, Biliary Sphincterotomy, Pancreatic Duct Stent Placement, and Bile Duct Stent Placement x 2;  Surgeon: Olvin Hart MD;  Location: [facility] OR     LAPAROSCOPIC UNROOFING LIVER CYST N/A 11/4/2016    Procedure: LAPAROSCOPIC UNROOFING LIVER CYST;  Surgeon: Sonido Cruz MD;  Location: [facility] OR       FAMILY HISTORY:   Family History   Problem Relation Age of Onset     Alzheimer Disease Mother      Thyroid Disease Sister      thyroid cancer     HEART DISEASE Father      Other - See Comments Brother      bradycardia requiring ablation     Psoriasis Maternal Grandfather        Past Anes  Hx: No personal or family h/o anesthesia problems    Soc Hx:   Tobacco:   EtOH:     Allergies:   Allergies   Allergen Reactions     Bee Venom      Penicillins Rash     Rash limited to arm ~20 years ago. See Antimicrobial Management Team allergy assessment note on 7/10/2018       Meds:   No prescriptions prior to admission.       No current outpatient prescriptions on file.       Physical Exam:  VS: T Data Unavailable, P Data Unavailable, BP Data Unavailable, R Data Unavailable, SpO2       Airway: MP , TM>3FB, Neck full ROM  Dentition: no loose teeth  Heart: RRR  Lungs: CTAB      BMP:  Lab Results   Component Value Date     09/04/2018      Lab Results   Component Value Date    POTASSIUM 4.8 09/04/2018     Lab Results   Component Value Date    CHLORIDE 101 09/04/2018     Lab Results   Component Value Date    THUAN 9.4 09/04/2018     Lab Results   Component Value Date    CO2 30 09/04/2018     Lab Results   Component Value Date    BUN 13 09/04/2018     Lab Results   Component Value Date    CR 0.74 09/04/2018     Lab Results   Component Value Date    GLC 84 09/04/2018        CBC:  Lab Results   Component Value Date    WBC 4.0 09/04/2018     Lab Results   Component Value Date    HGB 13.8 09/04/2018     Lab Results   Component Value Date    HCT 43.9 09/04/2018     Lab Results   Component Value Date     09/04/2018        Coags/Type and Screen  Lab Results   Component Value Date    INR 0.96 09/04/2018     No results found for: PT  Type and Screen:      Assessment/Plan:  - ASA 2  - GETA with standard ASA monitors, IV induction, balanced anesthetic  - PIV  - Antibiotics per surgery  - PONV prophylaxis  - Blood products available, possible administration discussed with patient    Roberto Carlos Balderas MD    9/9/2018  9:12 AM    Anesthesia Evaluation     . Pt has had prior anesthetic. Type: General    No history of anesthetic complications          ROS/MED HX    ENT/Pulmonary:      (-) tobacco use, asthma, COPD and NATALIE  risk factors   Neurologic:  - neg neurologic ROS     Cardiovascular:  - neg cardiovascular ROS   (+) ----. : . . . :. . Previous cardiac testing Echodate:07/02/2018results:Global and regional left ventricular function is normal with an EF of 55-60%. Global right ventricular function is normal. The aortic valve is likely bicuspid with mild sclerosis. Mild aortic insufficiency is present. No obvious valvular vegetations noted. Pulmonary artery systolic pressure is normal. The inferior vena cava was normal in size with preserved respiratory variability. No pericardial effusion is present.date: results:ECG reviewed date:07/02/2018 results:NSR date: results:         (-) hypertension and CAD   METS/Exercise Tolerance:     Hematologic: Comments: Most recent CBC 09/04/2018 Hgb 13.8, Hct 43    (+) Anemia, -      Musculoskeletal:         GI/Hepatic: Comment: Polycystic liver disease, w/o polycystic kidney disease.  S/p cyst unroofing in 2015, and ERCP + stents x2 in 07/2018.   Most recent Liver panel 09/04/2018 with total Bb 1.4, Dir 0.2, LFTs normal/     (+) GERD       Renal/Genitourinary: Comment: BMP 09/2018 Creat 0.74. GFR ~81 - ROS Renal section negative   (+) Pt has no history of transplant,       Endo:  - neg endo ROS       Psychiatric:  - neg psychiatric ROS       Infectious Disease: Comment: Hx of Polycystic Liver disease, presenting cyst infection vs cholangitis on 07/2018. Started on ceftriaxone and metronidazole but developed intractable nausea and was changed to ertapenem. Fevers and WBC improved after ERCP with stent placement, and addition of Ciprofloxacin. Most recent CBC 0904//2018 w/o leukocytosis        Malignancy:      - no malignancy   Other:    (+) No chance of pregnancy C-spine cleared: N/A, no H/O Chronic Pain,no other significant disability                           Anesthesia Plan      History & Physical Review  History and physical reviewed and following examination; no interval change.    ASA  Status:  2 .    NPO Status:  > 8 hours    Plan for General and ETT with Intravenous induction. Maintenance will be Balanced.    PONV prophylaxis:  Ondansetron (or other 5HT-3) and Dexamethasone or Solumedrol  Additional equipment: Videolaryngoscope History and physical assessed; Patient examined.  I have reviewed and agree with this pre-op assessment and anesthetic plan.  Patient and family also agree.   Risks and alternatives presented and discussed.   AQA.  -rcp        Postoperative Care  Postoperative pain management:  Multi-modal analgesia.      Consents  Anesthetic plan, risks, benefits and alternatives discussed with:  Patient.  Use of blood products discussed: No .   .            Rocio Weller MD  [facility] Anesthesia Resident/               .

## 2018-09-10 ENCOUNTER — APPOINTMENT (OUTPATIENT)
Dept: GENERAL RADIOLOGY | Facility: CLINIC | Age: 53
End: 2018-09-10
Attending: INTERNAL MEDICINE
Payer: COMMERCIAL

## 2018-09-10 ENCOUNTER — ANESTHESIA (OUTPATIENT)
Dept: SURGERY | Facility: CLINIC | Age: 53
End: 2018-09-10
Payer: COMMERCIAL

## 2018-09-10 ENCOUNTER — HOSPITAL ENCOUNTER (OUTPATIENT)
Facility: CLINIC | Age: 53
Discharge: HOME | End: 2018-09-10
Attending: INTERNAL MEDICINE | Admitting: INTERNAL MEDICINE
Payer: COMMERCIAL

## 2018-09-10 VITALS
RESPIRATION RATE: 18 BRPM | OXYGEN SATURATION: 99 % | TEMPERATURE: 97.5 F | DIASTOLIC BLOOD PRESSURE: 88 MMHG | HEIGHT: 68 IN | BODY MASS INDEX: 22.12 KG/M2 | WEIGHT: 145.94 LBS | SYSTOLIC BLOOD PRESSURE: 123 MMHG | HEART RATE: 74 BPM

## 2018-09-10 LAB
ALBUMIN SERPL-MCNC: 3.7 G/DL (ref 3.4–5)
ALP SERPL-CCNC: 87 U/L (ref 40–150)
ALT SERPL W P-5'-P-CCNC: 20 U/L (ref 0–50)
AMYLASE SERPL-CCNC: 66 U/L (ref 30–110)
ANION GAP SERPL CALCULATED.3IONS-SCNC: 5 MMOL/L (ref 3–14)
AST SERPL W P-5'-P-CCNC: 23 U/L (ref 0–45)
BILIRUB SERPL-MCNC: 1.2 MG/DL (ref 0.2–1.3)
BUN SERPL-MCNC: 13 MG/DL (ref 7–30)
CALCIUM SERPL-MCNC: 9 MG/DL (ref 8.5–10.1)
CHLORIDE SERPL-SCNC: 105 MMOL/L (ref 94–109)
CO2 SERPL-SCNC: 29 MMOL/L (ref 20–32)
CREAT SERPL-MCNC: 0.78 MG/DL (ref 0.52–1.04)
ERCP: NORMAL
ERYTHROCYTE [DISTWIDTH] IN BLOOD BY AUTOMATED COUNT: 13.4 % (ref 10–15)
GFR SERPL CREATININE-BSD FRML MDRD: 77 ML/MIN/1.7M2
GLUCOSE BLDC GLUCOMTR-MCNC: 78 MG/DL (ref 70–99)
GLUCOSE SERPL-MCNC: 81 MG/DL (ref 70–99)
HCT VFR BLD AUTO: 41.7 % (ref 35–47)
HGB BLD-MCNC: 13.5 G/DL (ref 11.7–15.7)
LIPASE SERPL-CCNC: 220 U/L (ref 73–393)
MCH RBC QN AUTO: 29.5 PG (ref 26.5–33)
MCHC RBC AUTO-ENTMCNC: 32.4 G/DL (ref 31.5–36.5)
MCV RBC AUTO: 91 FL (ref 78–100)
PLATELET # BLD AUTO: 191 10E9/L (ref 150–450)
POTASSIUM SERPL-SCNC: 3.7 MMOL/L (ref 3.4–5.3)
PROT SERPL-MCNC: 7.4 G/DL (ref 6.8–8.8)
RBC # BLD AUTO: 4.58 10E12/L (ref 3.8–5.2)
SODIUM SERPL-SCNC: 139 MMOL/L (ref 133–144)
WBC # BLD AUTO: 4.4 10E9/L (ref 4–11)

## 2018-09-10 PROCEDURE — C1769 GUIDE WIRE: HCPCS | Performed by: INTERNAL MEDICINE

## 2018-09-10 PROCEDURE — 36000055 ZZH SURGERY LEVEL 2 W FLUORO 1ST 30 MIN - UMMC: Performed by: INTERNAL MEDICINE

## 2018-09-10 PROCEDURE — C1726 CATH, BAL DIL, NON-VASCULAR: HCPCS | Performed by: INTERNAL MEDICINE

## 2018-09-10 PROCEDURE — 37000008 ZZH ANESTHESIA TECHNICAL FEE, 1ST 30 MIN: Performed by: INTERNAL MEDICINE

## 2018-09-10 PROCEDURE — 36000053 ZZH SURGERY LEVEL 2 EA 15 ADDTL MIN - UMMC: Performed by: INTERNAL MEDICINE

## 2018-09-10 PROCEDURE — 25500064 ZZH RX 255 OP 636: Performed by: INTERNAL MEDICINE

## 2018-09-10 PROCEDURE — 36415 COLL VENOUS BLD VENIPUNCTURE: CPT | Performed by: INTERNAL MEDICINE

## 2018-09-10 PROCEDURE — 71000027 ZZH RECOVERY PHASE 2 EACH 15 MINS: Performed by: INTERNAL MEDICINE

## 2018-09-10 PROCEDURE — 25000566 ZZH SEVOFLURANE, EA 15 MIN: Performed by: INTERNAL MEDICINE

## 2018-09-10 PROCEDURE — 25000125 ZZHC RX 250: Performed by: STUDENT IN AN ORGANIZED HEALTH CARE EDUCATION/TRAINING PROGRAM

## 2018-09-10 PROCEDURE — 37000009 ZZH ANESTHESIA TECHNICAL FEE, EACH ADDTL 15 MIN: Performed by: INTERNAL MEDICINE

## 2018-09-10 PROCEDURE — 82150 ASSAY OF AMYLASE: CPT | Performed by: INTERNAL MEDICINE

## 2018-09-10 PROCEDURE — 40000170 ZZH STATISTIC PRE-PROCEDURE ASSESSMENT II: Performed by: INTERNAL MEDICINE

## 2018-09-10 PROCEDURE — 25000125 ZZHC RX 250: Performed by: INTERNAL MEDICINE

## 2018-09-10 PROCEDURE — 71000014 ZZH RECOVERY PHASE 1 LEVEL 2 FIRST HR: Performed by: INTERNAL MEDICINE

## 2018-09-10 PROCEDURE — 25000128 H RX IP 250 OP 636: Performed by: STUDENT IN AN ORGANIZED HEALTH CARE EDUCATION/TRAINING PROGRAM

## 2018-09-10 PROCEDURE — 83690 ASSAY OF LIPASE: CPT | Performed by: INTERNAL MEDICINE

## 2018-09-10 PROCEDURE — 40000277 XR SURGERY CARM FLUORO LESS THAN 5 MIN W STILLS: Mod: TC

## 2018-09-10 PROCEDURE — 25000128 H RX IP 250 OP 636: Performed by: ANESTHESIOLOGY

## 2018-09-10 PROCEDURE — 85027 COMPLETE CBC AUTOMATED: CPT | Performed by: INTERNAL MEDICINE

## 2018-09-10 PROCEDURE — 80053 COMPREHEN METABOLIC PANEL: CPT | Performed by: INTERNAL MEDICINE

## 2018-09-10 PROCEDURE — 27210794 ZZH OR GENERAL SUPPLY STERILE: Performed by: INTERNAL MEDICINE

## 2018-09-10 PROCEDURE — 82962 GLUCOSE BLOOD TEST: CPT

## 2018-09-10 RX ORDER — INDOMETHACIN 50 MG/1
100 SUPPOSITORY RECTAL
Status: COMPLETED | OUTPATIENT
Start: 2018-09-10 | End: 2018-09-10

## 2018-09-10 RX ORDER — FENTANYL CITRATE 50 UG/ML
INJECTION, SOLUTION INTRAMUSCULAR; INTRAVENOUS PRN
Status: DISCONTINUED | OUTPATIENT
Start: 2018-09-10 | End: 2018-09-10

## 2018-09-10 RX ORDER — PROPOFOL 10 MG/ML
INJECTION, EMULSION INTRAVENOUS PRN
Status: DISCONTINUED | OUTPATIENT
Start: 2018-09-10 | End: 2018-09-10

## 2018-09-10 RX ORDER — IOPAMIDOL 510 MG/ML
INJECTION, SOLUTION INTRAVASCULAR PRN
Status: DISCONTINUED | OUTPATIENT
Start: 2018-09-10 | End: 2018-09-10 | Stop reason: HOSPADM

## 2018-09-10 RX ORDER — FLUMAZENIL 0.1 MG/ML
0.2 INJECTION, SOLUTION INTRAVENOUS
Status: DISCONTINUED | OUTPATIENT
Start: 2018-09-10 | End: 2018-09-10 | Stop reason: HOSPADM

## 2018-09-10 RX ORDER — DEXAMETHASONE SODIUM PHOSPHATE 4 MG/ML
INJECTION, SOLUTION INTRA-ARTICULAR; INTRALESIONAL; INTRAMUSCULAR; INTRAVENOUS; SOFT TISSUE PRN
Status: DISCONTINUED | OUTPATIENT
Start: 2018-09-10 | End: 2018-09-10

## 2018-09-10 RX ORDER — LIDOCAINE HYDROCHLORIDE 20 MG/ML
INJECTION, SOLUTION INFILTRATION; PERINEURAL PRN
Status: DISCONTINUED | OUTPATIENT
Start: 2018-09-10 | End: 2018-09-10

## 2018-09-10 RX ORDER — NALOXONE HYDROCHLORIDE 0.4 MG/ML
.1-.4 INJECTION, SOLUTION INTRAMUSCULAR; INTRAVENOUS; SUBCUTANEOUS
Status: DISCONTINUED | OUTPATIENT
Start: 2018-09-10 | End: 2018-09-10 | Stop reason: HOSPADM

## 2018-09-10 RX ORDER — LIDOCAINE 40 MG/G
CREAM TOPICAL
Status: DISCONTINUED | OUTPATIENT
Start: 2018-09-10 | End: 2018-09-10 | Stop reason: HOSPADM

## 2018-09-10 RX ORDER — ONDANSETRON 2 MG/ML
INJECTION INTRAMUSCULAR; INTRAVENOUS PRN
Status: DISCONTINUED | OUTPATIENT
Start: 2018-09-10 | End: 2018-09-10

## 2018-09-10 RX ORDER — SODIUM CHLORIDE, SODIUM LACTATE, POTASSIUM CHLORIDE, CALCIUM CHLORIDE 600; 310; 30; 20 MG/100ML; MG/100ML; MG/100ML; MG/100ML
INJECTION, SOLUTION INTRAVENOUS CONTINUOUS
Status: DISCONTINUED | OUTPATIENT
Start: 2018-09-10 | End: 2018-09-10 | Stop reason: HOSPADM

## 2018-09-10 RX ADMIN — FENTANYL CITRATE 35 MCG: 50 INJECTION, SOLUTION INTRAMUSCULAR; INTRAVENOUS at 10:55

## 2018-09-10 RX ADMIN — PHENYLEPHRINE HYDROCHLORIDE 200 MCG: 10 INJECTION, SOLUTION INTRAMUSCULAR; INTRAVENOUS; SUBCUTANEOUS at 11:12

## 2018-09-10 RX ADMIN — PHENYLEPHRINE HYDROCHLORIDE 100 MCG: 10 INJECTION, SOLUTION INTRAMUSCULAR; INTRAVENOUS; SUBCUTANEOUS at 11:06

## 2018-09-10 RX ADMIN — PHENYLEPHRINE HYDROCHLORIDE 100 MCG: 10 INJECTION, SOLUTION INTRAMUSCULAR; INTRAVENOUS; SUBCUTANEOUS at 10:50

## 2018-09-10 RX ADMIN — PHENYLEPHRINE HYDROCHLORIDE 100 MCG: 10 INJECTION, SOLUTION INTRAMUSCULAR; INTRAVENOUS; SUBCUTANEOUS at 11:01

## 2018-09-10 RX ADMIN — PROPOFOL 170 MG: 10 INJECTION, EMULSION INTRAVENOUS at 10:16

## 2018-09-10 RX ADMIN — SUGAMMADEX 200 MG: 100 INJECTION, SOLUTION INTRAVENOUS at 11:16

## 2018-09-10 RX ADMIN — PROPOFOL 30 MG: 10 INJECTION, EMULSION INTRAVENOUS at 10:55

## 2018-09-10 RX ADMIN — LIDOCAINE HYDROCHLORIDE 60 MG: 20 INJECTION, SOLUTION INFILTRATION; PERINEURAL at 10:18

## 2018-09-10 RX ADMIN — MIDAZOLAM 1 MG: 1 INJECTION INTRAMUSCULAR; INTRAVENOUS at 10:16

## 2018-09-10 RX ADMIN — ONDANSETRON 4 MG: 2 INJECTION INTRAMUSCULAR; INTRAVENOUS at 11:10

## 2018-09-10 RX ADMIN — SODIUM CHLORIDE, POTASSIUM CHLORIDE, SODIUM LACTATE AND CALCIUM CHLORIDE: 600; 310; 30; 20 INJECTION, SOLUTION INTRAVENOUS at 10:10

## 2018-09-10 RX ADMIN — PHENYLEPHRINE HYDROCHLORIDE 200 MCG: 10 INJECTION, SOLUTION INTRAMUSCULAR; INTRAVENOUS; SUBCUTANEOUS at 10:36

## 2018-09-10 RX ADMIN — ROCURONIUM BROMIDE 40 MG: 10 INJECTION INTRAVENOUS at 10:18

## 2018-09-10 RX ADMIN — FENTANYL CITRATE 65 MCG: 50 INJECTION, SOLUTION INTRAMUSCULAR; INTRAVENOUS at 10:18

## 2018-09-10 RX ADMIN — DEXAMETHASONE SODIUM PHOSPHATE 6 MG: 4 INJECTION, SOLUTION INTRA-ARTICULAR; INTRALESIONAL; INTRAMUSCULAR; INTRAVENOUS; SOFT TISSUE at 10:53

## 2018-09-10 NOTE — DISCHARGE INSTRUCTIONS
[facility]  Same-Day Surgery   Adult Discharge Orders & Instructions     For 24 hours after surgery    1. Get plenty of rest.  A responsible adult must stay with you for at least 24 hours after you leave the hospital.   2. Do not drive or use heavy equipment.  If you have weakness or tingling, don't drive or use heavy equipment until this feeling goes away.  3. Do not drink alcohol.  4. Avoid strenuous or risky activities.  Ask for help when climbing stairs.   5. You may feel lightheaded.  IF so, sit for a few minutes before standing.  Have someone help you get up.   6. If you have nausea (feel sick to your stomach): Drink only clear liquids such as apple juice, ginger ale, broth or 7-Up.  Rest may also help.  Be sure to drink enough fluids.  Move to a regular diet as you feel able.  7. You may have a slight fever. Call the doctor if your fever is over 100 F (37.7 C) (taken under the tongue) or lasts longer than 24 hours.  8. You may have a dry mouth, a sore throat, muscle aches or trouble sleeping.  These should go away after 24 hours.  9. Do not make important or legal decisions.   Call your doctor for any of the followin.  Signs of infection (fever, growing tenderness at the surgery site, a large amount of drainage or bleeding, severe pain, foul-smelling drainage, redness, swelling).    2. It has been over 8 to 10 hours since surgery and you are still not able to urinate (pass water).    3.  Headache for over 24 hours.    4.  Numbness, tingling or weakness the day after surgery (if you had spinal anesthesia).  To contact a Dr. Hart [phone]  or:        [phone] and ask for the resident on call for    (answered 24 hours a day)      Emergency Department:    [facility]: [phone]       (TTY for hearing impaired: [phone])    [facility]: [phone]       (TTY for hearing impaired: [phone])

## 2018-09-10 NOTE — IP AVS SNAPSHOT
MRN:[id number]                      After Visit Summary   9/10/2018    Jade Carcamo    MRN: [id number]           Thank you!     Thank you for choosing [facility] for your care. Our goal is always to provide you with excellent care. Hearing back from our patients is one way we can continue to improve our services. Please take a few minutes to complete the written survey that you may receive in the mail after you visit with us. Thank you!        Patient Information     Date Of Birth          1965        About your hospital stay     You were admitted on:  September 10, 2018 You last received care in the:  Post Anesthesia Care Unit [facility]    You were discharged on:  September 10, 2018       Who to Call     For medical emergencies, please call 911.  For non-urgent questions about your medical care, please call your primary care provider or clinic, [phone]  For questions related to your surgery, please call your surgery clinic        Attending Provider     Provider Specialty    Olvin Hart MD Gastroenterology       Primary Care Provider Office Phone # Fax #    Marina Owens [phone]      After Care Instructions     Discharge Instructions       Resume pre procedure diet            Discharge Instructions       Restart home medications.                  Further instructions from your care team       [facility]  Same-Day Surgery   Adult Discharge Orders & Instructions     For 24 hours after surgery    1. Get plenty of rest.  A responsible adult must stay with you for at least 24 hours after you leave the hospital.   2. Do not drive or use heavy equipment.  If you have weakness or tingling, don't drive or use heavy equipment until this feeling goes away.  3. Do not drink alcohol.  4. Avoid strenuous or risky activities.  Ask for help when climbing stairs.   5. You may feel lightheaded.  IF so, sit for a few minutes  before standing.  Have someone help you get up.   6. If you have nausea (feel sick to your stomach): Drink only clear liquids such as apple juice, ginger ale, broth or 7-Up.  Rest may also help.  Be sure to drink enough fluids.  Move to a regular diet as you feel able.  7. You may have a slight fever. Call the doctor if your fever is over 100 F (37.7 C) (taken under the tongue) or lasts longer than 24 hours.  8. You may have a dry mouth, a sore throat, muscle aches or trouble sleeping.  These should go away after 24 hours.  9. Do not make important or legal decisions.   Call your doctor for any of the followin.  Signs of infection (fever, growing tenderness at the surgery site, a large amount of drainage or bleeding, severe pain, foul-smelling drainage, redness, swelling).    2. It has been over 8 to 10 hours since surgery and you are still not able to urinate (pass water).    3.  Headache for over 24 hours.    4.  Numbness, tingling or weakness the day after surgery (if you had spinal anesthesia).  To contact a Dr. Hart [phone]  or:        [phone] and ask for the resident on call for    (answered 24 hours a day)      Emergency Department:    [facility]: [phone]       (TTY for hearing impaired: [phone])    [facility]: [phone]       (TTY for hearing impaired: [phone])        Additional Information     If you use hormonal birth control (such as the pill, patch, ring or implants): You'll need a second form of birth control for 7 days (condoms, a diaphragm or contraceptive foam). While in the hospital, you received a medicine called Bridion. Your normal birth control will not work as well for a week after taking this medicine.          Pending Results     No orders found from 2018 to 2018.            Admission Information     Date & Time Provider Department Dept. Phone    9/10/2018 Olvin Hart MD Post Anesthesia Care Unit [facility]  "[phone]      Your Vitals Were     Blood Pressure Pulse Temperature Respirations Height Weight    120/82 74 96.4  F (35.8  C) 14 1.727 m (5' 8\") 66.2 kg (145 lb 15.1 oz)    Last Period Pulse Oximetry BMI (Body Mass Index)             11/14/2016 99% 22.19 kg/m2         [facility] Information     [facility] gives you secure access to your electronic health record. If you see a primary care provider, you can also send messages to your care team and make appointments. If you have questions, please call your primary care clinic.  If you do not have a primary care provider, please call [phone] and they will assist you.        Care EveryWhere ID     This is your Care EveryWhere ID. This could be used by other organizations to access your [facility] medical records  [id number]        Equal Access to Services     HENRRY HALL : Susana Lees, chris cruz, dusty tovar, nehemiah worrell. So [facility] [phone].    ATENCIÓN: Si habla español, tiene a francisco disposición servicios gratuitos de asistencia lingüística. Justin al [phone].    We comply with applicable federal civil rights laws and [place] laws. We do not discriminate on the basis of race, color, national origin, age, disability, sex, sexual orientation, or gender identity.               Review of your medicines      Notice     You have not been prescribed any medications.             Protect others around you: Learn how to safely use, store and throw away your medicines at www.disposemymeds.org.             Medication List: This is a list of all your medications and when to take them. Check marks below indicate your daily home schedule. Keep this list as a reference.      Notice     You have not been prescribed any medications.              More Information        Discharge Instructions for ERCP (Endoscopic Retrograde Cholangiopancreatography)  You had a procedure known as an ERCP. Your healthcare provider " performed the ERCP to look at your bile or pancreatic ducts, and to locate and treat blockages in the ducts. This procedure is used to diagnose diseases of the pancreas, bile ducts, and pancreatic duct, liver, and gallbladder. Here s what you need to do after your ERCP.  Home care    Don t take aspirin or any other blood-thinning medicines (anticoagulants) until your healthcare provider says it s OK.    Your healthcare provider may prescribe an antibiotic, depending on what was done during the ERCP.    You may have a sore throat for 1 to 2 days after the procedure. Use lozenges or gargle with salt water for your sore throat. If you're not better in a few days, call your healthcare provider.    Rest, drink fluids, and eat light meals. If you feel bloated or have too much gas, use a heating pad on your belly to help reduce the discomfort. This should help you feel better. But if it doesn't, call your healthcare provider.    Don t drink alcohol for 2 days after the procedure.    Follow your healthcare provider's advice about when to return to your normal routine.  Follow-up care  Make a follow-up appointment as directed by our staff.  When to seek medical care  Call your healthcare provider right away if you have any of the following:    Black or tarry stools    Chest pain or severe belly or abdominal pain    Fever of 100.4 F (38 C) or higher, or as directed by your healthcare provider    Trouble swallowing or throat pain that gets worse     Upset stomach (nausea) and vomiting   Date Last Reviewed: 11/1/2017 2000-2017 [employer]. [address] [place] [address], [place] [address]. All rights reserved. This information is not intended as a substitute for professional medical care. Always follow your healthcare professional's instructions.

## 2018-09-10 NOTE — OR NURSING
"\"good AM - Pt Young will need and updated H&P.  thanks otis *[phone].\"  This text message sent to MD Hart @ [phone].    "

## 2018-09-10 NOTE — ANESTHESIA POSTPROCEDURE EVALUATION
Patient: Jade Carcamo    Procedure(s):  Endoscopic Retrograde Cholangiopancreatogram with Stent removal - Wound Class: II-Clean Contaminated    Diagnosis:Cholangitis   Diagnosis Additional Information: No value filed.    Anesthesia Type:  General, ETT    Note:  Anesthesia Post Evaluation    Patient location during evaluation: PACU  Patient participation: Able to fully participate in evaluation  Level of consciousness: sleepy but conscious and awake  Pain management: adequate  Airway patency: patent  Cardiovascular status: acceptable  Respiratory status: acceptable  Hydration status: acceptable  PONV: controlled     Anesthetic complications: None    Comments: Patient awake to voice, clear a/w.  Stable VS.  No new or current issues evident at this time.  OK out per PACU protocol.  --rcp          Last vitals:  Vitals:    09/10/18 0700 09/10/18 1130   BP: 111/81 93/56   Pulse: 74    Resp: 16 16   Temp: 36.1  C (97  F) 36.6  C (97.9  F)   SpO2: 100% 100%         Electronically Signed By: Roberto Carlos Balderas MD  September 10, 2018  11:52 AM

## 2018-09-10 NOTE — ANESTHESIA CARE TRANSFER NOTE
Patient: Jade Carcamo    Procedure(s):  Endoscopic Retrograde Cholangiopancreatogram with Stent removal - Wound Class: II-Clean Contaminated    Diagnosis: Cholangitis   Diagnosis Additional Information: No value filed.    Anesthesia Type:   General, ETT     Note:  Airway :Face Mask  Patient transferred to:PACU  Comments: VSS. Breathing spontaneously at a regular rate with adequate tidal volumes and maintaining O2 sats on 6L facemask. Denies nausea or pain. No apparent complications from anesthesia.     Rocio Weller MD  [facility] Anesthesia Resident  Handoff Report: Identifed the Patient, Identified the Reponsible Provider, Reviewed the pertinent medical history, Discussed the surgical course, Reviewed Intra-OP anesthesia mangement and issues during anesthesia, Set expectations for post-procedure period and Allowed opportunity for questions and acknowledgement of understanding      Vitals: (Last set prior to Anesthesia Care Transfer)    CRNA VITALS  9/10/2018 1100 - 9/10/2018 1138      9/10/2018             Pulse: 84    SpO2: 100 %    Resp Rate (observed): (!)  1                Electronically Signed By: Rocio Gray MD  September 10, 2018  11:38 AM

## 2018-09-10 NOTE — IP AVS SNAPSHOT
Post Anesthesia Care Unit [address] [address]    Phone:  [phone]                                       After Visit Summary   9/10/2018    Jade Carcamo    MRN: [id number]           After Visit Summary Signature Page     I have received my discharge instructions, and my questions have been answered. I have discussed any challenges I see with this plan with the nurse or doctor.    ..........................................................................................................................................  Patient/Patient Representative Signature      ..........................................................................................................................................  Patient Representative Print Name and Relationship to Patient    ..................................................               ................................................  Date                                            Time    ..........................................................................................................................................  Reviewed by Signature/Title    ...................................................              ..............................................  Date                                                            Time          22EPIC Rev 08/18

## 2018-09-11 ENCOUNTER — CARE COORDINATION (OUTPATIENT)
Dept: GASTROENTEROLOGY | Facility: CLINIC | Age: 53
End: 2018-09-11

## 2018-09-11 NOTE — PROGRESS NOTES
Post ERCP (9/10/2018) with Dr. Hart: Follow-up    Post procedure recommendations: No planned interventional GI procedure at this moment; follow up with you established providers as scheduled   - With recurrent fevers, repeat ERCP will need to be considered despite fluoroscopic findings     Orders placed: None at this time.     Message left for Jade to call with any questions/concerns.     Clinic contact and scheduling numbers verified for future questions/concerns.     Katie SALGUERO RN Coordinator  Dr. Mcnamara, Dr. Hart & Dr. Cavazos  Advanced Endoscopy  [phone]

## 2018-10-02 ENCOUNTER — OFFICE VISIT (OUTPATIENT)
Dept: INTERNAL MEDICINE | Facility: CLINIC | Age: 53
End: 2018-10-02
Payer: COMMERCIAL

## 2018-10-02 VITALS
WEIGHT: 147.9 LBS | DIASTOLIC BLOOD PRESSURE: 78 MMHG | BODY MASS INDEX: 22.49 KG/M2 | RESPIRATION RATE: 16 BRPM | SYSTOLIC BLOOD PRESSURE: 114 MMHG | HEART RATE: 78 BPM

## 2018-10-02 DIAGNOSIS — Z13.29 SCREENING FOR THYROID DISORDER: Primary | ICD-10-CM

## 2018-10-02 DIAGNOSIS — Z13.29 SCREENING FOR THYROID DISORDER: ICD-10-CM

## 2018-10-02 DIAGNOSIS — Z13.220 LIPID SCREENING: ICD-10-CM

## 2018-10-02 DIAGNOSIS — Z00.00 PREVENTATIVE HEALTH CARE: ICD-10-CM

## 2018-10-02 LAB
CHOLEST SERPL-MCNC: 234 MG/DL
HDLC SERPL-MCNC: 72 MG/DL
LDLC SERPL CALC-MCNC: 151 MG/DL
NONHDLC SERPL-MCNC: 162 MG/DL
TRIGL SERPL-MCNC: 58 MG/DL
TSH SERPL DL<=0.005 MIU/L-ACNC: 3.16 MU/L (ref 0.4–4)

## 2018-10-02 ASSESSMENT — PAIN SCALES - GENERAL: PAINLEVEL: NO PAIN (0)

## 2018-10-02 NOTE — PATIENT INSTRUCTIONS
Primary Care Center Phone Number [phone]  Primary Care Center Medication Refill Request Information:  * Please contact your pharmacy regarding ANY request for medication refills.  ** [facility] Prescription Fax = [phone]  * Please allow 3 business days for routine medication refills.  * Please allow 5 business days for controlled substance medication refills.     Primary Care Center Test Result notification information:  *You will be notified with in 7-10 days of your appointment day regarding the results of your test.  If you are on MyChart you will be notified as soon as the provider has reviewed the results and signed off on them.                 [facility]         Internal Medicine Resident                   Continuity Clinic    Who We Are    Resident Continuity Clinic is a part of the [facility].  Resident physicians see patients independently and establish a relationship with them over the course of their three-year residency program.  As with the Primary Care Clinic, our Resident Continuity Clinic models a group practice.  If your doctor is not available, you will be able to see another resident physician.  At the end of a resident s training, patients will be transitioned to a new resident physician for ongoing care.     We treat patients with a wide array of medical needs from routine physicals, to acute illnesses, to diabetes and blood pressure management, to complex medical illness.  What is a Resident Physician?    Resident physicians hold medical degrees and are doctors. They are training to become specialists in Internal Medicine. They work under the supervision of board-certified faculty physicians.  Expectations for Your Care    We strive to provide accessible, quality care at all times.    In order to provide this care, it is best to see your primary care resident doctor consistently rather switching between providers.  In the event you do see another physician, you  should schedule a follow-up visit with your usual primary care doctor.    If you are transitioning your care from another clinic, it is helpful to have your records available for your doctor to review.    We do not prescribe controlled substances, such as ADD medications or narcotic pain medications, on your first visit.  We will review your health records and concerns prior to devising a treatment plan with you in order to provide the best care.      Clinic Services     Extended clinic hours; patient  to help navigate your visit;  parking; laboratory and imaging services with evening and weekend hours    Multiple medical and surgical specialties in one building    Complementary services, including Nutrition, Integrative Medicine, Pharmacy consultations, Mental and Behavioral Health, Sports Medicine and Physical Therapy    Thank You    We would like to thank you for choosing the [facility] Resident Continuity Clinic for your primary care. You are making a priceless contribution to the training of the next generation of health care practitioners.     Contact us at [phone] for appointments or questions.    Resident Clinic Hours are Tuesdays and Thursdays, 7:30am-5:00pm    Residents  Ama Sebastian MD   (Female )   Prachi Leon MD   (Female)   Thomas Perrin MD  (Male)   Shelton Maher MD  (Male)   Ann Marie Martinez MD   (Female)   Karl Ogden MD  (Male)    Jordan Hammond MD  (Male)   Torsten Gleason MD  (Male)   Shelton Kaur MD (Male)   Gaurang Salazar MD  (Male)   Josselyn Lutz MD (Female)    Nicole Borges MD (Female)   Aftab Powers MD  (Male)   Asia Yee MD(Female)   Victorina Arroyo MD  (Female)    Supervising Physicians   MD Sabrina Boone MD Briar Duffy, MD James Langland, MD Mary Logeais, MD Tanya Melnik, MD Charles Moldow, MD Heather Thompson Buum, MD Kathleen Watson, MD

## 2018-10-02 NOTE — PROGRESS NOTES
"  Internal Medicine Primary Care Clinic      History of Present Illness:   Jade Carcamo is a 52 year old female with a history of polycystic liver disease, recent presumed cholangitis (s/p ERCP stent removal 9/10/18), and recent incidental thyroid nodule finding on CT.    Jade was hospitalized from 7/2/2018 to 7/13/2018 with a fever of unknown origin.  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 While in the hospital she underwent an endoscopic retrograde cholangiopancreatography, during which stents were inserted and completed a 14 day course of cipro and ertapenem. Now she is post stent removal on 9/10/18 with Dr. Hart.    A couple days after the stents came out have had a fever but otherwise no fever since then and overall feels she is doing well. Still some sweating at night still, not quite soaking sheets. No chills, no abdominal pain, nausea, vomiting. Stools once a day and at baseline.    Energy level and mood are both \"down a little bit,\" but still able to hike in [place] on recent vacation. Also feels more cold than usual. She feels she has gained weight but per review of records she looks to be at baseline. Trouble sleeping at night, has taken ambien before, but not currently taking on a regular basis. At similar weight. Appetite is fine and eating the same amount as normal. No increased edema. 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      Assessment & Plan:   Jade Carcamo is a 52 year old female with a history of polycystic liver disease, recent presumed cholangitis (s/p ERCP stent removal 9/10/18), and recent incidental thyroid nodule finding on CT.    Incidental thyroid nodule  On CT 6/29 had \"Heterogeneous thyroid nodule in the inferior left thyroid lobe measures 2.8 x 1.6 cm.\" This is in the setting of sister with " thyroid malignancy currently being treated, and mother with thyroid removed for unknown reason. Additionally she notes decreased energy and mood. Somewhat diminished but no delayed reflexes on exam however. Not significantly enlarged or tender on exam. TSH was elevated during acute illness of hospitalization, which makes interpretation difficult. Per UpToDate guidelines on incidental thyroid nodules, we will obtain TSH and thyroid ultrasound to evaluate for disease and next steps. We will also obtain a lipid panel.   - TSH   - Thyroid US   - Lipid panel    Polycystic Liver Disease and recent cholangitis  Still unclear if cholangitis was the exact etiology, but she is much improved. Follows with Dr. Hobbs in hepatology clinic. He is hoping may be able to discontinue scans in the future, and assess obstruction ongoing, with deferment of regular stent placement and removal at this time as she is currently improved. He also mentions that in the future a liver transplantation could be considered if her illness is refractory, but again she is currently doing well.    Thank you, Aníbal Crespo (PGY-1 )   [id number]        Review of Systems:   Review of systems completed and otherwise negative.      Past Medical History:     Past Medical History:   Diagnosis Date     GERD (gastroesophageal reflux disease)      Polycystic liver disease     in 40 years         Past Surgical History:     Past Surgical History:   Procedure Laterality Date     ENDOSCOPIC RETROGRADE CHOLANGIOPANCREATOGRAM N/A 7/9/2018    Procedure: COMBINED ENDOSCOPIC RETROGRADE CHOLANGIOPANCREATOGRAPHY, PLACE TUBE/STENT;  Endoscopic Retrograde Cholangiopancreatogram, Biliary Sphincterotomy, Pancreatic Duct Stent Placement, and Bile Duct Stent Placement x 2;  Surgeon: Olvin Hart MD;  Location: [facility] OR     ENDOSCOPIC RETROGRADE CHOLANGIOPANCREATOGRAM N/A 9/10/2018    Procedure: COMBINED ENDOSCOPIC RETROGRADE CHOLANGIOPANCREATOGRAPHY, REMOVE FOREIGN BODY OR  STENT/TUBE;  Endoscopic Retrograde Cholangiopancreatogram with Stent removal;  Surgeon: Olvin Hart MD;  Location: [facility] OR     LAPAROSCOPIC UNROOFING LIVER CYST N/A 11/4/2016    Procedure: LAPAROSCOPIC UNROOFING LIVER CYST;  Surgeon: Sonido Cruz MD;  Location: [facility] OR        Social History:        Social History     Social History     Marital status:      Spouse name: N/A     Number of children: N/A     Years of education: N/A     Occupational History     Not on file.     Social History Main Topics     Smoking status: Never Smoker     Smokeless tobacco: Never Used     Alcohol use Yes      Comment: 2 per week     Drug use: No     Sexual activity: Not on file     Other Topics Concern     Not on file     Social History Narrative    Works in [employer], contracts with [employer]            Family History:     Family History   Problem Relation Age of Onset     Alzheimer Disease Mother      Thyroid Disease Sister      thyroid cancer     HEART DISEASE Father      Other - See Comments Brother      bradycardia requiring ablation     Psoriasis Maternal Grandfather      Sister with malignant thyroid nodules, following with [facility].  Mother had thyroid removed.     Allergies:      Allergies   Allergen Reactions     Bee Venom      Penicillins Rash     Rash limited to arm ~20 years ago. See Antimicrobial Management Team allergy assessment note on 7/10/2018        Medications:     No current outpatient prescriptions on file.     No current facility-administered medications for this visit.           Physical Exam:   LMP 11/14/2016  Wt:   Wt Readings from Last 4 Encounters:   09/10/18 66.2 kg (145 lb 15.1 oz)   09/04/18 66.2 kg (146 lb)   08/16/18 65.8 kg (145 lb)   07/19/18 66.1 kg (145 lb 12.8 oz)      BP Readings from Last 6 Encounters:   09/10/18 123/88   09/04/18 124/82   08/16/18 110/73   07/19/18 100/71   07/16/18 (!) 89/62   07/13/18 138/84       Constitutional: pleasant and intelligent  woman, wearing vibrant [place] jacket, not dyspneic/diaphoretic, no acute distress  Mood:  cooperative, pleasant  Eyes: Sclera anicteric/injected  Ears/nose/mouth/throat: Normal oropharynx without ulcers or exudate, mucus membranes moist, hearing intact  Neck: supple, thyroid normal size  CV: Regular rate and rhythm, no edema  Respiratory: Unlabored breathing  Lymph: No axillary, submandibular, supraclavicular or inguinal lymphadenopathy  Abd: Nondistended, +bs, nontender, no peritoneal signs, liver with prominent left lobe, unable to appreciate spleen  Skin: warm, perfused, no jaundice  Neuro: AAO x 3, no asterixis  Psych: Normal affect  MSK: Normal gait  Ms Carcamo was seen and examined with the resident Dr Crespo, I have reviewed his note and plan and I agree.  Vega Arevalo MD

## 2018-10-02 NOTE — NURSING NOTE
Chief Complaint   Patient presents with     Thyroid Problem     pt is here to discuss CT results of nodules on thyroid        Ama Skaggs CMA at 9:42 AM on 10/2/2018

## 2018-10-02 NOTE — MR AVS SNAPSHOT
After Visit Summary   10/2/2018    Jade Carcamo    MRN: [id number]           Patient Information     Date Of Birth          1965        Visit Information        Provider Department      10/2/2018 9:25 AM Aníbal Crespo MD [facility]        Today's Diagnoses     Screening for thyroid disorder    -  1    Lipid screening        Preventative health care          Care Instructions    Primary Care Center Phone Number [phone]  Primary Care Center Medication Refill Request Information:  * Please contact your pharmacy regarding ANY request for medication refills.  ** [facility] Prescription Fax = [phone]  * Please allow 3 business days for routine medication refills.  * Please allow 5 business days for controlled substance medication refills.     Primary Care Center Test Result notification information:  *You will be notified with in 7-10 days of your appointment day regarding the results of your test.  If you are on MyChart you will be notified as soon as the provider has reviewed the results and signed off on them.                 [facility]         Internal Medicine Resident                   Continuity Clinic    Who We Are    Resident Continuity Clinic is a part of the [facility].  Resident physicians see patients independently and establish a relationship with them over the course of their three-year residency program.  As with the Primary Care Clinic, our Resident Continuity Clinic models a group practice.  If your doctor is not available, you will be able to see another resident physician.  At the end of a resident s training, patients will be transitioned to a new resident physician for ongoing care.     We treat patients with a wide array of medical needs from routine physicals, to acute illnesses, to diabetes and blood pressure management, to complex medical illness.  What is a Resident Physician?    Resident physicians hold medical  degrees and are doctors. They are training to become specialists in Internal Medicine. They work under the supervision of board-certified faculty physicians.  Expectations for Your Care    We strive to provide accessible, quality care at all times.    In order to provide this care, it is best to see your primary care resident doctor consistently rather switching between providers.  In the event you do see another physician, you should schedule a follow-up visit with your usual primary care doctor.    If you are transitioning your care from another clinic, it is helpful to have your records available for your doctor to review.    We do not prescribe controlled substances, such as ADD medications or narcotic pain medications, on your first visit.  We will review your health records and concerns prior to devising a treatment plan with you in order to provide the best care.      Clinic Services     Extended clinic hours; patient  to help navigate your visit;  parking; laboratory and imaging services with evening and weekend hours    Multiple medical and surgical specialties in one building    Complementary services, including Nutrition, Integrative Medicine, Pharmacy consultations, Mental and Behavioral Health, Sports Medicine and Physical Therapy    Thank You    We would like to thank you for choosing the [facility] Internal Medicine Resident Continuity Clinic for your primary care. You are making a priceless contribution to the training of the next generation of health care practitioners.     Contact us at [phone] for appointments or questions.    Resident Clinic Hours are Tuesdays and Thursdays, 7:30am-5:00pm    Residents  Ama Sebastian MD   (Female )   Prachi Leon MD   (Female)   Thomas Perrin MD  (Male)   Shelton Maher MD  (Male)   Ann Marie Martinez MD   (Female)   Karl Ogden MD  (Male)    Jordan Hammond MD  (Male)   Torsten Gleason MD  (Male)   Shelton Kaur,  MD (Male)   Gaurang Salazar MD  (Male)   Josselyn Lutz MD (Female)    Nicole Borges MD (Female)   Aftab Powers MD  (Male)   Asia Yee MD(Female)   Victorina Arroyo MD  (Female)    Supervising Physicians   MD Sabrina Boone, MD Delfino Cates, MD Abiodun Lora, MD Marina Owens, MD Vega Farnsworth MD Heather Thompson Buum, MD Kathleen Watson, MD                    Follow-ups after your visit        Your next 10 appointments already scheduled     Oct 02, 2018 11:30 AM CDT   LAB with [facility])    [address] [place] [id number] [phone]           Please do not eat 10-12 hours before your appointment if you are coming in fasting for labs on lipids, cholesterol, or glucose (sugar). This does not apply to pregnant women. Water, hot tea and black coffee (with nothing added) are okay. Do not drink other fluids, diet soda or chew gum.            Oct 04, 2018  7:30 AM CDT   (Arrive by 7:15 AM)   US THYROID with [address] [facility] US ([facility])    [address] [place] [id number] [phone]           How do I prepare for my exam? (Food and drink instructions) No Food and Drink Restrictions.  How do I prepare for my exam? (Other instructions) You do not need to do anything special to prepare for your exam.  What should I wear: Wear comfortable clothes.  How long does the exam take: Most ultrasounds take 30 to 60 minutes.  What should I bring: Bring a list of your medicines, including vitamins, minerals and over-the-counter drugs. It is safest to leave personal items at home.  Do I need a :  No  is needed.  What do I need to tell my doctor: Tell your doctor about any allergies you may have.  What should I do after the exam: No restrictions, You may resume normal activities.  What is this test:  An ultrasound uses sound waves to make pictures of the body. Sound waves do not cause pain. The only discomfort may be the pressure of the wand against your skin or full bladder.  Who should I call with questions: If you have any questions, please call the Imaging Department where you will have your exam. Directions, parking instructions, and other information is available on our website, [email or web address].              Future tests that were ordered for you today     Open Future Orders        Priority Expected Expires Ordered    US Thyroid Routine  10/2/2019 10/2/2018    Lipid Profile NON-FASTING Routine 10/2/2018 10/16/2018 10/2/2018    TSH Routine 10/2/2018 10/2/2019 10/2/2018            Who to contact     Please call your clinic at [phone] to:    Ask questions about your health    Make or cancel appointments    Discuss your medicines    Learn about your test results    Speak to your doctor            Additional Information About Your Visit        [employer] Information     [employer] gives you secure access to your electronic health record. If you see a primary care provider, you can also send messages to your care team and make appointments. If you have questions, please call your primary care clinic.  If you do not have a primary care provider, please call [phone] and they will assist you.      [employer] is an electronic gateway that provides easy, online access to your medical records. With [employer], you can request a clinic appointment, read your test results, renew a prescription or communicate with your care team.     To access your existing account, please contact your [facility] or call [phone] for assistance.        Care EveryWhere ID     This is your Care EveryWhere ID. This could be used by other organizations to access your [facility] medical records  [id number]        Your Vitals Were     Pulse Respirations Last Period Breastfeeding? BMI (Body Mass Index)        78 16 11/14/2016 No 22.49 kg/m2        Blood Pressure from Last 3 Encounters:   10/02/18 114/78   09/10/18 123/88   09/04/18 124/82    Weight from Last 3 Encounters:   10/02/18 67.1 kg (147 lb 14.4 oz)   09/10/18 66.2 kg (145 lb 15.1 oz)   09/04/18 66.2 kg (146 lb)              We Performed the Following     FLU VACCINE, AGE >= 3 YR        Primary Care Provider Office Phone # Fax #    Marina Owens [phone]       [address] [address]        Equal Access to Services     HENRRY HALL : Susana Lees, chris cruz, dusty fisheralmada guy, nehemiah worrell. So [facility] [phone].    ATENCIÓN: Si habla español, tiene a francisco disposición servicios gratuitos de asistencia lingüística. Llame al [phone].    We comply with applicable federal civil rights laws and [place] laws. We do not discriminate on the basis of race, color, national origin, age, disability, sex, sexual orientation, or gender identity.            Thank you!     Thank you for choosing [facility] PRIMARY CARE CLINIC  for your care. Our goal is always to provide you with excellent care. Hearing back from our patients is one way we can continue to improve our services. Please take a few minutes to complete the written survey that you may receive in the mail after your visit with us. Thank you!             Your Updated Medication List - Protect others around you: Learn how to safely use, store and throw away your medicines at www.disposemymeds.org.      Notice  As of 10/2/2018 11:28 AM    You have not been prescribed any medications.

## 2018-10-02 NOTE — NURSING NOTE
Jade Carcamo    1.  Has the patient received the information for the influenza vaccine? YES    2.  Does the patient have any of the following contraindications?     Allergy to eggs? No     Allergic reaction to previous influenza vaccines? No     Any other problems to previous influenza vaccines? No     Paralyzed by Guillain-Manly syndrome? No     Currently pregnant? NO     Current moderate or severe illness? No     Allergy to contact lens solution? No      Vaccination given by Ama Skaggs CMA  Recorded by Ama Skaggs CMA

## 2018-10-04 ENCOUNTER — RADIANT APPOINTMENT (OUTPATIENT)
Dept: ULTRASOUND IMAGING | Facility: CLINIC | Age: 53
End: 2018-10-04
Attending: INTERNAL MEDICINE
Payer: COMMERCIAL

## 2018-10-04 DIAGNOSIS — E04.1 THYROID NODULE: Primary | ICD-10-CM

## 2018-10-04 DIAGNOSIS — Z13.29 SCREENING FOR THYROID DISORDER: ICD-10-CM

## 2018-10-12 ENCOUNTER — RADIANT APPOINTMENT (OUTPATIENT)
Dept: ULTRASOUND IMAGING | Facility: CLINIC | Age: 53
End: 2018-10-12
Attending: INTERNAL MEDICINE
Payer: COMMERCIAL

## 2018-10-12 DIAGNOSIS — E04.1 THYROID NODULE: ICD-10-CM

## 2018-10-12 RX ORDER — LIDOCAINE HYDROCHLORIDE 10 MG/ML
2 INJECTION, SOLUTION INFILTRATION; PERINEURAL ONCE
Status: COMPLETED | OUTPATIENT
Start: 2018-10-12 | End: 2018-10-12

## 2018-10-12 RX ADMIN — LIDOCAINE HYDROCHLORIDE 2 ML: 10 INJECTION, SOLUTION INFILTRATION; PERINEURAL at 11:36

## 2018-10-12 NOTE — MR AVS SNAPSHOT
MRN:[id number]                      After Visit Summary   10/12/2018    Jade Carcamo    MRN: [id number]           Visit Information        Provider Department      10/12/2018 11:00 AM  PROCEDURAL RAD;  IMAGING NURSE; US3 [facility] US           Review of your medicines      Notice  As of 10/12/2018 11:43 AM    You have not been prescribed any medications.             Protect others around you: Learn how to safely use, store and throw away your medicines at www.disposemymeds.org.         Follow-ups after your visit        Future tests that were ordered for you     Fine needle aspiration       Fine needle aspiration procedure: Ultrasound  Thyroid Site 1:  LT Mid #1                   Care Instructions        Further instructions from your care team       Directions for after your Thyroid Fine Needle Aspiration:    You can remove your bandage within a few hours.  The site of the biopsy may be sore for a day or two after the procedure. You can take over-the-counter pain medicine if needed.  Notify your doctor if you have any of the following:    Fever above 101 degrees    Swelling in the area of the biopsy    Redness or leaking from the biopsy site  Your doctor will notify you of the results in 2-3 days.     Additional Information About Your Visit        MyChart Information     [employer] gives you secure access to your electronic health record. If you see a primary care provider, you can also send messages to your care team and make appointments. If you have questions, please call your primary care clinic.  If you do not have a primary care provider, please call [phone] and they will assist you.      [employer] is an electronic gateway that provides easy, online access to your medical records. With [employer], you can request a clinic appointment, read your test results, renew a prescription or communicate with your care team.     To access your existing account, please contact your  [facility] or call [phone] for assistance.        Care EveryWhere ID     This is your Care EveryWhere ID. This could be used by other organizations to access your [facility] medical records  [id number]        Your Vitals Were     Last Period                   11/14/2016            Primary Care Provider Office Phone # Fax #    [place]andra Owens [phone]      Equal Access to Services     HENRRY HALL : Hadii aad ku hadasho Soomaali, waaxda luqadaha, qaybta kaalmada adeegyada, waxay emmettin hayglorian adeeg solojuan luisaxel laisabela . So [facility] [phone].    ATENCIÓN: Si habla español, tiene a francisco disposición servicios gratuitos de asistencia lingüística. Llame al [phone].    We comply with applicable federal civil rights laws and [place] laws. We do not discriminate on the basis of race, color, national origin, age, disability, sex, sexual orientation, or gender identity.            Thank you!     Thank you for choosing [facility] for your care. Our goal is always to provide you with excellent care. Hearing back from our patients is one way we can continue to improve our services. Please take a few minutes to complete the written survey that you may receive in the mail after you visit with us. Thank you!             Medication List: This is a list of all your medications and when to take them. Check marks below indicate your daily home schedule. Keep this list as a reference.      Notice  As of 10/12/2018 11:43 AM    You have not been prescribed any medications.

## 2018-10-15 LAB — COPATH REPORT: NORMAL

## 2018-10-16 ENCOUNTER — OFFICE VISIT (OUTPATIENT)
Dept: INTERNAL MEDICINE | Facility: CLINIC | Age: 53
End: 2018-10-16
Payer: COMMERCIAL

## 2018-10-16 ENCOUNTER — MYC MEDICAL ADVICE (OUTPATIENT)
Dept: INTERNAL MEDICINE | Facility: CLINIC | Age: 53
End: 2018-10-16

## 2018-10-16 VITALS — SYSTOLIC BLOOD PRESSURE: 114 MMHG | DIASTOLIC BLOOD PRESSURE: 74 MMHG | HEART RATE: 79 BPM | RESPIRATION RATE: 16 BRPM

## 2018-10-16 DIAGNOSIS — E04.1 THYROID NODULE: Primary | ICD-10-CM

## 2018-10-16 ASSESSMENT — PAIN SCALES - GENERAL: PAINLEVEL: MODERATE PAIN (4)

## 2018-10-16 NOTE — MR AVS SNAPSHOT
After Visit Summary   10/16/2018    Jade Carcamo    MRN: [id number]           Patient Information     Date Of Birth          1965        Visit Information        Provider Department      10/16/2018 1:10 PM Jordan Hammond MD [facility]        Today's Diagnoses     Thyroid nodule    -  1      Care Instructions    Primary Care Center Phone Number [phone]  Primary Care Center Medication Refill Request Information:  * Please contact your pharmacy regarding ANY request for medication refills.  ** [facility] Prescription Fax = [phone]  * Please allow 3 business days for routine medication refills.  * Please allow 5 business days for controlled substance medication refills.     Primary Care Center Test Result notification information:  *You will be notified with in 7-10 days of your appointment day regarding the results of your test.  If you are on MyChart you will be notified as soon as the provider has reviewed the results and signed off on them.                 [facility]         Internal Medicine Resident                   Continuity Clinic    Who We Are    Resident Continuity Clinic is a part of the [facility].  Resident physicians see patients independently and establish a relationship with them over the course of their three-year residency program.  As with the Primary Care Clinic, our Resident Continuity Clinic models a group practice.  If your doctor is not available, you will be able to see another resident physician.  At the end of a resident s training, patients will be transitioned to a new resident physician for ongoing care.     We treat patients with a wide array of medical needs from routine physicals, to acute illnesses, to diabetes and blood pressure management, to complex medical illness.  What is a Resident Physician?    Resident physicians hold medical degrees and are doctors. They are training to become specialists in  Internal Medicine. They work under the supervision of board-certified faculty physicians.  Expectations for Your Care    We strive to provide accessible, quality care at all times.    In order to provide this care, it is best to see your primary care resident doctor consistently rather switching between providers.  In the event you do see another physician, you should schedule a follow-up visit with your usual primary care doctor.    If you are transitioning your care from another clinic, it is helpful to have your records available for your doctor to review.    We do not prescribe controlled substances, such as ADD medications or narcotic pain medications, on your first visit.  We will review your health records and concerns prior to devising a treatment plan with you in order to provide the best care.      Clinic Services     Extended clinic hours; patient  to help navigate your visit;  parking; laboratory and imaging services with evening and weekend hours    Multiple medical and surgical specialties in one building    Complementary services, including Nutrition, Integrative Medicine, Pharmacy consultations, Mental and Behavioral Health, Sports Medicine and Physical Therapy    Thank You    We would like to thank you for choosing the [facility] Internal Medicine Resident Continuity Clinic for your primary care. You are making a priceless contribution to the training of the next generation of health care practitioners.     Contact us at [phone] for appointments or questions.    Resident Clinic Hours are Tuesdays and Thursdays, 7:30am-5:00pm    Residents  Ama Sebastian MD   (Female )   Prachi Leon MD   (Female)   Thomas Perrin MD  (Male)   Shelton Maher MD  (Male)   Ann Marie Martinez MD   (Female)   Karl Ogden MD  (Male)    Jordan Hammond MD  (Male)   Torsten Glaeson MD  (Male)   Shelton Kaur MD (Male)   Gaurang Salazar MD  (Male)   Josselyn Lutz MD (Female)     Nicole Borges MD (Female)   Aftab Powers MD  (Male)   sAia Yee MD(Female)   Victorina Arroyo MD  (Female)    Supervising Physicians   MD Sabrina Boone MD Briar Duffy, MD James Langland, MD Mary Logeais, MD Tanya Melnik, MD Charles Moldow, MD Heather Thompson Buum, MD Kathleen Watson, MD                    Follow-ups after your visit        Your next 10 appointments already scheduled     Dec 19, 2018  4:30 PM CST   (Arrive by 4:15 PM)   NEW ENDOCRINE with Mary Castillo MD   [facility] ([facility])    [address] [place] [address]  3rd [facility] [address] [phone]            Dec 21, 2018  9:55 AM CST   (Arrive by 9:40 AM)   Return Visit with Marina Owens MD   [facility] ([facility])    [address] [place] [address] [phone]              Who to contact     Please call your clinic at [phone] to:    Ask questions about your health    Make or cancel appointments    Discuss your medicines    Learn about your test results    Speak to your doctor            Additional Information About Your Visit        [employer] Information     [employer] gives you secure access to your electronic health record. If you see a primary care provider, you can also send messages to your care team and make appointments. If you have questions, please call your primary care clinic.  If you do not have a primary care provider, please call [phone] and they will assist you.      [employer] is an electronic gateway that provides easy, online access to your medical records. With [employer], you can request a clinic appointment, read your test results, renew a prescription or communicate with your care team.     To access your existing account, please contact your [facility] or call [phone] for assistance.         Care EveryWhere ID     This is your Care EveryWhere ID. This could be used by other organizations to access your [facility] medical records  [id number]        Your Vitals Were     Pulse Respirations Last Period Breastfeeding?          79 16 11/14/2016 No         Blood Pressure from Last 3 Encounters:   10/16/18 114/74   10/02/18 114/78   09/10/18 123/88    Weight from Last 3 Encounters:   10/02/18 67.1 kg (147 lb 14.4 oz)   09/10/18 66.2 kg (145 lb 15.1 oz)   09/04/18 66.2 kg (146 lb)              Today, you had the following     No orders found for display       Primary Care Provider Office Phone # Fax #    Marina Owens [phone] [address] [address]        Equal Access to Services     RODNEY HALL : Hadii aad cori hadasho Sotyroneali, waaxda luqadaha, qaybta kaalmada adelakeishayada, nehemiah rocha . So [facility] [phone].    ATENCIÓN: Si habla español, tiene a francisco disposición servicios gratuitos de asistencia lingüística. Justin al [phone].    We comply with applicable federal civil rights laws and [place] laws. We do not discriminate on the basis of race, color, national origin, age, disability, sex, sexual orientation, or gender identity.            Thank you!     Thank you for choosing [facility]  for your care. Our goal is always to provide you with excellent care. Hearing back from our patients is one way we can continue to improve our services. Please take a few minutes to complete the written survey that you may receive in the mail after your visit with us. Thank you!             Your Updated Medication List - Protect others around you: Learn how to safely use, store and throw away your medicines at www.disposemymeds.org.      Notice  As of 10/16/2018 11:59 PM    You have not been prescribed any medications.

## 2018-10-16 NOTE — PATIENT INSTRUCTIONS
Primary Care Center Phone Number [phone]  Primary Care Center Medication Refill Request Information:  * Please contact your pharmacy regarding ANY request for medication refills.  ** [facility] Prescription Fax = [phone]  * Please allow 3 business days for routine medication refills.  * Please allow 5 business days for controlled substance medication refills.     Primary Care Center Test Result notification information:  *You will be notified with in 7-10 days of your appointment day regarding the results of your test.  If you are on MyChart you will be notified as soon as the provider has reviewed the results and signed off on them.                 [facility]         Internal Medicine Resident                   Continuity Clinic    Who We Are    Resident Continuity Clinic is a part of the [facility].  Resident physicians see patients independently and establish a relationship with them over the course of their three-year residency program.  As with the Primary Care Clinic, our Resident Continuity Clinic models a group practice.  If your doctor is not available, you will be able to see another resident physician.  At the end of a resident s training, patients will be transitioned to a new resident physician for ongoing care.     We treat patients with a wide array of medical needs from routine physicals, to acute illnesses, to diabetes and blood pressure management, to complex medical illness.  What is a Resident Physician?    Resident physicians hold medical degrees and are doctors. They are training to become specialists in Internal Medicine. They work under the supervision of board-certified faculty physicians.  Expectations for Your Care    We strive to provide accessible, quality care at all times.    In order to provide this care, it is best to see your primary care resident doctor consistently rather switching between providers.  In the event you do see another physician, you  should schedule a follow-up visit with your usual primary care doctor.    If you are transitioning your care from another clinic, it is helpful to have your records available for your doctor to review.    We do not prescribe controlled substances, such as ADD medications or narcotic pain medications, on your first visit.  We will review your health records and concerns prior to devising a treatment plan with you in order to provide the best care.      Clinic Services     Extended clinic hours; patient  to help navigate your visit;  parking; laboratory and imaging services with evening and weekend hours    Multiple medical and surgical specialties in one building    Complementary services, including Nutrition, Integrative Medicine, Pharmacy consultations, Mental and Behavioral Health, Sports Medicine and Physical Therapy    Thank You    We would like to thank you for choosing the [facility] Internal Medicine Resident Continuity Clinic for your primary care. You are making a priceless contribution to the training of the next generation of health care practitioners.     Contact us at [phone] for appointments or questions.    Resident Clinic Hours are Tuesdays and Thursdays, 7:30am-5:00pm    Residents  Ama Sebastian MD   (Female )   Prachi Leon MD   (Female)   Thomas Perrin MD  (Male)   Shelton Maher MD  (Male)   Ann Marie Martinez MD   (Female)   Karl Ogden MD  (Male)    Jordan Hammond MD  (Male)   Torsten Gleason MD  (Male)   Shelton Kaur MD (Male)   Gaurang Salazar MD  (Male)   Josselyn Lutz MD (Female)    Nicole Borges MD (Female)   Aftab Powesr MD  (Male)   Asia Yee MD(Female)   Victorina Arroyo MD  (Female)    Supervising Physicians   MD Sabrina Boone MD Briar Duffy, MD James Langland, MD Mary Logeais, MD Tanya Melnik, MD Charles Moldow, MD Heather Thompson Buum, MD Kathleen Watson, MD

## 2018-10-16 NOTE — NURSING NOTE
Chief Complaint   Patient presents with     Pain     pt is here to discuss pain at thyroid biopsy site        Ama Skaggs CMA at 1:21 PM on 10/16/2018

## 2018-10-17 NOTE — TELEPHONE ENCOUNTER
Patient came in to see a provider in clinic yesterday 10/16/18. Monik Rivera LPN 10/17/2018 7:48 AM

## 2018-10-17 NOTE — PROGRESS NOTES
PRIMARY CARE CLINIC    Resident Clinic Note        Visit Date: October 17, 2018    Jade Carcamo  MRN: [id number]  YOB: 1965    HPI:  Jade Carcamo is a 52 year old female with PMH of polycystic liver disease, cholangitis, recent thyroid nodule FNA presenting to clinic with ongoing throat discomfort 5 days after her procedure.    She underwent FNA biopsy 10/12 which was complicated by pain, bleeding, and hematoma around the nodule. Pressure was held over the site for about an hour per patient, and she was discharged home    She continues to have pain/tenderness in the neck, which has evolved over the last few days. She previously described pain at site of biopsy with some associated difficulty swallowing. The pain has shift left and upward toward the ear. She describes a pulling sensation, especially when she turns her had to the right, and also ear pain, as if she had an ear infection.    She otherwise denies fevers, chills, difficulty breathing or eating.     ROS:  10 point ROS was reviewed and negative other than stated in HPI    Past medical history reviewed.    Past Medical History:   Diagnosis Date     GERD (gastroesophageal reflux disease)      Polycystic liver disease     in 40 years       Allergies   Allergen Reactions     Bee Venom      Penicillins Rash     Rash limited to arm ~20 years ago. See Antimicrobial Management Team allergy assessment note on 7/10/2018       Past Surgical History:   Procedure Laterality Date     ENDOSCOPIC RETROGRADE CHOLANGIOPANCREATOGRAM N/A 7/9/2018    Procedure: COMBINED ENDOSCOPIC RETROGRADE CHOLANGIOPANCREATOGRAPHY, PLACE TUBE/STENT;  Endoscopic Retrograde Cholangiopancreatogram, Biliary Sphincterotomy, Pancreatic Duct Stent Placement, and Bile Duct Stent Placement x 2;  Surgeon: Olvin Hart MD;  Location: [facility] OR     ENDOSCOPIC RETROGRADE CHOLANGIOPANCREATOGRAM N/A 9/10/2018    Procedure: COMBINED ENDOSCOPIC RETROGRADE CHOLANGIOPANCREATOGRAPHY,  REMOVE FOREIGN BODY OR STENT/TUBE;  Endoscopic Retrograde Cholangiopancreatogram with Stent removal;  Surgeon: Olvin Hart MD;  Location: [facility] OR     LAPAROSCOPIC UNROOFING LIVER CYST N/A 11/4/2016    Procedure: LAPAROSCOPIC UNROOFING LIVER CYST;  Surgeon: Sonido Cruz MD;  Location: [facility] OR       Family History   Problem Relation Age of Onset     Alzheimer Disease Mother      Thyroid Disease Sister      thyroid cancer     HEART DISEASE Father      Other - See Comments Brother      bradycardia requiring ablation     Psoriasis Maternal Grandfather        Social History     Social History     Marital status:      Spouse name: N/A     Number of children: N/A     Years of education: N/A     Occupational History     Not on file.     Social History Main Topics     Smoking status: Never Smoker     Smokeless tobacco: Never Used     Alcohol use Yes      Comment: 2 per week     Drug use: No     Sexual activity: Not on file     Other Topics Concern     Not on file     Social History Narrative    Works in [place], contracts with [employer]           No current outpatient prescriptions on file.     No current facility-administered medications for this visit.        Vitals:  /74  Pulse 79  Resp 16  LMP 11/14/2016  Breastfeeding? No    Physical Exam:  General: NAD  HEENT: Oral mucosa moist and non-erythematous, PERRLA, EOM intact. OP clear. TM pearly bilaterally. There is minimal later stage bruising around manubrium and superior to left clavicle. Minimal swelling in left neck. There is no lymphadenopathy. She has full range of motion of neck. CNII-XII intact.   CV: RRR    CYTOLOGIC INTERPRETATION:      Thyroid, left mid #1, ultrasound guided fine needle aspiration:   - Atypia of undetermined significance   Specimen Adequacy: Satisfactory for evaluation.     The [place] implied risk of malignancy and recommended clinical   management:   Atypia of undetermined significance has a  "10-30% risk of malignancy,   recommend repeat FNA in 4-6 weeks,   molecular testing or lobectomy     I have personally reviewed all specimens and/or slides, including the   listed special stains, and used them   with my medical judgement to determine or confirm the final diagnosis.     Assessment:  Jade Carcamo is a 52 year old female with PMH of polycystic liver disease, cholangitis, recent thyroid nodule FNA presenting to clinic with ongoing throat discomfort 5 days after her procedure.    Plan:  #Thyroid Nodule, atypia of undetermined significance  #Periprocedural hematoma  It is likely that there was some extension of the initial hematoma, at least visible at the base of the neck, that is causing pain and pulling sensation in her left neck. She has no clinic evidence of infection. She does have anxiety about \"seeding\" of the atypical cells of her thyroid nodule, or that there ongoing bleeding. Offered reassurance.  - She should try warm compresses to neck  - Over the counter icyhot may provide some symptomatic relief, though warned about salicylates  - She should be safe to travel to [facility] as she previously planned  - Reviewed atypia of undetermined significance, she was encouraged to repeat biopsy in 4-6 weeks, though deferring until after the holidays also seems reasonable      Patient seen and discussed with Dr. Arevalo.    Jordan Hammond MD, NICOLAS  PGY-3, Internal Medicine   [phone]     Ms Carcamo was seen and examined with the resident chrystal Hammond, I have reviewed his note and plan and I agree..  Vega Arevalo MD      "

## 2018-11-07 ENCOUNTER — TELEPHONE (OUTPATIENT)
Dept: ENDOCRINOLOGY | Facility: CLINIC | Age: 53
End: 2018-11-07

## 2018-11-07 NOTE — TELEPHONE ENCOUNTER
Patient she states that she had a thyroid biopsy done about 1 month ago  in 'radiology' here with the clinic.  She said the experience was unpleasant and she had some bleeding with this.  She was told on her last visit with resident Dr. Crespo precepted by Dr. Arevalo that she will need another thyroid biopsy.  She prefers to not have this done by a radiologist again.  The patient would like to know where else she can go to have this done in the [facility] system.  Her next appointment with shalini is in December.  She would like to have the procedure done soon as she has met her deductible for the year.  Will inform Dr. Arevalo.  Blayne Yancey LPN at 4:36 PM on 11/7/2018

## 2018-11-07 NOTE — TELEPHONE ENCOUNTER
[facility] Call Center    Phone Message    May a detailed message be left on voicemail: yes    Reason for Call: Other: Pt called again and is requesting to speak with a nurse in regards to thyroid Biopsy. Please call back pt asap. Thanks,      Action Taken: Message routed to:  [facility] ([facility]): Danie

## 2018-11-07 NOTE — TELEPHONE ENCOUNTER
This patient isn't coming until December and we will not have an answer regarding her past biopsy. I am forwarding this on to her primary who ordered the test.

## 2018-11-08 DIAGNOSIS — E04.1 LEFT THYROID NODULE: Primary | ICD-10-CM

## 2018-11-19 ENCOUNTER — TELEPHONE (OUTPATIENT)
Dept: ENDOCRINOLOGY | Facility: CLINIC | Age: 53
End: 2018-11-19

## 2018-11-19 DIAGNOSIS — E04.1 THYROID NODULE: ICD-10-CM

## 2018-11-19 NOTE — TELEPHONE ENCOUNTER
[facility] Call Center    Phone Message    May a detailed message be left on voicemail: yes    Reason for Call: Other: PT is wondering if she will be able to have a biopsy done the day of her appt. or before the end of the year for insurance purposes.  Please follow up with the PT.     Action Taken: Message routed to:  [facility] ([facility] shalini

## 2018-11-19 NOTE — LETTER
12/14/2018     Jade Carcamo  [address] [address]      Dear Jade:    Thank you for allowing me to participate in your care. Your recent test results were reviewed and listed below.      Your results are provided below for your review  The microscopic from recent thyroid biopsy revealed no evidence of malignancy  GOOD          Thank you for choosing [facility]. As a result, please continue with the treatment plan discussed in the office. Return as discussed or sooner if symptoms worsens or fail to improve. If you have any further questions or concerns, please do not hesitate to contact us.      Sincerely,    Vega Arevalo   [address] [address]  Phone: [phone]  Fax: [phone]

## 2018-11-20 NOTE — TELEPHONE ENCOUNTER
Previsit planning:   Has not been seen in Endocrine clinic.   Thyroid L - 4 cm nodule, hypoechoic minimal cystic area, intermediate suspicion.   1st FNA AUS    Patient is currently not scheduled for a biopsy in clinic.    Please schedule at 8:30 am 12/13/2018 Thursday on a biopsy slot for repeat FNA with Afirma hold    Mary Castillo MD  [id number]  Endocrinology Service

## 2018-12-13 ENCOUNTER — OFFICE VISIT (OUTPATIENT)
Dept: ENDOCRINOLOGY | Facility: CLINIC | Age: 53
End: 2018-12-13
Payer: COMMERCIAL

## 2018-12-13 VITALS
SYSTOLIC BLOOD PRESSURE: 110 MMHG | WEIGHT: 151.4 LBS | BODY MASS INDEX: 22.94 KG/M2 | HEIGHT: 68 IN | HEART RATE: 73 BPM | DIASTOLIC BLOOD PRESSURE: 74 MMHG

## 2018-12-13 DIAGNOSIS — E04.1 THYROID NODULE: Primary | ICD-10-CM

## 2018-12-13 LAB — COPATH REPORT: NORMAL

## 2018-12-13 ASSESSMENT — MIFFLIN-ST. JEOR: SCORE: 1335.25

## 2018-12-13 ASSESSMENT — PAIN SCALES - GENERAL: PAINLEVEL: NO PAIN (0)

## 2018-12-13 NOTE — LETTER
12/13/2018       RE: Jade Carcamo  [address] [facility] [id number]     Dear Colleague,    Thank you for referring your patient, Jade Carcamo, to the [facility] ENDOCRINOLOGY at [facility]. Please see a copy of my visit note below.    Endocrine Clinic Consult:     Reason for consult: Left thyroid nodule  Date: 12/13/2018  Referring Physician: Marina Owens    HPI:   Jade Carcamo is a 53 year old female who is seen for initial consultation.    She has history of polycystic liver disease, complicated by cholangitis in summer 2018.  Extensive workup was done.  Incidental thyroid cancer was noted on CT scan.  Thyroid ultrasound was done as next step.  Solitary thyroid nodule 4 cm in the longest dimension was noted.  This was hypoechoic and hypervascular.  FNA was done in October 2018.  This was complicated by hematoma formation.  The final report showed atypia of undetermined significance.  She is here for further evaluation of the thyroid nodule.      she  has a past medical history of GERD (gastroesophageal reflux disease) and Polycystic liver disease..     Symptoms Details   Temp intolerance   no   Palpitations  no   SOB  no   Change in BM  No   Appetite  Less appetite    Weight changes  Gained wt   Extremity  no edema   Skin or hair changes  no rash   Menses  Postmenopausal.    Diet  No changes   Exercise  No    Sleep  Better   Energy Levels  Good   Mental status change  No      Sister with thyroid cancer  Mother had thyroidectomy      No radiation exposure      No hoarseness, difficulty swallowing       Other ROS:   No eye symptoms  No headache, change in vision, loss of peripheral vision  Complete ROS that were obtained were negative.     Past Medical History:   Diagnosis Date     GERD (gastroesophageal reflux disease)      Polycystic liver disease     in 40 years     Past Surgical History:   Procedure Laterality Date     ENDOSCOPIC RETROGRADE  CHOLANGIOPANCREATOGRAM N/A 7/9/2018    Procedure: COMBINED ENDOSCOPIC RETROGRADE CHOLANGIOPANCREATOGRAPHY, PLACE TUBE/STENT;  Endoscopic Retrograde Cholangiopancreatogram, Biliary Sphincterotomy, Pancreatic Duct Stent Placement, and Bile Duct Stent Placement x 2;  Surgeon: Olvin Hart MD;  Location: [facility] OR     ENDOSCOPIC RETROGRADE CHOLANGIOPANCREATOGRAM N/A 9/10/2018    Procedure: COMBINED ENDOSCOPIC RETROGRADE CHOLANGIOPANCREATOGRAPHY, REMOVE FOREIGN BODY OR STENT/TUBE;  Endoscopic Retrograde Cholangiopancreatogram with Stent removal;  Surgeon: Olvin Hart MD;  Location: [facility] OR     LAPAROSCOPIC UNROOFING LIVER CYST N/A 11/4/2016    Procedure: LAPAROSCOPIC UNROOFING LIVER CYST;  Surgeon: Sonido Cruz MD;  Location: [facility] OR     Family History   Problem Relation Age of Onset     Alzheimer Disease Mother      Thyroid Disease Sister         thyroid cancer     Heart Disease Father      Other - See Comments Brother         bradycardia requiring ablation     Psoriasis Maternal Grandfather      Social History     Socioeconomic History     Marital status:      Spouse name: Not on file     Number of children: Not on file     Years of education: Not on file     Highest education level: Not on file   Social Needs     Financial resource strain: Not on file     Food insecurity - worry: Not on file     Food insecurity - inability: Not on file     Transportation needs - medical: Not on file     Transportation needs - non-medical: Not on file   Occupational History     Not on file   Tobacco Use     Smoking status: Never Smoker     Smokeless tobacco: Never Used   Substance and Sexual Activity     Alcohol use: Yes     Comment: 2 per week     Drug use: No     Sexual activity: Not on file   Other Topics Concern     Not on file   Social History Narrative    Works in [employer], contracts with [employer]            Allergies   Allergen Reactions     Bee Venom      Penicillins Rash     Rash  "limited to arm ~20 years ago. See Antimicrobial Management Team allergy assessment note on 7/10/2018     No current outpatient medications on file.     No current facility-administered medications for this visit.            Exam:  /74   Pulse 73   Ht 1.727 m (5' 8\")   Wt 68.7 kg (151 lb 6.4 oz)   LMP 11/14/2016   BMI 23.02 kg/m      Constitutional: healthy  Head: Normocephalic.   Neck: Neck supple. No adenopathy. Thyroid L nodule 3 cm, Carotids without bruits.  ENT: No throat congestion, no neck nodes or sinus tenderness  Cardiovascular: regular rhythm, no tachycardia  Respiratory: Lungs clear  Gastrointestinal: Abdomen soft, non-tender. BS normal. No striae  Musculoskeletal: gait normal and normal muscle tone  Neurologic: Normal speech, reflexes normal.   Psychiatric: mentation appears normal       TSH   Date Value Ref Range Status   10/02/2018 3.16 0.40 - 4.00 mU/L Final   07/02/2018 5.42 (H) 0.40 - 4.00 mU/L Final   05/22/2017 3.31 0.40 - 4.00 mU/L Final       T4 Free   Date Value Ref Range Status   07/03/2018 1.28 0.76 - 1.46 ng/dL Final   ]    CBC RESULTS:   Recent Labs   Lab Test 09/10/18  0751   WBC 4.4   RBC 4.58   HGB 13.5   HCT 41.7   MCV 91   MCH 29.5   MCHC 32.4   RDW 13.4        Recent Labs   Lab Test 09/10/18  0751 09/04/18  1037    138   POTASSIUM 3.7 4.8   CHLORIDE 105 101   CO2 29 30   ANIONGAP 5 7   GLC 81 84   BUN 13 13   CR 0.78 0.74   THUAN 9.0 9.4         Assessment     Left thyroid nodule, 4 cm in size, highly vascular nodule with prior FNA showing AUS. FNA complicated by hematoma formation.     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     Plan:     We discussed repeat FNA vs Afirma. Testing with repeat FNA was chosen by patient.     We discussed about thyroid nodules and management in great detail. Majority of thyroid nodules are benign. Majority of thyroid cancers are slow growing and have good prognosis but a small number tend " "to be aggressive.   Thyroid FNA for further evaluation of larger nodules is next best step.   Outcomes at FNA including nondiagnostic, benign, malignant and indeterminate diagnosis were discussed. The nondiagnostic and indeterminate diagnosis may necessitate repeat FNA   Outcomes of indeterminate nodules were discussed.  Role of molecular testing was discussed. GSC would be an acceptable option.  However patient would choose to do a repeat biopsy.    Mary Castillo MD  [id number]  Endocrinology Service           FINE NEEDLE ASPIRATION of THYROID NODULE(S):   Procedure Note: Discussed the risks of the procedure and reviewed the benefits. The complications including chances of infection, bruising and bleeding were discussed. Patient consents to the procedure.    Procedure:  The area was cleaned with iodine. US guidance was used to examine the thyroid prior to the biopsy.    3 passes, minimal skin bruise, but no hematoma or other complications. No major bleeding noted. VSS on discontinue    Aspirate samples were prepared on slides (air dried and alcohol fixation) for each nodule.  Needle washings were done with Affirma solution.    Area was cleaned and dressed with bandaid.    Vital signs post procedure was /74   Pulse 73   Ht 1.727 m (5' 8\")   Wt 68.7 kg (151 lb 6.4 oz)   LMP 11/14/2016   BMI 23.02 kg/m        Complications: none    Pt was given instructions on follow-up. The patient should call if questions and seek emergency care if swelling in the neck or difficulty breathing.         Mary Castillo MD      "

## 2018-12-13 NOTE — PROGRESS NOTES
"FINE NEEDLE ASPIRATION of THYROID NODULE(S):   Procedure Note: Discussed the risks of the procedure and reviewed the benefits. The complications including chances of infection, bruising and bleeding were discussed. Patient consents to the procedure.    Procedure:  The area was cleaned with iodine. US guidance was used to examine the thyroid prior to the biopsy.    3 passes, minimal skin bruise, but no hematoma or other complications. No major bleeding noted. VSS on discontinue    Aspirate samples were prepared on slides (air dried and alcohol fixation) for each nodule.  Needle washings were done with Affirma solution.    Area was cleaned and dressed with bandaid.    Vital signs post procedure was /74   Pulse 73   Ht 1.727 m (5' 8\")   Wt 68.7 kg (151 lb 6.4 oz)   LMP 11/14/2016   BMI 23.02 kg/m       Complications: none    Pt was given instructions on follow-up. The patient should call if questions and seek emergency care if swelling in the neck or difficulty breathing.     Mary Castillo MD  [id number]  Endocrinology Service    "

## 2018-12-13 NOTE — PROGRESS NOTES
Endocrine Clinic Consult:     Reason for consult: Left thyroid nodule  Date: 12/13/2018  Referring Physician: Marina Owens    HPI:   Jade Carcamo is a 53 year old female who is seen for initial consultation.    She has history of polycystic liver disease, complicated by cholangitis in summer 2018.  Extensive workup was done.  Incidental thyroid cancer was noted on CT scan.  Thyroid ultrasound was done as next step.  Solitary thyroid nodule 4 cm in the longest dimension was noted.  This was hypoechoic and hypervascular.  FNA was done in October 2018.  This was complicated by hematoma formation.  The final report showed atypia of undetermined significance.  She is here for further evaluation of the thyroid nodule.      she  has a past medical history of GERD (gastroesophageal reflux disease) and Polycystic liver disease..     Symptoms Details   Temp intolerance   no   Palpitations  no   SOB  no   Change in BM  No   Appetite  Less appetite    Weight changes  Gained wt   Extremity  no edema   Skin or hair changes  no rash   Menses  Postmenopausal.    Diet  No changes   Exercise  No    Sleep  Better   Energy Levels  Good   Mental status change  No      Sister with thyroid cancer  Mother had thyroidectomy      No radiation exposure      No hoarseness, difficulty swallowing       Other ROS:   No eye symptoms  No headache, change in vision, loss of peripheral vision  Complete ROS that were obtained were negative.     Past Medical History:   Diagnosis Date     GERD (gastroesophageal reflux disease)      Polycystic liver disease     in 40 years     Past Surgical History:   Procedure Laterality Date     ENDOSCOPIC RETROGRADE CHOLANGIOPANCREATOGRAM N/A 7/9/2018    Procedure: COMBINED ENDOSCOPIC RETROGRADE CHOLANGIOPANCREATOGRAPHY, PLACE TUBE/STENT;  Endoscopic Retrograde Cholangiopancreatogram, Biliary Sphincterotomy, Pancreatic Duct Stent Placement, and Bile Duct Stent Placement x 2;  Surgeon: Olvin Hart,  "MD;  Location: [facility] OR     ENDOSCOPIC RETROGRADE CHOLANGIOPANCREATOGRAM N/A 9/10/2018    Procedure: COMBINED ENDOSCOPIC RETROGRADE CHOLANGIOPANCREATOGRAPHY, REMOVE FOREIGN BODY OR STENT/TUBE;  Endoscopic Retrograde Cholangiopancreatogram with Stent removal;  Surgeon: Olvin Hart MD;  Location: [facility] OR     LAPAROSCOPIC UNROOFING LIVER CYST N/A 11/4/2016    Procedure: LAPAROSCOPIC UNROOFING LIVER CYST;  Surgeon: Sonido Cruz MD;  Location: [facility] OR     Family History   Problem Relation Age of Onset     Alzheimer Disease Mother      Thyroid Disease Sister         thyroid cancer     Heart Disease Father      Other - See Comments Brother         bradycardia requiring ablation     Psoriasis Maternal Grandfather      Social History     Socioeconomic History     Marital status:      Spouse name: Not on file     Number of children: Not on file     Years of education: Not on file     Highest education level: Not on file   Social Needs     Financial resource strain: Not on file     Food insecurity - worry: Not on file     Food insecurity - inability: Not on file     Transportation needs - medical: Not on file     Transportation needs - non-medical: Not on file   Occupational History     Not on file   Tobacco Use     Smoking status: Never Smoker     Smokeless tobacco: Never Used   Substance and Sexual Activity     Alcohol use: Yes     Comment: 2 per week     Drug use: No     Sexual activity: Not on file   Other Topics Concern     Not on file   Social History Narrative    Works in [employer], contracts with [employer]            Allergies   Allergen Reactions     Bee Venom      Penicillins Rash     Rash limited to arm ~20 years ago. See Antimicrobial Management Team allergy assessment note on 7/10/2018     No current outpatient medications on file.     No current facility-administered medications for this visit.            Exam:  /74   Pulse 73   Ht 1.727 m (5' 8\")   Wt 68.7 kg (151 lb " 6.4 oz)   LMP 11/14/2016   BMI 23.02 kg/m     Constitutional: healthy  Head: Normocephalic.   Neck: Neck supple. No adenopathy. Thyroid L nodule 3 cm, Carotids without bruits.  ENT: No throat congestion, no neck nodes or sinus tenderness  Cardiovascular: regular rhythm, no tachycardia  Respiratory: Lungs clear  Gastrointestinal: Abdomen soft, non-tender. BS normal. No striae  Musculoskeletal: gait normal and normal muscle tone  Neurologic: Normal speech, reflexes normal.   Psychiatric: mentation appears normal       TSH   Date Value Ref Range Status   10/02/2018 3.16 0.40 - 4.00 mU/L Final   07/02/2018 5.42 (H) 0.40 - 4.00 mU/L Final   05/22/2017 3.31 0.40 - 4.00 mU/L Final       T4 Free   Date Value Ref Range Status   07/03/2018 1.28 0.76 - 1.46 ng/dL Final   ]    CBC RESULTS:   Recent Labs   Lab Test 09/10/18  0751   WBC 4.4   RBC 4.58   HGB 13.5   HCT 41.7   MCV 91   MCH 29.5   MCHC 32.4   RDW 13.4        Recent Labs   Lab Test 09/10/18  0751 09/04/18  1037    138   POTASSIUM 3.7 4.8   CHLORIDE 105 101   CO2 29 30   ANIONGAP 5 7   GLC 81 84   BUN 13 13   CR 0.78 0.74   THUAN 9.0 9.4         Assessment     Left thyroid nodule, 4 cm in size, highly vascular nodule with prior FNA showing AUS. FNA complicated by hematoma formation.     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     Plan:     We discussed repeat FNA vs Afirma. Testing with repeat FNA was chosen by patient.     We discussed about thyroid nodules and management in great detail. Majority of thyroid nodules are benign. Majority of thyroid cancers are slow growing and have good prognosis but a small number tend to be aggressive.   Thyroid FNA for further evaluation of larger nodules is next best step.   Outcomes at FNA including nondiagnostic, benign, malignant and indeterminate diagnosis were discussed. The nondiagnostic and indeterminate diagnosis may necessitate repeat FNA   Outcomes of indeterminate  nodules were discussed.  Role of molecular testing was discussed. GSC would be an acceptable option.  However patient would choose to do a repeat biopsy.    Mary Castillo MD  [id number]  Endocrinology Service

## 2018-12-13 NOTE — PATIENT INSTRUCTIONS
Thyroid Biopsy Clinic     Aftercare instructions for thyroid needle aspiration     1. Remove Band-Aid tonight     2. You may notice mild discomfort, small bruise or swelling at the biopsy site. This is common. You may use acetaminophen (Tylenol) for discomfort rather than Aspirin or NSAIDs (such as Motrin or Aleve)     3. You may experience itching or mild redness and biopsy site if the topical spray was used. This should resolve in a couple of days.     4. The biopsy results are back in about 7-10 days. Please call our clinic if you have not heard back from us after 10 days.     5. Occasionally you may have a small amount of bleeding from the needle puncture site. If this happens, you should lie down and apply gentle direct pressure to the bleeding site for about ten minutes. When the bleeding has stopped, apply another clean Band-aid. If the bleeding persists, or should  you develop a fever, trouble in breathing, excessive neck swelling, or  unusual pain or discomfort, please call us. . You can reach our clinic at  [phone] during the regular business hours and endocrinologist on-call at [phone] during the evenings and night.     6. You should refrain from vigorous physical activity, sports, and stress for about 24 hours following your biopsy. In general, it is safe to return to work the day after the procedure. You may shower and get your neck wet the day after the biopsy unless instructed otherwise.

## 2018-12-18 NOTE — RESULT ENCOUNTER NOTE
Dear Jade,     Repeat FNA showed benign result which is reassuring. As we discussed this decreases the risk of malignancy significantly but you do need at least annual ultrasound going forward for next several years.     Best regards,   Mary Castillo MD  [id number]  Endocrinology Service

## 2018-12-20 NOTE — PROGRESS NOTES
[facility]  Primary Care Center   Marina Owens MD  12/21/2018      Chief Complaint:   Gyn Exam     History of Present Illness:   Jade Carcamo is a 53 year old female with a history of polycystic kidney disease, and gastroesophageal reflux disease who presents for GYN exam.    Cholangitis  The patient had ongoing cholangitis d/t her liver cysts this summer, necessitating hospitalization and inpatient treatment. She saw Dr. Hart for this--see her hospitalization notes for further details on this. She had two MRCP's done this summer, and did have an echocardiogram during this workup, showing bicuspid aortic valve as below. She states that she has no symptoms with this, but is wondering what this means. Today, she states that she is doing fine.     Echocardiogram 07/02/2018  Interpretation Summary  Global and regional left ventricular function is normal with an EF of 55-60%.  Global right ventricular function is normal.  The aortic valve is likely bicuspid with mild sclerosis. Mild aortic  insufficiency is present. No obvious valvular vegetations noted.  Pulmonary artery systolic pressure is normal.  The inferior vena cava was normal in size with preserved respiratory  variability.  No pericardial effusion is present.  Multiple hepatic cysts noted.  If clinical suspicion is high for endocarditis, consider TASNEEM for better  evaluation of the aortic valve.    Thyroid nodule  The patient had a FNA of a thyroid nodule on 12/13/2018 by Dr. Castillo, and this returned benign. She states that she was told to follow up on this in one year.     Fall  The patient states that she hit her head from falling in the bathroom at her hotel room in [facility]--she slipped on water. She thinks that she lost consciousness for a small amount of time. She did not go to the hospital in [facility], but instead flew home. The patient states that she is fine now, and has no lasting headaches, tinnitus, dizziness, memory loss, etc.     Weight gain  The  patient states that she is putting on weight like she never has before. She would be happy at 140-145 pounds--she states that the last time she weighed this much was when she was pregnant. The patient is wondering if this is related to menopause. She has joined the [employer] and is working on increasing her exercising. However, she does have significant knee pain and swelling with running, so she is swimming more now. She declines further evaluation of her knee in orthopedics.     GYN history   The patient has a history of an abnormal pap, necessitating a LEEP in the past. She is wondering if this got rid of the HPV, or not. This was done in [place], after her 2nd baby was born. The patient is , and all her children were born vaginally. All her recent pap smears have been normal, per [facility]. Her last pap smear was actually in 2016, but she would like to get another today as she is already here. She did not know that she had one that recent. She denies vaginal bleeding, vaginal pain, itching, abnormal discharge, or burning. She has some hot flashes, but no other menopausal symptoms. Her mood is stable and normal.       3 daughters aged 19-25 yo (2018)   x 3  Remote abnormal pap  Menopausal, hot flashes present  No gyn malignancies except one paternal aunt with breast ca    Review of Systems:   Pertinent items are noted in HPI, remainder of complete ROS is negative.      Active Medications:   The patient is currently on no regular medications.      Allergies:   Bee venom and Penicillins      Past Medical History:  Gastroesophageal reflux disease  Polycystic liver disease  Advance care planning   Fever      Past Surgical History:  ERCP x2   Lap. unroofing liver cyst     Family History:   Alzheimer's Disease - Mother   Thyroid cancer - Sister   Heart disease - Father   Psoriasis - Maternal grandfather    Breast cancer - Paternal Aunt  No family history of uterine or cervical cancer.     Social  History:   The patient was alone.  Smoking Status: Never   Smokeless Tobacco: Never   Alcohol Use: Yes    Employment: Unemployed currently      Physical Exam:   BP 99/63   Pulse 74   Wt 69.2 kg (152 lb 9.6 oz)   LMP 11/14/2016   SpO2 100%   BMI 23.20 kg/m       Constitutional: Alert, oriented, pleasant, no acute distress  Head: Normocephalic, atraumatic  Eyes: Extra-ocular movements intact, no scleral icterus  ENT: Oropharynx clear, moist mucus membranes, good dentition  Neck: Supple, no lymphadenopathy, no thyromegaly   Cardiovascular: Regular rate and rhythm, no murmurs, rubs or gallops, peripheral pulses full/symmetric  Respiratory: Good air movement bilaterally, lungs clear, no wheezes/rales/rhonchi  GI: Abdomen soft, bowel sounds present, nondistended, nontender, no organomegaly or masses, no rebound/guarding  Vulva: No external lesions, normal hair distribution, no adenopathy  Vagina: Moist, pink, no abnormal discharge, well rugated, no lesions, urethra normal, perineum normal  Cervix: Pap smear is taken, parous, smooth, pink, no visible lesions  Uterus: Normal size, anteverted, non-tender, mobile  Ovaries: No mass, non-tender, mobile  Musculoskeletal: No edema, normal muscle tone, normal gait  Neurologic: Alert and oriented, cranial nerves 2-12 intact.  Skin: No rashes/lesions  Psychiatric: normal mentation, affect and mood      Assessment and Plan:  Screening for malignant neoplasm of cervix  Pap completed today at the patient's request. She has a remote history of abnormal pap smear, s/p LEEP. Her most recent paps per EMR have all be normal. Pap and HPV screening sent to lab.  - Pap imaged thin layer screen with HPV - recommended age 30 - 65 years (select HPV order below)  - HPV High Risk Types DNA Cervical    Encounter for screening mammogram for breast cancer  Due this summer.  - Mammogram, screening w nolan (3D)    Need for tetanus booster  - TD PRESERVATIVE FREE, AGE >=7 YR    Bicuspid aortic  valve  For her newly discovered bicuspid valve on echocardiogram in July, we discussed the risk factors involved in this, and what this could cause in the future. At this time, she is asymptomatic. Mild AR on echo. We will repeat echocardiogram this summer to confirm/continue to monitor this.  I will advise screening of 1st degree family members if confirmed on echo.  - Echocardiogram Complete    Thyroid nodule--Benign colloid nodule on biopsy 12/18  Follow up with Dr. Castillo in one year.     Knee pain  For her knee pain, I offered referral to orthopedics or PT for further cares and evaluation, but she declined at this time.     Weight gain  The patient notes increasing weight gain, which is frustrating for her. She is working on exercise, and I encouraged her to continue with this. She will also work on her diet, such as intermittent fasting during the week--trying to not eat lunch, for example. She will try this. The patient and I discussed that this could be related to her menopause, but in the setting of her polycystic liver disease and with my discussion with Dr. Hobbs, we decided against HRT at this time.     Routine Health Maintenence:  Depression Screening:   PHQ-2 ( 1999 Pfizer) 8/16/2018 6/29/2018   Q1: Little interest or pleasure in doing things 0 0   Q2: Feeling down, depressed or hopeless 0 0   PHQ-2 Score 0 0   Q1: Little interest or pleasure in doing things - Not at all   Q2: Feeling down, depressed or hopeless - Not at all   PHQ-2 Score - 0     Immunizations (zoster, pneumovax, flu, Tdap, Hep A/B):   Most Recent Immunizations   Administered Date(s) Administered     HepA-Adult 10/28/2008     HepB 05/29/2009     Influenza Vaccine IM 3yrs+ 4 Valent IIV4 10/02/2018     TDAP Vaccine (Boostrix) 09/26/2008     Zoster vaccine, live 06/24/2016     Lipids:   Recent Labs   Lab Test 10/02/18  1142 05/22/17  1022   CHOL 234* 195   HDL 72 77   * 106*   TRIG 58 63     Colonoscopy (50-75 yrs):  3/15  hemorrhoids, repeat 10 years  Dexa (>65W or 70M yrs): n/a  Mammogram (40-75 yrs): due this summer  Pap (21-65 yrs): today  Last 6/16 NIL, no HPV done  GC/Chlam (<25 yrs): n/a  HIV/HCV if risk factors: both neg 7/18  Tob/EtOH: n/a  Lifestyle factors: reviewed  Advanced Directive: deferred      Follow-up: Data Unavailable         Scribe Disclosure:   I, Samreen Davalos, am serving as a scribe to document services personally performed by Marina Owens MD at this visit, based upon the provider's statements to me. All documentation has been reviewed by the aforementioned provider prior to being entered into the official medical record.     Portions of this medical record were completed by a scribe. UPON MY REVIEW AND AUTHENTICATION BY ELECTRONIC SIGNATURE, this confirms (a) I performed the applicable clinical services, and (b) the record is accurate.   Marina Owens MD  Internal Medicine    40 min spent face to face, of which >50% time spent on counseling/coordinating care exclusive of any procedure time

## 2018-12-21 ENCOUNTER — OFFICE VISIT (OUTPATIENT)
Dept: INTERNAL MEDICINE | Facility: CLINIC | Age: 53
End: 2018-12-21
Payer: COMMERCIAL

## 2018-12-21 VITALS
DIASTOLIC BLOOD PRESSURE: 63 MMHG | SYSTOLIC BLOOD PRESSURE: 99 MMHG | OXYGEN SATURATION: 100 % | BODY MASS INDEX: 23.2 KG/M2 | WEIGHT: 152.6 LBS | HEART RATE: 74 BPM

## 2018-12-21 DIAGNOSIS — Q23.81 BICUSPID AORTIC VALVE: ICD-10-CM

## 2018-12-21 DIAGNOSIS — M25.562 LEFT KNEE PAIN, UNSPECIFIED CHRONICITY: ICD-10-CM

## 2018-12-21 DIAGNOSIS — E04.1 THYROID NODULE: ICD-10-CM

## 2018-12-21 DIAGNOSIS — Q44.6 POLYCYSTIC LIVER DISEASE: ICD-10-CM

## 2018-12-21 DIAGNOSIS — Z12.31 ENCOUNTER FOR SCREENING MAMMOGRAM FOR BREAST CANCER: ICD-10-CM

## 2018-12-21 DIAGNOSIS — Z23 NEED FOR TETANUS BOOSTER: ICD-10-CM

## 2018-12-21 DIAGNOSIS — R63.5 WEIGHT GAIN: ICD-10-CM

## 2018-12-21 DIAGNOSIS — Z12.4 SCREENING FOR MALIGNANT NEOPLASM OF CERVIX: Primary | ICD-10-CM

## 2018-12-21 ASSESSMENT — PAIN SCALES - GENERAL: PAINLEVEL: NO PAIN (0)

## 2018-12-21 NOTE — PROGRESS NOTES
For asymptomatic adolescents and young adults (<30 years of age) with bicuspid aortic valve [9]:   With aortic valve mean Doppler gradient >30 mmHg or peak instantaneous gradient >50 mmHg, we suggest yearly Doppler echocardiography and a yearly electrocardiogram (ECG).   With aortic valve mean Doppler gradient <30 mmHg or peak instantaneous gradient <50 mmHg, we suggest Doppler echocardiography and an ECG every one to two years.   ?For older adults (>30 years of age) with aortic stenosis:   With severe aortic stenosis (defined as peak jet velocity ?4 m/sec, mean gradient >40 mmHg, valve area <1.0 cm2), we suggest Doppler echocardiography every 6 to 12 months.   With moderate aortic stenosis (defined as peak jet velocity 3 to 3.9 m/sec, mean gradient 25 to 40 mmHg, valve area 1.0 to 1.5 cm2), we suggest Doppler echocardiography every one to two years.   With mild aortic stenosis (defined as peak jet velocity 2.0 to 2.9 m/sec, mean gradient <25 mmHg, or valve area >1.5 cm2), we suggest Doppler echocardiography every three to five years.  ?For adults of all ages with aortic regurgitation:   With severe aortic regurgitation, we suggest Doppler echocardiography every 6 to 12 months (more frequently if the left ventricle is dilating).   With moderate aortic regurgitation, we suggest Doppler echocardiography every one to two years.   With mild aortic regurgitation, we suggest Doppler echocardiography every three to five years.  ?For adults of all ages with bicuspid aortic valve and an aortic root or ascending aortic diameter >4.0 cm:   Serial evaluation of the size and morphology of the aortic sinuses and ascending aorta by echocardiography, cardiovascular magnetic resonance, or computed tomography (CT) angiography is recommended, with the examination interval determined by the degree and rate of progression of aortic dilation and by family history [8].  If the aortic root or ascending aorta diameter is >4.5 cm, or if  there is a rapid rate of change in aortic diameter or a family history of aortic dissection, reassessment of the aortic root and ascending aorta size should be performed yearly [8]. When the aortic diameter is approaching a surgical threshold, more frequent imaging is reasonable.    #Routine Health Maintenence:  Depression Screening:   PHQ-2 ( 1999 Pfizer) 8/16/2018 6/29/2018   Q1: Little interest or pleasure in doing things 0 0   Q2: Feeling down, depressed or hopeless 0 0   PHQ-2 Score 0 0   Q1: Little interest or pleasure in doing things - Not at all   Q2: Feeling down, depressed or hopeless - Not at all   PHQ-2 Score - 0     Immunizations (zoster, pneumovax, flu, Tdap, Hep A/B):   Most Recent Immunizations   Administered Date(s) Administered     HepA-Adult 10/28/2008     HepB 05/29/2009     Influenza Vaccine IM 3yrs+ 4 Valent IIV4 10/02/2018     TDAP Vaccine (Boostrix) 09/26/2008     Zoster vaccine, live 06/24/2016     Lipids:   Recent Labs   Lab Test 10/02/18  1142 05/22/17  1022   CHOL 234* 195   HDL 72 77   * 106*   TRIG 58 63     PSA (50-75 yrs): No results found for: PSA   AAA Screening (65-75 yrs):  Lung Ca Screening (>30 pk age 55-79 or >20 py age 50-79 + RF):  Colonoscopy (50-75 yrs):  Dexa (>65W or 70M yrs):  Mammogram (40-75 yrs):  Pap (21-65 yrs):  GC/Chlam (<25 yrs):  HIV/HCV if risk factors:  Tob/EtOH:  Lifestyle factors:  Advanced Directive:

## 2018-12-21 NOTE — PATIENT INSTRUCTIONS
[facility] Medication Refill Request Information:  * Please contact your pharmacy regarding ANY request for medication refills.  ** [facility] Prescription Fax = [phone]  * Please allow 3 business days for routine medication refills.  * Please allow 5 business days for controlled substance medication refills.     [facility] Test Result notification information:  *You will be notified with in 7-10 days of your appointment day regarding the results of your test.  If you are on MyChart you will be notified as soon as the provider has reviewed the results and signed off on them.    [facility]: [phone]

## 2018-12-21 NOTE — NURSING NOTE
Chief Complaint   Patient presents with     Gyn Exam     here for pap       RADHA Gilman 10:01 AM on 12/21/2018.

## 2018-12-26 LAB
COPATH REPORT: NORMAL
PAP: NORMAL

## 2018-12-28 LAB
FINAL DIAGNOSIS: NORMAL
HPV HR 12 DNA CVX QL NAA+PROBE: NEGATIVE
HPV16 DNA SPEC QL NAA+PROBE: NEGATIVE
HPV18 DNA SPEC QL NAA+PROBE: NEGATIVE
SPECIMEN DESCRIPTION: NORMAL
SPECIMEN SOURCE CVX/VAG CYTO: NORMAL

## 2019-03-19 DIAGNOSIS — K76.89 LIVER CYST: Primary | ICD-10-CM

## 2019-03-20 ENCOUNTER — OFFICE VISIT (OUTPATIENT)
Dept: GASTROENTEROLOGY | Facility: CLINIC | Age: 54
End: 2019-03-20
Attending: INTERNAL MEDICINE
Payer: COMMERCIAL

## 2019-03-20 VITALS
TEMPERATURE: 97.4 F | DIASTOLIC BLOOD PRESSURE: 74 MMHG | OXYGEN SATURATION: 100 % | BODY MASS INDEX: 23.07 KG/M2 | HEIGHT: 68 IN | SYSTOLIC BLOOD PRESSURE: 124 MMHG | WEIGHT: 152.2 LBS | HEART RATE: 70 BPM

## 2019-03-20 DIAGNOSIS — Q44.6 POLYCYSTIC LIVER DISEASE: Primary | ICD-10-CM

## 2019-03-20 PROCEDURE — G0463 HOSPITAL OUTPT CLINIC VISIT: HCPCS | Mod: ZF

## 2019-03-20 ASSESSMENT — MIFFLIN-ST. JEOR: SCORE: 1338.87

## 2019-03-20 ASSESSMENT — PAIN SCALES - GENERAL: PAINLEVEL: NO PAIN (0)

## 2019-03-20 NOTE — NURSING NOTE
"Chief Complaint   Patient presents with     RECHECK     Liver cyst     /74   Pulse 70   Temp 97.4  F (36.3  C) (Oral)   Ht 1.727 m (5' 8\")   Wt 69 kg (152 lb 3.2 oz)   LMP 11/14/2016   SpO2 100%   BMI 23.14 kg/m    Ana Baltazar MA    "

## 2019-03-20 NOTE — LETTER
3/20/2019       RE: Jade Carcamo  [address]  [place] [address]     Dear Colleague,    Thank you for referring your patient, Jade Carcamo, to the [facility] HEPATOLOGY at [facility]. Please see a copy of my visit note below.    [facility] Hepatology Note    Encounter Date: Mar 20, 2019    CC:  Chief Complaint   Patient presents with     RECHECK     Liver cyst     History of Present Illness:  Ms. Jade Carcamo is a 53 year old female who presents as a follow-up for polycystic liver disease. The patient was last seen 01/16/2019 at [facility] where she was recommended to get a sphincterotomy and a resection of the liver. Patient does not really agree with the plan that was provided at the [facility]. She denies any abdominal pain, itching or skin rash or fatigue.  She denies any increased abdominal girth or lower extremity edema.  She denies any fevers or chills, cough or shortness of breath.  She denies any nausea or vomiting, diarrhea or constipation.  Her appetite has been good, and her weight has been stable. Patient was wondering if his wife should have a PET scan in order to see an an effected cyst. While at [place] the patient did a patch test in order to find out if she was allergic to penicillin. Results came back negative. Patient was wondering if her and her daughters should get DNA tested, and if her daughters have it should they go on medication. Patient was wondering if she should change her diet plan.     Past Medical History:   Patient Active Problem List   Diagnosis     Liver cyst     ACP (advance care planning)     Fever     Bicuspid aortic valve     Thyroid nodule     Past Medical History:   Diagnosis Date     Bicuspid aortic valve      Cholangitis 07/2018    prolonged hospitalization requiring ERCP/stenting     GERD (gastroesophageal reflux disease)      Polycystic liver disease     in 40 years     Thyroid nodule     cold, bening  "biopsy 12/18     Past Surgical History:   Procedure Laterality Date     ENDOSCOPIC RETROGRADE CHOLANGIOPANCREATOGRAM N/A 7/9/2018    Procedure: COMBINED ENDOSCOPIC RETROGRADE CHOLANGIOPANCREATOGRAPHY, PLACE TUBE/STENT;  Endoscopic Retrograde Cholangiopancreatogram, Biliary Sphincterotomy, Pancreatic Duct Stent Placement, and Bile Duct Stent Placement x 2;  Surgeon: Olvin Hart MD;  Location: [facility] OR     ENDOSCOPIC RETROGRADE CHOLANGIOPANCREATOGRAM N/A 9/10/2018    Procedure: COMBINED ENDOSCOPIC RETROGRADE CHOLANGIOPANCREATOGRAPHY, REMOVE FOREIGN BODY OR STENT/TUBE;  Endoscopic Retrograde Cholangiopancreatogram with Stent removal;  Surgeon: Olvin Hart MD;  Location: [facility] OR     LAPAROSCOPIC UNROOFING LIVER CYST N/A 11/4/2016    Procedure: LAPAROSCOPIC UNROOFING LIVER CYST;  Surgeon: Sonido Cruz MD;  Location: [facility] OR     Medications:  No current outpatient medications on file.       Allergies   Allergen Reactions     Bee Venom      Penicillins Rash     Rash limited to arm ~20 years ago. See Antimicrobial Management Team allergy assessment note on 7/10/2018       /74   Pulse 70   Temp 97.4  F (36.3  C) (Oral)   Ht 1.727 m (5' 8\")   Wt 69 kg (152 lb 3.2 oz)   LMP 11/14/2016   SpO2 100%   BMI 23.14 kg/m       Physical Exam:    In general, she appears quite healthy.  HEENT exam shows no scleral icterus or temporal muscle wasting.  Her chest is clear.  Her abdominal exam shows no increase in girth.  No tenderness to palpation is present.  Her liver is 13-14 cm in span without left lobe enlargement.  No spleen tip is palpable, and extremity exam shows no edema.  Skin exam shows no stigmata of chronic liver disease.  Neurologic exam shows no asterixis.       Impression/Plan:  1. Polycystic Liver Disease     Doing well, without symptoms, weight stable      Will hold off on genetic testing, but doubt this is autosomal dominant polycystic liver disease     2. History of cholangitis "     Status post sphincterotomy     No reoccurrences today     3. Health Care Maintenance     Up to date on Vaccine     Up to date on cancer screening     No further intervention required. Patient to report changes.     Follow-up in 1 year.         Staff Involved:  Staff/Scribe    Scribe Disclosure:   Dioni ESTRELLA, am serving as a scribe to document services personally performed by Dr. Davis Hobbs, based on data collection and the provider's statements to me.        Davis Hobbs MD      Professor of Medicine  [employer]      Executive Medical Director, Solid Organ Transplant Program  [facility]

## 2019-03-20 NOTE — LETTER
3/20/2019      RE: Jade Carcamo  [address]  [place] [address]       [facility] Hepatology Note    Encounter Date: Mar 20, 2019    CC:  Chief Complaint   Patient presents with     RECHECK     Liver cyst     History of Present Illness:  Ms. Jade Carcamo is a 53 year old female who presents as a follow-up for polycystic liver disease. The patient was last seen 01/16/2019 at [facility] where she was recommended to get a sphincterotomy and a resection of the liver. Patient does not really agree with the plan that was provided at the [facility]. She denies any abdominal pain, itching or skin rash or fatigue.  She denies any increased abdominal girth or lower extremity edema.  She denies any fevers or chills, cough or shortness of breath.  She denies any nausea or vomiting, diarrhea or constipation.  Her appetite has been good, and her weight has been stable. Patient was wondering if his wife should have a PET scan in order to see an an effected cyst. While at [place] the patient did a patch test in order to find out if she was allergic to penicillin. Results came back negative. Patient was wondering if her and her daughters should get DNA tested, and if her daughters have it should they go on medication. Patient was wondering if she should change her diet plan.     Past Medical History:   Patient Active Problem List   Diagnosis     Liver cyst     ACP (advance care planning)     Fever     Bicuspid aortic valve     Thyroid nodule     Past Medical History:   Diagnosis Date     Bicuspid aortic valve      Cholangitis 07/2018    prolonged hospitalization requiring ERCP/stenting     GERD (gastroesophageal reflux disease)      Polycystic liver disease     in 40 years     Thyroid nodule     cold, bening biopsy 12/18     Past Surgical History:   Procedure Laterality Date     ENDOSCOPIC RETROGRADE CHOLANGIOPANCREATOGRAM N/A 7/9/2018    Procedure: COMBINED ENDOSCOPIC RETROGRADE  "CHOLANGIOPANCREATOGRAPHY, PLACE TUBE/STENT;  Endoscopic Retrograde Cholangiopancreatogram, Biliary Sphincterotomy, Pancreatic Duct Stent Placement, and Bile Duct Stent Placement x 2;  Surgeon: Olvin Hart MD;  Location: [facility] OR     ENDOSCOPIC RETROGRADE CHOLANGIOPANCREATOGRAM N/A 9/10/2018    Procedure: COMBINED ENDOSCOPIC RETROGRADE CHOLANGIOPANCREATOGRAPHY, REMOVE FOREIGN BODY OR STENT/TUBE;  Endoscopic Retrograde Cholangiopancreatogram with Stent removal;  Surgeon: Olvin Hart MD;  Location: [facility] OR     LAPAROSCOPIC UNROOFING LIVER CYST N/A 11/4/2016    Procedure: LAPAROSCOPIC UNROOFING LIVER CYST;  Surgeon: Sonido Cruz MD;  Location: [facility] OR     Medications:  No current outpatient medications on file.       Allergies   Allergen Reactions     Bee Venom      Penicillins Rash     Rash limited to arm ~20 years ago. See Antimicrobial Management Team allergy assessment note on 7/10/2018       /74   Pulse 70   Temp 97.4  F (36.3  C) (Oral)   Ht 1.727 m (5' 8\")   Wt 69 kg (152 lb 3.2 oz)   LMP 11/14/2016   SpO2 100%   BMI 23.14 kg/m       Physical Exam:    In general, she appears quite healthy.  HEENT exam shows no scleral icterus or temporal muscle wasting.  Her chest is clear.  Her abdominal exam shows no increase in girth.  No tenderness to palpation is present.  Her liver is 13-14 cm in span without left lobe enlargement.  No spleen tip is palpable, and extremity exam shows no edema.  Skin exam shows no stigmata of chronic liver disease.  Neurologic exam shows no asterixis.       Impression/Plan:  1. Polycystic Liver Disease     Doing well, without symptoms, weight stable      Will hold off on genetic testing, but doubt this is autosomal dominant polycystic liver disease     2. History of cholangitis     Status post sphincterotomy     No reoccurrences today     3. Health Care Maintenance     Up to date on Vaccine     Up to date on cancer screening     No further intervention " required. Patient to report changes.     Follow-up in 1 year.         Staff Involved:  Staff/Scribe    Scribe Disclosure:   I, Dioni Sinclair, am serving as a scribe to document services personally performed by Dr. Davis Hobbs, based on data collection and the provider's statements to me.        Davis Hobbs MD      Professor of Medicine  [employer]      Executive Medical Director, Solid Organ Transplant Program  [facility]        Davis Hobbs MD

## 2019-03-20 NOTE — PROGRESS NOTES
[facility] Hepatology Note    Encounter Date: Mar 20, 2019    CC:  Chief Complaint   Patient presents with     RECHECK     Liver cyst     History of Present Illness:  Ms. Jade Carcamo is a 53 year old female who presents as a follow-up for polycystic liver disease. The patient was last seen 01/16/2019 at [facility] where she was recommended to get a sphincterotomy and a resection of the liver. Patient does not really agree with the plan that was provided at the [facility]. She denies any abdominal pain, itching or skin rash or fatigue.  She denies any increased abdominal girth or lower extremity edema.  She denies any fevers or chills, cough or shortness of breath.  She denies any nausea or vomiting, diarrhea or constipation.  Her appetite has been good, and her weight has been stable. Patient was wondering if his wife should have a PET scan in order to see an an effected cyst. While at [place] the patient did a patch test in order to find out if she was allergic to penicillin. Results came back negative. Patient was wondering if her and her daughters should get DNA tested, and if her daughters have it should they go on medication. Patient was wondering if she should change her diet plan.     Past Medical History:   Patient Active Problem List   Diagnosis     Liver cyst     ACP (advance care planning)     Fever     Bicuspid aortic valve     Thyroid nodule     Past Medical History:   Diagnosis Date     Bicuspid aortic valve      Cholangitis 07/2018    prolonged hospitalization requiring ERCP/stenting     GERD (gastroesophageal reflux disease)      Polycystic liver disease     in 40 years     Thyroid nodule     cold, bening biopsy 12/18     Past Surgical History:   Procedure Laterality Date     ENDOSCOPIC RETROGRADE CHOLANGIOPANCREATOGRAM N/A 7/9/2018    Procedure: COMBINED ENDOSCOPIC RETROGRADE CHOLANGIOPANCREATOGRAPHY, PLACE TUBE/STENT;  Endoscopic Retrograde Cholangiopancreatogram, Biliary  "Sphincterotomy, Pancreatic Duct Stent Placement, and Bile Duct Stent Placement x 2;  Surgeon: Olvin Hart MD;  Location: [facility]     ENDOSCOPIC RETROGRADE CHOLANGIOPANCREATOGRAM N/A 9/10/2018    Procedure: COMBINED ENDOSCOPIC RETROGRADE CHOLANGIOPANCREATOGRAPHY, REMOVE FOREIGN BODY OR STENT/TUBE;  Endoscopic Retrograde Cholangiopancreatogram with Stent removal;  Surgeon: Olvin Hart MD;  Location: [facility] OR     LAPAROSCOPIC UNROOFING LIVER CYST N/A 11/4/2016    Procedure: LAPAROSCOPIC UNROOFING LIVER CYST;  Surgeon: Sonido Cruz MD;  Location: [facility] OR     Medications:  No current outpatient medications on file.       Allergies   Allergen Reactions     Bee Venom      Penicillins Rash     Rash limited to arm ~20 years ago. See Antimicrobial Management Team allergy assessment note on 7/10/2018       /74   Pulse 70   Temp 97.4  F (36.3  C) (Oral)   Ht 1.727 m (5' 8\")   Wt 69 kg (152 lb 3.2 oz)   LMP 11/14/2016   SpO2 100%   BMI 23.14 kg/m      Physical Exam:    In general, she appears quite healthy.  HEENT exam shows no scleral icterus or temporal muscle wasting.  Her chest is clear.  Her abdominal exam shows no increase in girth.  No tenderness to palpation is present.  Her liver is 13-14 cm in span without left lobe enlargement.  No spleen tip is palpable, and extremity exam shows no edema.  Skin exam shows no stigmata of chronic liver disease.  Neurologic exam shows no asterixis.       Impression/Plan:  1. Polycystic Liver Disease     Doing well, without symptoms, weight stable      Will hold off on genetic testing, but doubt this is autosomal dominant polycystic liver disease     2. History of cholangitis     Status post sphincterotomy     No reoccurrences today     3. Health Care Maintenance     Up to date on Vaccine     Up to date on cancer screening     No further intervention required. Patient to report changes.     Follow-up in 1 year.         Staff " Involved:  Staff/Scribe    Scribe Disclosure:   I, Dioni Sinclair, am serving as a scribe to document services personally performed by Dr. Davis Hobbs, based on data collection and the provider's statements to me.        Davis Hobbs MD      Professor of Medicine  [employer]      Executive Medical Director, Solid Organ Transplant Program  [facility]

## 2019-03-21 ENCOUNTER — TELEPHONE (OUTPATIENT)
Dept: GASTROENTEROLOGY | Facility: CLINIC | Age: 54
End: 2019-03-21

## 2019-05-17 ENCOUNTER — TELEPHONE (OUTPATIENT)
Dept: INTERNAL MEDICINE | Facility: CLINIC | Age: 54
End: 2019-05-17

## 2019-05-17 NOTE — TELEPHONE ENCOUNTER
[facility] Call Center    Phone Message    May a detailed message be left on voicemail: yes    Reason for Call: Other: Per call from PT she is [place] in [facility] and has a sinus infection that causes pain in the face. PT is requesting a call back today ASAP to get medications for the sinus infection.      Action Taken: Message routed to:  [facility] Primary Care

## 2019-05-17 NOTE — TELEPHONE ENCOUNTER
Left a message to the pt to call me back before 5 pm today, if not, she needs to go to urgent care over the weekend for further evaluation.

## 2019-05-23 ENCOUNTER — DOCUMENTATION ONLY (OUTPATIENT)
Dept: CARE COORDINATION | Facility: CLINIC | Age: 54
End: 2019-05-23

## 2019-06-24 ENCOUNTER — OFFICE VISIT (OUTPATIENT)
Dept: ENDOCRINOLOGY | Facility: CLINIC | Age: 54
End: 2019-06-24
Payer: COMMERCIAL

## 2019-06-24 ENCOUNTER — ANCILLARY PROCEDURE (OUTPATIENT)
Dept: CARDIOLOGY | Facility: CLINIC | Age: 54
End: 2019-06-24
Attending: INTERNAL MEDICINE
Payer: COMMERCIAL

## 2019-06-24 ENCOUNTER — ANCILLARY PROCEDURE (OUTPATIENT)
Dept: ULTRASOUND IMAGING | Facility: CLINIC | Age: 54
End: 2019-06-24
Attending: INTERNAL MEDICINE
Payer: COMMERCIAL

## 2019-06-24 VITALS
DIASTOLIC BLOOD PRESSURE: 67 MMHG | BODY MASS INDEX: 23.15 KG/M2 | HEIGHT: 69 IN | HEART RATE: 67 BPM | SYSTOLIC BLOOD PRESSURE: 104 MMHG | WEIGHT: 156.3 LBS

## 2019-06-24 DIAGNOSIS — E04.1 THYROID NODULE: ICD-10-CM

## 2019-06-24 DIAGNOSIS — Q23.81 BICUSPID AORTIC VALVE: ICD-10-CM

## 2019-06-24 DIAGNOSIS — E04.1 THYROID NODULE: Primary | ICD-10-CM

## 2019-06-24 LAB
T4 FREE SERPL-MCNC: 0.78 NG/DL (ref 0.76–1.46)
TSH SERPL DL<=0.005 MIU/L-ACNC: 2.46 MU/L (ref 0.4–4)

## 2019-06-24 ASSESSMENT — MIFFLIN-ST. JEOR: SCORE: 1365.47

## 2019-06-24 NOTE — LETTER
6/24/2019       RE: Jade Carcamo  [address]  [place] [address]     Dear Colleague,    Thank you for referring your patient, Jade Carcamo, to the [facility] ENDOCRINOLOGY at [facility]. Please see a copy of my visit note below.    Endocrine Clinic Consult:     Reason for consult: Left thyroid nodule  Date: 06/24/2019  Referring Physician: Marina Owens    Interval history:   Does not have any neck pain or difficulty swallowing.   Wt gain, but not change in appetite or BM  No palpitations, dizziness.  No skin or hair changes.   Menopause 2 yrs back after a surgery.   Hot flashes +    I reviewed US done today  3.7 x 3.1 x 2.2 cm hypoechoic, significant peripheral vascularity.   Size smaller than before    Assessment     Left thyroid nodule, 3.7, highly vascular nodule with prior FNA showing AUS followed by benign FNa on repeat FNA (scant cellularity).  Size smaller than prior measurements.     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     Plan:   -- discussed implications of AUS / Benign FNA  US thyroid today, we will follow-up the results. Will plan to repeat US in 1 yr  Labs today.     Mary Castillo MD  [id number]  Endocrinology Service      HPI:   Jade Carcamo is a 53 year old female who is seen for initial consultation.    She has history of polycystic liver disease, complicated by cholangitis in summer 2018.  Extensive workup was done.  Incidental thyroid cancer was noted on CT scan.  Thyroid ultrasound was done as next step.  Solitary thyroid nodule 4 cm in the longest dimension was noted.  This was hypoechoic and hypervascular.  FNA was done in October 2018.  This was complicated by hematoma formation.  The final report showed atypia of undetermined significance.  She is here for further evaluation of the thyroid nodule.  Repeat FNA Benign but scant specimen      she  has a past medical history of Bicuspid aortic  valve, Cholangitis (07/2018), GERD (gastroesophageal reflux disease), Polycystic liver disease, and Thyroid nodule..     Symptoms Details   Temp intolerance   no   Palpitations  no   SOB  no   Change in BM  No   Appetite  Less appetite    Weight changes  Gained wt   Extremity  no edema   Skin or hair changes  no rash   Menses  Postmenopausal.    Diet  No changes   Exercise  No    Sleep  Better   Energy Levels  Good   Mental status change  No      Sister with thyroid cancer  Mother had thyroidectomy      No radiation exposure      No hoarseness, difficulty swallowing       Other ROS:   No eye symptoms  No headache, change in vision, loss of peripheral vision  Complete ROS that were obtained were negative.     Past Medical History:   Diagnosis Date     Bicuspid aortic valve      Cholangitis 07/2018    prolonged hospitalization requiring ERCP/stenting     GERD (gastroesophageal reflux disease)      Polycystic liver disease     in 40 years     Thyroid nodule     cold, bening biopsy 12/18     Past Surgical History:   Procedure Laterality Date     ENDOSCOPIC RETROGRADE CHOLANGIOPANCREATOGRAM N/A 7/9/2018    Procedure: COMBINED ENDOSCOPIC RETROGRADE CHOLANGIOPANCREATOGRAPHY, PLACE TUBE/STENT;  Endoscopic Retrograde Cholangiopancreatogram, Biliary Sphincterotomy, Pancreatic Duct Stent Placement, and Bile Duct Stent Placement x 2;  Surgeon: Olvin Hart MD;  Location: [facility] OR     ENDOSCOPIC RETROGRADE CHOLANGIOPANCREATOGRAM N/A 9/10/2018    Procedure: COMBINED ENDOSCOPIC RETROGRADE CHOLANGIOPANCREATOGRAPHY, REMOVE FOREIGN BODY OR STENT/TUBE;  Endoscopic Retrograde Cholangiopancreatogram with Stent removal;  Surgeon: Olvin Hart MD;  Location: [facility] OR     LAPAROSCOPIC UNROOFING LIVER CYST N/A 11/4/2016    Procedure: LAPAROSCOPIC UNROOFING LIVER CYST;  Surgeon: Sonido Cruz MD;  Location: [facility] OR     Family History   Problem Relation Age of Onset     Alzheimer Disease Mother      Thyroid Disease Sister          thyroid cancer     Heart Disease Father      Other - See Comments Brother         bradycardia requiring ablation     Psoriasis Maternal Grandfather      Breast Cancer Paternal Aunt      Social History     Socioeconomic History     Marital status:      Spouse name: Not on file     Number of children: Not on file     Years of education: Not on file     Highest education level: Not on file   Occupational History     Not on file   Social Needs     Financial resource strain: Not on file     Food insecurity:     Worry: Not on file     Inability: Not on file     Transportation needs:     Medical: Not on file     Non-medical: Not on file   Tobacco Use     Smoking status: Never Smoker     Smokeless tobacco: Never Used   Substance and Sexual Activity     Alcohol use: Yes     Comment: 2 per week     Drug use: No     Sexual activity: Not on file   Lifestyle     Physical activity:     Days per week: Not on file     Minutes per session: Not on file     Stress: Not on file   Relationships     Social connections:     Talks on phone: Not on file     Gets together: Not on file     Attends [employer]ism service: Not on file     Active member of club or organization: Not on file     Attends meetings of clubs or organizations: Not on file     Relationship status: Not on file     Intimate partner violence:     Fear of current or ex partner: Not on file     Emotionally abused: Not on file     Physically abused: Not on file     Forced sexual activity: Not on file   Other Topics Concern     Not on file   Social History Narrative    Works in [employer], contracts with [employer]                3 daughters aged 19-23 yo (2018)     x 3    Remote abnormal pap    Menopausal, hot flashes present    No gyn malignancies except one paternal aunt with breast ca        Allergies   Allergen Reactions     Bee Venom      No current outpatient medications on file.     No current facility-administered medications for this  "visit.            Exam:  /67   Pulse 67   Ht 1.74 m (5' 8.5\")   Wt 70.9 kg (156 lb 4.8 oz)   LMP 11/14/2016   BMI 23.42 kg/m      Constitutional: healthy  Head: Normocephalic.   Neck: Neck supple. No adenopathy. Thyroid L nodule 3 cm, Carotids without bruits.  ENT: No throat congestion, no neck nodes or sinus tenderness  Cardiovascular: regular rhythm, no tachycardia  Respiratory: Lungs clear  Gastrointestinal: Abdomen soft, non-tender. BS normal. No striae  Musculoskeletal: gait normal and normal muscle tone  Neurologic: Normal speech, reflexes normal.   Psychiatric: mentation appears normal       TSH   Date Value Ref Range Status   10/02/2018 3.16 0.40 - 4.00 mU/L Final   07/02/2018 5.42 (H) 0.40 - 4.00 mU/L Final   05/22/2017 3.31 0.40 - 4.00 mU/L Final       T4 Free   Date Value Ref Range Status   07/03/2018 1.28 0.76 - 1.46 ng/dL Final   ]    CBC RESULTS:   Recent Labs   Lab Test 09/10/18  0751   WBC 4.4   RBC 4.58   HGB 13.5   HCT 41.7   MCV 91   MCH 29.5   MCHC 32.4   RDW 13.4        Recent Labs   Lab Test 09/10/18  0751 09/04/18  1037    138   POTASSIUM 3.7 4.8   CHLORIDE 105 101   CO2 29 30   ANIONGAP 5 7   GLC 81 84   BUN 13 13   CR 0.78 0.74   THUAN 9.0 9.4                "

## 2019-06-24 NOTE — PATIENT INSTRUCTIONS
Thyroid ultrasound and labs is ordered for you, please call to schedule.     To schedule with our Imaging Department, please call: [phone]    Jasmina Soares: [phone]    ---------------------------------------------------------------------------------------------------  If the size remains unchanged, plan to repeat US in 1 year.

## 2019-06-24 NOTE — RESULT ENCOUNTER NOTE
Dear Jade,     Thyroid nodule is smaller than prior exam which is reassuring,     Thyroid labs are also normal.     No changes are needed based on these test results.     Best regards,   Mary Castillo MD  Endocrinology Service

## 2019-06-24 NOTE — PROGRESS NOTES
Endocrine Clinic Consult:     Reason for consult: Left thyroid nodule  Date: 06/24/2019  Referring Physician: Marina Owens    Interval history:   Does not have any neck pain or difficulty swallowing.   Wt gain, but not change in appetite or BM  No palpitations, dizziness.  No skin or hair changes.   Menopause 2 yrs back after a surgery.   Hot flashes +    I reviewed US done today  3.7 x 3.1 x 2.2 cm hypoechoic, significant peripheral vascularity.   Size smaller than before    Assessment     Left thyroid nodule, 3.7, highly vascular nodule with prior FNA showing AUS followed by benign FNa on repeat FNA (scant cellularity).  Size smaller than prior measurements.     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     Plan:   -- discussed implications of AUS / Benign FNA  US thyroid today, we will follow-up the results. Will plan to repeat US in 1 yr  Labs today.     Mary Castillo MD  [id number]  Endocrinology Service      HPI:   Jade Carcamo is a 53 year old female who is seen for initial consultation.    She has history of polycystic liver disease, complicated by cholangitis in summer 2018.  Extensive workup was done.  Incidental thyroid cancer was noted on CT scan.  Thyroid ultrasound was done as next step.  Solitary thyroid nodule 4 cm in the longest dimension was noted.  This was hypoechoic and hypervascular.  FNA was done in October 2018.  This was complicated by hematoma formation.  The final report showed atypia of undetermined significance.  She is here for further evaluation of the thyroid nodule.  Repeat FNA Benign but scant specimen      she  has a past medical history of Bicuspid aortic valve, Cholangitis (07/2018), GERD (gastroesophageal reflux disease), Polycystic liver disease, and Thyroid nodule..     Symptoms Details   Temp intolerance   no   Palpitations  no   SOB  no   Change in BM  No   Appetite  Less appetite    Weight changes  Gained wt   Extremity  no  edema   Skin or hair changes  no rash   Menses  Postmenopausal.    Diet  No changes   Exercise  No    Sleep  Better   Energy Levels  Good   Mental status change  No      Sister with thyroid cancer  Mother had thyroidectomy      No radiation exposure      No hoarseness, difficulty swallowing       Other ROS:   No eye symptoms  No headache, change in vision, loss of peripheral vision  Complete ROS that were obtained were negative.     Past Medical History:   Diagnosis Date     Bicuspid aortic valve      Cholangitis 07/2018    prolonged hospitalization requiring ERCP/stenting     GERD (gastroesophageal reflux disease)      Polycystic liver disease     in 40 years     Thyroid nodule     cold, bening biopsy 12/18     Past Surgical History:   Procedure Laterality Date     ENDOSCOPIC RETROGRADE CHOLANGIOPANCREATOGRAM N/A 7/9/2018    Procedure: COMBINED ENDOSCOPIC RETROGRADE CHOLANGIOPANCREATOGRAPHY, PLACE TUBE/STENT;  Endoscopic Retrograde Cholangiopancreatogram, Biliary Sphincterotomy, Pancreatic Duct Stent Placement, and Bile Duct Stent Placement x 2;  Surgeon: Olvin Hart MD;  Location: [facility] OR     ENDOSCOPIC RETROGRADE CHOLANGIOPANCREATOGRAM N/A 9/10/2018    Procedure: COMBINED ENDOSCOPIC RETROGRADE CHOLANGIOPANCREATOGRAPHY, REMOVE FOREIGN BODY OR STENT/TUBE;  Endoscopic Retrograde Cholangiopancreatogram with Stent removal;  Surgeon: Olvin Hart MD;  Location: [facility] OR     LAPAROSCOPIC UNROOFING LIVER CYST N/A 11/4/2016    Procedure: LAPAROSCOPIC UNROOFING LIVER CYST;  Surgeon: Sonido Cruz MD;  Location: [facility] OR     Family History   Problem Relation Age of Onset     Alzheimer Disease Mother      Thyroid Disease Sister         thyroid cancer     Heart Disease Father      Other - See Comments Brother         bradycardia requiring ablation     Psoriasis Maternal Grandfather      Breast Cancer Paternal Aunt      Social History     Socioeconomic History     Marital status:      Spouse name: Not  "on file     Number of children: Not on file     Years of education: Not on file     Highest education level: Not on file   Occupational History     Not on file   Social Needs     Financial resource strain: Not on file     Food insecurity:     Worry: Not on file     Inability: Not on file     Transportation needs:     Medical: Not on file     Non-medical: Not on file   Tobacco Use     Smoking status: Never Smoker     Smokeless tobacco: Never Used   Substance and Sexual Activity     Alcohol use: Yes     Comment: 2 per week     Drug use: No     Sexual activity: Not on file   Lifestyle     Physical activity:     Days per week: Not on file     Minutes per session: Not on file     Stress: Not on file   Relationships     Social connections:     Talks on phone: Not on file     Gets together: Not on file     Attends Zoroastrianism service: Not on file     Active member of club or organization: Not on file     Attends meetings of clubs or organizations: Not on file     Relationship status: Not on file     Intimate partner violence:     Fear of current or ex partner: Not on file     Emotionally abused: Not on file     Physically abused: Not on file     Forced sexual activity: Not on file   Other Topics Concern     Not on file   Social History Narrative    Works in [employer], contracts with [employer]                3 daughters aged 19-25 yo (2018)     x 3    Remote abnormal pap    Menopausal, hot flashes present    No gyn malignancies except one paternal aunt with breast ca        Allergies   Allergen Reactions     Bee Venom      No current outpatient medications on file.     No current facility-administered medications for this visit.            Exam:  /67   Pulse 67   Ht 1.74 m (5' 8.5\")   Wt 70.9 kg (156 lb 4.8 oz)   LMP 2016   BMI 23.42 kg/m     Constitutional: healthy  Head: Normocephalic.   Neck: Neck supple. No adenopathy. Thyroid L nodule 3 cm, Carotids without bruits.  ENT: No " throat congestion, no neck nodes or sinus tenderness  Cardiovascular: regular rhythm, no tachycardia  Respiratory: Lungs clear  Gastrointestinal: Abdomen soft, non-tender. BS normal. No striae  Musculoskeletal: gait normal and normal muscle tone  Neurologic: Normal speech, reflexes normal.   Psychiatric: mentation appears normal       TSH   Date Value Ref Range Status   10/02/2018 3.16 0.40 - 4.00 mU/L Final   07/02/2018 5.42 (H) 0.40 - 4.00 mU/L Final   05/22/2017 3.31 0.40 - 4.00 mU/L Final       T4 Free   Date Value Ref Range Status   07/03/2018 1.28 0.76 - 1.46 ng/dL Final   ]    CBC RESULTS:   Recent Labs   Lab Test 09/10/18  0751   WBC 4.4   RBC 4.58   HGB 13.5   HCT 41.7   MCV 91   MCH 29.5   MCHC 32.4   RDW 13.4        Recent Labs   Lab Test 09/10/18  0751 09/04/18  1037    138   POTASSIUM 3.7 4.8   CHLORIDE 105 101   CO2 29 30   ANIONGAP 5 7   GLC 81 84   BUN 13 13   CR 0.78 0.74   THUAN 9.0 9.4

## 2019-06-26 ENCOUNTER — TELEPHONE (OUTPATIENT)
Dept: INTERNAL MEDICINE | Facility: CLINIC | Age: 54
End: 2019-06-26

## 2019-06-26 DIAGNOSIS — Q23.9 ABNORMALITY OF AORTIC VALVE: Primary | ICD-10-CM

## 2019-06-26 NOTE — TELEPHONE ENCOUNTER
Please call patient to schedule cardiac MRI non-urgently.  Thanks,  Marina Owens MD  Internal Medicine     <<-----Click on this checkbox to enter Pre-Op Dx

## 2019-07-22 ENCOUNTER — HOSPITAL ENCOUNTER (OUTPATIENT)
Dept: MRI IMAGING | Facility: CLINIC | Age: 54
Discharge: HOME | End: 2019-07-22
Attending: INTERNAL MEDICINE | Admitting: INTERNAL MEDICINE
Payer: COMMERCIAL

## 2019-07-22 DIAGNOSIS — Q23.9 ABNORMALITY OF AORTIC VALVE: ICD-10-CM

## 2019-07-22 PROCEDURE — 25500064 ZZH RX 255 OP 636: Performed by: INTERNAL MEDICINE

## 2019-07-22 PROCEDURE — 75561 CARDIAC MRI FOR MORPH W/DYE: CPT

## 2019-07-22 PROCEDURE — A9585 GADOBUTROL INJECTION: HCPCS | Performed by: INTERNAL MEDICINE

## 2019-07-22 RX ORDER — GADOBUTROL 604.72 MG/ML
10 INJECTION INTRAVENOUS ONCE
Status: COMPLETED | OUTPATIENT
Start: 2019-07-22 | End: 2019-07-22

## 2019-07-22 RX ADMIN — GADOBUTROL 9 ML: 604.72 INJECTION INTRAVENOUS at 10:30

## 2019-09-24 ENCOUNTER — TELEPHONE (OUTPATIENT)
Dept: INTERNAL MEDICINE | Facility: CLINIC | Age: 54
End: 2019-09-24

## 2019-09-24 NOTE — TELEPHONE ENCOUNTER
I was told to ask for a referral for a Diagnostic Breast Exam. I have a new dark mole on my breast. Probably came in July. I kept thinking it would go away or that it was something else. I called for my annual breast exam, they asked if anything was new, I said the mole is new, and was told to get a diagnostic but that my primary care doctor would have to order it.    Thank you.     Note from the patient.  Rocio Berman LPN, EMT at 8:42 AM on 9/24/2019.

## 2019-10-02 ENCOUNTER — HEALTH MAINTENANCE LETTER (OUTPATIENT)
Age: 54
End: 2019-10-02

## 2019-10-07 ENCOUNTER — OFFICE VISIT (OUTPATIENT)
Dept: INTERNAL MEDICINE | Facility: CLINIC | Age: 54
End: 2019-10-07
Payer: COMMERCIAL

## 2019-10-07 VITALS
BODY MASS INDEX: 22.77 KG/M2 | DIASTOLIC BLOOD PRESSURE: 71 MMHG | SYSTOLIC BLOOD PRESSURE: 116 MMHG | WEIGHT: 152 LBS | OXYGEN SATURATION: 96 % | HEART RATE: 66 BPM

## 2019-10-07 DIAGNOSIS — E04.1 THYROID NODULE: ICD-10-CM

## 2019-10-07 DIAGNOSIS — Z00.00 ROUTINE GENERAL MEDICAL EXAMINATION AT A HEALTH CARE FACILITY: ICD-10-CM

## 2019-10-07 DIAGNOSIS — G25.81 RESTLESS LEGS SYNDROME (RLS): ICD-10-CM

## 2019-10-07 DIAGNOSIS — G47.00 INSOMNIA, UNSPECIFIED TYPE: ICD-10-CM

## 2019-10-07 DIAGNOSIS — N95.1 MENOPAUSAL SYNDROME (HOT FLASHES): ICD-10-CM

## 2019-10-07 DIAGNOSIS — Q44.6 POLYCYSTIC LIVER DISEASE: ICD-10-CM

## 2019-10-07 DIAGNOSIS — Z23 NEED FOR VACCINATION: Primary | ICD-10-CM

## 2019-10-07 DIAGNOSIS — L81.9 ATYPICAL PIGMENTED SKIN LESION: ICD-10-CM

## 2019-10-07 LAB
CHOLEST SERPL-MCNC: 204 MG/DL
HDLC SERPL-MCNC: 67 MG/DL
LDLC SERPL CALC-MCNC: 126 MG/DL
NONHDLC SERPL-MCNC: 137 MG/DL
TRIGL SERPL-MCNC: 58 MG/DL

## 2019-10-07 RX ORDER — ZOLPIDEM TARTRATE 5 MG/1
5 TABLET ORAL
Qty: 20 TABLET | Refills: 0 | Status: SHIPPED | OUTPATIENT
Start: 2019-10-07 | End: 2020-08-07

## 2019-10-07 ASSESSMENT — ANXIETY QUESTIONNAIRES
2. NOT BEING ABLE TO STOP OR CONTROL WORRYING: NOT AT ALL
6. BECOMING EASILY ANNOYED OR IRRITABLE: NOT AT ALL
7. FEELING AFRAID AS IF SOMETHING AWFUL MIGHT HAPPEN: NOT AT ALL
1. FEELING NERVOUS, ANXIOUS, OR ON EDGE: NOT AT ALL
3. WORRYING TOO MUCH ABOUT DIFFERENT THINGS: NOT AT ALL
IF YOU CHECKED OFF ANY PROBLEMS ON THIS QUESTIONNAIRE, HOW DIFFICULT HAVE THESE PROBLEMS MADE IT FOR YOU TO DO YOUR WORK, TAKE CARE OF THINGS AT HOME, OR GET ALONG WITH OTHER PEOPLE: NOT DIFFICULT AT ALL
5. BEING SO RESTLESS THAT IT IS HARD TO SIT STILL: NOT AT ALL
GAD7 TOTAL SCORE: 0

## 2019-10-07 ASSESSMENT — PAIN SCALES - GENERAL: PAINLEVEL: NO PAIN (0)

## 2019-10-07 ASSESSMENT — PATIENT HEALTH QUESTIONNAIRE - PHQ9
SUM OF ALL RESPONSES TO PHQ QUESTIONS 1-9: 3
5. POOR APPETITE OR OVEREATING: NOT AT ALL

## 2019-10-07 NOTE — PROGRESS NOTES
"[facility] [place]   Marina Owens MD  10/07/2019      Chief Complaint:   Physical     History of Present Illness:   Jade Carcamo is a 53 year old female with a history of GERD and polycystic liver disease who presents for an annual physical exam.     Polycystic Liver Disease  At our last vsit on 12/21/18, we discussed ongoing cholangitis due to liver cysts. She was [place]. [place] [facility] [place] Jane Glover at [facility]. She notes that Dr. Glover told her that the surgeon here clipped her bile duct when she had her stent placed, but Dr. Hobbs felt that was incorrect. She has chosen to continue with Dr. Hobbs for monitoring.      Restless Leg Syndrome   She feels that she has had symptoms in her right leg only, but has not any lately. She was experiencing an increase in symptoms in June 2019. She feels like her legs need to move while she is at the theatre, on an airplane, or in bed. She does not drink more than 3 cups of coffee and does not drink caffeine after noon. She sleeps \"okay\", which can depend on her hot flashes as well. She had a prescription of Ambien before, which she used sparingly. She does not take Lexapro anymore. She is not on a vegetarian diet - low hemoglobin and hematocrit in July 2018.     Hot Flashes   She has hot flashes \"like crazy\" at night and occasionally during the day. She sleeps with limited clothing, open windows, and without covers. She has been lifting weights more, but is not sure if this helps. She thinks Lexapro may have helped, but discontinued as it made her tired. She notes that her periods ended almost immediately after her liver surgery ~3 years ago. She has no personal and family history of blood clots. She does note her mother is on HRT and had early onset of dementia.     Sleep Apnea   Her friend thought she \"snored weird\" and her  of 29 years has needed to leave the room due to her snoring. She is not sure if the snoring is new, as her " " lost weight and stopped snoring, so he may now be noticing it more. She has gained at minimum 10 pounds and is unable to lose it, despite increasing exerise. She is not sure if it is positional. Denies chest pain or trouble breathing.     Other concerns discussed:  1. Thyroid nodule - The patient had a benign FNA of a thyroid nodule on 2018 and stable thyroid ultrasound in 2019 by Dr. Castillo.   2. Cardiac - She had an echocardiogram on 19 that revealed \"bicuspid with mild sclerosis\", but Cardiac MRI was normal on 19.   3. Shingrix - She wonders when she should receive this vaccine, as she has had shingles in the past.   4. Dermatology - She has a bump on her left breast. History of melanoma on her foot in her 20s and family history of paternal aunt with breast cancer.     GYN    3 daughters aged 19-25 yo (2018)   x 3  Remote abnormal pap  Menopausal, hot flashes present  No gyn malignancies except one paternal aunt with breast ca    Routine Health Maintenence:  Depression Screening:   PHQ-2 Score:     PHQ-2 (  Pf[employer]) 2018   Q1: Little interest or pleasure in doing things 0 0   Q2: Feeling down, depressed or hopeless 0 0   PHQ-2 Score 0 0   Q1: Little interest or pleasure in doing things - Not at all   Q2: Feeling down, depressed or hopeless - Not at all   PHQ-2 Score - 0     Immunizations (zoster, pneumovax, flu, Tdap, Hep A/B):        Most Recent Immunizations   Administered Date(s) Administered     HepA-Adult 10/28/2008     HepB 2009     Influenza Vaccine IM 3yrs+ 4 Valent IIV4 10/02/2018     TDAP Vaccine (Boostrix) 2008     Zoster vaccine, live 2016      Lipids:   Recent Labs   Lab Test 10/02/18  1142 17  1022   CHOL 234* 195   HDL 72 77   * 106*   TRIG 58 63       Colonoscopy (50-75 yrs):  3/15 hemorrhoids, repeat 10 years  Dexa (>65W or 70M yrs): n/a  Mammogram (40-75 yrs): does at [facility]  Pap (21-65 yrs): HPV neg  Lab " Results   Component Value Date    PAP NIL 12/21/2018     Last 6/16 NIL  GC/Chlam (<25 yrs): n/a  HIV/HCV if risk factors: both neg 7/18  Tob/EtOH: n/a  Lifestyle factors: reviewed  Advanced Directive: deferred     Review of Systems:   Pertinent items are noted in HPI or as in patient entered ROS below, remainder of complete ROS is negative.     Active Medications:      zolpidem (AMBIEN) 5 MG tablet, Take 1 tablet (5 mg) by mouth nightly as needed for sleep, Disp: 20 tablet, Rfl: 0      Allergies:   Bee venom      Past Medical History:  Gastroesophageal reflux disease  Polycystic liver disease  Advance care planning      Past Surgical History:  ERCP x2   Lap. unroofing liver cyst     Family History:   Alzheimer's Disease - Mother ( and many relatives on maternal side)   Thyroid cancer - Sister   Heart disease - Father, brother   Psoriasis - Maternal grandfather    Breast cancer - Paternal Aunt     Social History:   The patient was alone.   Smoking Status: Never smoker    Smokeless Tobacco: Never used   Alcohol Use: Yes  Marital Status:      Physical Exam:   /71   Pulse 66   Wt 68.9 kg (152 lb)   LMP 11/14/2016   SpO2 96%   BMI 22.77 kg/m       Constitutional: Alert, oriented, pleasant, no acute distress  Head: Normocephalic, atraumatic  Eyes: Extra-ocular movements intact, no scleral icterus  ENT: Oropharynx clear, moist mucus membranes, good dentition  Neck: Supple, no lymphadenopathy  BREAST: normal without masses, tenderness or nipple discharge and no palpable axillary masses or adenopathy  Cardiovascular: Regular rate and rhythm, no murmurs, rubs or gallops, peripheral pulses full/symmetric  Respiratory: Good air movement bilaterally, lungs clear, no wheezes/rales/rhonchi  Musculoskeletal: No edema, normal muscle tone, normal gait  Neurologic: Alert and oriented, cranial nerves 2-12 intact.  Skin: No rashes/lesions, 3 mm oval hyperpigmented macule on right breast with color variation, no  associated nodules  Psychiatric: normal mentation, affect and mood     Assessment and Plan:  Routine general medical examination at a health care facility  Lipid panel per patient request. I asked her to look into her insurance coverage for the Shingrix vaccine.   - Lipid panel reflex to direct LDL Fasting    Polycystic liver disease  She has been asymptomatic this summer after episode of prolonged hospitalization for FUO and presumed cholangitis summer 2018. She will continue to follow with Dr. Hobbs in GI and has elected not to proceed with surgery as suggested by [place].    Thyroid nodule  Benign FNA biopsy in December 2018 (previously AUS) and stable thyroid ultrasound in June 2019. Will continue with Dr. Castillo in Endocrinology.     Need for vaccination  - FLU VAC PRESRV FREE QUAD SPLIT VIR 3+YRS IM    Menopausal syndrome (hot flashes)  See below.     Restless legs syndrome (RLS)  We discussed decreasing to no more than 2 cups of coffee and to continue with no caffeine after noon and increasing exercise during the day. We reviewed the risks and benefits of pharmacologic treatment options, including HRT, and she will consider gabapentin for both hot flashes and RLS. She is not interested in restarting an serotonin specific reuptake inhibitor today. Per previously conversation with Hepatologist, no contraindication to estrogen use.    Insomnia, unspecified type  Uses sparingly during exacerbation of restless leg symptoms and menopausal hot flashes. Tolerates well.   - zolpidem (AMBIEN) 5 MG tablet  Dispense: 20 tablet; Refill: 0    Atypical pigmented skin lesion  Atypical hyperpigmented lesion with color variation on right breast upon physical exam. I asked her to see dermatology within a month for further evaluation, though I suspect benign vascular lesion is is darkly colored with variation, as well as personal history of Melanoma in situ.  - DERMATOLOGY REFERRAL     Follow-up: Next year for annual physical exam  or PRN     Scribe Disclosure:  I, Vaishnavi Schroeder, am serving as a scribe to document services personally performed by Marina Owens MD at this visit, based upon the provider's statements to me. All documentation has been reviewed by the aforementioned provider prior to being entered into the official medical record.     Portions of this medical record were completed by a scribe. UPON MY REVIEW AND AUTHENTICATION BY ELECTRONIC SIGNATURE, this confirms (a) I performed the applicable clinical services, and (b) the record is accurate.   Marina Owens MD  Internal Medicine

## 2019-10-07 NOTE — PROGRESS NOTES
GYN history   The patient has a history of an abnormal pap, necessitating a LEEP in the past. She is wondering if this got rid of the HPV, or not. This was done in [place], after her 2nd baby was born. The patient is , and all her children were born vaginally. All her recent pap smears have been normal, per Care Everywhere. Her last pap smear was actually in 2016, but she would like to get another today as she is already here. She did not know that she had one that recent. She denies vaginal bleeding, vaginal pain, itching, abnormal discharge, or burning. She has some hot flashes, but no other menopausal symptoms. Her mood is stable and normal.       3 daughters aged 19-25 yo (2018)   x 3  Remote abnormal pap  Menopausal, hot flashes present  No gyn malignancies except one paternal aunt with breast ca    Routine Health Maintenence:  Depression Screening:   PHQ-2 Score:     PHQ-2 (  [employer]) 2018   Q1: Little interest or pleasure in doing things 0 0   Q2: Feeling down, depressed or hopeless 0 0   PHQ-2 Score 0 0   Q1: Little interest or pleasure in doing things - Not at all   Q2: Feeling down, depressed or hopeless - Not at all   PHQ-2 Score - 0     Immunizations (zoster, pneumovax, flu, Tdap, Hep A/B):        Most Recent Immunizations   Administered Date(s) Administered     HepA-Adult 10/28/2008     HepB 2009     Influenza Vaccine IM 3yrs+ 4 Valent IIV4 10/02/2018     TDAP Vaccine (Boostrix) 2008     Zoster vaccine, live 2016      Lipids:   Recent Labs   Lab Test 10/02/18  1142 17  1022   CHOL 234* 195   HDL 72 77   * 106*   TRIG 58 63       Colonoscopy (50-75 yrs):  3/15 hemorrhoids, repeat 10 years  Dexa (>65W or 70M yrs): n/a  Mammogram (40-75 yrs): due this summer  Pap (21-65 yrs): today  Last  NIL, no HPV done  GC/Chlam (<25 yrs): n/a  HIV/HCV if risk factors: both neg   Tob/EtOH: n/a  Lifestyle factors: reviewed  Advanced Directive:  deferred

## 2019-10-07 NOTE — NURSING NOTE
Chief Complaint   Patient presents with     Physical     pt would also like to discuss menopause. Also struggling with retless leg. Labs. Breast check     Kimberly Nissen, EMT at 9:05 AM on 10/7/2019

## 2019-10-08 ASSESSMENT — ANXIETY QUESTIONNAIRES: GAD7 TOTAL SCORE: 0

## 2019-12-03 ENCOUNTER — TRANSFERRED RECORDS (OUTPATIENT)
Dept: HEALTH INFORMATION MANAGEMENT | Facility: CLINIC | Age: 54
End: 2019-12-03

## 2020-01-06 ENCOUNTER — TELEPHONE (OUTPATIENT)
Dept: INTERNAL MEDICINE | Facility: CLINIC | Age: 55
End: 2020-01-06

## 2020-01-06 NOTE — TELEPHONE ENCOUNTER
Health Call Center    Phone Message    May a detailed message be left on voicemail: yes    Reason for Call: Symptoms or Concerns     If patient has red-flag symptoms, warm transfer to triage line    Current symptom or concern: Cold/flu symptoms, fever    Symptoms have been present for:  Approx 1 day(s)    Has patient previously been seen for this? No    By: Dr. Owens    Date: Last seen 10/7/2019    Are there any new or worsening symptoms? Yes: Pt is requesting a prescription for tamaflu. She states over the past day she developed a fever of 102, a cough, congestion, headache, and chills. She declined an appt, and just asked if this medication could be sent to her pharmacy, [id number] - [place], [place] - [address] [place] [address] [address]. Please advise.    Action Taken: Message routed to:  [facility] P[facility]

## 2020-02-12 ENCOUNTER — TELEPHONE (OUTPATIENT)
Dept: ENDOCRINOLOGY | Facility: CLINIC | Age: 55
End: 2020-02-12

## 2020-02-12 NOTE — TELEPHONE ENCOUNTER
LVM following up on unread 12/23 MyC msg. Pt can schedule LIS with any provider accepting new patients ~June/July.

## 2020-03-12 ENCOUNTER — OFFICE VISIT (OUTPATIENT)
Dept: FAMILY MEDICINE | Facility: CLINIC | Age: 55
End: 2020-03-12
Payer: COMMERCIAL

## 2020-03-12 VITALS
RESPIRATION RATE: 16 BRPM | DIASTOLIC BLOOD PRESSURE: 70 MMHG | HEART RATE: 73 BPM | OXYGEN SATURATION: 98 % | SYSTOLIC BLOOD PRESSURE: 102 MMHG | TEMPERATURE: 97.6 F

## 2020-03-12 DIAGNOSIS — Z11.59 ENCOUNTER FOR SCREENING FOR OTHER VIRAL DISEASES: ICD-10-CM

## 2020-03-12 DIAGNOSIS — Z71.84 TRAVEL ADVICE ENCOUNTER: Primary | ICD-10-CM

## 2020-03-12 PROCEDURE — 86765 RUBEOLA ANTIBODY: CPT | Performed by: NURSE PRACTITIONER

## 2020-03-12 PROCEDURE — 36415 COLL VENOUS BLD VENIPUNCTURE: CPT | Performed by: NURSE PRACTITIONER

## 2020-03-12 PROCEDURE — 90717 YELLOW FEVER VACCINE SUBQ: CPT | Performed by: NURSE PRACTITIONER

## 2020-03-12 PROCEDURE — 86735 MUMPS ANTIBODY: CPT | Performed by: NURSE PRACTITIONER

## 2020-03-12 PROCEDURE — 90734 MENACWYD/MENACWYCRM VACC IM: CPT | Performed by: NURSE PRACTITIONER

## 2020-03-12 PROCEDURE — 90471 IMMUNIZATION ADMIN: CPT | Performed by: NURSE PRACTITIONER

## 2020-03-12 PROCEDURE — 90472 IMMUNIZATION ADMIN EACH ADD: CPT | Performed by: NURSE PRACTITIONER

## 2020-03-12 PROCEDURE — 99402 PREV MED CNSL INDIV APPRX 30: CPT | Mod: 25 | Performed by: NURSE PRACTITIONER

## 2020-03-12 PROCEDURE — 86762 RUBELLA ANTIBODY: CPT | Performed by: NURSE PRACTITIONER

## 2020-03-12 PROCEDURE — 90632 HEPA VACCINE ADULT IM: CPT | Performed by: NURSE PRACTITIONER

## 2020-03-12 RX ORDER — ATOVAQUONE AND PROGUANIL HYDROCHLORIDE 250; 100 MG/1; MG/1
1 TABLET, FILM COATED ORAL DAILY
Qty: 25 TABLET | Refills: 0 | Status: SHIPPED | OUTPATIENT
Start: 2020-03-12 | End: 2020-03-12

## 2020-03-12 RX ORDER — AZITHROMYCIN 500 MG/1
500 TABLET, FILM COATED ORAL DAILY
Qty: 3 TABLET | Refills: 0 | Status: SHIPPED | OUTPATIENT
Start: 2020-03-12 | End: 2020-08-07

## 2020-03-12 RX ORDER — ATOVAQUONE AND PROGUANIL HYDROCHLORIDE 250; 100 MG/1; MG/1
1 TABLET, FILM COATED ORAL DAILY
Qty: 25 TABLET | Refills: 0 | Status: SHIPPED | OUTPATIENT
Start: 2020-03-12 | End: 2020-08-07

## 2020-03-12 NOTE — PROGRESS NOTES
Nurse Note      Itinerary:  [facility]       Departure Date: 05/09/2020      Return Date: 05/23/2020      Length of Trip 2weeks       Reason for Travel: Tourism           Urban or rural: both      Accommodations: Hotel    Safari (tent)        IMMUNIZATION HISTORY  Have you received any immunizations within the past 4 weeks?  No  Have you ever fainted from having your blood drawn or from an injection?  No  Have you ever had a fever reaction to vaccination?  No  Have you ever had any bad reaction or side effect from any vaccination?  No  Have you ever had hepatitis A or B vaccine?  yes  Do you live (or work closely) with anyone who has AIDS, an AIDS-like condition, any other immune disorder or who is on chemotherapy for cancer?  No  Do you have a family history of immunodeficiency?  No  Have you received any injection of immune globulin or any blood products during the past 12 months?  No    Patient roomed by       Octavio Carcamo is a 54 year old female seen today with spouse for counsultation for international travel to the stated countries.   Patient will be departing in  2 month(s) and  traveling with spouse  with family member(s).  Lodges and tents     Patient itinerary :  will be in the urban region of [facility] > 10 day safari  which presents risk for Malaria and Yellow Fever. exposure.      Patient's activities will include sightseeing and safari/game [place].    Vitals: /70   Pulse 73   Temp 97.6  F (36.4  C) (Oral)   Resp 16   LMP 11/14/2016   SpO2 98%   BMI= There is no height or weight on file to calculate BMI.    EXAM:  General:  Well-nourished, well-developed in no acute distress.  Appears to be stated age, interacts appropriately and expresses understanding of information given to patient.    Current Outpatient Medications   Medication Sig Dispense Refill     zolpidem (AMBIEN) 5 MG tablet Take 1 tablet (5 mg) by mouth nightly as needed for sleep 20 tablet 0     Patient Active Problem List    Diagnosis     Liver cyst     ACP (advance care planning)     Fever     Bicuspid aortic valve     Thyroid nodule     Allergies   Allergen Reactions     Bee Venom          Immunizations discussed include:   Hepatitis A:  Ordered/given today, risks, benefits and side effects reviewed  Hepatitis B: Up to date  Influenza: Up to date  Typhoid: future, patient will research coverage first.   Rabies: Declined  reviewed managment of a animal bite or scratch (washing wound, seek medical care within 24 hours for post exposure prophylaxis )  Yellow Fever: Stamaril Ordered/given today - consent completed, side effects, precautions, allergies, risks discussed. Patient expressed understanding.  Swedish Encephalitis: Not indicated  Meningococcus: Ordered/given today, risks, benefits and side effects reviewed  Tetanus/Diphtheria: Up to date  Measles/Mumps/Rubella: Titers drawn  Cholera: Not needed  Polio: Not indicated  Pneumococcal: Under age of 65  Varicella: Immune by disease history per patient report  Zostavax:  Not indicated  Shingrix: deferred  HPV:  Not indicated  TB:  Low risk     Altitude Exposure on this trip: no  Past tolerance to Altitude: na    ASSESSMENT/PLAN:    ICD-10-CM    1. Travel advice encounter  Z71.84    2. Encounter for screening for other viral diseases  Z11.59 Rubeola Antibody IgG     Rubella Antibody IgG Quantitative     Mumps Immune Status, IgG     azithromycin (ZITHROMAX) 500 MG tablet     atovaquone-proguanil (MALARONE) 250-100 MG tablet     DISCONTINUED: atovaquone-proguanil (MALARONE) 250-100 MG tablet     I have reviewed general recommendations for safe travel   including: food/water precautions, insect precautions, safer sex   practices given high prevalence of Zika, HIV and other STDs,   roadway safety. Educational materials and [employer] report provided.    Malaraia prophylaxis recommended:   Symptomatic treatment for traveler's diarrhea: azithromycin  Altitude illness prevention and treatment:  none    Personal protective measures reviewed including hand sanitizing and contact precautions for the prevention of viral illnesses. Cover coughs and masking if ill during travel and upon return.  Current COVID 19 outbreak.  Monitor / follow current CDC guidelines.      Evacuation insurance advised and resources were provided to patient.    Total visit time 30 minutes  with over 50% of time spent counseling patient as detailed above.    Nisha Giraldo CNP

## 2020-03-12 NOTE — PATIENT INSTRUCTIONS
Today March 12, 2020 you received the    Hepatitis A Vaccine - Please return on 9/8/20 or later for your 2nd and final dose.    Yellow Fever (YF)    Meningococcal (Menactra) Vaccine      Future    Shingrix, Typhoid   Rabies  ( Days 0 , 7 , 21 or 28 )   .    These appointments can be made as a NURSE ONLY visit.    **It is very important for the vaccinations to be given on the scheduled day(s), this helps ensure you receive the full effectiveness of the vaccine.**    Please call [facility] with any questions [phone]    Thank you for visiting [facility]

## 2020-03-12 NOTE — NURSING NOTE
Prior to immunization administration, verified patients identity using patient s name and date of birth. Please see Immunization Activity for additional information.     Screening Questionnaire for Adult Immunization    Are you sick today?   No   Do you have allergies to medications, food, a vaccine component or latex?   No   Have you ever had a serious reaction after receiving a vaccination?   No   Do you have a long-term health problem with heart disease, lung disease, asthma, kidney disease, metabolic disease (e.g. diabetes), anemia, or other blood disorder?   No   Do you have cancer, leukemia, HIV/AIDS, or any other immune system problem?   No   In the past 3 months, have you taken medications that affect  your immune system, such as prednisone, other steroids, or anticancer drugs; drugs for the treatment of rheumatoid arthritis, Crohn s disease, or psoriasis; or have you had radiation treatments?   No   Have you had a seizure, or a brain or other nervous system problem?   No   During the past year, have you received a transfusion of blood or blood     products, or been given immune (gamma) globulin or antiviral drug?   No   For women: Are you pregnant or is there a chance you could become        pregnant during the next month?   No   Have you received any vaccinations in the past 4 weeks?   No     Immunization questionnaire answers were all negative.        Per orders of Jessica Giraldo NP, injection of YF, Menactra and Hep A  given by Ramona Nagel CMA. Patient instructed to remain in clinic for 15 minutes afterwards, and to report any adverse reaction to me immediately.       Screening performed by Ramona Nagel CMA on 3/12/2020 at 1:08 PM.

## 2020-03-13 LAB
MEV IGG SER QL IA: 2.1 AI (ref 0–0.8)
MUV IGG SER QL IA: 2.9 AI (ref 0–0.8)
RUBV IGG SERPL IA-ACNC: 121 IU/ML

## 2020-03-19 ENCOUNTER — TELEPHONE (OUTPATIENT)
Dept: GASTROENTEROLOGY | Facility: CLINIC | Age: 55
End: 2020-03-19

## 2020-03-19 NOTE — TELEPHONE ENCOUNTER
Called pt, lvfifi explained about COVID 19 issue and not wanting patient to come to clinic that his lab appt will be canceled and appt with Dr Hobbs will be a phone visit, I asked for a call back to confirm.

## 2020-03-23 ENCOUNTER — VIRTUAL VISIT (OUTPATIENT)
Dept: GASTROENTEROLOGY | Facility: CLINIC | Age: 55
End: 2020-03-23
Attending: INTERNAL MEDICINE
Payer: COMMERCIAL

## 2020-03-23 DIAGNOSIS — Q44.6 POLYCYSTIC LIVER DISEASE: Primary | ICD-10-CM

## 2020-03-23 NOTE — PROGRESS NOTES
"Jade Carcamo is a 54 year old female who is being evaluated via a billable telephone visit.      The patient has been notified of following:     \"This telephone visit will be conducted via a call between you and your physician/provider. We have found that certain health care needs can be provided without the need for a physical exam.  This service lets us provide the care you need with a short phone conversation.  If a prescription is necessary we can send it directly to your pharmacy.  If lab work is needed we can place an order for that and you can then stop by our lab to have the test done at a later time.    If during the course of the call the physician/provider feels a telephone visit is not appropriate, you will not be charged for this service.\"     Jade Carcamo has polycystic liver disease.    I have reviewed and updated the patient's Past Medical History, Social History, Family History and Medication List.    ALLERGIES  Bee venom    She is doing well at this visit.  She does have some mild abdominal discomfort, which she describes as occasionally feeling like a baby kicking when you are pregnant.  She had similar symptoms before her unroofing 4 years ago.  She denies any itching or skin rash, or fatigue.  She does not feel as though her abdominal girth is increased and she is not experiencing any lower extremity edema.    She denies any fevers or chills, cough or shortness of breath.  She denies any nausea or vomiting, diarrhea or constipation.  Her appetite has been good and her weight is roughly the same.  She is exercising on a regular basis.    There have been no other new events since she was last seen.    Assessment/Plan:  My impression is that Ms. Carcamo has polycystic liver disease.  Her disease appears to be quite stable at this point in time.  She has not had any imaging for almost 2 years and I will schedule her for repeat MRI and blood work in 3 months.  I also plan on seeing her back in clinic " face-to-face in 1 year.    I otherwise would not be making any other change to her medical regimen.    Phone call duration:  15 minutes    Davis Hobbs MD      Professor of Medicine  [employer]      Executive Medical Director, Solid Organ Transplant Program  [facility]

## 2020-04-26 NOTE — TELEPHONE ENCOUNTER
Health Call Center    Phone Message    May a detailed message be left on voicemail: yes    Reason for Call: Other: Pt has question regarding a thyroid biopsy.  She had one done at the [facility] by a radiologist and it was botched.  She would like to talk to a nurse with her questions.  Please follow up with pt.  Thank you!     Action Taken: Other: P Endocrinology Adult [facility]     No antibiotics or any antibiotics < 2 hrs prior to birth

## 2020-05-12 ENCOUNTER — TELEPHONE (OUTPATIENT)
Dept: GASTROENTEROLOGY | Facility: CLINIC | Age: 55
End: 2020-05-12

## 2020-05-12 NOTE — TELEPHONE ENCOUNTER
Connected with patient. Pt reports Dr. Hobbs had called her directly with recommendations as stated below. Pt is scheduled for Covid test today at 3 pm. Labs entered if Covid test is negative.    Prachi Cueto LPN  Hepatology Clinic    ----  Sent for COVID testing.  If (-) she should get a full set of labs.     Gus     ------  [facility] Call Center    Phone Message    May a detailed message be left on voicemail: yes     Reason for Call: Symptoms or Concerns     If patient has red-flag symptoms, warm transfer to triage line    Current symptom or concern: chills , fever 100.8 right now  and achy    Symptoms have been present for:  4 day(s)    Has patient previously been seen for this? Yes    By :     Date: 03/23/2020    Are there any new or worsening symptoms? Yes: chills and achy and fever goes up and down       Action Taken: Message routed to:  [facility] hep    Travel Screening: Not Applicable

## 2020-05-12 NOTE — TELEPHONE ENCOUNTER
[facility] Call Center    Phone Message    May a detailed message be left on voicemail: yes     Reason for Call: Symptoms or Concerns     If patient has red-flag symptoms, warm transfer to triage line    Current symptom or concern: chills , fever 100.8 right now  and achy    Symptoms have been present for:  4 day(s)    Has patient previously been seen for this? Yes    By :     Date: 03/23/2020    Are there any new or worsening symptoms? Yes: chills and achy and fever goes up and down       Action Taken: Message routed to:  [facility] hep    Travel Screening: Not Applicable

## 2020-05-16 ENCOUNTER — NURSE TRIAGE (OUTPATIENT)
Dept: NURSING | Facility: CLINIC | Age: 55
End: 2020-05-16

## 2020-05-16 NOTE — TELEPHONE ENCOUNTER
Pt had a COVID test done at [facility] on Tuesday, she is calling for the results.  Writer does not have access to [facility]'s EMR.      Pt will wait until Monday to hear from the clinic.     Jesusita Zhu RN/MAZIN    Reason for Disposition    Caller requesting lab results    Additional Information    Negative: Lab calling with strep throat test results and triager can call in prescription    Negative: Lab calling with urinalysis test results and triager can call in prescription    Negative: Medication questions    Negative: ED call to PCP    Negative: Physician call to PCP    Negative: Call about patient who is currently hospitalized    Negative: Lab or radiology calling with CRITICAL test results    Negative: [1] Prescription not at pharmacy AND [2] was prescribed today by PCP    Negative: [1] Follow-up call from patient regarding patient's clinical status AND [2] information urgent    Negative: [1] Caller requests to speak ONLY to PCP AND [2] urgent question    Negative: [1] Caller requests to speak to PCP now AND [2] won't tell us reason for call  (Exception: if 10 pm to 6 am, caller must first discuss reason for the call)    Negative: Notification of hospital admission    Negative: Notification of death    Protocols used: PCP CALL - NO TRIAGE-A-

## 2020-06-09 DIAGNOSIS — J06.9 VIRAL UPPER RESPIRATORY TRACT INFECTION: ICD-10-CM

## 2020-06-09 DIAGNOSIS — Q44.6 POLYCYSTIC LIVER DISEASE: ICD-10-CM

## 2020-06-09 LAB
ALBUMIN SERPL-MCNC: 3.9 G/DL (ref 3.4–5)
ALP SERPL-CCNC: 85 U/L (ref 40–150)
ALT SERPL W P-5'-P-CCNC: 20 U/L (ref 0–50)
ANION GAP SERPL CALCULATED.3IONS-SCNC: 4 MMOL/L (ref 3–14)
AST SERPL W P-5'-P-CCNC: 20 U/L (ref 0–45)
BILIRUB DIRECT SERPL-MCNC: 0.3 MG/DL (ref 0–0.2)
BILIRUB SERPL-MCNC: 2.1 MG/DL (ref 0.2–1.3)
BUN SERPL-MCNC: 17 MG/DL (ref 7–30)
CALCIUM SERPL-MCNC: 9.4 MG/DL (ref 8.5–10.1)
CHLORIDE SERPL-SCNC: 102 MMOL/L (ref 94–109)
CO2 SERPL-SCNC: 32 MMOL/L (ref 20–32)
CREAT SERPL-MCNC: 0.8 MG/DL (ref 0.52–1.04)
ERYTHROCYTE [DISTWIDTH] IN BLOOD BY AUTOMATED COUNT: 11.8 % (ref 10–15)
GFR SERPL CREATININE-BSD FRML MDRD: 83 ML/MIN/{1.73_M2}
GLUCOSE SERPL-MCNC: 87 MG/DL (ref 70–99)
HCT VFR BLD AUTO: 41.2 % (ref 35–47)
HGB BLD-MCNC: 13.4 G/DL (ref 11.7–15.7)
MCH RBC QN AUTO: 30.6 PG (ref 26.5–33)
MCHC RBC AUTO-ENTMCNC: 32.5 G/DL (ref 31.5–36.5)
MCV RBC AUTO: 94 FL (ref 78–100)
PLATELET # BLD AUTO: 160 10E9/L (ref 150–450)
POTASSIUM SERPL-SCNC: 3.8 MMOL/L (ref 3.4–5.3)
PROT SERPL-MCNC: 7.5 G/DL (ref 6.8–8.8)
RBC # BLD AUTO: 4.38 10E12/L (ref 3.8–5.2)
SODIUM SERPL-SCNC: 137 MMOL/L (ref 133–144)
WBC # BLD AUTO: 3.9 10E9/L (ref 4–11)

## 2020-06-11 LAB
COVID-19 SPIKE RBD ABY TITER: NORMAL
COVID-19 SPIKE RBD ABY: NEGATIVE

## 2020-08-05 ENCOUNTER — TELEPHONE (OUTPATIENT)
Dept: INTERNAL MEDICINE | Facility: CLINIC | Age: 55
End: 2020-08-05

## 2020-08-05 NOTE — TELEPHONE ENCOUNTER
[facility] Call [facility]    Phone Message    May a detailed message be left on voicemail: yes     Reason for Call: Medication Question or concern regarding medication   Prescription Clarification  Name of Medication: Progesterone  Prescribing Provider: Graciela   Pharmacy: n/a   What on the order needs clarification? Per Patient is wanting to know if able to get prescribed the medication for hot flashes only, please advise.           Action Taken: Message routed to:  [facility]    Travel Screening: Not Applicable

## 2020-08-07 ENCOUNTER — VIRTUAL VISIT (OUTPATIENT)
Dept: INTERNAL MEDICINE | Facility: CLINIC | Age: 55
End: 2020-08-07
Payer: COMMERCIAL

## 2020-08-07 DIAGNOSIS — G47.00 INSOMNIA, UNSPECIFIED TYPE: ICD-10-CM

## 2020-08-07 DIAGNOSIS — N95.1 MENOPAUSAL SYNDROME (HOT FLASHES): Primary | ICD-10-CM

## 2020-08-07 RX ORDER — MEDROXYPROGESTERONE ACETATE 2.5 MG/1
2.5 TABLET ORAL DAILY
Qty: 90 TABLET | Refills: 1 | Status: SHIPPED | OUTPATIENT
Start: 2020-08-07 | End: 2021-02-08

## 2020-08-07 RX ORDER — ZOLPIDEM TARTRATE 5 MG/1
5 TABLET ORAL
Qty: 20 TABLET | Refills: 0 | Status: SHIPPED | OUTPATIENT
Start: 2020-08-07 | End: 2021-08-30

## 2020-08-07 NOTE — PROGRESS NOTES
Jade Carcamo is a 54 year old female who is being evaluated via a billable video visit.      The patient has consented to a video visit and informed that video and telephone visits are being performed during the COVID-19 pandemic in order to mitigate the risk of an in office visit for appropriate candidates/issues. If during the course of the call the physician/provider feels a video visit is not appropriate, you will not be charged for this service. If the provider feels that they are unable to assess your concerns without an in person visit, you will be advised of this limitation and depending on the nature of the concern, advised to seek in person care if your provider feels you need urgent evaluation.      Subjective     Jade Carcamo is a 54 year old female who presents to clinic today for the following health issues:    Chief Complaint   Patient presents with     Menopausal Sx     Pt would like to discuss hot flashes     Sleep Problem     Pt would like to discuss possible medication for sleep.        PAZ Hansen wishes to discuss menopausal flushing. She states she abruptly went into menopause in Nov 2016, menopause started after surgery. She also had a severe illness in 2018 d/t Cholangitis. Was takign lexapro which helped, but caused fatigue. She is not advised to take estrogen given PLD.  She is wondering what her options are.    Patient Active Problem List   Diagnosis     Liver cyst     ACP (advance care planning)     Fever     Bicuspid aortic valve     Thyroid nodule     Past Surgical History:   Procedure Laterality Date     ENDOSCOPIC RETROGRADE CHOLANGIOPANCREATOGRAM N/A 7/9/2018    Procedure: COMBINED ENDOSCOPIC RETROGRADE CHOLANGIOPANCREATOGRAPHY, PLACE TUBE/STENT;  Endoscopic Retrograde Cholangiopancreatogram, Biliary Sphincterotomy, Pancreatic Duct Stent Placement, and Bile Duct Stent Placement x 2;  Surgeon: Olvin Hart MD;  Location: [facility] OR     ENDOSCOPIC RETROGRADE  "CHOLANGIOPANCREATOGRAM N/A 9/10/2018    Procedure: COMBINED ENDOSCOPIC RETROGRADE CHOLANGIOPANCREATOGRAPHY, REMOVE FOREIGN BODY OR STENT/TUBE;  Endoscopic Retrograde Cholangiopancreatogram with Stent removal;  Surgeon: Olvin Hart MD;  Location: [facility] OR     LAPAROSCOPIC UNROOFING LIVER CYST N/A 11/4/2016    Procedure: LAPAROSCOPIC UNROOFING LIVER CYST;  Surgeon: Sonido Cruz MD;  Location: [facility] OR       Social History     Tobacco Use     Smoking status: Never Smoker     Smokeless tobacco: Never Used   Substance Use Topics     Alcohol use: Yes     Comment: 2 per week     Family History   Problem Relation Age of Onset     Alzheimer Disease Mother 60     Thyroid Disease Sister         thyroid cancer     Heart Disease Father      Other - See Comments Brother         bradycardia requiring ablation     Psoriasis Maternal Grandfather      Dementia Maternal Grandfather      Breast Cancer Paternal Aunt          Current Outpatient Medications   Medication Sig Dispense Refill     medroxyPROGESTERone (PROVERA) 2.5 MG tablet Take 1 tablet (2.5 mg) by mouth daily 90 tablet 1     zolpidem (AMBIEN) 5 MG tablet Take 1 tablet (5 mg) by mouth nightly as needed for sleep 20 tablet 0     Allergies   Allergen Reactions     Bee Venom        Reviewed and updated as needed this visit by Provider    Review of Systems   A comprehensive ROS was performed and was negative unless indicated in the HPI above.        Physical Exam   There were no vitals taken for this visit.  Estimated body mass index is 22.24 kg/m  as calculated from the following:    Height as of 3/10/20: 1.702 m (5' 7\").    Weight as of 4/22/20: 64.4 kg (142 lb).  GENERAL: healthy, alert and no distress  HEAD: Normocephalic, atraumatic  EYES: Eyes grossly normal to inspection, EOMI and conjunctivae and sclerae normal  RESP: Speaking in full sentences, unlabored, no audible wheezes or cough  SKIN: no suspicious lesions or rashes, no jaundice  NEURO: oriented, and " speech normal  PSYCH: mentation appears normal, affect normal/bright          Diagnostic Test Results:  Labs reviewed in Epic        Assessment and Plan:  Jade was seen today for menopausal sx and sleep problem.    Diagnoses and all orders for this visit:    Menopausal syndrome (hot flashes)  Discussed various medication and complementary options. Advised trial of Vitamin E. Given low risk, okay with trial of provera.  Asked her to let me know if side effects or ineffective, we could consider increase in dose.  -     medroxyPROGESTERone (PROVERA) 2.5 MG tablet; Take 1 tablet (2.5 mg) by mouth daily    Insomnia, unspecified type  -     zolpidem (AMBIEN) 5 MG tablet; Take 1 tablet (5 mg) by mouth nightly as needed for sleep            Video-Visit Details    Type of service:  Video Visit    Video Start/End Time:  11:47 AM 12:10 PM    Originating Location (pt. Location): Home    Distant Location (provider location):  [facility]     Mode of Communication:  Video Conference via  [employer] or  Nano[employer]        Marina Owens MD  Internal Medicine

## 2020-08-07 NOTE — NURSING NOTE
Chief Complaint   Patient presents with     Menopausal Sx     Pt would like to discuss hot flashes     Sleep Problem     Pt would like to discuss possible medication for sleep.      Video Visit Technology for this patient: Cindy Video Visit- Patient was left in waiting room     Myrna Keen LPN at 11:04 AM on 8/7/2020.

## 2020-08-07 NOTE — NURSING NOTE
Chief Complaint   Patient presents with     Menopausal Sx     Pt would like to discuss hot flashes     Sleep Problem     Pt would like to discuss possible medication for sleep.      Myrna Keen LPN at 10:47 AM on 8/7/2020.

## 2020-08-10 ENCOUNTER — TELEPHONE (OUTPATIENT)
Dept: INTERNAL MEDICINE | Facility: CLINIC | Age: 55
End: 2020-08-10

## 2020-11-10 ENCOUNTER — TELEPHONE (OUTPATIENT)
Dept: GASTROENTEROLOGY | Facility: CLINIC | Age: 55
End: 2020-11-10

## 2020-11-10 NOTE — TELEPHONE ENCOUNTER
Extended MRI order.    Prachi SINGH LPN  Hepatology Clinic      --------  [facility] Call Center    Phone Message    May a detailed message be left on voicemail: yes     Reason for Call: Order(s): Other:   Reason for requested: Pt is requesting for their MRI orders to be re-ordered.  Date needed: asap  Provider name: Dr. Hobbs      Action Taken: Message routed to:  [facility] hep    Travel Screening: Not Applicable

## 2020-12-07 ENCOUNTER — HOSPITAL ENCOUNTER (OUTPATIENT)
Dept: MRI IMAGING | Facility: CLINIC | Age: 55
Discharge: HOME | End: 2020-12-07
Attending: INTERNAL MEDICINE | Admitting: INTERNAL MEDICINE
Payer: COMMERCIAL

## 2020-12-07 ENCOUNTER — TRANSFERRED RECORDS (OUTPATIENT)
Dept: HEALTH INFORMATION MANAGEMENT | Facility: CLINIC | Age: 55
End: 2020-12-07

## 2020-12-07 DIAGNOSIS — Q44.6 POLYCYSTIC LIVER DISEASE: ICD-10-CM

## 2020-12-07 LAB — NEGATIVE: NORMAL

## 2020-12-07 PROCEDURE — 74183 MRI ABD W/O CNTR FLWD CNTR: CPT | Mod: 26 | Performed by: RADIOLOGY

## 2020-12-07 PROCEDURE — 255N000002 HC RX 255 OP 636: Performed by: INTERNAL MEDICINE

## 2020-12-07 PROCEDURE — A9585 GADOBUTROL INJECTION: HCPCS | Performed by: INTERNAL MEDICINE

## 2020-12-07 PROCEDURE — 74183 MRI ABD W/O CNTR FLWD CNTR: CPT

## 2020-12-07 RX ORDER — GADOBUTROL 604.72 MG/ML
7.5 INJECTION INTRAVENOUS ONCE
Status: COMPLETED | OUTPATIENT
Start: 2020-12-07 | End: 2020-12-07

## 2020-12-07 RX ADMIN — GADOBUTROL 7 ML: 604.72 INJECTION INTRAVENOUS at 08:13

## 2021-01-15 ENCOUNTER — HEALTH MAINTENANCE LETTER (OUTPATIENT)
Age: 56
End: 2021-01-15

## 2021-02-05 ENCOUNTER — TELEPHONE (OUTPATIENT)
Dept: INTERNAL MEDICINE | Facility: CLINIC | Age: 56
End: 2021-02-05

## 2021-02-05 DIAGNOSIS — N95.1 MENOPAUSAL SYNDROME (HOT FLASHES): ICD-10-CM

## 2021-02-08 RX ORDER — MEDROXYPROGESTERONE ACETATE 2.5 MG/1
2.5 TABLET ORAL DAILY
Qty: 90 TABLET | Refills: 1 | Status: SHIPPED | OUTPATIENT
Start: 2021-02-08 | End: 2022-12-08

## 2021-02-08 NOTE — TELEPHONE ENCOUNTER
medroxyPROGESTERone (PROVERA) 2.5 MG tablet Take 1 tablet (2.5 mg) by mouth daily     Last Written Prescription Date:  8/7/20  Last Fill Quantity: 90,   # refills: 1  Last Office Visit : 8/7/20  Future Office visit: none    Routing refill request to provider for review/approval because:  Drug not on the refill protocol

## 2021-06-07 ENCOUNTER — VIRTUAL VISIT (OUTPATIENT)
Dept: INTERNAL MEDICINE | Facility: CLINIC | Age: 56
End: 2021-06-07
Payer: COMMERCIAL

## 2021-06-07 DIAGNOSIS — R50.9 LOW GRADE FEVER: Primary | ICD-10-CM

## 2021-06-07 DIAGNOSIS — R53.83 FATIGUE, UNSPECIFIED TYPE: ICD-10-CM

## 2021-06-07 DIAGNOSIS — M79.10 MYALGIA: ICD-10-CM

## 2021-06-07 PROCEDURE — 99213 OFFICE O/P EST LOW 20 MIN: CPT | Mod: 95 | Performed by: INTERNAL MEDICINE

## 2021-06-07 NOTE — PROGRESS NOTES
"Precharting today  Total time 7 min    CC:  Fevers, mostly at night    HPI:  55 year old female  PCP:  Dr. Owens  GI/liver specialist:  Dr. Hobbs  Endocrinology:  Dr. Castillo  Last visit with me:  None    PMHx:  Vasomotor symptoms of menopause  Menopause 11/2016  Polycystic liver disease  Cholangitis  GERD  Bicuspid aortic valve  Left thyroid nodule, FNA 2018 showed AUS followed by benign FNz on repeat FNA  Sick euthyroid syndrome  Insomnia    Today, Jade reports that she has had a 100.3-00.55 temp intermittently for five days.  Has some myalgias and fatigue. Cholangitis in the past was associated with higher fevers and vomiting.  Has \"awareness\" of the central abdomen area, no pain or swelling.  No fever now, was using antipyretics up until yesterday.  COVID negative test two days ago  No known sick contacts  Concerned about possible leukemia, lymphoma, or return of episode of chlangitis.  Complete ROS otherwise negative.    Current Outpatient Medications   Medication Sig Dispense Refill     medroxyPROGESTERone (PROVERA) 2.5 MG tablet Take 1 tablet (2.5 mg) by mouth daily (Patient not taking: Reported on 6/7/2021) 90 tablet 1     zolpidem (AMBIEN) 5 MG tablet Take 1 tablet (5 mg) by mouth nightly as needed for sleep (Patient not taking: Reported on 11/16/2020) 20 tablet 0     Allergies   Allergen Reactions     Bee Venom      BP Readings from Last 6 Encounters:   03/12/20 102/70   10/07/19 116/71   06/24/19 104/67   03/20/19 124/74   12/21/18 99/63   12/13/18 110/74     Wt Readings from Last 5 Encounters:   10/07/19 68.9 kg (152 lb)   06/24/19 70.9 kg (156 lb 4.8 oz)   03/20/19 69 kg (152 lb 3.2 oz)   12/21/18 69.2 kg (152 lb 9.6 oz)   12/13/18 68.7 kg (151 lb 6.4 oz)     Estimated body mass index is 22.77 kg/m  as calculated from the following:    Height as of 6/24/19: 1.74 m (5' 8.5\").    Weight as of 10/7/19: 68.9 kg (152 lb).  General:  Alert, appears generally healthy, not pale, range of affect within normal " limits, breathing comfortably, actively engaged in conversation. No cough, wheeze, shortness of breath. EOM's intact, no facial assymetry, no dysarthria.    Jade was seen today for fever.    Diagnoses and all orders for this visit:    Low grade fever    Myalgia    Fatigue, unspecified type    Presentation sounds consistent with a non specific viral syndrome.  Differential broad otherwise, and too early to speculate on more serious etiologies at this time.   Check CBC/diff, CMP.  If symptoms persist, schedule in person clinic visit.

## 2021-06-07 NOTE — PROGRESS NOTES
"This patient is being evaluated via a billable video visit as an alternative to an in-person visit.       The patient has been notified of following:     \"This video visit will be conducted via a call between you and your physician/provider. We have found that certain health care needs can be provided without the need for an in-person physical exam.  This service lets us provide the care you need with a video conversation.  If a prescription is necessary we can send it directly to your pharmacy.  If lab work is needed we can place an order for that and you can then stop by our lab to have the test done at a later time. If during the course of the call the physician/provider feels a video visit is not appropriate, you will not be charged for this service.\"     Patient has given verbal consent for virtual video visit? Yes  Did patient initiate this virtual visit? Yes    Person spoken to:  Patient    This was a synchronous virtual visit  Location of patient: home  Location of physician:  home office  Department name:  Medicine  Mode of communication:  Video Conference via AmWell    Time video initiated:  2:33  Time video ended:  2:50 pm  Total length of video visit: 17 min    Bianca Gardner M.D.  Internal Medicine  [facility]       Patients: if you have questions or concerns about this progress note, please discuss them with the provider at a future office visit.    "

## 2021-06-08 ENCOUNTER — MYC MEDICAL ADVICE (OUTPATIENT)
Dept: GASTROENTEROLOGY | Facility: CLINIC | Age: 56
End: 2021-06-08

## 2021-06-08 DIAGNOSIS — R53.83 FATIGUE, UNSPECIFIED TYPE: ICD-10-CM

## 2021-06-08 DIAGNOSIS — M79.10 MYALGIA: ICD-10-CM

## 2021-06-08 DIAGNOSIS — R50.9 LOW GRADE FEVER: ICD-10-CM

## 2021-06-08 LAB
ALBUMIN SERPL-MCNC: 3.6 G/DL (ref 3.4–5)
ALP SERPL-CCNC: 92 U/L (ref 40–150)
ALT SERPL W P-5'-P-CCNC: 19 U/L (ref 0–50)
ANION GAP SERPL CALCULATED.3IONS-SCNC: 6 MMOL/L (ref 3–14)
AST SERPL W P-5'-P-CCNC: 19 U/L (ref 0–45)
BASOPHILS # BLD AUTO: 0.1 10E9/L (ref 0–0.2)
BASOPHILS NFR BLD AUTO: 0.7 %
BILIRUB SERPL-MCNC: 1 MG/DL (ref 0.2–1.3)
BUN SERPL-MCNC: 12 MG/DL (ref 7–30)
CALCIUM SERPL-MCNC: 9.6 MG/DL (ref 8.5–10.1)
CHLORIDE SERPL-SCNC: 104 MMOL/L (ref 94–109)
CO2 SERPL-SCNC: 31 MMOL/L (ref 20–32)
CREAT SERPL-MCNC: 0.8 MG/DL (ref 0.52–1.04)
DIFFERENTIAL METHOD BLD: NORMAL
EOSINOPHIL # BLD AUTO: 0 10E9/L (ref 0–0.7)
EOSINOPHIL NFR BLD AUTO: 0.6 %
ERYTHROCYTE [DISTWIDTH] IN BLOOD BY AUTOMATED COUNT: 11.7 % (ref 10–15)
GFR SERPL CREATININE-BSD FRML MDRD: 82 ML/MIN/{1.73_M2}
GLUCOSE SERPL-MCNC: 111 MG/DL (ref 70–99)
HCT VFR BLD AUTO: 38.6 % (ref 35–47)
HGB BLD-MCNC: 12.7 G/DL (ref 11.7–15.7)
IMM GRANULOCYTES # BLD: 0 10E9/L (ref 0–0.4)
IMM GRANULOCYTES NFR BLD: 0.4 %
LYMPHOCYTES # BLD AUTO: 1 10E9/L (ref 0.8–5.3)
LYMPHOCYTES NFR BLD AUTO: 14.2 %
MCH RBC QN AUTO: 30.7 PG (ref 26.5–33)
MCHC RBC AUTO-ENTMCNC: 32.9 G/DL (ref 31.5–36.5)
MCV RBC AUTO: 93 FL (ref 78–100)
MONOCYTES # BLD AUTO: 0.9 10E9/L (ref 0–1.3)
MONOCYTES NFR BLD AUTO: 13 %
NEUTROPHILS # BLD AUTO: 4.9 10E9/L (ref 1.6–8.3)
NEUTROPHILS NFR BLD AUTO: 71.1 %
NRBC # BLD AUTO: 0 10*3/UL
NRBC BLD AUTO-RTO: 0 /100
PLATELET # BLD AUTO: 248 10E9/L (ref 150–450)
POTASSIUM SERPL-SCNC: 3.6 MMOL/L (ref 3.4–5.3)
PROT SERPL-MCNC: 7.4 G/DL (ref 6.8–8.8)
RBC # BLD AUTO: 4.14 10E12/L (ref 3.8–5.2)
SODIUM SERPL-SCNC: 141 MMOL/L (ref 133–144)
WBC # BLD AUTO: 6.8 10E9/L (ref 4–11)

## 2021-06-08 PROCEDURE — 85025 COMPLETE CBC W/AUTO DIFF WBC: CPT | Performed by: PATHOLOGY

## 2021-06-08 PROCEDURE — 36415 COLL VENOUS BLD VENIPUNCTURE: CPT | Performed by: PATHOLOGY

## 2021-06-08 PROCEDURE — 80053 COMPREHEN METABOLIC PANEL: CPT | Performed by: PATHOLOGY

## 2021-06-09 DIAGNOSIS — R50.81 FEVER IN OTHER DISEASES: Primary | ICD-10-CM

## 2021-06-09 RX ORDER — CIPROFLOXACIN 500 MG/1
500 TABLET, FILM COATED ORAL 2 TIMES DAILY
Qty: 20 TABLET | Refills: 0 | Status: SHIPPED | OUTPATIENT
Start: 2021-06-09 | End: 2021-12-15

## 2021-06-09 NOTE — TELEPHONE ENCOUNTER
[facility] Call [facility]    Phone Message    May a detailed message be left on voicemail: yes     Reason for Call: Other: Jade calling to request a call back to discuss the symptoms she has been having. Please call her at your earliest convenience to discuss.     Action Taken: Message routed to:  [facility]  HEPATOLOGY    Travel Screening: Not Applicable

## 2021-08-27 DIAGNOSIS — G47.00 INSOMNIA, UNSPECIFIED TYPE: ICD-10-CM

## 2021-08-27 NOTE — TELEPHONE ENCOUNTER
[facility]    Phone Message    May a detailed message be left on voicemail: yes     Reason for Call: Medication Question or concern regarding medication   Prescription Clarification  Name of Medication: zolpidem (AMBIEN) 5 MG tablet  Prescribing Provider:Dr. Owens   Pharmacy:    [id number] [place], [place] - [address] [place] [address] & [address]       What on the order needs clarification? Patient is wondering why her refill was denied, Please call patient when this is complete.          Action Taken: Message routed to:  [facility]    Travel Screening: Not Applicable

## 2021-08-30 RX ORDER — ZOLPIDEM TARTRATE 5 MG/1
5 TABLET ORAL
Qty: 10 TABLET | Refills: 0 | Status: SHIPPED | OUTPATIENT
Start: 2021-08-30 | End: 2023-10-26

## 2021-08-30 NOTE — TELEPHONE ENCOUNTER
Patient Requested  Ambien  Last Ordered  08/07/2020  Last Office Visit  06/07/2021-Jj  08/07/2020-Logeais  Next Office Visit     Checked  08/30/2021    DX: Insomnia, unspecified type [G47.00]    Pharmacy:     LUIS Merida RN at 10:23 AM on 8/30/2021.

## 2021-09-04 ENCOUNTER — HEALTH MAINTENANCE LETTER (OUTPATIENT)
Age: 56
End: 2021-09-04

## 2021-10-05 ENCOUNTER — TELEPHONE (OUTPATIENT)
Dept: GASTROENTEROLOGY | Facility: CLINIC | Age: 56
End: 2021-10-05

## 2021-10-05 DIAGNOSIS — Q44.6 POLYCYSTIC LIVER DISEASE: Primary | ICD-10-CM

## 2021-10-05 NOTE — TELEPHONE ENCOUNTER
[facility] Call [facility]    Phone Message    May a detailed message be left on voicemail: yes     Reason for Call: Order(s): Other:   Reason for requested: Lab Orders  Date needed: Patient is scheduled on 11/18/21  Provider name: Dr. Hobbs      Action Taken: Message routed to:  [facility] Hep    Travel Screening: Not Applicable

## 2021-11-16 ENCOUNTER — LAB (OUTPATIENT)
Dept: LAB | Facility: CLINIC | Age: 56
End: 2021-11-16
Payer: COMMERCIAL

## 2021-11-16 DIAGNOSIS — Q44.6 POLYCYSTIC LIVER DISEASE: ICD-10-CM

## 2021-11-16 LAB
ALBUMIN SERPL-MCNC: 3.8 G/DL (ref 3.4–5)
ALP SERPL-CCNC: 105 U/L (ref 40–150)
ALT SERPL W P-5'-P-CCNC: 31 U/L (ref 0–50)
ANION GAP SERPL CALCULATED.3IONS-SCNC: 4 MMOL/L (ref 3–14)
AST SERPL W P-5'-P-CCNC: 38 U/L (ref 0–45)
BILIRUB DIRECT SERPL-MCNC: 0.3 MG/DL (ref 0–0.2)
BILIRUB SERPL-MCNC: 1.9 MG/DL (ref 0.2–1.3)
BUN SERPL-MCNC: 15 MG/DL (ref 7–30)
CALCIUM SERPL-MCNC: 9 MG/DL (ref 8.5–10.1)
CHLORIDE BLD-SCNC: 104 MMOL/L (ref 94–109)
CO2 SERPL-SCNC: 32 MMOL/L (ref 20–32)
CREAT SERPL-MCNC: 0.8 MG/DL (ref 0.52–1.04)
ERYTHROCYTE [DISTWIDTH] IN BLOOD BY AUTOMATED COUNT: 11.9 % (ref 10–15)
GFR SERPL CREATININE-BSD FRML MDRD: 83 ML/MIN/1.73M2
GLUCOSE BLD-MCNC: 95 MG/DL (ref 70–99)
HCT VFR BLD AUTO: 39.3 % (ref 35–47)
HGB BLD-MCNC: 13.1 G/DL (ref 11.7–15.7)
INR PPP: 0.97 (ref 0.85–1.15)
MCH RBC QN AUTO: 30.5 PG (ref 26.5–33)
MCHC RBC AUTO-ENTMCNC: 33.3 G/DL (ref 31.5–36.5)
MCV RBC AUTO: 92 FL (ref 78–100)
PLATELET # BLD AUTO: 205 10E3/UL (ref 150–450)
POTASSIUM BLD-SCNC: 3.5 MMOL/L (ref 3.4–5.3)
PROT SERPL-MCNC: 7.1 G/DL (ref 6.8–8.8)
RBC # BLD AUTO: 4.29 10E6/UL (ref 3.8–5.2)
SODIUM SERPL-SCNC: 140 MMOL/L (ref 133–144)
WBC # BLD AUTO: 5.6 10E3/UL (ref 4–11)

## 2021-11-16 PROCEDURE — 36415 COLL VENOUS BLD VENIPUNCTURE: CPT

## 2021-11-16 PROCEDURE — 85610 PROTHROMBIN TIME: CPT

## 2021-11-16 PROCEDURE — 85027 COMPLETE CBC AUTOMATED: CPT

## 2021-11-16 PROCEDURE — 80048 BASIC METABOLIC PNL TOTAL CA: CPT

## 2021-11-16 PROCEDURE — 82040 ASSAY OF SERUM ALBUMIN: CPT

## 2021-11-18 ENCOUNTER — OFFICE VISIT (OUTPATIENT)
Dept: GASTROENTEROLOGY | Facility: CLINIC | Age: 56
End: 2021-11-18
Attending: INTERNAL MEDICINE
Payer: COMMERCIAL

## 2021-11-18 VITALS
DIASTOLIC BLOOD PRESSURE: 71 MMHG | TEMPERATURE: 98.4 F | SYSTOLIC BLOOD PRESSURE: 112 MMHG | OXYGEN SATURATION: 98 % | BODY MASS INDEX: 22.94 KG/M2 | RESPIRATION RATE: 16 BRPM | HEIGHT: 69 IN | WEIGHT: 154.9 LBS | HEART RATE: 68 BPM

## 2021-11-18 DIAGNOSIS — Q44.6 POLYCYSTIC LIVER DISEASE: Primary | ICD-10-CM

## 2021-11-18 PROCEDURE — 99214 OFFICE O/P EST MOD 30 MIN: CPT | Performed by: INTERNAL MEDICINE

## 2021-11-18 PROCEDURE — G0463 HOSPITAL OUTPT CLINIC VISIT: HCPCS

## 2021-11-18 ASSESSMENT — PAIN SCALES - GENERAL: PAINLEVEL: NO PAIN (0)

## 2021-11-18 ASSESSMENT — MIFFLIN-ST. JEOR: SCORE: 1349.12

## 2021-11-18 NOTE — LETTER
"  11/18/2021     RE: Jade Carcamo  [address] [place] [address]    I had the pleasure of seeing Jade Carcamo, a 54 year old female, for polycystic liver disease.  She is doing well at this visit.  She does have some mild abdominal discomfort, which she describes as occasionally feeling like a baby kicking when you are pregnant.  She had similar symptoms before her unroofing 5 years ago.  She denies any itching or skin rash, or fatigue.  She does not feel as though her abdominal girth is increased and she is not experiencing any lower extremity edema.     She denies any fevers or chills, cough or shortness of breath.  She denies any nausea or vomiting, diarrhea or constipation.  Her appetite has been good and her weight is roughly the same.  She is exercising on a regular basis.     There have been no other new events since she was last seen.    Current Outpatient Medications   Medication     zolpidem (AMBIEN) 5 MG tablet     ciprofloxacin (CIPRO) 500 MG tablet     medroxyPROGESTERone (PROVERA) 2.5 MG tablet     No current facility-administered medications for this visit.     /71 (BP Location: Right arm, Patient Position: Sitting, Cuff Size: Adult Regular)   Pulse 68   Temp 98.4  F (36.9  C) (Oral)   Resp 16   Ht 1.74 m (5' 8.5\")   Wt 70.3 kg (154 lb 14.4 oz)   LMP 11/14/2016   SpO2 98%   BMI 23.21 kg/m       PHYSICAL EXAMINATION:    GENERAL:  She looks well.  HEENT:  Exam shows no scleral icterus or temporal muscle wasting.  CHEST:  Clear.  ABDOMEN:  Exam shows no increase in girth.  No masses or tenderness to palpation are present.  Her liver is 10 cm in span without left lobe enlargement.  No spleen tip is palpable.  EXTREMITIES:  Exam shows no edema.  SKIN:  Exam shows no stigmata of chronic liver disease.  NEUROLOGIC:  Exam is nonfocal.    Recent Results (from the past 168 hour(s))   CBC with platelets    Collection Time: 11/16/21  1:34 PM   Result Value Ref Range    WBC Count 5.6 " 4.0 - 11.0 10e3/uL    RBC Count 4.29 3.80 - 5.20 10e6/uL    Hemoglobin 13.1 11.7 - 15.7 g/dL    Hematocrit 39.3 35.0 - 47.0 %    MCV 92 78 - 100 fL    MCH 30.5 26.5 - 33.0 pg    MCHC 33.3 31.5 - 36.5 g/dL    RDW 11.9 10.0 - 15.0 %    Platelet Count 205 150 - 450 10e3/uL   Basic metabolic panel    Collection Time: 11/16/21  1:35 PM   Result Value Ref Range    Sodium 140 133 - 144 mmol/L    Potassium 3.5 3.4 - 5.3 mmol/L    Chloride 104 94 - 109 mmol/L    Carbon Dioxide (CO2) 32 20 - 32 mmol/L    Anion Gap 4 3 - 14 mmol/L    Urea Nitrogen 15 7 - 30 mg/dL    Creatinine 0.80 0.52 - 1.04 mg/dL    Calcium 9.0 8.5 - 10.1 mg/dL    Glucose 95 70 - 99 mg/dL    GFR Estimate 83 >60 mL/min/1.73m2   Hepatic function panel    Collection Time: 11/16/21  1:35 PM   Result Value Ref Range    Bilirubin Total 1.9 (H) 0.2 - 1.3 mg/dL    Bilirubin Direct 0.3 (H) 0.0 - 0.2 mg/dL    Protein Total 7.1 6.8 - 8.8 g/dL    Albumin 3.8 3.4 - 5.0 g/dL    Alkaline Phosphatase 105 40 - 150 U/L    AST 38 0 - 45 U/L    ALT 31 0 - 50 U/L   INR    Collection Time: 11/16/21  1:35 PM   Result Value Ref Range    INR 0.97 0.85 - 1.15      IMPRESSION:  My impression is that Ms. Carcamo has polycystic liver disease.  Her symptoms are very mild at this point in time, and fortunately there has been no recurrence of the cholangitis she experienced 3 years ago.  Her cyst burden is fairly modest and I do not think there is any reason to do additional imaging at this point in time.  I will not be making any change to her medical regimen.  She is fully vaccinated against COVID-19, including having had a booster.  I will see her back in the clinic again in 1 year.    Thank you very much for allowing me to participate in the care of this patient.  If you have any questions regarding my recommendations, please do not hesitate to contact me.           Davis Hobbs MD      Professor of Medicine  [employer]      Executive Medical Director, Solid  Organ Transplant Program  [facility]

## 2021-11-18 NOTE — PROGRESS NOTES
"I had the pleasure of seeing Jade Carcamo, a 54 year old female, for polycystic liver disease.  She is doing well at this visit.  She does have some mild abdominal discomfort, which she describes as occasionally feeling like a baby kicking when you are pregnant.  She had similar symptoms before her unroofing 5 years ago.  She denies any itching or skin rash, or fatigue.  She does not feel as though her abdominal girth is increased and she is not experiencing any lower extremity edema.     She denies any fevers or chills, cough or shortness of breath.  She denies any nausea or vomiting, diarrhea or constipation.  Her appetite has been good and her weight is roughly the same.  She is exercising on a regular basis.     There have been no other new events since she was last seen.    Current Outpatient Medications   Medication     zolpidem (AMBIEN) 5 MG tablet     ciprofloxacin (CIPRO) 500 MG tablet     medroxyPROGESTERone (PROVERA) 2.5 MG tablet     No current facility-administered medications for this visit.     /71 (BP Location: Right arm, Patient Position: Sitting, Cuff Size: Adult Regular)   Pulse 68   Temp 98.4  F (36.9  C) (Oral)   Resp 16   Ht 1.74 m (5' 8.5\")   Wt 70.3 kg (154 lb 14.4 oz)   LMP 11/14/2016   SpO2 98%   BMI 23.21 kg/m       PHYSICAL EXAMINATION:    GENERAL:  She looks well.  HEENT:  Exam shows no scleral icterus or temporal muscle wasting.  CHEST:  Clear.  ABDOMEN:  Exam shows no increase in girth.  No masses or tenderness to palpation are present.  Her liver is 10 cm in span without left lobe enlargement.  No spleen tip is palpable.  EXTREMITIES:  Exam shows no edema.  SKIN:  Exam shows no stigmata of chronic liver disease.  NEUROLOGIC:  Exam is nonfocal.    Recent Results (from the past 168 hour(s))   CBC with platelets    Collection Time: 11/16/21  1:34 PM   Result Value Ref Range    WBC Count 5.6 4.0 - 11.0 10e3/uL    RBC Count 4.29 3.80 - 5.20 10e6/uL    Hemoglobin 13.1 11.7 - " 15.7 g/dL    Hematocrit 39.3 35.0 - 47.0 %    MCV 92 78 - 100 fL    MCH 30.5 26.5 - 33.0 pg    MCHC 33.3 31.5 - 36.5 g/dL    RDW 11.9 10.0 - 15.0 %    Platelet Count 205 150 - 450 10e3/uL   Basic metabolic panel    Collection Time: 11/16/21  1:35 PM   Result Value Ref Range    Sodium 140 133 - 144 mmol/L    Potassium 3.5 3.4 - 5.3 mmol/L    Chloride 104 94 - 109 mmol/L    Carbon Dioxide (CO2) 32 20 - 32 mmol/L    Anion Gap 4 3 - 14 mmol/L    Urea Nitrogen 15 7 - 30 mg/dL    Creatinine 0.80 0.52 - 1.04 mg/dL    Calcium 9.0 8.5 - 10.1 mg/dL    Glucose 95 70 - 99 mg/dL    GFR Estimate 83 >60 mL/min/1.73m2   Hepatic function panel    Collection Time: 11/16/21  1:35 PM   Result Value Ref Range    Bilirubin Total 1.9 (H) 0.2 - 1.3 mg/dL    Bilirubin Direct 0.3 (H) 0.0 - 0.2 mg/dL    Protein Total 7.1 6.8 - 8.8 g/dL    Albumin 3.8 3.4 - 5.0 g/dL    Alkaline Phosphatase 105 40 - 150 U/L    AST 38 0 - 45 U/L    ALT 31 0 - 50 U/L   INR    Collection Time: 11/16/21  1:35 PM   Result Value Ref Range    INR 0.97 0.85 - 1.15      IMPRESSION:  My impression is that Ms. Carcamo has polycystic liver disease.  Her symptoms are very mild at this point in time, and fortunately there has been no recurrence of the cholangitis she experienced 3 years ago.  Her cyst burden is fairly modest and I do not think there is any reason to do additional imaging at this point in time.  I will not be making any change to her medical regimen.  She is fully vaccinated against COVID-19, including having had a booster.  I will see her back in the clinic again in 1 year.    Thank you very much for allowing me to participate in the care of this patient.  If you have any questions regarding my recommendations, please do not hesitate to contact me.           Davis Hobbs MD      Professor of Medicine  [employer]      Executive Medical Director, Solid Organ Transplant Program  [facility]

## 2021-11-18 NOTE — NURSING NOTE
"Chief Complaint   Patient presents with     RECHECK     Polycystic liver disease     Vital signs:  Temp: 98.4  F (36.9  C) Temp src: Oral BP: 112/71 Pulse: 68   Resp: 16 SpO2: 98 %     Height: 174 cm (5' 8.5\") Weight: 70.3 kg (154 lb 14.4 oz)  Estimated body mass index is 23.21 kg/m  as calculated from the following:    Height as of this encounter: 1.74 m (5' 8.5\").    Weight as of this encounter: 70.3 kg (154 lb 14.4 oz).      Andreia Belle, [facility]  11/18/2021 10:00 AM      "

## 2021-11-18 NOTE — LETTER
"  11/18/2021     RE: Jade Carcamo  [address] [place] [facility] [address]    Dear Colleague,    Thank you for referring your patient, Jade Carcamo, to the [facility]PAT[facility] [place]. Please see a copy of my visit note below.    I had the pleasure of seeing Jade Carcamo, a 54 year old female, for polycystic liver disease.  She is doing well at this visit.  She does have some mild abdominal discomfort, which she describes as occasionally feeling like a baby kicking when you are pregnant.  She had similar symptoms before her unroofing 5 years ago.  She denies any itching or skin rash, or fatigue.  She does not feel as though her abdominal girth is increased and she is not experiencing any lower extremity edema.     She denies any fevers or chills, cough or shortness of breath.  She denies any nausea or vomiting, diarrhea or constipation.  Her appetite has been good and her weight is roughly the same.  She is exercising on a regular basis.     There have been no other new events since she was last seen.    Current Outpatient Medications   Medication     zolpidem (AMBIEN) 5 MG tablet     ciprofloxacin (CIPRO) 500 MG tablet     medroxyPROGESTERone (PROVERA) 2.5 MG tablet     No current facility-administered medications for this visit.     /71 (BP Location: Right arm, Patient Position: Sitting, Cuff Size: Adult Regular)   Pulse 68   Temp 98.4  F (36.9  C) (Oral)   Resp 16   Ht 1.74 m (5' 8.5\")   Wt 70.3 kg (154 lb 14.4 oz)   LMP 11/14/2016   SpO2 98%   BMI 23.21 kg/m       PHYSICAL EXAMINATION:    GENERAL:  She looks well.  HEENT:  Exam shows no scleral icterus or temporal muscle wasting.  CHEST:  Clear.  ABDOMEN:  Exam shows no increase in girth.  No masses or tenderness to palpation are present.  Her liver is 10 cm in span without left lobe enlargement.  No spleen tip is palpable.  EXTREMITIES:  Exam shows no edema.  SKIN:  Exam shows no stigmata of chronic liver " disease.  NEUROLOGIC:  Exam is nonfocal.    Recent Results (from the past 168 hour(s))   CBC with platelets    Collection Time: 11/16/21  1:34 PM   Result Value Ref Range    WBC Count 5.6 4.0 - 11.0 10e3/uL    RBC Count 4.29 3.80 - 5.20 10e6/uL    Hemoglobin 13.1 11.7 - 15.7 g/dL    Hematocrit 39.3 35.0 - 47.0 %    MCV 92 78 - 100 fL    MCH 30.5 26.5 - 33.0 pg    MCHC 33.3 31.5 - 36.5 g/dL    RDW 11.9 10.0 - 15.0 %    Platelet Count 205 150 - 450 10e3/uL   Basic metabolic panel    Collection Time: 11/16/21  1:35 PM   Result Value Ref Range    Sodium 140 133 - 144 mmol/L    Potassium 3.5 3.4 - 5.3 mmol/L    Chloride 104 94 - 109 mmol/L    Carbon Dioxide (CO2) 32 20 - 32 mmol/L    Anion Gap 4 3 - 14 mmol/L    Urea Nitrogen 15 7 - 30 mg/dL    Creatinine 0.80 0.52 - 1.04 mg/dL    Calcium 9.0 8.5 - 10.1 mg/dL    Glucose 95 70 - 99 mg/dL    GFR Estimate 83 >60 mL/min/1.73m2   Hepatic function panel    Collection Time: 11/16/21  1:35 PM   Result Value Ref Range    Bilirubin Total 1.9 (H) 0.2 - 1.3 mg/dL    Bilirubin Direct 0.3 (H) 0.0 - 0.2 mg/dL    Protein Total 7.1 6.8 - 8.8 g/dL    Albumin 3.8 3.4 - 5.0 g/dL    Alkaline Phosphatase 105 40 - 150 U/L    AST 38 0 - 45 U/L    ALT 31 0 - 50 U/L   INR    Collection Time: 11/16/21  1:35 PM   Result Value Ref Range    INR 0.97 0.85 - 1.15      IMPRESSION:  My impression is that Ms. Carcamo has polycystic liver disease.  Her symptoms are very mild at this point in time, and fortunately there has been no recurrence of the cholangitis she experienced 3 years ago.  Her cyst burden is fairly modest and I do not think there is any reason to do additional imaging at this point in time.  I will not be making any change to her medical regimen.  She is fully vaccinated against COVID-19, including having had a booster.  I will see her back in the clinic again in 1 year.    Thank you very much for allowing me to participate in the care of this patient.  If you have any questions regarding my  recommendations, please do not hesitate to contact me.           Davis Hobbs MD      Professor of Medicine  [employer]      Executive Medical Director, Solid Organ Transplant Program  [facility]

## 2021-12-02 ENCOUNTER — OFFICE VISIT (OUTPATIENT)
Dept: OPHTHALMOLOGY | Facility: CLINIC | Age: 56
End: 2021-12-02
Payer: COMMERCIAL

## 2021-12-02 DIAGNOSIS — H52.203 MYOPIA OF BOTH EYES WITH ASTIGMATISM AND PRESBYOPIA: Primary | ICD-10-CM

## 2021-12-02 DIAGNOSIS — H52.4 MYOPIA OF BOTH EYES WITH ASTIGMATISM AND PRESBYOPIA: Primary | ICD-10-CM

## 2021-12-02 DIAGNOSIS — H52.13 MYOPIA OF BOTH EYES WITH ASTIGMATISM AND PRESBYOPIA: Primary | ICD-10-CM

## 2021-12-02 PROCEDURE — 92004 COMPRE OPH EXAM NEW PT 1/>: CPT | Performed by: OPTOMETRIST

## 2021-12-02 PROCEDURE — 92015 DETERMINE REFRACTIVE STATE: CPT | Performed by: OPTOMETRIST

## 2021-12-02 ASSESSMENT — SLIT LAMP EXAM - LIDS
COMMENTS: NORMAL
COMMENTS: NORMAL

## 2021-12-02 ASSESSMENT — REFRACTION_CURRENTRX
OD_BRAND: DAILIES TOTAL 1 MULTIFOCAL
OD_SPHERE: -6.50
OD_BASECURVE: 8.5
OS_BASECURVE: 8.5
OS_BRAND: DAILIES TOTAL 1 MULTIFOCAL
OS_ADD: MED
OD_BRAND: DAILIES TOTAL 1 MULTIFOCAL
OD_BASECURVE: 8.5
OS_SPHERE: -7.00
OD_SPHERE: -7.00
OD_DIAMETER: 14.1
OS_BRAND: DAILIES TOTAL 1 MULTIFOCAL
OS_SPHERE: -7.00
OD_ADD: MED
OS_DIAMETER: 14.1
OD_ADD: HIGH
OS_DIAMETER: 14.1
OD_DIAMETER: 14.1
OS_ADD: HIGH
OS_BASECURVE: 8.5

## 2021-12-02 ASSESSMENT — REFRACTION_MANIFEST
OS_ADD: +2.50
OD_ADD: +2.50
OD_SPHERE: -7.25
OS_AXIS: 058
OS_SPHERE: -7.50
OD_CYLINDER: +0.50
OS_CYLINDER: +0.25
OD_AXIS: 045

## 2021-12-02 ASSESSMENT — TONOMETRY
OD_IOP_MMHG: 12
IOP_METHOD: ICARE
OS_IOP_MMHG: 11

## 2021-12-02 ASSESSMENT — REFRACTION
OS_CYLINDER: +0.50
OS_AXIS: 067
OD_AXIS: 060
OS_SPHERE: -7.25
OD_CYLINDER: +0.25
OD_SPHERE: -6.75

## 2021-12-02 ASSESSMENT — REFRACTION_WEARINGRX
OS_CYLINDER: +0.50
OS_ADD: +2.50
OD_SPHERE: -7.75
OD_CYLINDER: +0.50
OD_AXIS: 058
OS_AXIS: 058
OD_ADD: +2.50
OS_SPHERE: -8.00
SPECS_TYPE: PAL

## 2021-12-02 ASSESSMENT — VISUAL ACUITY
OD_SC: 200E@3'
OD_CC+: -2
OS_CC+: +2
OD_CC: 20/25
OS_CC: 20/25
CORRECTION_TYPE: GLASSES
METHOD: SNELLEN - LINEAR
OS_SC: 200E@3'

## 2021-12-02 ASSESSMENT — CONF VISUAL FIELD
OD_NORMAL: 1
METHOD: COUNTING FINGERS
OS_NORMAL: 1

## 2021-12-02 ASSESSMENT — CUP TO DISC RATIO
OD_RATIO: 0.15
OS_RATIO: 0.15

## 2021-12-02 ASSESSMENT — EXTERNAL EXAM - LEFT EYE: OS_EXAM: NORMAL

## 2021-12-02 ASSESSMENT — EXTERNAL EXAM - RIGHT EYE: OD_EXAM: NORMAL

## 2021-12-02 NOTE — PROGRESS NOTES
A/P  1.) Myopia/Astig/Presbyopia OU  -Currently in MF CL's with good distance vision but poor near  -Does seem overminused slightly in glasses  -Did not respond well to reduced minus AND higher add  -Rec decreasing minus right eye and keeping medium add for now. Will also have her compare -7.00 high adds in real life setting (did not like in office today)  -Dilated ocular health unremarkable OU    Monitor 1-2 years comprehensive, sooner prn    Mailed CL trials out 12/3/21    I have confirmed the patient's CC, HPI and reviewed Past Medical History, Past Surgical History, Social History, Family History, Problem List, Medication List and agree with Tech note.     No Petersen, BERTHA FAAO FSLS

## 2021-12-02 NOTE — NURSING NOTE
Chief Complaints and History of Present Illnesses   Patient presents with     COMPREHENSIVE EYE EXAM     Chief Complaint(s) and History of Present Illness(es)     COMPREHENSIVE EYE EXAM     Laterality: both eyes    Onset: gradual    Onset: years ago    Course: gradually worsening    Associated symptoms: floaters, glare and haloes.  Negative for eye pain, dryness, tearing, flashes and photophobia    Treatments tried: no treatments    Pain scale: 0/10              Comments     Last exam was about 2 years.  Pt states vision is a little worse since last visit.  No change to floaters.  Night driving has become a little harder.  No ocular medications.    IVETT Kyle December 2, 2021 12:48 PM

## 2021-12-06 ENCOUNTER — TELEPHONE (OUTPATIENT)
Dept: INTERNAL MEDICINE | Facility: CLINIC | Age: 56
End: 2021-12-06
Payer: COMMERCIAL

## 2021-12-06 DIAGNOSIS — E04.1 THYROID NODULE: Primary | ICD-10-CM

## 2021-12-06 NOTE — TELEPHONE ENCOUNTER
[facility] Call [facility]    Phone Message    May a detailed message be left on voicemail: yes     Reason for Call: Other: Patient would like a referral placed to see endocrinology here at the  instead of at [facility]. Please call back when this is complete.     Action Taken: Message routed to:  [facility] ([facility] P[facility]    Travel Screening: Not Applicable

## 2021-12-07 NOTE — TELEPHONE ENCOUNTER
Referral signed by provider today.  [facility] scheduling will call patient to schedule.      Nahum Hernández CMA ([facility]) at 1:26 PM on 12/7/2021

## 2021-12-13 NOTE — TELEPHONE ENCOUNTER
APPT NOTES: Former pt of Dr. Zuleta, Thyroid, scheduled with pt   APPT DATE: 12.15.21    NOTES (FOR ALL VISITS) STATUS DETAILS   OFFICE NOTES from referring provider Internal  Dr. Zuleta   OFFICE NOTES from other specialist na    ED NOTES na    OPERATIVE REPORT  (thyroid, pituitary, adrenal, parathyroid)  (All op notes related to diagnoses) na    MEDICATION LIST Internal     IMAGING      DEXASCAN    (ALL) na    MRI (BRAIN)    (ALL) na    XR (Chest)    (ALL) ce /Internal  Internal- 7/12/18, 7/11/18, 6/29/18, 6.23.18, 11.4.16  [facility]- 4.9.21    CT (HEAD/NECK/CHEST/ABDOMEN)    (ALL) Internal  6.29/18, 11.14.16, 7/14/16   NUCLEAR    (ALL)  na    ULTRASOUND (HEAD/NECK) FNA BIOPSIES    (ALL) These images have pathology reports that need to be collected ce /Internal  HP- 11.16.21,   Internal- 12.13.18, 10.12.18   ULTRASOUND (HEAD/NECK)    (ALL) Internal  Internal - 6.24.19, 10.4.18,    LABS     DIABETES: HBGA1C, CREATININE, FASTING LIPIDS, MICROALBUMIN URINE, POTASSIUM, TSH, T4    THYROID: TSH, T4, CBC, THYRODLONULIN, TOTAL T3, FREE T4, CALCITONIN, CEA Internal/ce  Cbc- 11.16.21   CREATININE- 7/17/18  T4/TSH- 12.4.20  T3- 12.4.20   Glucose by meter- 9.10.18   LIPID- 11/17/21   Potassium- 7/12/18      PATHOLOGY REPORTS WITH CASE NUMBER  *All pathology reports, list case number related to DX  *Just report, no path slides  *Surgical path reports for endocrine organs (ovaries, testes, pancreas, etc) Na

## 2021-12-15 ENCOUNTER — VIRTUAL VISIT (OUTPATIENT)
Dept: ENDOCRINOLOGY | Facility: CLINIC | Age: 56
End: 2021-12-15
Payer: COMMERCIAL

## 2021-12-15 ENCOUNTER — PRE VISIT (OUTPATIENT)
Dept: ENDOCRINOLOGY | Facility: CLINIC | Age: 56
End: 2021-12-15

## 2021-12-15 DIAGNOSIS — Z80.8 FAMILY HISTORY OF THYROID CANCER: Primary | ICD-10-CM

## 2021-12-15 DIAGNOSIS — E04.1 THYROID NODULE: ICD-10-CM

## 2021-12-15 PROCEDURE — 99214 OFFICE O/P EST MOD 30 MIN: CPT | Mod: 95

## 2021-12-15 NOTE — PATIENT INSTRUCTIONS
Ask your sister what kind of thyroid cancer she had    Thyroid ultrasound orders are on the system ([id number])    Labs

## 2021-12-15 NOTE — PROGRESS NOTES
Jade is a 56 year old who is being evaluated via a billable video visit.      Patient reviewed allergy and medication lists and states all is correct.     How would you like to obtain your AVS? MyChart  If the video visit is dropped, the invitation should be resent by: Send to e-mail at: [email or web address]  Will anyone else be joining your video visit? No  Endocrine Consult Video visit note-     Attending Assessment/Plan :     Left thyroid nodule - FNAB AUS and benign. Molecular testing was never done .  Malignancy risk of this nodule may be intermediate between benign and AUS   Repeat US- low threshold to repeat FNAB     12/20/2021 thyroid US compared with 11/16/2020 thyroid US [facility] compared with 6/24/19 , 10/4/2018    Left thyroid nodule 3.1 x 2.6 x 3.6 cm (volume 14.5)  Was 3 x 2.1 x 3.9 cm (volume 12.3)  was 3.2 x 2.2 x 3.7 cm hypoechoic; 2.9 x 2.1 x4  (volume 12.1)  -- 10/12/18 FNAB AUS  12/13/18 FNAB benign ; FNAB 1/21/2022  Benign       Family history of thyroid cancer, type unknown   Labs to include calcitonin     History of abnormal thyroid cytology AUS - as above .    Due to the COVID 19 pandemic this visit was a video visit in order to help prevent spread of infection in this patient and the general population. The patient gave verbal consent for the visit today. I have independently reviewed and interpreted labs, imaging as indicated.     Chart review/prep time 1    Visit Start time amwel 1559   Visit Stop time  1612   _27_ minutes spent on the date of the encounter doing chart review, history and exam, documentation and further activities as noted above.      Chief complaint:    HISTORY OF PRESENT ILLNESS  Jade is new to me today, having previously seen both Raphael Castillo and Tori  in this clinic . She no showed for appt with Dr Valle 11/2020. :She last saw Dr Castillo 6/2019.      Left thyroid nodule was first noted on the 2018 CT. This was followed by US and FNAB.  The first FNAB had  AUS cytology  .  Reeat FNAB had benign cytology.  Molecular was never performed on the AUS sample.  The most recent US was 11/2020, stable.     She had no known childhood radiation exposure.  She has had a lot of CT scans as an adult.  Her sister has thyroid cancer, type unknown.  Her mother had thyroidectomy in childhood     Endocrine relevant labs are as follows:  5/22/17 TSH 3.31  7/2/18 TSH 5.42  7/3/18 free T4 1.28  10/2/18 TSh 3.16  6/24/18 TSh 2.46, free T4 0.78  11/16/2021 Na 140 K 3.3, creatinine 0.8, Ca 9, albumin 3.8, glucose 95    Relevant imaging is as follows:   6/29/18 CT CAP: thyroid L>>> right with left nodule heterogeneous expanding left lobe   11/16/2020 thyroid US [facility] compared with 6/24/19 , 10/4/2018    Left thyroid nodule 3 x 2.1 x 3.9 cm was 3.2 x 2.2 x 3.7 cm hypoechoic; 2.9 x 2.1 x4   -- 10/12/18 FNAB AUS  12/13/18 FNAB benign     REVIEW OF SYSTEMS  ? Sometimes feels swallow  Weight gain     Answers for HPI/ROS submitted by the patient on 12/14/2021  General Symptoms: No  Skin Symptoms: No  HENT Symptoms: No  EYE SYMPTOMS: No  HEART SYMPTOMS: No  LUNG SYMPTOMS: No  INTESTINAL SYMPTOMS: No  URINARY SYMPTOMS: No  GYNECOLOGIC SYMPTOMS: No  BREAST SYMPTOMS: No  SKELETAL SYMPTOMS: No  BLOOD SYMPTOMS: No  NERVOUS SYSTEM SYMPTOMS: No  MENTAL HEALTH SYMPTOMS: No  10 system ROS otherwise as per the HPI or negative    Past Medical History  Past Medical History:   Diagnosis Date     Cholangitis 07/2018    prolonged hospitalization requiring ERCP/stenting     GERD (gastroesophageal reflux disease)      Menopausal flushing      Polycystic liver disease     in 40 years     Thyroid nodule 2018    cold, bening biopsy 12/18     Past Surgical History:   Procedure Laterality Date     ENDOSCOPIC RETROGRADE CHOLANGIOPANCREATOGRAM N/A 7/9/2018    Procedure: COMBINED ENDOSCOPIC RETROGRADE CHOLANGIOPANCREATOGRAPHY, PLACE TUBE/STENT;  Endoscopic Retrograde Cholangiopancreatogram, Biliary Sphincterotomy,  Pancreatic Duct Stent Placement, and Bile Duct Stent Placement x 2;  Surgeon: Olvin Hart MD;  Location: [facility] OR     ENDOSCOPIC RETROGRADE CHOLANGIOPANCREATOGRAM N/A 9/10/2018    Procedure: COMBINED ENDOSCOPIC RETROGRADE CHOLANGIOPANCREATOGRAPHY, REMOVE FOREIGN BODY OR STENT/TUBE;  Endoscopic Retrograde Cholangiopancreatogram with Stent removal;  Surgeon: Olvin Hart MD;  Location: [facility] OR     LAPAROSCOPIC UNROOFING LIVER CYST N/A 11/4/2016    Procedure: LAPAROSCOPIC UNROOFING LIVER CYST;  Surgeon: Sonido Cruz MD;  Location: [facility] OR       Medications  Current Outpatient Medications   Medication Sig Dispense Refill     ciprofloxacin (CIPRO) 500 MG tablet Take 1 tablet (500 mg) by mouth 2 times daily 20 tablet 0     medroxyPROGESTERone (PROVERA) 2.5 MG tablet Take 1 tablet (2.5 mg) by mouth daily 90 tablet 1     zolpidem (AMBIEN) 5 MG tablet Take 1 tablet (5 mg) by mouth nightly as needed for sleep (due for PCP appointment) 10 tablet 0       Allergies  Allergies   Allergen Reactions     Bee Venom        Family History  Family History   Problem Relation Age of Onset     Alzheimer Disease Mother 60     Thyroid Disease Mother         thyroidectomy in childhood     Heart Disease Father      Glaucoma Father      Hypertension Father      Thyroid Cancer Sister         thyroid cancer     Other - See Comments Brother         bradycardia requiring ablation     Hypertension Brother      Psoriasis Maternal Grandfather      Dementia Maternal Grandfather      Breast Cancer Paternal Aunt      Diabetes No family hx of      Macular Degeneration No family hx of      Social History  Social History     Tobacco Use     Smoking status: Never Smoker     Smokeless tobacco: Never Used   Substance Use Topics     Alcohol use: Yes     Comment: 2 per week     Drug use: No     Physical Exam  There is no height or weight on file to calculate BMI.   BP Readings from Last 1 Encounters:   11/18/21 112/71      Pulse Readings from  "Last 1 Encounters:   11/18/21 68      Resp Readings from Last 1 Encounters:   11/18/21 16      Temp Readings from Last 1 Encounters:   11/18/21 98.4  F (36.9  C) (Oral)      SpO2 Readings from Last 1 Encounters:   11/18/21 98%      Wt Readings from Last 1 Encounters:   11/18/21 70.3 kg (154 lb 14.4 oz)      Ht Readings from Last 1 Encounters:   11/18/21 1.74 m (5' 8.5\")       GENERAL: pleasant woman in no distress; I can see from upper trunk up   SKIN: Visible skin clear. No significant rash, abnormal pigmentation or lesions.  EYES: Eyes grossly normal to inspection.  No discharge or erythema, or obvious scleral/conjunctival abnormalities.  NECK:subtle visible fullness left thyroid bed, intermittently seen when she talks   RESP: No audible wheeze, cough, or visible cyanosis.  No visible retractions or increased work of breathing.    NEURO: Awake, alert, responds appropriately to questions.  Mentation and speech fluent.  PSYCH:affect normal and appearance well-groomed.      DATA REVIEW      Patient Name: IGNACIO ZULETA   MR#: [id number]   Specimen #: [id number]   Collected: 10/12/2018   Received: 10/12/2018   Reported: 10/15/2018 17:06   Ordering Phy(s): GRIFFIN LITTLE     For improved result formatting, select 'View Enhanced Report Format' under    Linked Documents section.     SPECIMEN/STAIN PROCESS:   Thyroid, left mid #1, ultrasound guided fine needle aspiration        Pap-Cyto x 2, Diff Quick Stain-cyto x 2     ----------------------------------------------------------------     CYTOLOGIC INTERPRETATION:      Thyroid, left mid #1, ultrasound guided fine needle aspiration:   - Atypia of undetermined significance   Specimen Adequacy: Satisfactory for evaluation.     The [place] implied risk of malignancy and recommended clinical   management:   Atypia of undetermined significance has a 10-30% risk of malignancy,   recommend repeat FNA in 4-6 weeks,   molecular testing or lobectomy     I have personally reviewed " all specimens and/or slides, including the   listed special stains, and used them   with my medical judgement to determine or confirm the final diagnosis.     Electronically signed out by:   Manuel Escobedo M.D., Physicians     Processed and screened at [facility]     CLINICAL HISTORY:   4.0x2.5x1.9 cm nodule.     ,     GROSS:   Thyroid, left mid #1, ultrasound guided fine needle aspiration:  Received   are 2 fixed slides, processed for   Pap stain, and 2 air dried slides, processed for Diff Quik stain. Afirma   tube held.     INTRAOPERATIVE CONSULTATION:   FNA Performance: Fine needle aspiration was not performed by [facility],    Pathology staff.   Immediate Adequacy: On site specimen adequacy evaluation was performed by   Dr. ELLEN Escobedo MD via telepathology.     Onsite adequacy/interpretation:   Pass A1 adequate.  Pass A2 put directly into Affirma tube.  Pass A3   inadequate.     MICROSCOPIC:   Microscopic examination is performed.     Stas Pearl MD (PGY2)     Manuel Escobedo MD (Attending)     CPT Codes:    37403-IXXZ-QQP, 34966-VGTB-UDS   A: 88994-OVFH     TESTING LAB LOCATION:   [facility], [address]   [id number] [phone]     COLLECTION SITE:   Client:  [facility]   Location:  [facility] (B)     3 yr ago    Copath Report Patient Name: IGNACIO ZULETA   MR#: [id number]   Specimen #: [id number]   Collected: 12/13/2018   Received: 12/13/2018   Reported: 12/13/2018 16:25   Ordering Phy(s): RUPENDRA DEV THAKU VELASQUEZ   Additional Phy(s): GRIFFIN LITTLE     For improved result formatting, select 'View Enhanced Report Format' under    Linked Documents section.     SPECIMEN/STAIN PROCESS:   Thyroid, left nodule, ultrasound guided fine needle aspiration        Pap-Cyto x 3, Shaffer's stain-cyto x 6      ----------------------------------------------------------------     CYTOLOGIC INTERPRETATION:      Thyroid, left nodule, ultrasound guided fine needle aspiration:   Benign   Consistent with a benign nodule (includes adenomatoid nodule, colloid   nodule, etc.)     The [place] implied risk of malignancy and recommended clinical   management:   Benign has a 0-3% risk of malignancy, recommended management is clinical   follow-up     Specimen Adequacy: Satisfactory for evaluation but limited by:   ...scant follicular cells present.     I have personally reviewed all specimens and/or slides, including the   listed special stains, and used them   with my medical judgement to determine or confirm the final diagnosis.     Electronically signed out by:   Gus Mckeon M.D., [facility]     Processed and screened at [facility]     CLINICAL HISTORY:   53-year-old woman presents with a left thyroid nodule.     ,     GROSS:   Thyroid, left nodule, ultrasound guided fine needle aspiration:  Received   are 3 fixed slides, processed for   Pap stain, and 6 air dried slides, processed for Shaffer stain. Afirma tube    held.     MICROSCOPIC:   Microscopic examination is performed.     Samy Aleman MD (Cytopathology fellow); Gus Mckeon MD   (Attending)     CPT Codes:   A: 87058-XEQG, 35957-[id number]     TESTING LAB LOCATION:   [facility], [address]   [id number] [phone]     COLLECTION SITE:   Client:  [facility]   [place]  [facility] [place]    US THYROID 12/20/2021 12:15 PM     CLINICAL HISTORY: Family history of thyroid cancer. Thyroid nodule.  TECHNIQUE: Thyroid ultrasound.      COMPARISON: None.      FINDINGS:  RIGHT lobe: 4.3 x 1.7 x 1.6 cm. Homogeneous echotexture.  Isthmus: 2 mm.  LEFT lobe: 5.5 x 1.5 x 2.5 cm. Homogeneous  echotexture.     NODULES:     Nodule 1: 3.3 x 3.2 x 2.6 cm nodule is not significantly changed since  the comparison study in the inferior left lobe.   Composition: Solid or almost completely solid, 2 points   Echogenicity: Hypoechoic, 2 points   Shape: Wider-than-tall, 0 points   Margin: Smooth, 0 points   Echogenic Foci: None, or large comet-tail artifacts, 0 points   Point Total: 4-6 points. TI-RADS 4. If 1.5 cm or larger, recommend  FNA; if 1 cm or larger, follow up US (annually for 5 years). This was  biopsied in 2018.                                                                       IMPRESSION:  1.  No significant change in thyroid nodule in the inferior left lobe.  2.  No new nodules.     Nodules are characterized per  ACR Thyroid Imaging, Reporting and Data System (TI-RADS): White Paper  of the ACR TI-RADS Committee  Kit Pradhan et. al. Journal of the American College of  Radiology 2017. Volume 14 (2017), Issue 5, 044-030.      TRISTIN ORTEGA MD         SYSTEM ID:  [id number]       1/21/2022 11:32 AM     Status: Final result     Visible to patient: Yes (seen)     0 Result Notes    Component    Final Diagnosis   Specimen A                 Interpretation:                  Benign,   Consistent with a benign nodule (includes adenomatoid nodule, colloid nodule, etc.)              Adequacy:                 Satisfactory for evaluation         Electronically signed by Gus Mckeon III, MD on 1/24/2022 at  3:16 PM   Clinical Information    A 56-year-old female history of Left thyroid nodule first noted in 2018 with AUS on cytology and subsequent follow up with benign cytology. The nodule has been stable in size.   Rapid Onsite Evaluation    FNA Performance:   Fine needle aspiration was not performed by [facility] staff.     Aspirate immediate study/adequacy:  I, Manuel Escobedo MD, attest that I immediately examined smears while the procedure was underway and determined or confirmed the adequacy of  the specimens via telepathology.     It is of note that the final assessment and report may be performed and signed by a different pathologist.     Onsite adequacy/interpretation:  A: Adequate      Gross Description    A. Thyroid, left, Left inferior thyroid #1, Fine Needle Aspirate:  Received are 3 fixed slides, processed for Pap stain, and 3 air dried slides, processed for Diff Quik stain. Afirma tube held.      Performing Labs    The technical component of this testing was completed at [facility] and [facility]

## 2021-12-15 NOTE — LETTER
12/15/2021       RE: Jade Carcamo  [address]  [place] [address]     Dear Colleague,    Thank you for referring your patient, Jade Carcamo, to the [facility] [place] at [facility]. Please see a copy of my visit note below.    Jade is a 56 year old who is being evaluated via a billable video visit.      Patient reviewed allergy and medication lists and states all is correct.     How would you like to obtain your AVS? MyChart  If the video visit is dropped, the invitation should be resent by: Send to e-mail at: [email or web address]  Will anyone else be joining your video visit? No  Endocrine Consult Video visit note-     Attending Assessment/Plan :     Left thyroid nodule - FNAB AUS and benign. Molecular testing was never done .  Malignancy risk of this nodule may be intermediate between benign and AUS   Repeat US- low threshold to repeat FNAB     Family history of thyroid cancer, type unknown   Labs to include calcitonin     History of abnormal thyroid cytology AUS - as above .    Due to the COVID 19 pandemic this visit was a video visit in order to help prevent spread of infection in this patient and the general population. The patient gave verbal consent for the visit today. I have independently reviewed and interpreted labs, imaging as indicated.     Chart review/prep time 1    Visit Start time amwel 1559   Visit Stop time  1612   _27_ minutes spent on the date of the encounter doing chart review, history and exam, documentation and further activities as noted above.      Chief complaint:    HISTORY OF PRESENT ILLNESS  Jade is new to me today, having previously seen both Raphael Castillo and Tori  in this clinic . She no showed for appt with Dr Valle 11/2020. :She last saw Dr Castillo 6/2019.      Left thyroid nodule was first noted on the 2018 CT. This was followed by US and FNAB.  The first FNAB had AUS cytology  .   Reeat FNAB had benign cytology.  Molecular was never performed on the AUS sample.  The most recent US was 11/2020, stable.     She had no known childhood radiation exposure.  She has had a lot of CT scans as an adult.  Her sister has thyroid cancer, type unknown.  Her mother had thyroidectomy in childhood     Endocrine relevant labs are as follows:  5/22/17 TSH 3.31  7/2/18 TSH 5.42  7/3/18 free T4 1.28  10/2/18 TSh 3.16  6/24/18 TSh 2.46, free T4 0.78  11/16/2021 Na 140 K 3.3, creatinine 0.8, Ca 9, albumin 3.8, glucose 95    Relevant imaging is as follows:   6/29/18 CT CAP: thyroid L>>> right with left nodule heterogeneous expanding left lobe   11/16/2020 thyroid US [facility] compared with 6/24/19 , 10/4/2018    Left thyroid nodule 3 x 2.1 x 3.9 cm was 3.2 x 2.2 x 3.7 cm hypoechoic; 2.9 x 2.1 x4   -- 10/12/18 FNAB AUS  12/13/18 FNAB benign     REVIEW OF SYSTEMS  ? Sometimes feels swallow  Weight gain     Answers for HPI/ROS submitted by the patient on 12/14/2021  General Symptoms: No  Skin Symptoms: No  HENT Symptoms: No  EYE SYMPTOMS: No  HEART SYMPTOMS: No  LUNG SYMPTOMS: No  INTESTINAL SYMPTOMS: No  URINARY SYMPTOMS: No  GYNECOLOGIC SYMPTOMS: No  BREAST SYMPTOMS: No  SKELETAL SYMPTOMS: No  BLOOD SYMPTOMS: No  NERVOUS SYSTEM SYMPTOMS: No  MENTAL HEALTH SYMPTOMS: No  10 system ROS otherwise as per the HPI or negative    Past Medical History  Past Medical History:   Diagnosis Date     Cholangitis 07/2018    prolonged hospitalization requiring ERCP/stenting     GERD (gastroesophageal reflux disease)      Menopausal flushing      Polycystic liver disease     in 40 years     Thyroid nodule 2018    cold, bening biopsy 12/18     Past Surgical History:   Procedure Laterality Date     ENDOSCOPIC RETROGRADE CHOLANGIOPANCREATOGRAM N/A 7/9/2018    Procedure: COMBINED ENDOSCOPIC RETROGRADE CHOLANGIOPANCREATOGRAPHY, PLACE TUBE/STENT;  Endoscopic Retrograde Cholangiopancreatogram, Biliary Sphincterotomy, Pancreatic Duct  Stent Placement, and Bile Duct Stent Placement x 2;  Surgeon: Olvin Hart MD;  Location: [facility] OR     ENDOSCOPIC RETROGRADE CHOLANGIOPANCREATOGRAM N/A 9/10/2018    Procedure: COMBINED ENDOSCOPIC RETROGRADE CHOLANGIOPANCREATOGRAPHY, REMOVE FOREIGN BODY OR STENT/TUBE;  Endoscopic Retrograde Cholangiopancreatogram with Stent removal;  Surgeon: Olvin Hart MD;  Location: [facility] OR     LAPAROSCOPIC UNROOFING LIVER CYST N/A 11/4/2016    Procedure: LAPAROSCOPIC UNROOFING LIVER CYST;  Surgeon: Sonido Cruz MD;  Location: [facility] OR       Medications  Current Outpatient Medications   Medication Sig Dispense Refill     ciprofloxacin (CIPRO) 500 MG tablet Take 1 tablet (500 mg) by mouth 2 times daily 20 tablet 0     medroxyPROGESTERone (PROVERA) 2.5 MG tablet Take 1 tablet (2.5 mg) by mouth daily 90 tablet 1     zolpidem (AMBIEN) 5 MG tablet Take 1 tablet (5 mg) by mouth nightly as needed for sleep (due for PCP appointment) 10 tablet 0       Allergies  Allergies   Allergen Reactions     Bee Venom        Family History  Family History   Problem Relation Age of Onset     Alzheimer Disease Mother 60     Thyroid Disease Mother         thyroidectomy in childhood     Heart Disease Father      Glaucoma Father      Hypertension Father      Thyroid Cancer Sister         thyroid cancer     Other - See Comments Brother         bradycardia requiring ablation     Hypertension Brother      Psoriasis Maternal Grandfather      Dementia Maternal Grandfather      Breast Cancer Paternal Aunt      Diabetes No family hx of      Macular Degeneration No family hx of      Social History  Social History     Tobacco Use     Smoking status: Never Smoker     Smokeless tobacco: Never Used   Substance Use Topics     Alcohol use: Yes     Comment: 2 per week     Drug use: No     Physical Exam  There is no height or weight on file to calculate BMI.   BP Readings from Last 1 Encounters:   11/18/21 112/71      Pulse Readings from Last 1  "Encounters:   11/18/21 68      Resp Readings from Last 1 Encounters:   11/18/21 16      Temp Readings from Last 1 Encounters:   11/18/21 98.4  F (36.9  C) (Oral)      SpO2 Readings from Last 1 Encounters:   11/18/21 98%      Wt Readings from Last 1 Encounters:   11/18/21 70.3 kg (154 lb 14.4 oz)      Ht Readings from Last 1 Encounters:   11/18/21 1.74 m (5' 8.5\")       GENERAL: pleasant woman in no distress; I can see from upper trunk up   SKIN: Visible skin clear. No significant rash, abnormal pigmentation or lesions.  EYES: Eyes grossly normal to inspection.  No discharge or erythema, or obvious scleral/conjunctival abnormalities.  NECK:subtle visible fullness left thyroid bed, intermittently seen when she talks   RESP: No audible wheeze, cough, or visible cyanosis.  No visible retractions or increased work of breathing.    NEURO: Awake, alert, responds appropriately to questions.  Mentation and speech fluent.  PSYCH:affect normal and appearance well-groomed.      DATA REVIEW      Patient Name: IGNACIO ZULETA   MR#: [id number]   Specimen #: [id number]   Collected: 10/12/2018   Received: 10/12/2018   Reported: 10/15/2018 17:06   Ordering Phy(s): GRIFFIN LITTLE     For improved result formatting, select 'View Enhanced Report Format' under    Linked Documents section.     SPECIMEN/STAIN PROCESS:   Thyroid, left mid #1, ultrasound guided fine needle aspiration        Pap-Cyto x 2, Diff Quick Stain-cyto x 2     ----------------------------------------------------------------     CYTOLOGIC INTERPRETATION:      Thyroid, left mid #1, ultrasound guided fine needle aspiration:   - Atypia of undetermined significance   Specimen Adequacy: Satisfactory for evaluation.     The [place] implied risk of malignancy and recommended clinical   management:   Atypia of undetermined significance has a 10-30% risk of malignancy,   recommend repeat FNA in 4-6 weeks,   molecular testing or lobectomy     I have personally reviewed all " specimens and/or slides, including the   listed special stains, and used them   with my medical judgement to determine or confirm the final diagnosis.     Electronically signed out by:   Manuel Escobedo M.D., [facility]     Processed and screened at [facility]     CLINICAL HISTORY:   4.0x2.5x1.9 cm nodule.     ,     GROSS:   Thyroid, left mid #1, ultrasound guided fine needle aspiration:  Received   are 2 fixed slides, processed for   Pap stain, and 2 air dried slides, processed for Diff Quik stain. Afirma   tube held.     INTRAOPERATIVE CONSULTATION:   FNA Performance: Fine needle aspiration was not performed by [facility],    Pathology staff.   Immediate Adequacy: On site specimen adequacy evaluation was performed by   Dr. ELLEN Escobedo MD via telepathology.     Onsite adequacy/interpretation:   Pass A1 adequate.  Pass A2 put directly into Affirma tube.  Pass A3   inadequate.     MICROSCOPIC:   Microscopic examination is performed.     Stas Pearl MD (PGY2)     Manuel Escobedo MD (Attending)     CPT Codes:    88162-ISVA-CHW, 40131-WASH-LNG   A: 22763-EPEJ     TESTING LAB LOCATION:   [facility], [address]   [id number] [phone]     COLLECTION SITE:   Client:  [facility]   Location:  [facility] ()     3 yr ago    Copath Report Patient Name: IGNACIO ZULETA   MR#: [id number]   Specimen #: [id number]   Collected: 12/13/2018   Received: 12/13/2018   Reported: 12/13/2018 16:25   Ordering Phy(s): RUPENDRA DEV THAKU VELASQUEZ   Additional Phy(s): GRIFFIN LITTLE     For improved result formatting, select 'View Enhanced Report Format' under    Linked Documents section.     SPECIMEN/STAIN PROCESS:   Thyroid, left nodule, ultrasound guided fine needle aspiration        Pap-Cyto x 3, Shaffer's stain-cyto x 6      ----------------------------------------------------------------     CYTOLOGIC INTERPRETATION:      Thyroid, left nodule, ultrasound guided fine needle aspiration:   Benign   Consistent with a benign nodule (includes adenomatoid nodule, colloid   nodule, etc.)     The [place] implied risk of malignancy and recommended clinical   management:   Benign has a 0-3% risk of malignancy, recommended management is clinical   follow-up     Specimen Adequacy: Satisfactory for evaluation but limited by:   ...scant follicular cells present.     I have personally reviewed all specimens and/or slides, including the   listed special stains, and used them   with my medical judgement to determine or confirm the final diagnosis.     Electronically signed out by:   Gus Mckeon M.D., [facility]     Processed and screened at [facility]     CLINICAL HISTORY:   53-year-old woman presents with a left thyroid nodule.     ,     GROSS:   Thyroid, left nodule, ultrasound guided fine needle aspiration:  Received   are 3 fixed slides, processed for   Pap stain, and 6 air dried slides, processed for Shaffer stain. Afirma tube    held.     MICROSCOPIC:   Microscopic examination is performed.     Samy Aleman MD (Cytopathology fellow); Gus Mckeon MD   (Attending)     CPT Codes:   A: 35610-LONP, 90009-BFWDSFN     TESTING LAB LOCATION:   [facility], [address]   [id number] [phone]     COLLECTION SITE:   Client:  [facility]   Location:  [facility]

## 2021-12-17 ENCOUNTER — LAB (OUTPATIENT)
Dept: LAB | Facility: CLINIC | Age: 56
End: 2021-12-17
Payer: COMMERCIAL

## 2021-12-17 DIAGNOSIS — Z80.8 FAMILY HISTORY OF THYROID CANCER: ICD-10-CM

## 2021-12-17 DIAGNOSIS — E04.1 THYROID NODULE: ICD-10-CM

## 2021-12-17 PROCEDURE — 36415 COLL VENOUS BLD VENIPUNCTURE: CPT

## 2021-12-17 PROCEDURE — 99000 SPECIMEN HANDLING OFFICE-LAB: CPT

## 2021-12-17 PROCEDURE — 82308 ASSAY OF CALCITONIN: CPT | Mod: 90

## 2021-12-20 ENCOUNTER — HOSPITAL ENCOUNTER (OUTPATIENT)
Dept: ULTRASOUND IMAGING | Facility: CLINIC | Age: 56
Discharge: HOME | End: 2021-12-20
Payer: COMMERCIAL

## 2021-12-20 DIAGNOSIS — Z80.8 FAMILY HISTORY OF THYROID CANCER: ICD-10-CM

## 2021-12-20 DIAGNOSIS — E04.1 THYROID NODULE: ICD-10-CM

## 2021-12-20 PROCEDURE — 76536 US EXAM OF HEAD AND NECK: CPT

## 2021-12-21 DIAGNOSIS — Z80.8 FAMILY HISTORY OF THYROID CANCER: ICD-10-CM

## 2021-12-21 DIAGNOSIS — E04.1 THYROID NODULE: Primary | ICD-10-CM

## 2021-12-22 LAB — CALCIT SERPL-MCNC: <2 PG/ML

## 2022-01-21 ENCOUNTER — ANCILLARY PROCEDURE (OUTPATIENT)
Dept: ULTRASOUND IMAGING | Facility: CLINIC | Age: 57
End: 2022-01-21
Payer: COMMERCIAL

## 2022-01-21 DIAGNOSIS — Z80.8 FAMILY HISTORY OF THYROID CANCER: ICD-10-CM

## 2022-01-21 DIAGNOSIS — E04.1 THYROID NODULE: ICD-10-CM

## 2022-01-21 PROCEDURE — 88173 CYTOPATH EVAL FNA REPORT: CPT | Mod: 26 | Performed by: PATHOLOGY

## 2022-01-21 PROCEDURE — 88173 CYTOPATH EVAL FNA REPORT: CPT | Mod: TC

## 2022-01-21 PROCEDURE — 88172 CYTP DX EVAL FNA 1ST EA SITE: CPT | Mod: 26 | Performed by: PATHOLOGY

## 2022-01-21 PROCEDURE — 10005 FNA BX W/US GDN 1ST LES: CPT | Performed by: STUDENT IN AN ORGANIZED HEALTH CARE EDUCATION/TRAINING PROGRAM

## 2022-01-21 RX ORDER — LIDOCAINE HYDROCHLORIDE 10 MG/ML
5 INJECTION, SOLUTION INFILTRATION; PERINEURAL ONCE
Status: COMPLETED | OUTPATIENT
Start: 2022-01-21 | End: 2022-01-21

## 2022-01-21 RX ADMIN — LIDOCAINE HYDROCHLORIDE 3 ML: 10 INJECTION, SOLUTION INFILTRATION; PERINEURAL at 11:33

## 2022-01-24 LAB
PATH REPORT.COMMENTS IMP SPEC: NORMAL
PATH REPORT.COMMENTS IMP SPEC: NORMAL
PATH REPORT.FINAL DX SPEC: NORMAL
PATH REPORT.GROSS SPEC: NORMAL
PATH REPORT.RELEVANT HX SPEC: NORMAL

## 2022-01-24 NOTE — PROGRESS NOTES
[facility], [place]    Internal Medicine Progress Note - [facility] Service    Main Plans for Today   - Rheum Consult  - start empiric ceftriaxone and flagyl per ID recs  - Abdominal US to assess for ascites.     Assessment & Plan   Jade Carcamo is a 52 year old female admitted on 7/2/2018. Jade Carcamo is a 52 year old female admitted on 7/2/2018. She was previously healthy now with 11 days of persistent fever up to 103F now s/p 7d doxycycline with negative outpatient infectious workup here now for supportive cares and further workup of fever.    Fever without source - unclear etiology  Ddx includes infections (fungal vs vector vs viral, less likely bacterial) vs autoimmune vs malignancy. Drug reaction less likely as no known exposure. On admission had leukocytosis of 14.1, up from 12.8 on 7/1 and 8.3 on 6/28, neutrophil predominant. Sed rate elevated at 102 (7/1) and . Has not improved after despite 7 days of doxycycline therapy. She has had a relatively negative infectious work up. She has had possible recent environmental exposure to spores on her hiking trip and/or around her backyard pond that had a broken filter. Fungal work-up pending. Her alk phos has been slowly trending up, but other LFTs are at her baseline. Autoimmune is also possible given elevated inflammatory markers and RF/ROBBY are pending; however, no arthralgias or rashes. Malignancy is much less likely at this time. Had CT C/A/P that was unremarkable on 6/28, CBC not concerning for hematologic malignancy. Although she does not meet the criteria for fever of unknown origin at this time, we are continuing with this workup to attempt to better elucidate the etiology of her symptoms. TTE without endocarditis. Hep A IgG positive, Hep B, Hep C negative. HIV negative. ROBBY and RF negative  - Infectious disease consult, recs appreciated  - will empirically give 48H trial of ceftriaxone and metronidazole and assess for  improvement  - Hep A IgM pending  - Quantiferon Gold pending  - Histoplasma urine and blood ag pending  - Blastomyces urine and blood ag pending  - Fungal antibody panel pending  - West Nile Virus serology pending  - Add on alk davi isoenzymes given elevated alk phos pending   - Rheum consult for possible seronegative autoimmune disorder, recs appreciated.     Workup to date:  - Erlichia - negative  - Lyme screen - negative  - Anaplasma - negative  - Babesia - negative  - RMSF - negative  - CMV - negative  - EBV - low titer consistent with reactivation  - Parvovirus - previous infection  - CK - normal  - LDH - wnl  - Blood cultures x4 (6/25, 6/28, 7/1, 7/2) - NGTD  - Urine culture (6/23) - Negative    Hx of Polycystic Liver Disease  CT C/A/P (7/1) - previously known polycystic liver disease. No evidence of new or infected cyst. However, could be a ruptured cyst (sterile or infected fluid collection) that is causing fevers  - abdominal US for possible ascites (plan to obtain a sample to culture if possible)  - plan to speak with surgeon on 7/5 to further discuss this possibility    # Pain Assessment:  Current Pain Score 7/4/2018   Patient currently in pain? denies   Pain score (0-10) -   Pain location -   Pain descriptors -   - Jade is experiencing pain due to fevers. Pain management was discussed and the plan was created in a collaborative fashion.  Jade's response to the current recommendations: engaged  - Please see the plan for pain management as documented above      Diet: Regular Diet Adult  Fluids: mIVF D5NS @100ml/h  DVT Prophylaxis: Enoxaparin (Lovenox) SQ  Code Status: Full Code    Disposition Plan   Expected discharge: 2 - 3 days, recommended to prior living arrangement once adequate pain management/ tolerating PO medications and SIRS/Sepsis treated.     Entered: Anusha Moscoso 07/04/2018, 2:59 PM   Information in the above section will display in the discharge planner report.      The patient's care was  discussed with the Attending Physician, Dr. Strickland, Care Coordinator/ and Patient.    Anusha Moscoso  [facility]  Maroon: 5  Pager: [phone]  Please see sticky note for cross cover information    Interval History   Overnight events and notes reviewed.     Overnight, febrile to ~101 which did come down with IV tylenol.     Denies pain. Some nausea. Denies abdominal pain, chest pain, sob, headache, vision changes.   Feels that her abdomen is more distended    4 point ROS negative except as above.     Physical Exam   Vital Signs: Temp: 99.5  F (37.5  C) Temp src: Oral BP: 121/76 Pulse: 89   Resp: 16 SpO2: 98 % O2 Device: None (Room air)    Weight: 159 lbs 8 oz    General Appearance: No distress. Awake and alert and answering questions appropriately. Tearful  Eyes: No scleral icterus. PERRL  HEENT: NC/AT. Oropharynx clear. No lymphadenopathy. MMM  Respiratory: CTAB.  Cardiovascular: RRR. No murmurs, rubs or gallops.  GI: BS+. Soft, nondistended. No tenderness to palpation. No guarding or rebound tenderness. No organomegaly.  Skin: No rash. No ecchymoses or petechiae.  Musculoskeletal: Normal muscle bulk and tone. Extremities warm and well perfused. DP and radial pulses 2+ bilaterally.  Neurologic: AOx3. CN II-XII intact. No focal deficits.     Physician Attestation   I, Sarah Strickland, saw this patient with the resident and agree with the resident and/or medical student's findings and plan of care as documented in the note.      I personally reviewed vital signs, medications and labs.    Sarah Strickland MD  Date of Service (when I saw the patient): 07/04/18   Statement Selected

## 2022-02-19 ENCOUNTER — HEALTH MAINTENANCE LETTER (OUTPATIENT)
Age: 57
End: 2022-02-19

## 2022-07-24 NOTE — PLAN OF CARE
Problem: Patient Care Overview  Goal: Plan of Care/Patient Progress Review  Outcome: No Change  Pt returned from PACU around 2000, VSS on 3L NC, has since been weaned to RA. Scheduled tylenol given for pain, rotates with ibuprofen. Voided in PACU prior to coming to floor. Nausea controlled since returning, tolerating clears. MIVF at 100cc/hr. Family at bedside and supportive.       stated

## 2022-10-22 ENCOUNTER — HEALTH MAINTENANCE LETTER (OUTPATIENT)
Age: 57
End: 2022-10-22

## 2022-11-04 NOTE — DISCHARGE INSTRUCTIONS
Directions for after your Thyroid Fine Needle Aspiration:    You can remove your bandage within a few hours.  The site of the biopsy may be sore for a day or two after the procedure. You can take over-the-counter pain medicine if needed.  Notify your doctor if you have any of the following:    Fever above 101 degrees    Swelling in the area of the biopsy    Redness or leaking from the biopsy site  Your doctor will notify you of the results in 2-3 days.   Number Of Stages: 2

## 2022-12-08 ENCOUNTER — OFFICE VISIT (OUTPATIENT)
Dept: INTERNAL MEDICINE | Facility: CLINIC | Age: 57
End: 2022-12-08
Payer: COMMERCIAL

## 2022-12-08 ENCOUNTER — LAB (OUTPATIENT)
Dept: LAB | Facility: CLINIC | Age: 57
End: 2022-12-08
Payer: COMMERCIAL

## 2022-12-08 VITALS
HEART RATE: 63 BPM | BODY MASS INDEX: 22.97 KG/M2 | WEIGHT: 155.1 LBS | OXYGEN SATURATION: 100 % | DIASTOLIC BLOOD PRESSURE: 74 MMHG | HEIGHT: 69 IN | SYSTOLIC BLOOD PRESSURE: 116 MMHG

## 2022-12-08 DIAGNOSIS — E78.5 HYPERLIPIDEMIA LDL GOAL <100: ICD-10-CM

## 2022-12-08 DIAGNOSIS — E04.1 THYROID NODULE: ICD-10-CM

## 2022-12-08 DIAGNOSIS — Z00.00 ROUTINE GENERAL MEDICAL EXAMINATION AT A HEALTH CARE FACILITY: Primary | ICD-10-CM

## 2022-12-08 DIAGNOSIS — N95.1 MENOPAUSAL SYNDROME (HOT FLASHES): ICD-10-CM

## 2022-12-08 DIAGNOSIS — Q44.6 POLYCYSTIC LIVER DISEASE: ICD-10-CM

## 2022-12-08 DIAGNOSIS — G47.00 INSOMNIA, UNSPECIFIED TYPE: ICD-10-CM

## 2022-12-08 LAB
ALBUMIN SERPL BCG-MCNC: 4.3 G/DL (ref 3.5–5.2)
ALP SERPL-CCNC: 97 U/L (ref 35–104)
ALT SERPL W P-5'-P-CCNC: 18 U/L (ref 10–35)
ANION GAP SERPL CALCULATED.3IONS-SCNC: 8 MMOL/L (ref 7–15)
AST SERPL W P-5'-P-CCNC: 32 U/L (ref 10–35)
BILIRUB SERPL-MCNC: 1.9 MG/DL
BUN SERPL-MCNC: 15.8 MG/DL (ref 6–20)
CALCIUM SERPL-MCNC: 9.6 MG/DL (ref 8.6–10)
CHLORIDE SERPL-SCNC: 103 MMOL/L (ref 98–107)
CHOLEST SERPL-MCNC: 195 MG/DL
CREAT SERPL-MCNC: 0.78 MG/DL (ref 0.51–0.95)
DEPRECATED HCO3 PLAS-SCNC: 29 MMOL/L (ref 22–29)
GFR SERPL CREATININE-BSD FRML MDRD: 88 ML/MIN/1.73M2
GLUCOSE SERPL-MCNC: 83 MG/DL (ref 70–99)
HDLC SERPL-MCNC: 74 MG/DL
LDLC SERPL CALC-MCNC: 110 MG/DL
NONHDLC SERPL-MCNC: 121 MG/DL
POTASSIUM SERPL-SCNC: 4.1 MMOL/L (ref 3.4–5.3)
PROT SERPL-MCNC: 6.6 G/DL (ref 6.4–8.3)
SODIUM SERPL-SCNC: 140 MMOL/L (ref 136–145)
TRIGL SERPL-MCNC: 54 MG/DL
TSH SERPL DL<=0.005 MIU/L-ACNC: 3.69 UIU/ML (ref 0.3–4.2)

## 2022-12-08 PROCEDURE — 80061 LIPID PANEL: CPT | Performed by: PATHOLOGY

## 2022-12-08 PROCEDURE — 80053 COMPREHEN METABOLIC PANEL: CPT | Performed by: PATHOLOGY

## 2022-12-08 PROCEDURE — 36415 COLL VENOUS BLD VENIPUNCTURE: CPT | Performed by: PATHOLOGY

## 2022-12-08 PROCEDURE — 84443 ASSAY THYROID STIM HORMONE: CPT | Performed by: PATHOLOGY

## 2022-12-08 PROCEDURE — 99396 PREV VISIT EST AGE 40-64: CPT | Performed by: INTERNAL MEDICINE

## 2022-12-08 RX ORDER — ZOLPIDEM TARTRATE 5 MG/1
5 TABLET ORAL
Qty: 20 TABLET | Refills: 0 | Status: SHIPPED | OUTPATIENT
Start: 2022-12-08 | End: 2023-02-18

## 2022-12-08 RX ORDER — GABAPENTIN 100 MG/1
100-300 CAPSULE ORAL
Qty: 30 CAPSULE | Refills: 1 | Status: SHIPPED | OUTPATIENT
Start: 2022-12-08

## 2022-12-08 NOTE — NURSING NOTE
Jade Carcamo is a 56 year old female patient that presents today in clinic for the following:    Chief Complaint   Patient presents with     Physical     Pt would like to discuss shingles, HPV/pap smear     The patient's allergies and medications were reviewed as noted. A set of vitals were recorded as noted without incident. The patient does not have any other questions for the provider.    Reshma Colby, EMT at 10:31 AM on 12/8/2022

## 2022-12-08 NOTE — PROGRESS NOTES
History of Present Illness:  Ms. Carcamo is a 56 year old female who presents for  Chief Complaint   Patient presents with     Physical     Pt would like to discuss shingles, HPV/pap smear     PMH of melanoma in situ foot, thyroid nodule with benign FNA, PLD c/b cholangitis and stenting, menopausal syndrome, RLS, benign breast cyst here for physical.    We discussed shingles vaccine  Liver has been stable, no pains/fevers, not clearly enlarging  She has seen Endo, Thyroid last biopsy was ,             Benign,   Consistent with a benign nodule (includes adenomatoid nodule, colloid nodule, etc.)              Adequacy:     Still getting some hot flashes, feels warm at night, LMP at time of surgery age 49 yo, tried lexapro in past, low dose progesterone  Last mammo normal , at [facility], [facility], joined gym    The 10-year ASCVD risk score (Rogelio GRIDER, et al., 2019) is: 1.7%    Values used to calculate the score:      Age: 56 years      Sex: Female      Is Non- : No      Diabetic: No      Tobacco smoker: No      Systolic Blood Pressure: 116 mmHg      Is BP treated: No      HDL Cholesterol: 67 mg/dL      Total Cholesterol: 204 mg/dL        CHOLESTEROL,TOTAL 100 - 199 mg/dL 216 High     TRIGLYCERIDES <150 mg/dL 50    HDL CHOLESTEROL >40 mg/dL 59    NON-HDL CHOLESTEROL <145 mg/dl 157 High     CHOL/HDL RATIO            <4.50                 3.66    LDL CHOLESTEROL <=130 mg/dL 147 High     VLDL CHOLESTEROL <=30 mg/dL 10          GYN    3 daughters aged 24-27 yo   x 3  Remote abnormal pap  Menopausal, hot flashes present  No gyn malignancies except one paternal aunt with breast ca    Routine Health Maintenence:    Colonoscopy (50-75 yrs):  3/15 hemorrhoids, repeat 10 years  Dexa (>65W or 70M yrs): n/a  Mammogram (40-75 yrs): does at [facility]  Pap (21-65 yrs):  NILM, HPV neg  HPV neg  Lab Results   Component Value Date    PAP NIL 2018     Last   "NIL  GC/Chlam (<25 yrs): n/a  HIV/HCV if risk factors: both neg 7/18  Tob/EtOH: n/a  Lifestyle factors: reviewed  Advanced Directive: deferred       Review of external notes as documented above                   A detailed Review of Systems was performed, verified and is negative except as documented in the HPI.  All health questionnaires were reviewed, verified and relevant information documented above.      Past Medical History:  Past Medical History:   Diagnosis Date     Cholangitis 07/2018    prolonged hospitalization requiring ERCP/stenting     GERD (gastroesophageal reflux disease)      Menopausal flushing      Polycystic liver disease     in 40 years     Thyroid nodule 2018    cold, bening biopsy 12/18       Active Meds:  Current Outpatient Medications   Medication     zolpidem (AMBIEN) 5 MG tablet     medroxyPROGESTERone (PROVERA) 2.5 MG tablet     No current facility-administered medications for this visit.        Allergies:  Reviewed, refer to EMR    Relevant Social History:  Social History     Tobacco Use     Smoking status: Never     Smokeless tobacco: Never   Substance Use Topics     Alcohol use: Yes     Comment: 2 per week     Drug use: No        Physical Exam:  Vitals: /74 (BP Location: Right arm, Patient Position: Sitting, Cuff Size: Adult Regular)   Pulse 63   Ht 1.74 m (5' 8.5\")   Wt 70.4 kg (155 lb 1.6 oz)   LMP 11/14/2016   SpO2 100%   BMI 23.24 kg/m    Constitutional: Alert, oriented, pleasant, no acute distress  Head: Normocephalic, atraumatic  Eyes: Extra-ocular movements intact, pupils equally round bilaterally, no scleral icterus  ENT: Oropharynx clear, moist mucus membranes, good dentition  Neck: Supple, no lymphadenopathy, L sided thyroid nodule present  Breasts: normal without suspicious masses, skin changes or axillary nodes.  Cardiovascular: Regular rate and rhythm, no murmurs, rubs or gallops, peripheral pulses full/symmetric  Respiratory: Good air movement bilaterally, " lungs clear, no wheezes/rales/rhonchi  GI: Abdomen soft, bowel sounds present, nondistended, nontender, hepatomegaly noted, no rebound/guarding  Musculoskeletal: No edema, normal muscle tone, normal gait  Neurologic: Alert and oriented, cranial nerves 2-12 intact, grossly non-focal  Skin: No rashes/lesions  Psychiatric: normal mentation, affect and mood      Diagnostics:  Labs reviewed in Epic          Assessment and Plan:  Jade was seen today for physical.    Diagnoses and all orders for this visit:    Routine general medical examination at a health care facility  Routine health care reviewed and updated as appropriate.    Thyroid nodule  Will ask Endo recommended follow-up for benign cyst.  -     TSH with free T4 reflex; Future    Insomnia, unspecified type  -     zolpidem (AMBIEN) 5 MG tablet; Take 1 tablet (5 mg) by mouth nightly as needed for sleep    Menopausal syndrome (hot flashes)  Discussed possible risks/benefits, reasonable to consider given insomnia.  -     gabapentin (NEURONTIN) 100 MG capsule; Take 1-3 capsules (100-300 mg) by mouth nightly as needed for other (hot flashes)    Hyperlipidemia LDL goal <100  -     Lipid panel reflex to direct LDL Fasting; Future  -     Comprehensive metabolic panel; Future    Polycystic liver disease  Overall stable, advised annual follow-up with hepatology.    Advised shingrix vaccine.      Marina Owens MD  Internal Medicine

## 2022-12-27 DIAGNOSIS — N95.1 MENOPAUSAL SYNDROME (HOT FLASHES): ICD-10-CM

## 2022-12-28 RX ORDER — GABAPENTIN 100 MG/1
CAPSULE ORAL
Qty: 30 CAPSULE | OUTPATIENT
Start: 2022-12-28

## 2022-12-28 NOTE — TELEPHONE ENCOUNTER
GABAPENTIN 100MG CAPSULES      Last Written Prescription Date:  12/8/22  Last Fill Quantity: 30,   # refills: 1  Last Office Visit : 12/8/22  Future Office visit:  none    Routing refill request to provider for review/approval because:  Drug not on the [facility], [facility] or [facility] refill protocol or controlled substance  Drug recently refilled with limited quantity

## 2022-12-28 NOTE — TELEPHONE ENCOUNTER
Per  data, 100mg of Gabapentin was filled for a 10 day supply on 12/9/2022 and 12/17/2022 and both sold on 12/20/2022 for a total of 20 day supply. Prescription for pt to take 100-300mg PRN. Medication able to be filled 1/9/2023.  Pt last saw Dr. Owens on 12/8/2022. Medication refused, too early for refill.     ERIK FAIRCHILD RN on 12/28/2022 at 11:52 AM

## 2023-02-15 ENCOUNTER — TELEPHONE (OUTPATIENT)
Dept: GASTROENTEROLOGY | Facility: CLINIC | Age: 58
End: 2023-02-15

## 2023-02-15 ENCOUNTER — NURSE TRIAGE (OUTPATIENT)
Dept: NURSING | Facility: CLINIC | Age: 58
End: 2023-02-15

## 2023-02-15 DIAGNOSIS — K76.89 LIVER CYST: Primary | ICD-10-CM

## 2023-02-15 NOTE — TELEPHONE ENCOUNTER
Connected with pt to review symptoms and discuss plan of care. Pt reported experiencing fevers, nausea, and loose stools that started on 2/14. Pt continues to have fevers today, up to 102, and nausea but is no longer experiencing loose stools. Denied abdominal pain. Pt endorsed concern that she is experiencing acute cholangitis. Pt has history of polycystic liver disease and was hospitalized in 2018 with presumed atypical cholangitis.    Reviewed that pt's symptoms are not diagnostic for cholangitis. Instructed pt to reach out to PCP office and attempt to get an appointment in the next 1-2 days for further evaluation. Advised pt to present to the ER if symptoms do not improve and/or worsen prior to PCP appointment. Pt verbalized understanding and is agreeable to plan of care.

## 2023-02-15 NOTE — TELEPHONE ENCOUNTER
Nurse Triage SBAR    Is this a 2nd Level Triage?  Yes     Situation:  Fever and Nausea    Background/Assessment    Pt worried she might be experiencing Cholangitis again.    Pt has had a fever and nausea for 2 days now.  Fever is between 101-102 (Oral Temp).    Very nauseated.  No vomiting.   Loose stools.  Very achy, hot sweats, cold sweats and the chills.  No headache, cough, sore throat or running nose.       Pt just does not feel good and would like a call back from the clinic.    Please call [phone] for further assistance.    Jayda Browning RN  Central Triage Red Flags/Med Refills      Protocol Recommended Disposition:   See in Office Today      Reason for Disposition    Patient wants to be seen    Additional Information    Negative: Difficult to awaken or acting confused (e.g., disoriented, slurred speech)    Negative: Pale cold skin and very weak (can't stand)    Negative: Difficulty breathing and bluish (or gray) lips or face    Negative: New-onset rash with purple (or blood-colored) spots or dots    Negative: Sounds like a life-threatening emergency to the triager    Negative: Fever onset within 24 hours of receiving vaccine    Negative: Fever within 14 days of COVID-19 Exposure    Negative: Pregnant    Negative: Postpartum (from 0 to 6 weeks after delivery)    Negative: Headache and stiff neck (can't touch chin to chest)    Negative: Difficulty breathing    Negative: IV Drug Use (IVDU)    Negative: Fever > 103 F (39.4 C)    Negative: Fever > 100.0 F (37.8 C) and indwelling urinary catheter (e.g., Puente, coude)    Negative: Fever > 100.4 F (38.0 C) and has port (portacath), central line, or PICC line    Negative: Drinking very little and has signs of dehydration (e.g., no urine > 12 hours, very dry mouth, very lightheaded)    Negative: Patient sounds very sick or weak to the triager    Negative: Fever > 101 F (38.3 C) and over 60 years of age    Negative: Fever > 100.0 F (37.8 C) and diabetes mellitus  or a weak immune system (e.g., HIV positive, chemotherapy, splenectomy)    Negative: Fever > 100.0 F (37.8 C) and bedridden (e.g., nursing home patient, stroke, chronic illness, recovering from surgery)    Negative: Fever > 100.4 F (38.0 C) and surgery in the past month    Negative: Transplant patient (e.g., liver, heart, lung, kidney)    Negative: Widespread rash and cause unknown    Negative: Severe chills (i.e., feeling extremely cold WITH shaking chills)    Protocols used: FEVER-A-OH

## 2023-02-15 NOTE — TELEPHONE ENCOUNTER
Addressed by Mona Roberson, RN  ----------  [facility]    Phone Message    May a detailed message be left on voicemail: yes     Reason for Call: Other:     pt is requesting a call back to discuss the Fever (101) and nausea they are experiencing. Pt stated they believe they may have cholangitis. Writer transferred pt to red flag triage nurse.         Action Taken: Message routed to:  [facility] ([facility]):  Hep    Travel Screening: Not Applicable

## 2023-02-16 NOTE — TELEPHONE ENCOUNTER
Per Dr. Leventhal (on call hepatologist), pt should get a CBC and CMP. Pt should get a MRI/MRCP if these labs show abnormal liver tests.     Called pt to check in and review plan of care. Pt reported feeling less nauseous today and stated that her temperature is now 99 degrees. Pt would still like to get labs drawn and plans to go within 1-2 days to have these completed.

## 2023-02-17 ENCOUNTER — LAB (OUTPATIENT)
Dept: LAB | Facility: CLINIC | Age: 58
End: 2023-02-17
Attending: INTERNAL MEDICINE
Payer: COMMERCIAL

## 2023-02-17 DIAGNOSIS — K76.89 LIVER CYST: ICD-10-CM

## 2023-02-17 LAB
ALBUMIN SERPL BCG-MCNC: 3.8 G/DL (ref 3.5–5.2)
ALP SERPL-CCNC: 135 U/L (ref 35–104)
ALT SERPL W P-5'-P-CCNC: 29 U/L (ref 10–35)
ANION GAP SERPL CALCULATED.3IONS-SCNC: 11 MMOL/L (ref 7–15)
AST SERPL W P-5'-P-CCNC: 39 U/L (ref 10–35)
BILIRUB SERPL-MCNC: 1.4 MG/DL
BUN SERPL-MCNC: 10.7 MG/DL (ref 6–20)
CALCIUM SERPL-MCNC: 9 MG/DL (ref 8.6–10)
CHLORIDE SERPL-SCNC: 104 MMOL/L (ref 98–107)
CREAT SERPL-MCNC: 0.68 MG/DL (ref 0.51–0.95)
DEPRECATED HCO3 PLAS-SCNC: 27 MMOL/L (ref 22–29)
ERYTHROCYTE [DISTWIDTH] IN BLOOD BY AUTOMATED COUNT: 11.7 % (ref 10–15)
GFR SERPL CREATININE-BSD FRML MDRD: >90 ML/MIN/1.73M2
GLUCOSE SERPL-MCNC: 95 MG/DL (ref 70–99)
HCT VFR BLD AUTO: 33.7 % (ref 35–47)
HGB BLD-MCNC: 11.7 G/DL (ref 11.7–15.7)
MCH RBC QN AUTO: 31 PG (ref 26.5–33)
MCHC RBC AUTO-ENTMCNC: 34.7 G/DL (ref 31.5–36.5)
MCV RBC AUTO: 89 FL (ref 78–100)
PLATELET # BLD AUTO: 171 10E3/UL (ref 150–450)
POTASSIUM SERPL-SCNC: 3.4 MMOL/L (ref 3.4–5.3)
PROT SERPL-MCNC: 6.4 G/DL (ref 6.4–8.3)
RBC # BLD AUTO: 3.77 10E6/UL (ref 3.8–5.2)
SODIUM SERPL-SCNC: 142 MMOL/L (ref 136–145)
WBC # BLD AUTO: 5.5 10E3/UL (ref 4–11)

## 2023-02-17 PROCEDURE — 36415 COLL VENOUS BLD VENIPUNCTURE: CPT

## 2023-02-18 ENCOUNTER — TELEPHONE (OUTPATIENT)
Dept: GASTROENTEROLOGY | Facility: CLINIC | Age: 58
End: 2023-02-18

## 2023-02-18 ENCOUNTER — APPOINTMENT (OUTPATIENT)
Dept: MRI IMAGING | Facility: CLINIC | Age: 58
End: 2023-02-18
Attending: INTERNAL MEDICINE
Payer: COMMERCIAL

## 2023-02-18 ENCOUNTER — HOSPITAL ENCOUNTER (OUTPATIENT)
Facility: CLINIC | Age: 58
Setting detail: OBSERVATION
Discharge: HOME | End: 2023-02-19
Attending: INTERNAL MEDICINE | Admitting: FAMILY MEDICINE
Payer: COMMERCIAL

## 2023-02-18 DIAGNOSIS — Q44.6 POLYCYSTIC LIVER DISEASE: ICD-10-CM

## 2023-02-18 DIAGNOSIS — Q44.6 POLYCYSTIC LIVER DISEASE: Primary | ICD-10-CM

## 2023-02-18 DIAGNOSIS — Z20.822 LAB TEST NEGATIVE FOR COVID-19 VIRUS: ICD-10-CM

## 2023-02-18 DIAGNOSIS — Z87.19: ICD-10-CM

## 2023-02-18 DIAGNOSIS — B99.9 INTRA-ABDOMINAL INFECTION: Primary | ICD-10-CM

## 2023-02-18 DIAGNOSIS — R50.9 FEVER AND CHILLS: ICD-10-CM

## 2023-02-18 LAB
ALBUMIN SERPL BCG-MCNC: 3.8 G/DL (ref 3.5–5.2)
ALBUMIN UR-MCNC: NEGATIVE MG/DL
ALP SERPL-CCNC: 129 U/L (ref 35–104)
ALT SERPL W P-5'-P-CCNC: 31 U/L (ref 10–35)
ANION GAP SERPL CALCULATED.3IONS-SCNC: 12 MMOL/L (ref 7–15)
APPEARANCE UR: CLEAR
AST SERPL W P-5'-P-CCNC: 37 U/L (ref 10–35)
BASOPHILS # BLD AUTO: 0 10E3/UL (ref 0–0.2)
BASOPHILS NFR BLD AUTO: 1 %
BILIRUB SERPL-MCNC: 1 MG/DL
BILIRUB UR QL STRIP: NEGATIVE
BUN SERPL-MCNC: 9.5 MG/DL (ref 6–20)
CALCIUM SERPL-MCNC: 9.1 MG/DL (ref 8.6–10)
CHLORIDE SERPL-SCNC: 104 MMOL/L (ref 98–107)
COLOR UR AUTO: ABNORMAL
CREAT SERPL-MCNC: 0.6 MG/DL (ref 0.51–0.95)
CRP SERPL-MCNC: 106 MG/L
DEPRECATED HCO3 PLAS-SCNC: 25 MMOL/L (ref 22–29)
EOSINOPHIL # BLD AUTO: 0 10E3/UL (ref 0–0.7)
EOSINOPHIL NFR BLD AUTO: 1 %
ERYTHROCYTE [DISTWIDTH] IN BLOOD BY AUTOMATED COUNT: 11.9 % (ref 10–15)
FLUAV RNA SPEC QL NAA+PROBE: NEGATIVE
FLUBV RNA RESP QL NAA+PROBE: NEGATIVE
GFR SERPL CREATININE-BSD FRML MDRD: >90 ML/MIN/1.73M2
GLUCOSE SERPL-MCNC: 114 MG/DL (ref 70–99)
GLUCOSE UR STRIP-MCNC: NEGATIVE MG/DL
HCT VFR BLD AUTO: 37.6 % (ref 35–47)
HGB BLD-MCNC: 12.2 G/DL (ref 11.7–15.7)
HGB UR QL STRIP: NEGATIVE
IMM GRANULOCYTES # BLD: 0 10E3/UL
IMM GRANULOCYTES NFR BLD: 0 %
INR PPP: 1.01 (ref 0.85–1.15)
KETONES UR STRIP-MCNC: NEGATIVE MG/DL
LACTATE SERPL-SCNC: 1.3 MMOL/L (ref 0.7–2)
LEUKOCYTE ESTERASE UR QL STRIP: ABNORMAL
LIPASE SERPL-CCNC: 28 U/L (ref 13–60)
LYMPHOCYTES # BLD AUTO: 1.1 10E3/UL (ref 0.8–5.3)
LYMPHOCYTES NFR BLD AUTO: 23 %
MCH RBC QN AUTO: 30.7 PG (ref 26.5–33)
MCHC RBC AUTO-ENTMCNC: 32.4 G/DL (ref 31.5–36.5)
MCV RBC AUTO: 95 FL (ref 78–100)
MONOCYTES # BLD AUTO: 0.7 10E3/UL (ref 0–1.3)
MONOCYTES NFR BLD AUTO: 13 %
NEUTROPHILS # BLD AUTO: 3 10E3/UL (ref 1.6–8.3)
NEUTROPHILS NFR BLD AUTO: 62 %
NITRATE UR QL: NEGATIVE
NRBC # BLD AUTO: 0 10E3/UL
NRBC BLD AUTO-RTO: 0 /100
PH UR STRIP: 7.5 [PH] (ref 5–7)
PLATELET # BLD AUTO: 218 10E3/UL (ref 150–450)
POTASSIUM SERPL-SCNC: 3.6 MMOL/L (ref 3.4–5.3)
PROT SERPL-MCNC: 6.5 G/DL (ref 6.4–8.3)
RBC # BLD AUTO: 3.97 10E6/UL (ref 3.8–5.2)
RBC URINE: 1 /HPF
RSV RNA SPEC NAA+PROBE: NEGATIVE
SARS-COV-2 RNA RESP QL NAA+PROBE: NEGATIVE
SODIUM SERPL-SCNC: 141 MMOL/L (ref 136–145)
SP GR UR STRIP: 1.02 (ref 1–1.03)
SQUAMOUS EPITHELIAL: <1 /HPF
UROBILINOGEN UR STRIP-MCNC: NORMAL MG/DL
WBC # BLD AUTO: 4.8 10E3/UL (ref 4–11)
WBC URINE: 4 /HPF

## 2023-02-18 PROCEDURE — 99285 EMERGENCY DEPT VISIT HI MDM: CPT | Mod: 25,CS | Performed by: INTERNAL MEDICINE

## 2023-02-18 PROCEDURE — G0378 HOSPITAL OBSERVATION PER HR: HCPCS

## 2023-02-18 PROCEDURE — C9803 HOPD COVID-19 SPEC COLLECT: HCPCS | Performed by: INTERNAL MEDICINE

## 2023-02-18 PROCEDURE — 258N000003 HC RX IP 258 OP 636: Performed by: NURSE PRACTITIONER

## 2023-02-18 PROCEDURE — 99285 EMERGENCY DEPT VISIT HI MDM: CPT | Mod: CS | Performed by: INTERNAL MEDICINE

## 2023-02-18 PROCEDURE — 85025 COMPLETE CBC W/AUTO DIFF WBC: CPT | Performed by: INTERNAL MEDICINE

## 2023-02-18 PROCEDURE — 87637 SARSCOV2&INF A&B&RSV AMP PRB: CPT | Performed by: INTERNAL MEDICINE

## 2023-02-18 PROCEDURE — 87040 BLOOD CULTURE FOR BACTERIA: CPT | Performed by: INTERNAL MEDICINE

## 2023-02-18 PROCEDURE — 83605 ASSAY OF LACTIC ACID: CPT | Performed by: INTERNAL MEDICINE

## 2023-02-18 PROCEDURE — 81001 URINALYSIS AUTO W/SCOPE: CPT | Performed by: INTERNAL MEDICINE

## 2023-02-18 PROCEDURE — 74183 MRI ABD W/O CNTR FLWD CNTR: CPT

## 2023-02-18 PROCEDURE — A9585 GADOBUTROL INJECTION: HCPCS | Performed by: INTERNAL MEDICINE

## 2023-02-18 PROCEDURE — 80053 COMPREHEN METABOLIC PANEL: CPT | Performed by: INTERNAL MEDICINE

## 2023-02-18 PROCEDURE — 74183 MRI ABD W/O CNTR FLWD CNTR: CPT | Mod: 26 | Performed by: RADIOLOGY

## 2023-02-18 PROCEDURE — 83690 ASSAY OF LIPASE: CPT | Performed by: INTERNAL MEDICINE

## 2023-02-18 PROCEDURE — 36415 COLL VENOUS BLD VENIPUNCTURE: CPT | Performed by: INTERNAL MEDICINE

## 2023-02-18 PROCEDURE — 255N000002 HC RX 255 OP 636: Performed by: INTERNAL MEDICINE

## 2023-02-18 PROCEDURE — 99223 1ST HOSP IP/OBS HIGH 75: CPT | Mod: CS | Performed by: NURSE PRACTITIONER

## 2023-02-18 PROCEDURE — 96365 THER/PROPH/DIAG IV INF INIT: CPT | Mod: 59 | Performed by: INTERNAL MEDICINE

## 2023-02-18 PROCEDURE — 86140 C-REACTIVE PROTEIN: CPT | Performed by: INTERNAL MEDICINE

## 2023-02-18 PROCEDURE — 250N000011 HC RX IP 250 OP 636: Performed by: INTERNAL MEDICINE

## 2023-02-18 PROCEDURE — 85610 PROTHROMBIN TIME: CPT | Performed by: INTERNAL MEDICINE

## 2023-02-18 RX ORDER — PROCHLORPERAZINE 25 MG
25 SUPPOSITORY, RECTAL RECTAL EVERY 12 HOURS PRN
Status: DISCONTINUED | OUTPATIENT
Start: 2023-02-18 | End: 2023-02-19 | Stop reason: HOSPADM

## 2023-02-18 RX ORDER — IBUPROFEN 600 MG/1
600 TABLET, FILM COATED ORAL EVERY 6 HOURS PRN
Status: DISCONTINUED | OUTPATIENT
Start: 2023-02-18 | End: 2023-02-19 | Stop reason: HOSPADM

## 2023-02-18 RX ORDER — GADOBUTROL 604.72 MG/ML
7.5 INJECTION INTRAVENOUS ONCE
Status: COMPLETED | OUTPATIENT
Start: 2023-02-18 | End: 2023-02-18

## 2023-02-18 RX ORDER — LIDOCAINE 40 MG/G
CREAM TOPICAL
Status: DISCONTINUED | OUTPATIENT
Start: 2023-02-18 | End: 2023-02-19 | Stop reason: HOSPADM

## 2023-02-18 RX ORDER — PIPERACILLIN SODIUM, TAZOBACTAM SODIUM 3; .375 G/15ML; G/15ML
3.38 INJECTION, POWDER, LYOPHILIZED, FOR SOLUTION INTRAVENOUS EVERY 6 HOURS
Status: DISCONTINUED | OUTPATIENT
Start: 2023-02-19 | End: 2023-02-19 | Stop reason: HOSPADM

## 2023-02-18 RX ORDER — PIPERACILLIN SODIUM, TAZOBACTAM SODIUM 3; .375 G/15ML; G/15ML
3.38 INJECTION, POWDER, LYOPHILIZED, FOR SOLUTION INTRAVENOUS EVERY 6 HOURS
Status: CANCELLED | OUTPATIENT
Start: 2023-02-19

## 2023-02-18 RX ORDER — PIPERACILLIN SODIUM, TAZOBACTAM SODIUM 4; .5 G/20ML; G/20ML
4.5 INJECTION, POWDER, LYOPHILIZED, FOR SOLUTION INTRAVENOUS ONCE
Status: COMPLETED | OUTPATIENT
Start: 2023-02-18 | End: 2023-02-18

## 2023-02-18 RX ORDER — ONDANSETRON 2 MG/ML
4 INJECTION INTRAMUSCULAR; INTRAVENOUS EVERY 6 HOURS PRN
Status: DISCONTINUED | OUTPATIENT
Start: 2023-02-18 | End: 2023-02-18

## 2023-02-18 RX ORDER — ONDANSETRON 2 MG/ML
4 INJECTION INTRAMUSCULAR; INTRAVENOUS EVERY 6 HOURS PRN
Status: DISCONTINUED | OUTPATIENT
Start: 2023-02-18 | End: 2023-02-19 | Stop reason: HOSPADM

## 2023-02-18 RX ORDER — SODIUM CHLORIDE 9 MG/ML
INJECTION, SOLUTION INTRAVENOUS CONTINUOUS
Status: DISCONTINUED | OUTPATIENT
Start: 2023-02-18 | End: 2023-02-19 | Stop reason: HOSPADM

## 2023-02-18 RX ORDER — ONDANSETRON 4 MG/1
4 TABLET, ORALLY DISINTEGRATING ORAL EVERY 6 HOURS PRN
Status: DISCONTINUED | OUTPATIENT
Start: 2023-02-18 | End: 2023-02-19 | Stop reason: HOSPADM

## 2023-02-18 RX ORDER — ONDANSETRON 4 MG/1
4 TABLET, ORALLY DISINTEGRATING ORAL EVERY 6 HOURS PRN
Status: DISCONTINUED | OUTPATIENT
Start: 2023-02-18 | End: 2023-02-18

## 2023-02-18 RX ORDER — PROCHLORPERAZINE MALEATE 10 MG
10 TABLET ORAL EVERY 6 HOURS PRN
Status: DISCONTINUED | OUTPATIENT
Start: 2023-02-18 | End: 2023-02-19 | Stop reason: HOSPADM

## 2023-02-18 RX ORDER — ZOLPIDEM TARTRATE 5 MG/1
5 TABLET ORAL
Status: DISCONTINUED | OUTPATIENT
Start: 2023-02-18 | End: 2023-02-19 | Stop reason: HOSPADM

## 2023-02-18 RX ADMIN — PIPERACILLIN SODIUM AND TAZOBACTAM SODIUM 4.5 G: 4; .5 INJECTION, POWDER, LYOPHILIZED, FOR SOLUTION INTRAVENOUS at 19:04

## 2023-02-18 RX ADMIN — GADOBUTROL 7.5 ML: 604.72 INJECTION INTRAVENOUS at 16:46

## 2023-02-18 RX ADMIN — SODIUM CHLORIDE: 9 INJECTION, SOLUTION INTRAVENOUS at 21:43

## 2023-02-18 ASSESSMENT — ACTIVITIES OF DAILY LIVING (ADL)
ADLS_ACUITY_SCORE: 35

## 2023-02-18 ASSESSMENT — ENCOUNTER SYMPTOMS
NAUSEA: 1
WHEEZING: 0
TROUBLE SWALLOWING: 0
FLANK PAIN: 0
FEVER: 0
DIFFICULTY URINATING: 0
CHILLS: 0
WEAKNESS: 0
NECK PAIN: 0
SHORTNESS OF BREATH: 0
FEVER: 1
ABDOMINAL PAIN: 0
ADENOPATHY: 0
SORE THROAT: 0
BACK PAIN: 0
LIGHT-HEADEDNESS: 0
VOMITING: 0
HEADACHES: 0
CONFUSION: 0
COUGH: 0
NAUSEA: 0
NUMBNESS: 0

## 2023-02-18 NOTE — ED PROVIDER NOTES
ED Provider Note  [facility]      History     Chief Complaint   Patient presents with     Abnormal Labs     HPI  Jade Carcamo is a 57 year old female who presents with 5 days of fever and nausea. She has a history of polycystic liver disease and had a previous episode of cholangitis about 5 years ago. She has no URI symptoms, cough, sputum, shortness of breath, vomiting, diarrhea, dysuria or flank pain.    Past Medical History:   Diagnosis Date     Cholangitis 07/2018    prolonged hospitalization requiring ERCP/stenting     GERD (gastroesophageal reflux disease)      Menopausal flushing      Polycystic liver disease     in 40 years     Thyroid nodule 2018    cold, bening biopsy 12/18       Past Surgical History:   Procedure Laterality Date     ENDOSCOPIC RETROGRADE CHOLANGIOPANCREATOGRAM N/A 7/9/2018    Procedure: COMBINED ENDOSCOPIC RETROGRADE CHOLANGIOPANCREATOGRAPHY, PLACE TUBE/STENT;  Endoscopic Retrograde Cholangiopancreatogram, Biliary Sphincterotomy, Pancreatic Duct Stent Placement, and Bile Duct Stent Placement x 2;  Surgeon: Olvin Hart MD;  Location: [facility] OR     ENDOSCOPIC RETROGRADE CHOLANGIOPANCREATOGRAM N/A 9/10/2018    Procedure: COMBINED ENDOSCOPIC RETROGRADE CHOLANGIOPANCREATOGRAPHY, REMOVE FOREIGN BODY OR STENT/TUBE;  Endoscopic Retrograde Cholangiopancreatogram with Stent removal;  Surgeon: Olvin Hart MD;  Location: [facility] OR     LAPAROSCOPIC UNROOFING LIVER CYST N/A 11/4/2016    Procedure: LAPAROSCOPIC UNROOFING LIVER CYST;  Surgeon: Sonido Cruz MD;  Location: [facility] OR       Family History   Problem Relation Age of Onset     Alzheimer Disease Mother 60     Thyroid Disease Mother         thyroidectomy in childhood     Heart Disease Father      Glaucoma Father      Hypertension Father      Thyroid Cancer Sister         thyroid cancer     Other - See Comments Brother         bradycardia requiring ablation     Hypertension Brother      Psoriasis  Maternal Grandfather      Dementia Maternal Grandfather      Breast Cancer Paternal Aunt      Diabetes No family hx of      Macular Degeneration No family hx of        Social History     Tobacco Use     Smoking status: Never     Smokeless tobacco: Never   Substance Use Topics     Alcohol use: Yes     Comment: 2 per week         Review of Systems   Constitutional: Negative for chills and fever.   HENT: Negative for congestion and trouble swallowing.    Eyes: Negative for visual disturbance.   Respiratory: Negative for cough, shortness of breath and wheezing.    Cardiovascular: Negative for chest pain.   Gastrointestinal: Negative for abdominal pain, nausea and vomiting.   Genitourinary: Negative for difficulty urinating.   Musculoskeletal: Negative for back pain and neck pain.   Skin: Negative for rash.   Neurological: Negative for weakness, light-headedness, numbness and headaches.   Hematological: Negative for adenopathy.   Psychiatric/Behavioral: Negative for confusion.       Physical Exam   BP: 110/72  Pulse: 85  Temp: 98  F (36.7  C)  Resp: 20  Weight: 70.3 kg (155 lb)  SpO2: 96 %  Physical Exam  Vitals and nursing note reviewed.   Constitutional:       General: She is not in acute distress.     Appearance: Normal appearance.   HENT:      Head: Normocephalic and atraumatic.      Right Ear: External ear normal.      Left Ear: External ear normal.      Nose: Nose normal.      Mouth/Throat:      Mouth: Mucous membranes are moist.   Eyes:      General: No scleral icterus.     Extraocular Movements: Extraocular movements intact.      Pupils: Pupils are equal, round, and reactive to light.   Cardiovascular:      Rate and Rhythm: Normal rate and regular rhythm.      Heart sounds: No murmur heard.  Pulmonary:      Effort: Pulmonary effort is normal.      Breath sounds: Normal breath sounds. No wheezing.   Abdominal:      General: Abdomen is flat.      Palpations: Abdomen is soft.      Tenderness: There is abdominal  tenderness in the right upper quadrant.   Musculoskeletal:      Cervical back: Normal range of motion and neck supple.      Right lower leg: No edema.      Left lower leg: No edema.   Skin:     General: Skin is warm and dry.   Neurological:      General: No focal deficit present.      Mental Status: She is alert and oriented to person, place, and time.      Cranial Nerves: No cranial nerve deficit.      Motor: No weakness.   Psychiatric:         Mood and Affect: Mood normal.         Behavior: Behavior normal.           ED Course, Procedures, & Data      Procedures              Labs/Imaging    Results for orders placed or performed during the hospital encounter of 02/18/23 (from the past 24 hour(s))   CBC with platelets differential    Narrative    The following orders were created for panel order CBC with platelets differential.  Procedure                               Abnormality         Status                     ---------                               -----------         ------                     CBC with platelets and d...[[id number]]                      Final result                 Please view results for these tests on the individual orders.   INR   Result Value Ref Range    INR 1.01 0.85 - 1.15   Comprehensive metabolic panel   Result Value Ref Range    Sodium 141 136 - 145 mmol/L    Potassium 3.6 3.4 - 5.3 mmol/L    Chloride 104 98 - 107 mmol/L    Carbon Dioxide (CO2) 25 22 - 29 mmol/L    Anion Gap 12 7 - 15 mmol/L    Urea Nitrogen 9.5 6.0 - 20.0 mg/dL    Creatinine 0.60 0.51 - 0.95 mg/dL    Calcium 9.1 8.6 - 10.0 mg/dL    Glucose 114 (H) 70 - 99 mg/dL    Alkaline Phosphatase 129 (H) 35 - 104 U/L    AST 37 (H) 10 - 35 U/L    ALT 31 10 - 35 U/L    Protein Total 6.5 6.4 - 8.3 g/dL    Albumin 3.8 3.5 - 5.2 g/dL    Bilirubin Total 1.0 <=1.2 mg/dL    GFR Estimate >90 >60 mL/min/1.73m2   Lipase   Result Value Ref Range    Lipase 28 13 - 60 U/L   Lactic acid whole blood   Result Value Ref Range    Lactic Acid 1.3  0.7 - 2.0 mmol/L   CRP inflammation   Result Value Ref Range    CRP Inflammation 106.00 (H) <5.00 mg/L   CBC with platelets and differential   Result Value Ref Range    WBC Count 4.8 4.0 - 11.0 10e3/uL    RBC Count 3.97 3.80 - 5.20 10e6/uL    Hemoglobin 12.2 11.7 - 15.7 g/dL    Hematocrit 37.6 35.0 - 47.0 %    MCV 95 78 - 100 fL    MCH 30.7 26.5 - 33.0 pg    MCHC 32.4 31.5 - 36.5 g/dL    RDW 11.9 10.0 - 15.0 %    Platelet Count 218 150 - 450 10e3/uL    % Neutrophils 62 %    % Lymphocytes 23 %    % Monocytes 13 %    % Eosinophils 1 %    % Basophils 1 %    % Immature Granulocytes 0 %    NRBCs per 100 WBC 0 <1 /100    Absolute Neutrophils 3.0 1.6 - 8.3 10e3/uL    Absolute Lymphocytes 1.1 0.8 - 5.3 10e3/uL    Absolute Monocytes 0.7 0.0 - 1.3 10e3/uL    Absolute Eosinophils 0.0 0.0 - 0.7 10e3/uL    Absolute Basophils 0.0 0.0 - 0.2 10e3/uL    Absolute Immature Granulocytes 0.0 <=0.4 10e3/uL    Absolute NRBCs 0.0 10e3/uL   Symptomatic Influenza A/B & SARS-CoV2 (COVID-19) Virus PCR Multiplex Nasopharyngeal    Specimen: Nasopharyngeal; Swab   Result Value Ref Range    Influenza A PCR Negative Negative    Influenza B PCR Negative Negative    RSV PCR Negative Negative    SARS CoV2 PCR Negative Negative    Narrative    Testing was performed using the Xpert Xpress CoV2/Flu/RSV Assay on the [employer] GeneXpert Instrument. This test should be ordered for the detection of SARS-CoV-2 and influenza viruses in individuals who meet clinical and/or epidemiological criteria. Test performance is unknown in asymptomatic patients. This test is for in vitro diagnostic use under the FDA EUA for laboratories certified under CLIA to perform high or moderate complexity testing. This test has not been FDA cleared or approved. A negative result does not rule out the presence of PCR inhibitors in the specimen or target RNA in concentration below the limit of detection for the assay. If only one viral target is positive but coinfection with multiple  targets is suspected, the sample should be re-tested with another FDA cleared, approved, or authorized test, if coinfection would change clinical management. This test was validated by the [facility]. These laboratories are certified under the Clinical Laboratory Improvement Amendments of 1988 (CLIA-88) as qualified to perform high complexity laboratory testing.   MR Abdomen MRCP w/o & w Contrast    Impression    RESIDENT PRELIMINARY INTERPRETATION  IMPRESSION:   1. Similar appearance of polycystic liver. No suspicious enhancing  lesions.  2.  No significant dilation of the intrahepatic and extrahepatic  biliary system.          Results for orders placed or performed during the hospital encounter of 02/18/23   MR Abdomen MRCP w/o & w Contrast     Status: None (Preliminary result)    Impression    RESIDENT PRELIMINARY INTERPRETATION  IMPRESSION:   1. Similar appearance of polycystic liver. No suspicious enhancing  lesions.  2.  No significant dilation of the intrahepatic and extrahepatic  biliary system.   INR     Status: Normal   Result Value Ref Range    INR 1.01 0.85 - 1.15   Comprehensive metabolic panel     Status: Abnormal   Result Value Ref Range    Sodium 141 136 - 145 mmol/L    Potassium 3.6 3.4 - 5.3 mmol/L    Chloride 104 98 - 107 mmol/L    Carbon Dioxide (CO2) 25 22 - 29 mmol/L    Anion Gap 12 7 - 15 mmol/L    Urea Nitrogen 9.5 6.0 - 20.0 mg/dL    Creatinine 0.60 0.51 - 0.95 mg/dL    Calcium 9.1 8.6 - 10.0 mg/dL    Glucose 114 (H) 70 - 99 mg/dL    Alkaline Phosphatase 129 (H) 35 - 104 U/L    AST 37 (H) 10 - 35 U/L    ALT 31 10 - 35 U/L    Protein Total 6.5 6.4 - 8.3 g/dL    Albumin 3.8 3.5 - 5.2 g/dL    Bilirubin Total 1.0 <=1.2 mg/dL    GFR Estimate >90 >60 mL/min/1.73m2   Lipase     Status: Normal   Result Value Ref Range    Lipase 28 13 - 60 U/L   Lactic acid whole blood     Status: Normal   Result Value Ref Range    Lactic Acid 1.3 0.7 - 2.0 mmol/L   CRP inflammation     Status:  Abnormal   Result Value Ref Range    CRP Inflammation 106.00 (H) <5.00 mg/L   Symptomatic Influenza A/B & SARS-CoV2 (COVID-19) Virus PCR Multiplex Nasopharyngeal     Status: Normal    Specimen: Nasopharyngeal; Swab   Result Value Ref Range    Influenza A PCR Negative Negative    Influenza B PCR Negative Negative    RSV PCR Negative Negative    SARS CoV2 PCR Negative Negative    Narrative    Testing was performed using the Xpert Xpress CoV2/Flu/RSV Assay on the [employer] GeneXpert Instrument. This test should be ordered for the detection of SARS-CoV-2 and influenza viruses in individuals who meet clinical and/or epidemiological criteria. Test performance is unknown in asymptomatic patients. This test is for in vitro diagnostic use under the FDA EUA for laboratories certified under CLIA to perform high or moderate complexity testing. This test has not been FDA cleared or approved. A negative result does not rule out the presence of PCR inhibitors in the specimen or target RNA in concentration below the limit of detection for the assay. If only one viral target is positive but coinfection with multiple targets is suspected, the sample should be re-tested with another FDA cleared, approved, or authorized test, if coinfection would change clinical management. This test was validated by the [facility]. These laboratories are certified under the Clinical Laboratory Improvement Amendments of 1988 (CLIA-88) as qualified to perform high complexity laboratory testing.   CBC with platelets and differential     Status: None   Result Value Ref Range    WBC Count 4.8 4.0 - 11.0 10e3/uL    RBC Count 3.97 3.80 - 5.20 10e6/uL    Hemoglobin 12.2 11.7 - 15.7 g/dL    Hematocrit 37.6 35.0 - 47.0 %    MCV 95 78 - 100 fL    MCH 30.7 26.5 - 33.0 pg    MCHC 32.4 31.5 - 36.5 g/dL    RDW 11.9 10.0 - 15.0 %    Platelet Count 218 150 - 450 10e3/uL    % Neutrophils 62 %    % Lymphocytes 23 %    % Monocytes 13 %    % Eosinophils  1 %    % Basophils 1 %    % Immature Granulocytes 0 %    NRBCs per 100 WBC 0 <1 /100    Absolute Neutrophils 3.0 1.6 - 8.3 10e3/uL    Absolute Lymphocytes 1.1 0.8 - 5.3 10e3/uL    Absolute Monocytes 0.7 0.0 - 1.3 10e3/uL    Absolute Eosinophils 0.0 0.0 - 0.7 10e3/uL    Absolute Basophils 0.0 0.0 - 0.2 10e3/uL    Absolute Immature Granulocytes 0.0 <=0.4 10e3/uL    Absolute NRBCs 0.0 10e3/uL   CBC with platelets differential     Status: None    Narrative    The following orders were created for panel order CBC with platelets differential.  Procedure                               Abnormality         Status                     ---------                               -----------         ------                     CBC with platelets and d...[[id number]]                      Final result                 Please view results for these tests on the individual orders.     Medications   piperacillin-tazobactam (ZOSYN) 4.5 g vial to attach to  mL bag (4.5 g Intravenous New Bag 2/18/23 1904)   pharmacy alert - intermittent dosing (has no administration in time range)   gadobutrol (GADAVIST) injection 7.5 mL (7.5 mLs Intravenous Given 2/18/23 1646)            Medical Decision Making  The patient's presentation is strongly suggestive of a chronic illness severe exacerbation, progression, or side effect of treatment.    The patient's evaluation involved:  review of external note(s) from 1+ sources (see separate area of note for details)  ordering and/or review of 3+ test(s) in this encounter (see separate area of note for details)  Discussion with an independent provider.    The patient's management involved a decision regarding hospitalization.      Assessment & Plan    Impression:  Middle aged woman with a history of polycystic liver disease, a past episode of severe cholangitis with biliart stenting 5 years ago presents with 5 days of intermittent fevers, chills nausea and RUQ abdominal pain. She is afebrile in the ED and  has normal vital signs. She has significant elevation of CRP. AST and Alkaline phosphatase are mildly elevated. MRCP had normal appearing ducts and stable appearance of liver cysts. CBC is normal. She was seen in consultation by the GI hepatology fellow. She will be admitted for IV antibiotics with Zosyn pending her blood cultures. She will be admitted to ED observation.service for IV antibiotics, serial CBC and LFT and should be cultured and worked up for sepsis if she develops SIRS. GI service will follow.    I have reviewed the nursing notes. I have reviewed the findings, diagnosis, plan and need for follow up with the patient.    New Prescriptions    No medications on file       Final diagnoses:   Fever and chills   H/O bacterial cholangitis   Polycystic liver disease       Nakul Toscano  [facility] EMERGENCY DEPARTMENT  2/18/2023     Nakul Toscano MD  02/18/23 1935

## 2023-02-18 NOTE — ED TRIAGE NOTES
Pt arrives ambulatory to ED w c/o fever and nausea x 5 days   Labs drawn Monday showed elevated ALP, AST and bilirubin

## 2023-02-19 VITALS
BODY MASS INDEX: 23.22 KG/M2 | OXYGEN SATURATION: 96 % | HEART RATE: 75 BPM | SYSTOLIC BLOOD PRESSURE: 126 MMHG | TEMPERATURE: 98.1 F | WEIGHT: 155 LBS | DIASTOLIC BLOOD PRESSURE: 70 MMHG | RESPIRATION RATE: 18 BRPM

## 2023-02-19 LAB
ALBUMIN SERPL BCG-MCNC: 3.2 G/DL (ref 3.5–5.2)
ALP SERPL-CCNC: 111 U/L (ref 35–104)
ALT SERPL W P-5'-P-CCNC: 26 U/L (ref 10–35)
ANION GAP SERPL CALCULATED.3IONS-SCNC: 11 MMOL/L (ref 7–15)
AST SERPL W P-5'-P-CCNC: 32 U/L (ref 10–35)
BILIRUB SERPL-MCNC: 0.6 MG/DL
BUN SERPL-MCNC: 9.3 MG/DL (ref 6–20)
CALCIUM SERPL-MCNC: 8.5 MG/DL (ref 8.6–10)
CHLORIDE SERPL-SCNC: 107 MMOL/L (ref 98–107)
CREAT SERPL-MCNC: 0.68 MG/DL (ref 0.51–0.95)
CRP SERPL-MCNC: 66.6 MG/L
DEPRECATED HCO3 PLAS-SCNC: 22 MMOL/L (ref 22–29)
ERYTHROCYTE [DISTWIDTH] IN BLOOD BY AUTOMATED COUNT: 11.8 % (ref 10–15)
GFR SERPL CREATININE-BSD FRML MDRD: >90 ML/MIN/1.73M2
GLUCOSE SERPL-MCNC: 104 MG/DL (ref 70–99)
HCT VFR BLD AUTO: 33 % (ref 35–47)
HGB BLD-MCNC: 10.9 G/DL (ref 11.7–15.7)
MCH RBC QN AUTO: 30.8 PG (ref 26.5–33)
MCHC RBC AUTO-ENTMCNC: 33 G/DL (ref 31.5–36.5)
MCV RBC AUTO: 93 FL (ref 78–100)
PLATELET # BLD AUTO: 210 10E3/UL (ref 150–450)
POTASSIUM SERPL-SCNC: 4.1 MMOL/L (ref 3.4–5.3)
PROT SERPL-MCNC: 5.6 G/DL (ref 6.4–8.3)
RBC # BLD AUTO: 3.54 10E6/UL (ref 3.8–5.2)
SODIUM SERPL-SCNC: 140 MMOL/L (ref 136–145)
WBC # BLD AUTO: 4.8 10E3/UL (ref 4–11)

## 2023-02-19 PROCEDURE — 99239 HOSP IP/OBS DSCHRG MGMT >30: CPT | Mod: CS | Performed by: PHYSICIAN ASSISTANT

## 2023-02-19 PROCEDURE — 96376 TX/PRO/DX INJ SAME DRUG ADON: CPT

## 2023-02-19 PROCEDURE — 85027 COMPLETE CBC AUTOMATED: CPT | Performed by: NURSE PRACTITIONER

## 2023-02-19 PROCEDURE — 99222 1ST HOSP IP/OBS MODERATE 55: CPT | Mod: GC | Performed by: STUDENT IN AN ORGANIZED HEALTH CARE EDUCATION/TRAINING PROGRAM

## 2023-02-19 PROCEDURE — 250N000011 HC RX IP 250 OP 636: Performed by: NURSE PRACTITIONER

## 2023-02-19 PROCEDURE — 86140 C-REACTIVE PROTEIN: CPT | Performed by: NURSE PRACTITIONER

## 2023-02-19 PROCEDURE — 36415 COLL VENOUS BLD VENIPUNCTURE: CPT | Performed by: NURSE PRACTITIONER

## 2023-02-19 PROCEDURE — G0378 HOSPITAL OBSERVATION PER HR: HCPCS

## 2023-02-19 PROCEDURE — 80053 COMPREHEN METABOLIC PANEL: CPT | Performed by: NURSE PRACTITIONER

## 2023-02-19 PROCEDURE — 250N000013 HC RX MED GY IP 250 OP 250 PS 637: Performed by: PHYSICIAN ASSISTANT

## 2023-02-19 RX ORDER — CIPROFLOXACIN 500 MG/1
500 TABLET, FILM COATED ORAL 2 TIMES DAILY
Qty: 60 TABLET | Refills: 0 | Status: SHIPPED | OUTPATIENT
Start: 2023-02-19 | End: 2024-01-08

## 2023-02-19 RX ORDER — METRONIDAZOLE 500 MG/1
500 TABLET ORAL 3 TIMES DAILY
Qty: 90 TABLET | Refills: 0 | Status: SHIPPED | OUTPATIENT
Start: 2023-02-19 | End: 2024-01-08

## 2023-02-19 RX ORDER — ACETAMINOPHEN 325 MG/1
650 TABLET ORAL EVERY 4 HOURS PRN
Status: DISCONTINUED | OUTPATIENT
Start: 2023-02-19 | End: 2023-02-19 | Stop reason: HOSPADM

## 2023-02-19 RX ADMIN — PIPERACILLIN AND TAZOBACTAM 3.38 G: 3; .375 INJECTION, POWDER, LYOPHILIZED, FOR SOLUTION INTRAVENOUS at 07:46

## 2023-02-19 RX ADMIN — ACETAMINOPHEN 650 MG: 325 TABLET ORAL at 08:19

## 2023-02-19 RX ADMIN — PIPERACILLIN AND TAZOBACTAM 3.38 G: 3; .375 INJECTION, POWDER, LYOPHILIZED, FOR SOLUTION INTRAVENOUS at 01:03

## 2023-02-19 ASSESSMENT — ACTIVITIES OF DAILY LIVING (ADL)
ADLS_ACUITY_SCORE: 31

## 2023-02-19 NOTE — CONSULTS
HEPATOLOGY  CONSULTATION      Date of Admission:  2/18/2023           Reason for Consultation:   We were asked by Aydee Traylor of ED Obs to evaluate this patient with possible infected liver cyst vs cholangitis            ASSESSMENT AND RECOMMENDATIONS:   Assessment:    57 year old female with a history of polycystic liver disease (follows with Dr. Hobbs) s/p cyst unroofing and history of ascending cholangitis (2018) that presents for mild liver chemistry abnormalities and 5 days of persistent fevers.      #. Polycystic liver disease s/p unroofing  #. History of cholangitis (2018) s/p ERCP  #. Fever, nausea, malaise  #. Abnormal liver chemistries   Take together, concerning for some nature of infection. CRP significantly elevated on admission with improvement following pip/tazo. No evidence of infection in urine, blood, or lungs at this time. MRCP did not demonstrate purulent cyst though early/mild infections can be missed on imaging. Will use clinical presentation, CRP, and susceptibility to infection given her liver structural abnormalities to guide therapy.     Recommendations:  -- in discussion with [facility] ID service, it was decided that 14 days course of ciprofloxacin/metronidazole is appropriate coverage, with an extra two weeks available should that not be sufficient  -- patient will monitor fevers/symptoms at home  -- labs in 1 month, hepatology clinic follow up soon therafter  -- OK to discharge from hepatology perspective    Gastroenterology outpatient follow up recommendations:   -- CMP, CBC, CRP in 4 weeks  -- we will arrange clinic follow up    Thank you for involving us in this patient's care. Please do not hesitate to contact the GI service with any questions or concerns.     Pt care plan discussed with Dr. Walker, Hepatology staff physician.     William Houston MD, PGY5  Gastroenterology Fellow  [facility]  See [facility] for GI on-call  information    -------------------------------------------------------------------------------------------------------------------           History of Present Illness:     My phone note from 2/18/23:     Ms. Jade domingo is a 57 yof with a history of polycystic liver disease (follows with Dr. Hobbs) s/p cyst unroofing and history of ascending cholangitis (2018) who contacted the on call hepatology fellow today to discuss current symptoms and labs. She has had a fever (Tmax 102) for five days in a row which has spiked nightly and has been associated with nausea (without emesis), malaise, and subtle abdominal discomfort. She provides that this is reminiscent of her cholangitis in 2018 though less severe. She contacted our service earlier in the week and we recommend lab evaluation which demonstrates elevation in ALP, AST, and bilirubin (notable bilirubin is close to baseline for her). She is appropriately concerned about her symptoms given her history. Due to the persistent fever and nausea in the setting of underlying liver disease that is susceptible to infection (cyst infection, cholangitis, etc), I recommended that she be seen in the [facility] ED today. She verbalized understanding.         Data:   Key relevant labs:     Lab Results   Component Value Date    WBC 4.8 02/19/2023    HGB 10.9 (L) 02/19/2023    HCT 33.0 (L) 02/19/2023     02/19/2023     02/19/2023    POTASSIUM 4.1 02/19/2023    CHLORIDE 107 02/19/2023    CO2 22 02/19/2023    BUN 9.3 02/19/2023    CR 0.68 02/19/2023     (H) 02/19/2023    SED 98 (H) 07/04/2018    AST 32 02/19/2023    ALT 26 02/19/2023     (H) 07/19/2018    ALKPHOS 111 (H) 02/19/2023    BILITOTAL 0.6 02/19/2023    INR 1.01 02/18/2023       Key relevant imaging:    MRCP: 2/18/23    IMPRESSION:   1. Similar appearance of polycystic liver. No suspicious enhancing  lesions.  2.  No significant dilation of the intrahepatic and extrahepatic  biliary system.     I have  personally reviewed the examination and initial interpretation  and I agree with the findings.     RENATA NICHOLE MD            Previous Endoscopy:   ERCP: 9/10/18    Impression:          - Patent biliary sphincterotomy with upward migration of                        the longer biliary stent, and spotaneous ejection of the                        pancreatic and shorter biliary stent                        - As previous, shortened right intrahepatics with                        impressive elongation of the left primary meandering to                        the left lower quadrant without overt stenosis                        - Otherwise unremarkable biliary system                        - Successful retrieval of remaining biliary stent                        followed by saline irrigation of the system                        - Stent free trial pursued   Recommendation:      - Standard outpatient general anesthesia recovery with                        probable discharge home this afternoon                        - No planned interventional GI procedure at this moment;                        follow up with you established providers as scheduled                        - With recurrent fevers, repeat ERCP will need to be                        considered despite fluoroscopic findings                        - The findings and recommendations were discussed with                        the patient and their family                                                                                       electronically signed by MARCELO Hart   _____________________   Olvin Hart MD   9/10/2018 11:37:50 AM   I was physically present for the entire viewing portion of the exam.   __________________________   Signature of teaching physician   Collin/Marty Hart MD   Number of Addenda: 0          Medications:     Current Facility-Administered Medications   Medication     acetaminophen (TYLENOL) tablet 650 mg     ibuprofen  (ADVIL/MOTRIN) tablet 600 mg     lidocaine (LMX4) cream     lidocaine 1 % 0.1-1 mL     ondansetron (ZOFRAN ODT) ODT tab 4 mg    Or     ondansetron (ZOFRAN) injection 4 mg     piperacillin-tazobactam (ZOSYN) 3.375 g vial to attach to  mL bag     prochlorperazine (COMPAZINE) injection 10 mg    Or     prochlorperazine (COMPAZINE) tablet 10 mg    Or     prochlorperazine (COMPAZINE) suppository 25 mg     sodium chloride (PF) 0.9% PF flush 3 mL     sodium chloride (PF) 0.9% PF flush 3 mL     sodium chloride 0.9% infusion     zolpidem (AMBIEN) tablet 5 mg             Physical Exam:   /70 (BP Location: Right arm)   Pulse 75   Temp 98.1  F (36.7  C) (Oral)   Resp 18   Wt 70.3 kg (155 lb)   LMP 11/14/2016   SpO2 96%   BMI 23.22 kg/m    Wt:   Wt Readings from Last 2 Encounters:   02/18/23 70.3 kg (155 lb)   12/08/22 70.4 kg (155 lb 1.6 oz)      Constitutional: cooperative, pleasant, not dyspneic/diaphoretic, no acute distress  Eyes: Sclera anicteric/injected  Ears/nose/mouth/throat: Normal oropharynx without ulcers or exudate, mucus membranes moist, hearing intact  CV: No edema  Respiratory: Unlabored breathing  Abd: Nondistended, +bs, significant hepatomegaly , nontender, no peritoneal signs  Skin: warm, perfused, no jaundice  Neuro: AAO x 3, No asterixis  Psych: Normal affect  MSK: No gross deformities          Past Medical History:   Reviewed and edited as appropriate  Past Medical History:   Diagnosis Date     Cholangitis 07/2018    prolonged hospitalization requiring ERCP/stenting     GERD (gastroesophageal reflux disease)      Menopausal flushing      Polycystic liver disease     in 40 years     Thyroid nodule 2018    cold, bening biopsy 12/18            Past Surgical History:   Reviewed and edited as appropriate   Past Surgical History:   Procedure Laterality Date     ENDOSCOPIC RETROGRADE CHOLANGIOPANCREATOGRAM N/A 7/9/2018    Procedure: COMBINED ENDOSCOPIC RETROGRADE CHOLANGIOPANCREATOGRAPHY, PLACE  TUBE/STENT;  Endoscopic Retrograde Cholangiopancreatogram, Biliary Sphincterotomy, Pancreatic Duct Stent Placement, and Bile Duct Stent Placement x 2;  Surgeon: Olvin Hart MD;  Location: [facility] OR     ENDOSCOPIC RETROGRADE CHOLANGIOPANCREATOGRAM N/A 9/10/2018    Procedure: COMBINED ENDOSCOPIC RETROGRADE CHOLANGIOPANCREATOGRAPHY, REMOVE FOREIGN BODY OR STENT/TUBE;  Endoscopic Retrograde Cholangiopancreatogram with Stent removal;  Surgeon: Olvin Hart MD;  Location: [facility] OR     LAPAROSCOPIC UNROOFING LIVER CYST N/A 11/4/2016    Procedure: LAPAROSCOPIC UNROOFING LIVER CYST;  Surgeon: Sonido Cruz MD;  Location: [facility] OR            Social History:   Alcohol use: minimal  Smoking history: none  Living situation: lives at home with          Family History:   Reviewed and edited as appropriate  Family History   Problem Relation Age of Onset     Alzheimer Disease Mother 60     Thyroid Disease Mother         thyroidectomy in childhood     Heart Disease Father      Glaucoma Father      Hypertension Father      Thyroid Cancer Sister         thyroid cancer     Other - See Comments Brother         bradycardia requiring ablation     Hypertension Brother      Psoriasis Maternal Grandfather      Dementia Maternal Grandfather      Breast Cancer Paternal Aunt      Diabetes No family hx of      Macular Degeneration No family hx of      No known history of colorectal cancer or inflammatory bowel disease.         Allergies:   Reviewed and edited as appropriate     Allergies   Allergen Reactions     Bee Venom Anaphylaxis     As a teenager.     Hornet Venom Anaphylaxis     As a teenager.     Wasp Venom Protein Anaphylaxis     As a teenager.              Review of Systems:     A complete 10 point review of systems was performed and is negative except as noted in the HPI

## 2023-02-19 NOTE — PLAN OF CARE
Goal Outcome Evaluation:/71 (BP Location: Left arm, Patient Position: Supine)   Pulse 78   Temp 98.5  F (36.9  C) (Oral)   Resp 18   Wt 70.3 kg (155 lb)   LMP 11/14/2016   SpO2 98%   BMI 23.22 kg/m       -Patient afebrile : Met   - Vital signs normal or at patient baseline :Met   - Stable LFT and CBC labs :Not met   - Diagnostic tests and consults completed and resulted if ordered :Not met   - Tolerating oral antibiotics if ordered :Met   - Safe disposition plan has been identified: In progress

## 2023-02-19 NOTE — PLAN OF CARE
Observation Goals       -Patient afebrile : Met   - Vital signs normal or at patient baseline: Met   - Stable LFT and CBC labs: Not met   - Diagnostic tests and consults completed and resulted if ordered: In progress   - Tolerating oral antibiotics if ordered : Not Met   - Safe disposition plan has been identified: Met

## 2023-02-19 NOTE — DISCHARGE SUMMARY
[facility]  ED Observation Discharge Summary      Date of Admission:  2/18/2023  Date of Discharge:  2/19/2023  Discharging Provider: Aydee Traylor PA-C  Discharge Service: ED Observation Service    Discharge Diagnoses   #Concern for intra-abdominal infection, possible infected hepatic cyst  #Polycystic liver disease  #History of Cholangitis    Follow-ups Needed After Discharge   Follow up with hepatology clinic, they will arrange.    Unresulted Labs Ordered in the Past 30 Days of this Admission     Date and Time Order Name Status Description    2/18/2023  1:46 PM Blood Culture Peripheral Blood Preliminary     2/18/2023  1:46 PM Blood Culture Peripheral Blood Preliminary       These results will be followed up by ED Observation    Discharge Disposition   Discharged to home  Condition at discharge: Stable    Hospital Course   Jade Carcamo is a 57 year old female admitted on 2/18/2023. She has history of polycystic liver disease, s/p cyst unroofing and history of ascending cholangitis, and GERD who presented to the Emergency Department with complaints of  5 days of fever and nausea. She denies any recent abdominal pain, URI symptoms, cough, sputum, shortness of breath, vomiting, diarrhea, dysuria or flank pain.     #Concern for intra-abdominal infection, possible infected hepatic cyst  #Polycystic liver disease  #History of Cholangitis  She reports she has had a fever (Tmax 102) for five days in a row with her temperature spiking nightly with associated nausea without vomiting. She denies any abdominal pain, but does report the initial presentation is similar to when she was diagnosed with cholangitis in 2018 though currently she reports her complaints are much less severe. She contacted the GI service earlier in the week and had outpatient labs 2/17 significant for alk phos 135, AST 39, total bili 1.4. Due to her persistent fever and nausea, she was referred to the  ED for evaluation. In the ED, VSS and afebrile. LFTs showed similar alk phos and AST, although total bili was down to 1.0. No leukocytosis. Normal lipase. COVID/Influenza testing was negative, but her CRP was significantly elevated at 106. Lactic Acid was normal as well. Blood cultures were drawn. She underwent MRCP which showed similar appearance of polycystic liver with no suspicious enhancing lesions, and no significant dilation of the intrahepatic and extrahepatic biliary system. Patient was seen in the ED by GI hepatology and plan was made to admit patient to ED observation for empiric IV antibiotics and monitoring with repeat labs in the AM.    Patient was given IV Zosyn. She has remained afebrile and without leukocytosis. CRP is down from 106 to 66 today. LFTs are slowly improving with alk phos now 111 and AST normal at 32, total bili down to 0.6. Blood cultures NGTD. Patient was re-evaluted by hepatology who have also discussed the case with ID. It is felt that patient may have a hepatic cyst infection. Recommendation was to treat with oral Cipro 500 mg BID and Flagyl 500 mg TID x 2 weeks, with an extra 2 weeks available if that should no be sufficient. Patient will follow up in the hepatology clinic.    Consultations This Hospital Stay   GI HEPATOLOGY ADULT IP CONSULT    Code Status   Full Code    Time Spent on this Encounter   I, Aydee Traylor PA-C, personally saw the patient today and spent greater than 30 minutes discharging this patient.     Aydee Traylor PA-C  [facility] [address] [facility] [address] [address]  Phone: [phone]  Fax: [phone]  ______________________________________________________________________    Physical Exam   Vital Signs: Temp: 98.1  F (36.7  C) Temp src: Oral BP: 126/70 Pulse: 75   Resp: 18 SpO2: 96 % O2 Device: None (Room air)    Weight: 155 lbs 0 oz  Exam:  Constitutional: alert, no distress, and  cooperative  Head: normocephalic, atraumatic  Neck: no asymmetry, masses, or scars  ENT: throat normal without erythema or exudate  Cardiovascular: RRR  Respiratory: diminished, respirations unlabored  Gastrointestinal: (+) BS, non tender, soft  Musculoskeletal: normal muscle tone, no pitting edema  Skin: no suspicious lesions or rashes  Neurologic: oriented x 3, moves all extremities, no slurred speech  Psychiatric: normal affect and mood       Primary Care Physician   Marina Owens    Discharge Orders      Reason for your hospital stay    You were hospitalized for further evaluation of fever, nausea, and mildly elevated liver enzymes in the setting of history of cholangitis. You underwent an MRCP study which was unremarkable. You were started on empiric IV antibiotics and seen by hepatology. Your inflammatory markers have been improving and your liver function tests are also slowly improving. Hepatology has spoken with ID and think one of your liver cysts may be infected. They recommended Cipro 500 mg twice daily and Flagyl 500 mg three times daily for 1 month. They will arrange follow up in the hepatology clinic.     Activity    Your activity upon discharge: activity as tolerated     Adult [facility] Follow-up and recommended labs and tests    Follow up with hepatology clinic, they will arrange.    Appointments on [place] and/or [facility] (with [facility] or [facility] provider or service). Call [phone] if you haven't heard regarding these appointments within 7 days of discharge.     When to contact your care team    Return to the ED for recurrent fevers, worsening abdominal pain, intractable nausea/vomiting, yellowing of the skin, altered mental status, or any other new or worsening symptoms.     Diet    Follow this diet upon discharge: Regular Diet Adult       Significant Results and Procedures   Results for orders placed or performed during the hospital encounter of 02/18/23   MR Abdomen MRCP w/o & w Contrast     Narrative    MRCP Without Contrast    CLINICAL HISTORY: Polycystic liver disease, past cholangitis, acute  fevers, elevated LFT    DATE: 2/18/2023 5:02 PM    TECHNIQUE:  Images were acquired without intravenous gadolinium  contrast through the upper abdomen. The following MR images were  acquired without intravenous contrast: TrueFISP, multiplanar  T2-weighted, axial T1 in/out of phase, T2-weighted MRCP images, axial  diffusion-weighted and axial apparent diffusion coefficient.  T1-weighted images were obtained without contrast.    Comparison study: 12/7/2020    FINDINGS:    Liver: Innumerable T1 hypointense, T2 hyperintense nonenhancing cysts  are similar to prior. Unchanged mass effect upon surrounding visceral  organs. Heterogeneous T1 signal about the cysts, similar to prior.  Previously seen mild arterial enhancement in the left lobe is not well  visualized on today's exam. No hepatic steatosis or iron deposition.  The intrahepatic and extrahepatic biliary system is poorly visualized  on this exam but does not appear enlarged.    Pancreas: Unremarkable. No pancreatic duct dilation.    Gallbladder: No stones or wall thickening.    Spleen: Unremarkable    Kidneys: No hydronephrosis or hydroureter. No obstructing stones or  mass.    Adrenal glands: Unremarkable    Bowel: Normal caliber small and large bowel.    Lymph nodes: No significant lymphadenopathy by size criteria.    Blood vessels: Major vessels are patent.    Lung bases: No significant pleural effusion or other signal  abnormality.    Bones and soft tissues: No acute or aggressive abnormalities.  Continued rounded signal abnormality hyperintense on T2 imaging at the  L2 vertebral body, consistent with benign hemangioma.    Mesentery and abdominal wall: Unremarkable    Ascites: No significant ascites      Impression    IMPRESSION:   1. Similar appearance of polycystic liver. No suspicious enhancing  lesions.  2.  No significant dilation of the  intrahepatic and extrahepatic  biliary system.    I have personally reviewed the examination and initial interpretation  and I agree with the findings.    RENATA NICHLOE MD         SYSTEM ID:  [id number]       Discharge Medications   Current Discharge Medication List      START taking these medications    Details   ciprofloxacin (CIPRO) 500 MG tablet Take 1 tablet (500 mg) by mouth 2 times daily  Qty: 60 tablet, Refills: 0    Associated Diagnoses: Polycystic liver disease; Intra-abdominal infection      metroNIDAZOLE (FLAGYL) 500 MG tablet Take 1 tablet (500 mg) by mouth 3 times daily  Qty: 90 tablet, Refills: 0    Associated Diagnoses: Polycystic liver disease; Intra-abdominal infection         CONTINUE these medications which have NOT CHANGED    Details   zolpidem (AMBIEN) 5 MG tablet Take 1 tablet (5 mg) by mouth nightly as needed for sleep (due for PCP appointment)  Qty: 10 tablet, Refills: 0    Associated Diagnoses: Insomnia, unspecified type      gabapentin (NEURONTIN) 100 MG capsule Take 1-3 capsules (100-300 mg) by mouth nightly as needed for other (hot flashes)  Qty: 30 capsule, Refills: 1    Associated Diagnoses: Menopausal syndrome (hot flashes)           Allergies   Allergies   Allergen Reactions     Bee Venom Anaphylaxis     As a teenager.     Hornet Venom Anaphylaxis     As a teenager.     Wasp Venom Protein Anaphylaxis     As a teenager.

## 2023-02-19 NOTE — PLAN OF CARE
Observation Goals         -Patient afebrile : Met   - Vital signs normal or at patient baseline: Met   - Stable LFT and CBC labs:  Met   - Diagnostic tests and consults completed and resulted if ordered: In progress   - Tolerating oral antibiotics if ordered : Not Met   - Safe disposition plan has been identified: Met

## 2023-02-19 NOTE — H&P
[facility]    History and Physical - Emergency Department Observation Service       Date of Admission:  2/18/2023    Assessment & Plan      Jade Carcamo is a 57 year old female admitted on 2/18/2023. She has history of polycystic liver disease, s/p cyst unroofing and history of ascending cholangitis, and GERD who presented to the Emergency Department with complaints of  5 days of fever and nausea. She denies any recent abdominal pain, URI symptoms, cough, sputum, shortness of breath, vomiting, diarrhea, dysuria or flank pain.    ##Fevers  ##Nausea  ##History of Cholangitis  She reports she has had a fever (Tmax 102) for five days in a row with her temperature spiking nightly with associated nausea without vomiting. She denies any abdominal pain, but does report the initial presentation is similar to when she was diagnosed with cholangitis in 2018 though currently she reports her complaints are much less severe. She contacted the GI service earlier in the week and with lab recommends which did demonstrateselevations in ALP, AST, and bilirubin. Her work up in the Emergency Department included an unremarkable CBC, without leukocytosis. Lipase and INR were WNL. Her CMP did show a mild elevation in ALP and AST, though these had mildly improved from yesterday. Her COVID/Influenza testing was negative, but her CRP was elevated at 106. Lactic Acid was normal as well. Blood cultures are pending. She was seen by the GI/Hepatology fellow as well and she also was able to have a MRCP completed. The preliminary results show an unchanged appearance to her liver, and no dilatation of the biliary system. She was able to eat meal while in ED. Her her previous cholangitis, and subjective fevers she was given an initial dose of Piperacillin / Tazobactam in the ED. She was admitted to ED observation service for IV antibiotics, serial CBC and LFT lab draws in the setting of concern for  another intraabdominal infection.  - Continue Zosyn 4.5g q6 hours  - Zofran as needed for nausea  - Gentle IV hydration overnight  - Follow blood cultures  - Monitor for fevers  - Repeat CBC, CMP, CRP in AM      ##Polycystic liver disease  MRCP completed in ED shows unchanged appearance to her liver  - CTM       Diet:   Regular  DVT Prophylaxis: Low Risk/Ambulatory with no VTE prophylaxis indicated  Puente Catheter: Not present  Lines: None     Cardiac Monitoring: None  Code Status:   Full      Disposition Plan      Expected Discharge Date: 02/19/2023                The patient's care was discussed with the Patient and Patient's Family.    ELIE Steinberg CNP  Emergency Department Observation Service  [facility]  Securely message with [employer] (more info)  Text page via [facility] Paging/Directory     ______________________________________________________________________    Chief Complaint   Fevers and Nausea    History is obtained from the patient    History of Present Illness   Jade Carcamo is a 57 year old female who presented to the Emergency Department with 5 days of fever and nausea without vomiting or abdominal pain. She had contacted the [facility] GI/Hepatology service who ordered labs that showed an elevation in her AST/ALT/ALP, with some concern for biliary obstruction. With her fevers it was recommended she present to the Emergency Department. Patient does report her current presentation is reminiscent of her previous episode of cholangitis, though much less in severity. She has been using Tylenol and Ibuprofen for her fevers, and reports decrease in her PO intake, though she was able to eat this evening in the ED. She reports she last took Tylenol around 11am.    In the ED she was afebrile, and she had an unremarkable CBC, without leukocytosis. Lipase and INR were WNL. Her CMP did show a mild elevation in ALP and AST, though these had mildly improved from  yesterday with stable kidney function and electrolytes. Her COVID/Influenza testing was negative, though her CRP was elevated at 106. Lactic Acid was normal as well. Blood cultures are pending. She was seen by the GI/Hepatology fellow as well and she also was able to have a MRCP completed without signs of obstruction. She received one dose of Zosyn 4.5mg in the ED. She was admitted to ED Observation Service for IV antibiotics, serial CBC and LFT lab draws due to concern for another intraabdominal infection.      Past Medical History    Past Medical History:   Diagnosis Date     Cholangitis 07/2018    prolonged hospitalization requiring ERCP/stenting     GERD (gastroesophageal reflux disease)      Menopausal flushing      Polycystic liver disease     in 40 years     Thyroid nodule 2018    cold, bening biopsy 12/18       Past Surgical History   Past Surgical History:   Procedure Laterality Date     ENDOSCOPIC RETROGRADE CHOLANGIOPANCREATOGRAM N/A 7/9/2018    Procedure: COMBINED ENDOSCOPIC RETROGRADE CHOLANGIOPANCREATOGRAPHY, PLACE TUBE/STENT;  Endoscopic Retrograde Cholangiopancreatogram, Biliary Sphincterotomy, Pancreatic Duct Stent Placement, and Bile Duct Stent Placement x 2;  Surgeon: Olvin Hart MD;  Location: [facility] OR     ENDOSCOPIC RETROGRADE CHOLANGIOPANCREATOGRAM N/A 9/10/2018    Procedure: COMBINED ENDOSCOPIC RETROGRADE CHOLANGIOPANCREATOGRAPHY, REMOVE FOREIGN BODY OR STENT/TUBE;  Endoscopic Retrograde Cholangiopancreatogram with Stent removal;  Surgeon: Olvin Hart MD;  Location: [facility] OR     LAPAROSCOPIC UNROOFING LIVER CYST N/A 11/4/2016    Procedure: LAPAROSCOPIC UNROOFING LIVER CYST;  Surgeon: Sonido Cruz MD;  Location: [facility] OR       Prior to Admission Medications   Prior to Admission Medications   Prescriptions Last Dose Informant Patient Reported? Taking?   gabapentin (NEURONTIN) 100 MG capsule Not Taking Self No No   Sig: Take 1-3 capsules (100-300 mg) by mouth nightly as needed  for other (hot flashes)   Patient not taking: Reported on 2/18/2023   zolpidem (AMBIEN) 5 MG tablet  at prn Self No Yes   Sig: Take 1 tablet (5 mg) by mouth nightly as needed for sleep (due for PCP appointment)      Facility-Administered Medications: None        Review of Systems    Review of Systems   Constitutional: Positive for fever.   HENT: Negative for sore throat.    Respiratory: Negative for shortness of breath.    Cardiovascular: Negative for chest pain.   Gastrointestinal: Positive for nausea. Negative for abdominal pain and vomiting.   Genitourinary: Negative for flank pain.     Physical Exam   Vital Signs: Temp: 98  F (36.7  C) Temp src: Temporal BP: 118/74 Pulse: 79   Resp: 20 SpO2: 96 % O2 Device: None (Room air)    Weight: 155 lbs 0 oz    Physical Exam  Vitals and nursing note reviewed.   HENT:      Head: Normocephalic and atraumatic.      Right Ear: External ear normal.      Left Ear: External ear normal.      Nose: Nose normal.      Mouth/Throat:      Mouth: Mucous membranes are moist.   Eyes:      Conjunctiva/sclera: Conjunctivae normal.   Cardiovascular:      Rate and Rhythm: Normal rate and regular rhythm.      Pulses: Normal pulses.      Heart sounds: Normal heart sounds.   Pulmonary:      Effort: Pulmonary effort is normal.      Breath sounds: Normal breath sounds.   Abdominal:      Palpations: Abdomen is soft.      Tenderness: There is no abdominal tenderness. There is no guarding.   Musculoskeletal:         General: Normal range of motion.      Cervical back: Normal range of motion.   Skin:     General: Skin is warm.   Neurological:      Mental Status: She is alert and oriented to person, place, and time.   Psychiatric:         Mood and Affect: Mood normal.           60 MINUTES SPENT BY ME on the date of service doing chart review, history, exam, documentation & further activities per the note.      Data     I have personally reviewed the following data over the past 24 hrs:    4.8  \   12.2    / 218     141 104 9.5 /  114 (H)   3.6 25 0.60 \       ALT: 31 AST: 37 (H) AP: 129 (H) TBILI: 1.0   ALB: 3.8 TOT PROTEIN: 6.5 LIPASE: 28       Procal: N/A CRP: 106.00 (H) Lactic Acid: 1.3       INR:  1.01 PTT:  N/A   D-dimer:  N/A Fibrinogen:  N/A       Imaging results reviewed over the past 24 hrs:   Recent Results (from the past 24 hour(s))   MR Abdomen MRCP w/o & w Contrast    Impression    RESIDENT PRELIMINARY INTERPRETATION  IMPRESSION:   1. Similar appearance of polycystic liver. No suspicious enhancing  lesions.  2.  No significant dilation of the intrahepatic and extrahepatic  biliary system.     Armando Mota APRN CNP

## 2023-02-19 NOTE — PROGRESS NOTES
DC instructions given to pt and verbalized understanding.  All belongings with pt, IV DC'd and documented. Pt discharging home, ride provided by family member

## 2023-02-19 NOTE — PROGRESS NOTES
Pt admitted to Obs unit from [facility]. Came into the room ambulatory. PRETTY notified.

## 2023-02-23 LAB
BACTERIA BLD CULT: NO GROWTH
BACTERIA BLD CULT: NO GROWTH

## 2023-02-24 ENCOUNTER — MYC MEDICAL ADVICE (OUTPATIENT)
Dept: INTERNAL MEDICINE | Facility: CLINIC | Age: 58
End: 2023-02-24

## 2023-02-24 DIAGNOSIS — K76.89 LIVER CYST: Primary | ICD-10-CM

## 2023-03-07 ENCOUNTER — MYC MEDICAL ADVICE (OUTPATIENT)
Dept: INTERNAL MEDICINE | Facility: CLINIC | Age: 58
End: 2023-03-07

## 2023-03-07 DIAGNOSIS — K76.89 LIVER CYST: Primary | ICD-10-CM

## 2023-03-17 ENCOUNTER — LAB (OUTPATIENT)
Dept: LAB | Facility: CLINIC | Age: 58
End: 2023-03-17
Payer: COMMERCIAL

## 2023-03-17 DIAGNOSIS — K76.89 LIVER CYST: ICD-10-CM

## 2023-03-17 LAB
BASOPHILS # BLD AUTO: 0.1 10E3/UL (ref 0–0.2)
BASOPHILS NFR BLD AUTO: 2 %
EOSINOPHIL # BLD AUTO: 0.1 10E3/UL (ref 0–0.7)
EOSINOPHIL NFR BLD AUTO: 2 %
ERYTHROCYTE [DISTWIDTH] IN BLOOD BY AUTOMATED COUNT: 12 % (ref 10–15)
HCT VFR BLD AUTO: 42.4 % (ref 35–47)
HGB BLD-MCNC: 13.7 G/DL (ref 11.7–15.7)
IMM GRANULOCYTES # BLD: 0 10E3/UL
IMM GRANULOCYTES NFR BLD: 0 %
LYMPHOCYTES # BLD AUTO: 1.4 10E3/UL (ref 0.8–5.3)
LYMPHOCYTES NFR BLD AUTO: 35 %
MCH RBC QN AUTO: 30.6 PG (ref 26.5–33)
MCHC RBC AUTO-ENTMCNC: 32.3 G/DL (ref 31.5–36.5)
MCV RBC AUTO: 95 FL (ref 78–100)
MONOCYTES # BLD AUTO: 0.3 10E3/UL (ref 0–1.3)
MONOCYTES NFR BLD AUTO: 8 %
NEUTROPHILS # BLD AUTO: 2.2 10E3/UL (ref 1.6–8.3)
NEUTROPHILS NFR BLD AUTO: 53 %
NRBC # BLD AUTO: 0 10E3/UL
NRBC BLD AUTO-RTO: 0 /100
PLATELET # BLD AUTO: 191 10E3/UL (ref 150–450)
RBC # BLD AUTO: 4.47 10E6/UL (ref 3.8–5.2)
WBC # BLD AUTO: 4.1 10E3/UL (ref 4–11)

## 2023-03-17 PROCEDURE — 36415 COLL VENOUS BLD VENIPUNCTURE: CPT

## 2023-03-17 PROCEDURE — 85018 HEMOGLOBIN: CPT

## 2023-04-07 ENCOUNTER — TELEPHONE (OUTPATIENT)
Dept: GASTROENTEROLOGY | Facility: CLINIC | Age: 58
End: 2023-04-07

## 2023-04-07 NOTE — TELEPHONE ENCOUNTER
Patient rescheduled to see Dr. Hobbs 23 at 9 am with labs at 830 am.    Prachi SINGH LPN  Hepatology Clinic    -------  Updated patient that writer will have to check with Dr. Hobbs to see if patient can wait until August or if  wanted to overbook to see her.    Prachi SINGH LPN  Hepatology Clinic  --------   Health Call Center    Phone Message    May a detailed message be left on voicemail: yes     Reason for Call: Other: Jade is calling in asking for a call back. Pt states that she has a  out of state that she needs to go to and will need to reschedule her  appt. Writer offered Dr. Hobbs's next available appt of , but Pt cannot wait this long due to a recent hospitalization and would like to speak with her care team about other options. Please call back as soon as possible to discuss.     Action Taken: Message routed to:  [facility] ([facility] Hep    Travel Screening: Not Applicable

## 2023-05-02 DIAGNOSIS — Q44.6 POLYCYSTIC LIVER DISEASE: Primary | ICD-10-CM

## 2023-05-09 ENCOUNTER — LAB (OUTPATIENT)
Dept: LAB | Facility: CLINIC | Age: 58
End: 2023-05-09
Payer: COMMERCIAL

## 2023-05-09 ENCOUNTER — OFFICE VISIT (OUTPATIENT)
Dept: GASTROENTEROLOGY | Facility: CLINIC | Age: 58
End: 2023-05-09
Attending: INTERNAL MEDICINE
Payer: COMMERCIAL

## 2023-05-09 VITALS
HEART RATE: 71 BPM | BODY MASS INDEX: 23.54 KG/M2 | SYSTOLIC BLOOD PRESSURE: 119 MMHG | OXYGEN SATURATION: 96 % | WEIGHT: 157.1 LBS | DIASTOLIC BLOOD PRESSURE: 83 MMHG | TEMPERATURE: 97.8 F

## 2023-05-09 DIAGNOSIS — Q44.6 POLYCYSTIC LIVER DISEASE: Primary | ICD-10-CM

## 2023-05-09 DIAGNOSIS — Q44.6 POLYCYSTIC LIVER DISEASE: ICD-10-CM

## 2023-05-09 LAB
ALBUMIN SERPL BCG-MCNC: 4.4 G/DL (ref 3.5–5.2)
ALP SERPL-CCNC: 95 U/L (ref 35–104)
ALT SERPL W P-5'-P-CCNC: 26 U/L (ref 10–35)
ANION GAP SERPL CALCULATED.3IONS-SCNC: 8 MMOL/L (ref 7–15)
AST SERPL W P-5'-P-CCNC: 48 U/L (ref 10–35)
BILIRUB DIRECT SERPL-MCNC: 0.33 MG/DL (ref 0–0.3)
BILIRUB SERPL-MCNC: 1.9 MG/DL
BUN SERPL-MCNC: 11.7 MG/DL (ref 6–20)
CALCIUM SERPL-MCNC: 9.7 MG/DL (ref 8.6–10)
CHLORIDE SERPL-SCNC: 104 MMOL/L (ref 98–107)
CREAT SERPL-MCNC: 0.81 MG/DL (ref 0.51–0.95)
CRP SERPL-MCNC: <3 MG/L
DEPRECATED HCO3 PLAS-SCNC: 29 MMOL/L (ref 22–29)
ERYTHROCYTE [DISTWIDTH] IN BLOOD BY AUTOMATED COUNT: 12.1 % (ref 10–15)
GFR SERPL CREATININE-BSD FRML MDRD: 84 ML/MIN/1.73M2
GLUCOSE SERPL-MCNC: 94 MG/DL (ref 70–99)
HCT VFR BLD AUTO: 41.3 % (ref 35–47)
HGB BLD-MCNC: 13.9 G/DL (ref 11.7–15.7)
INR PPP: 0.95 (ref 0.85–1.15)
MCH RBC QN AUTO: 30.9 PG (ref 26.5–33)
MCHC RBC AUTO-ENTMCNC: 33.7 G/DL (ref 31.5–36.5)
MCV RBC AUTO: 92 FL (ref 78–100)
PLATELET # BLD AUTO: 198 10E3/UL (ref 150–450)
POTASSIUM SERPL-SCNC: 3.7 MMOL/L (ref 3.4–5.3)
PROT SERPL-MCNC: 6.8 G/DL (ref 6.4–8.3)
RBC # BLD AUTO: 4.5 10E6/UL (ref 3.8–5.2)
SODIUM SERPL-SCNC: 141 MMOL/L (ref 136–145)
WBC # BLD AUTO: 5.2 10E3/UL (ref 4–11)

## 2023-05-09 PROCEDURE — 82248 BILIRUBIN DIRECT: CPT | Performed by: PATHOLOGY

## 2023-05-09 PROCEDURE — 86140 C-REACTIVE PROTEIN: CPT | Performed by: PATHOLOGY

## 2023-05-09 PROCEDURE — 80053 COMPREHEN METABOLIC PANEL: CPT | Performed by: PATHOLOGY

## 2023-05-09 PROCEDURE — 36415 COLL VENOUS BLD VENIPUNCTURE: CPT | Performed by: PATHOLOGY

## 2023-05-09 PROCEDURE — 99214 OFFICE O/P EST MOD 30 MIN: CPT | Mod: GC | Performed by: INTERNAL MEDICINE

## 2023-05-09 PROCEDURE — G0463 HOSPITAL OUTPT CLINIC VISIT: HCPCS | Performed by: INTERNAL MEDICINE

## 2023-05-09 PROCEDURE — 85610 PROTHROMBIN TIME: CPT | Performed by: PATHOLOGY

## 2023-05-09 PROCEDURE — 85027 COMPLETE CBC AUTOMATED: CPT | Performed by: PATHOLOGY

## 2023-05-09 ASSESSMENT — PAIN SCALES - GENERAL: PAINLEVEL: NO PAIN (0)

## 2023-05-09 NOTE — PROGRESS NOTES
SUBJECTIVE:   Jade Carcamo is a 57 year old year old female here for: Follow up of polycystic liver disease    Jade has overall been feeling well since being seen 11/2021. She had one episode of developing fevers back in 2/2023. Was evaluated in the ED. Not having any abdominal pain at that time, but CRP was considerably elevated and the overall concern for bacterial process was high. MR abdomen without clear infected cyst, but was treated for potential cyst infection. Has since felt well. No fever recurrence. Intermittent, mild RUQ pain that she describes as a friction like pain. No recent n/v. No recent loose stools.    Review of systems:  10 point ROS negative except as noted above.    Past Medical History:   Diagnosis Date     Cholangitis 07/2018    prolonged hospitalization requiring ERCP/stenting     GERD (gastroesophageal reflux disease)      Menopausal flushing      Polycystic liver disease     in 40 years     Thyroid nodule 2018    cold, bening biopsy 12/18        ciprofloxacin (CIPRO) 500 MG tablet, Take 1 tablet (500 mg) by mouth 2 times daily (Patient not taking: Reported on 5/9/2023)  gabapentin (NEURONTIN) 100 MG capsule, Take 1-3 capsules (100-300 mg) by mouth nightly as needed for other (hot flashes) (Patient not taking: Reported on 2/18/2023)  metroNIDAZOLE (FLAGYL) 500 MG tablet, Take 1 tablet (500 mg) by mouth 3 times daily (Patient not taking: Reported on 5/9/2023)  zolpidem (AMBIEN) 5 MG tablet, Take 1 tablet (5 mg) by mouth nightly as needed for sleep (due for PCP appointment) (Patient not taking: Reported on 5/9/2023)    No current facility-administered medications on file prior to visit.       OBJECTIVE:  Physical exam:  /83   Pulse 71   Temp 97.8  F (36.6  C) (Oral)   Wt 71.3 kg (157 lb 1.6 oz)   LMP 11/14/2016   SpO2 96%   BMI 23.54 kg/m    Body mass index is 23.54 kg/m .   Constitutional: No acute distress  Eyes: Sclera anicteric  Ears/nose/mouth/throat: Hearing intact on  gross exam  CV: Extremities wwp. No appreciable LE edema  Respiratory: Unlabored breathing  Abdomen: Mild distension with hepatomegaly, nontender, no peritoneal signs  Skin: warm, perfused, no jaundice  Neuro: AAO x 3  Psych: Normal affect  MSK: In bed. Moves all 4 extremities spontaneously    Tbili (1.9), AST (48), CRP (<3.00)    ASSESSMENT and PLAN:   Jade was seen today for recheck.    Diagnoses and all orders for this visit:    Polycystic liver disease  -     CRP inflammation; Future    Other orders  -     Adult GI Clinic Follow-Up Order (Blank); Future      Jade appears to be doing well. Her MR abdomen was reviewed and showed no clear sign of infection or significant progression of her cysts. Having a little bit of occasional discomfort, but it is manageable. At this point, no indication for any further deroofing procedures unless change in clinical status. I do think recurrent episodes of infection may be suspected with her cyst burden and did discuss this with her. Slight increase in her tbili (1.9) and AST (48), which appear similar compared to previously. Her CRP has normalized. Continued clinical monitoring appropriate at this time with visits every couple of years given how well she is doing.     Return to clinic in 2 years    Patient seen and discussed with Dr. Hobbs who agrees with this plan.

## 2023-05-09 NOTE — NURSING NOTE
Chief Complaint   Patient presents with     RECHECK       /83   Pulse 71   Temp 97.8  F (36.6  C) (Oral)   Wt 71.3 kg (157 lb 1.6 oz)   LMP 11/14/2016   SpO2 96%   BMI 23.54 kg/m      Ronnell Miller on 5/9/2023 at 8:55 AM

## 2023-05-09 NOTE — LETTER
5/9/2023         RE: Jade Carcamo  [address] [id number]        Dear Colleague,    Thank you for referring your patient, Jade Carcamo, to the [facility] [place]. Please see a copy of my visit note below.    SUBJECTIVE:   Jade Carcamo is a 57 year old year old female here for: Follow up of polycystic liver disease    Jade has overall been feeling well since being seen 11/2021. She had one episode of developing fevers back in 2/2023. Was evaluated in the ED. Not having any abdominal pain at that time, but CRP was considerably elevated and the overall concern for bacterial process was high. MR abdomen without clear infected cyst, but was treated for potential cyst infection. Has since felt well. No fever recurrence. Intermittent, mild RUQ pain that she describes as a friction like pain. No recent n/v. No recent loose stools.    Review of systems:  10 point ROS negative except as noted above.    Past Medical History:   Diagnosis Date    Cholangitis 07/2018    prolonged hospitalization requiring ERCP/stenting    GERD (gastroesophageal reflux disease)     Menopausal flushing     Polycystic liver disease     in 40 years    Thyroid nodule 2018    cold, bening biopsy 12/18        ciprofloxacin (CIPRO) 500 MG tablet, Take 1 tablet (500 mg) by mouth 2 times daily (Patient not taking: Reported on 5/9/2023)  gabapentin (NEURONTIN) 100 MG capsule, Take 1-3 capsules (100-300 mg) by mouth nightly as needed for other (hot flashes) (Patient not taking: Reported on 2/18/2023)  metroNIDAZOLE (FLAGYL) 500 MG tablet, Take 1 tablet (500 mg) by mouth 3 times daily (Patient not taking: Reported on 5/9/2023)  zolpidem (AMBIEN) 5 MG tablet, Take 1 tablet (5 mg) by mouth nightly as needed for sleep (due for PCP appointment) (Patient not taking: Reported on 5/9/2023)    No current facility-administered medications on file prior to visit.       OBJECTIVE:  Physical exam:  /83    Pulse 71   Temp 97.8  F (36.6  C) (Oral)   Wt 71.3 kg (157 lb 1.6 oz)   LMP 11/14/2016   SpO2 96%   BMI 23.54 kg/m    Body mass index is 23.54 kg/m .   Constitutional: No acute distress  Eyes: Sclera anicteric  Ears/nose/mouth/throat: Hearing intact on gross exam  CV: Extremities wwp. No appreciable LE edema  Respiratory: Unlabored breathing  Abdomen: Mild distension with hepatomegaly, nontender, no peritoneal signs  Skin: warm, perfused, no jaundice  Neuro: AAO x 3  Psych: Normal affect  MSK: In bed. Moves all 4 extremities spontaneously    Tbili (1.9), AST (48), CRP (<3.00)    ASSESSMENT and PLAN:   Jade was seen today for recheck.    Diagnoses and all orders for this visit:    Polycystic liver disease  -     CRP inflammation; Future    Other orders  -     Adult GI Clinic Follow-Up Order (Blank); Future      Jade appears to be doing well. Her MR abdomen was reviewed and showed no clear sign of infection or significant progression of her cysts. Having a little bit of occasional discomfort, but it is manageable. At this point, no indication for any further deroofing procedures unless change in clinical status. I do think recurrent episodes of infection may be suspected with her cyst burden and did discuss this with her. Slight increase in her tbili (1.9) and AST (48), which appear similar compared to previously. Her CRP has normalized. Continued clinical monitoring appropriate at this time with visits every couple of years given how well she is doing.     Return to clinic in 2 years    Patient seen and discussed with Dr. Hobbs who agrees with this plan.      Attestation signed by Davis Hobbs MD at 5/9/2023 12:18 PM:  The patient was seen and examined.  The above assessment and plan was developed jointly with the resident.      I did spend a total of 30 minutes (on the date of the encounter), including 20 minutes of face-to-face clinic time including counseling. The rest of the time was spent in documentation  and review of records.     Thank you very much for allowing me to participate in the care of this patient.  If you have any questions regarding recommendations, please do not hesitate to contact me.         Davis Hobbs MD      Professor of Medicine  [employer]      Executive Medical Director, Solid Organ Transplant Program  [facility]

## 2023-05-31 ENCOUNTER — HOSPITAL ENCOUNTER (OUTPATIENT)
Dept: ULTRASOUND IMAGING | Facility: CLINIC | Age: 58
Discharge: HOME | End: 2023-05-31
Attending: INTERNAL MEDICINE | Admitting: INTERNAL MEDICINE
Payer: COMMERCIAL

## 2023-05-31 DIAGNOSIS — E04.1 THYROID NODULE: ICD-10-CM

## 2023-05-31 PROCEDURE — 76536 US EXAM OF HEAD AND NECK: CPT

## 2023-07-11 ENCOUNTER — MYC MEDICAL ADVICE (OUTPATIENT)
Dept: INTERNAL MEDICINE | Facility: CLINIC | Age: 58
End: 2023-07-11

## 2023-08-10 ENCOUNTER — E-VISIT (OUTPATIENT)
Dept: INTERNAL MEDICINE | Facility: CLINIC | Age: 58
End: 2023-08-10
Payer: COMMERCIAL

## 2023-08-10 DIAGNOSIS — Z83.79 FAMILY HISTORY OF CELIAC DISEASE: Primary | ICD-10-CM

## 2023-08-10 PROCEDURE — 99423 OL DIG E/M SVC 21+ MIN: CPT | Performed by: INTERNAL MEDICINE

## 2023-08-10 NOTE — PATIENT INSTRUCTIONS
Thank you for choosing us for your care. Given your symptoms, I would like you to do a lab-only visit to determine what is causing them.  I have placed the orders.  Please schedule an appointment with the lab right here in [place] [id number], or call [phone].  I will let you know when the results are back and next steps to take.

## 2023-10-26 ENCOUNTER — MYC REFILL (OUTPATIENT)
Dept: INTERNAL MEDICINE | Facility: CLINIC | Age: 58
End: 2023-10-26

## 2023-10-26 DIAGNOSIS — G47.00 INSOMNIA, UNSPECIFIED TYPE: ICD-10-CM

## 2023-10-27 RX ORDER — ZOLPIDEM TARTRATE 5 MG/1
5 TABLET ORAL
Qty: 18 TABLET | Refills: 0 | Status: SHIPPED | OUTPATIENT
Start: 2023-10-27 | End: 2024-01-08

## 2024-01-08 ENCOUNTER — OFFICE VISIT (OUTPATIENT)
Dept: INTERNAL MEDICINE | Facility: CLINIC | Age: 59
End: 2024-01-08
Payer: COMMERCIAL

## 2024-01-08 ENCOUNTER — LAB (OUTPATIENT)
Dept: LAB | Facility: CLINIC | Age: 59
End: 2024-01-08
Payer: COMMERCIAL

## 2024-01-08 VITALS
DIASTOLIC BLOOD PRESSURE: 78 MMHG | WEIGHT: 153.6 LBS | BODY MASS INDEX: 23.28 KG/M2 | HEART RATE: 67 BPM | HEIGHT: 68 IN | OXYGEN SATURATION: 98 % | SYSTOLIC BLOOD PRESSURE: 114 MMHG

## 2024-01-08 DIAGNOSIS — Z13.220 SCREENING FOR HYPERLIPIDEMIA: ICD-10-CM

## 2024-01-08 DIAGNOSIS — E04.1 THYROID NODULE: ICD-10-CM

## 2024-01-08 DIAGNOSIS — G47.00 INSOMNIA, UNSPECIFIED TYPE: ICD-10-CM

## 2024-01-08 DIAGNOSIS — Z23 NEED FOR PNEUMOCOCCAL 20-VALENT CONJUGATE VACCINATION: ICD-10-CM

## 2024-01-08 DIAGNOSIS — Q44.6 POLYCYSTIC LIVER DISEASE: ICD-10-CM

## 2024-01-08 DIAGNOSIS — Z00.00 ROUTINE GENERAL MEDICAL EXAMINATION AT A HEALTH CARE FACILITY: Primary | ICD-10-CM

## 2024-01-08 DIAGNOSIS — Z12.4 SCREENING FOR CERVICAL CANCER: ICD-10-CM

## 2024-01-08 DIAGNOSIS — Z83.79 FAMILY HISTORY OF CELIAC DISEASE: ICD-10-CM

## 2024-01-08 LAB
ALBUMIN SERPL BCG-MCNC: 4.4 G/DL (ref 3.5–5.2)
ALP SERPL-CCNC: 105 U/L (ref 40–150)
ALT SERPL W P-5'-P-CCNC: 20 U/L (ref 0–50)
ANION GAP SERPL CALCULATED.3IONS-SCNC: 8 MMOL/L (ref 7–15)
AST SERPL W P-5'-P-CCNC: 31 U/L (ref 0–45)
BILIRUB SERPL-MCNC: 1.3 MG/DL
BUN SERPL-MCNC: 14.6 MG/DL (ref 8–23)
CALCIUM SERPL-MCNC: 9.7 MG/DL (ref 8.6–10)
CHLORIDE SERPL-SCNC: 105 MMOL/L (ref 98–107)
CHOLEST SERPL-MCNC: 215 MG/DL
CREAT SERPL-MCNC: 0.71 MG/DL (ref 0.51–0.95)
DEPRECATED HCO3 PLAS-SCNC: 29 MMOL/L (ref 22–29)
EGFRCR SERPLBLD CKD-EPI 2021: >90 ML/MIN/1.73M2
FASTING STATUS PATIENT QL REPORTED: ABNORMAL
GLUCOSE SERPL-MCNC: 93 MG/DL (ref 70–99)
HDLC SERPL-MCNC: 69 MG/DL
LDLC SERPL CALC-MCNC: 136 MG/DL
NONHDLC SERPL-MCNC: 146 MG/DL
POTASSIUM SERPL-SCNC: 4.4 MMOL/L (ref 3.4–5.3)
PROT SERPL-MCNC: 6.8 G/DL (ref 6.4–8.3)
SODIUM SERPL-SCNC: 142 MMOL/L (ref 135–145)
TRIGL SERPL-MCNC: 52 MG/DL
TSH SERPL DL<=0.005 MIU/L-ACNC: 3.71 UIU/ML (ref 0.3–4.2)

## 2024-01-08 PROCEDURE — 99000 SPECIMEN HANDLING OFFICE-LAB: CPT | Performed by: PATHOLOGY

## 2024-01-08 PROCEDURE — G0145 SCR C/V CYTO,THINLAYER,RESCR: HCPCS | Performed by: INTERNAL MEDICINE

## 2024-01-08 PROCEDURE — 99396 PREV VISIT EST AGE 40-64: CPT | Mod: 25 | Performed by: INTERNAL MEDICINE

## 2024-01-08 PROCEDURE — 84443 ASSAY THYROID STIM HORMONE: CPT | Performed by: PATHOLOGY

## 2024-01-08 PROCEDURE — 90677 PCV20 VACCINE IM: CPT | Performed by: INTERNAL MEDICINE

## 2024-01-08 PROCEDURE — 90471 IMMUNIZATION ADMIN: CPT | Performed by: INTERNAL MEDICINE

## 2024-01-08 PROCEDURE — 80061 LIPID PANEL: CPT | Performed by: PATHOLOGY

## 2024-01-08 PROCEDURE — 80053 COMPREHEN METABOLIC PANEL: CPT | Performed by: PATHOLOGY

## 2024-01-08 PROCEDURE — 36415 COLL VENOUS BLD VENIPUNCTURE: CPT | Performed by: PATHOLOGY

## 2024-01-08 PROCEDURE — 87624 HPV HI-RISK TYP POOLED RSLT: CPT | Performed by: INTERNAL MEDICINE

## 2024-01-08 PROCEDURE — 86364 TISS TRNSGLTMNASE EA IG CLAS: CPT | Performed by: INTERNAL MEDICINE

## 2024-01-08 RX ORDER — ZOLPIDEM TARTRATE 5 MG/1
5 TABLET ORAL
Qty: 18 TABLET | Refills: 0 | Status: SHIPPED | OUTPATIENT
Start: 2024-01-08

## 2024-01-08 NOTE — PROGRESS NOTES
Jade is a 58 year old that presents in clinic today for the following:     Chief Complaint   Patient presents with    Physical           2024     8:58 AM   Additional Questions   Roomed by MR       Screenings as of today     PHQ-2 Total Score (Adult) - Positive if 3 or more points; Administer   PHQ-9 if positive 0        [place], EMT at 9:02 AM on 2024      History of Present Illness:  Ms. Carcamo is a 58 year old female who presents for  Chief Complaint   Patient presents with    Physical     Pt would like to discuss shingles, HPV/pap smear     PMH of melanoma in situ foot, thyroid nodule with benign FNA, PLD c/b cholangitis and stenting, menopausal syndrome, RLS, benign breast cyst here for physical.    [facility]    Liver has been stable, last hospitalization was , took abx for possible infected hepatic cyst  Follow up with Dr. Hobbs was unremarkable    She has seen Endo, Thyroid last biopsy was , Benign,   Consistent with a benign nodule (includes adenomatoid nodule, colloid nodule, etc.)    Dr. Musa is her Derm, has appt in Feb     Still getting some hot flashes, feels warm at night, LMP at time of surgery age 49 yo, tried lexapro in past, low dose progesterone, no longer taking    Has seen therapist, Dr. Willams    Exercising, pickle ball, weights, joined gym    The 10-year ASCVD risk score (Rogelio DK, et al., 2019) is: 1.9%    Values used to calculate the score:      Age: 58 years      Sex: Female      Is Non- : No      Diabetic: No      Tobacco smoker: No      Systolic Blood Pressure: 114 mmHg      Is BP treated: No      HDL Cholesterol: 74 mg/dL      Total Cholesterol: 195 mg/dL      GYN    3 daughters aged 24-27 yo   x 3  Remote abnormal pap  Menopausal, hot flashes present  No gyn malignancies except one paternal aunt with breast ca    Routine Health Maintenence:    Colonoscopy (50-75  "yrs):  3/15 hemorrhoids, repeat 10 years  Dexa (>65W or 70M yrs): n/a  Mammogram (40-75 yrs): does at [facility]  Pap (21-65 yrs): 11/21 NILM, HPV neg  HPV neg  Lab Results   Component Value Date    PAP NIL 12/21/2018     Last 6/16 NIL  GC/Chlam (<25 yrs): n/a  HIV/HCV if risk factors: both neg 7/18  Tob/EtOH: n/a  Lifestyle factors: reviewed  Advanced Directive: deferred      Review of external notes as documented above       A detailed Review of Systems was performed, verified and is negative except as documented in the HPI.  All health questionnaires were reviewed, verified and relevant information documented above.      Past Medical History:  Past Medical History:   Diagnosis Date    Cholangitis (H28) 07/2018    prolonged hospitalization requiring ERCP/stenting    GERD (gastroesophageal reflux disease)     Menopausal flushing     Polycystic liver disease     in 40 years    Thyroid nodule 2018    cold, bening biopsy 12/18       Active Meds:  Current Outpatient Medications   Medication    ciprofloxacin (CIPRO) 500 MG tablet    gabapentin (NEURONTIN) 100 MG capsule    metroNIDAZOLE (FLAGYL) 500 MG tablet    zolpidem (AMBIEN) 5 MG tablet     No current facility-administered medications for this visit.        Allergies:  Reviewed, refer to EMR    Relevant Social History:  Social History     Tobacco Use    Smoking status: Never    Smokeless tobacco: Never   Substance Use Topics    Alcohol use: Yes     Comment: 2 per week    Drug use: No        Physical Exam:  Vitals: /78 (BP Location: Right arm, Patient Position: Sitting, Cuff Size: Adult Regular)   Pulse 67   Ht 1.73 m (5' 8.11\")   Wt 69.7 kg (153 lb 9.6 oz)   LMP 11/14/2016   SpO2 98%   BMI 23.28 kg/m    Constitutional: Alert, oriented, pleasant, no acute distress  Head: Normocephalic, atraumatic  Eyes: Extra-ocular movements intact, pupils equally round bilaterally, no scleral icterus  ENT: Oropharynx clear, moist mucus membranes, good dentition  Neck: " Supple, no lymphadenopathy, L sided thyroid nodule present  Cardiovascular: Regular rate and rhythm, no murmurs, rubs or gallops, peripheral pulses full/symmetric  Respiratory: Good air movement bilaterally, lungs clear, no wheezes/rales/rhonchi  GI: Abdomen soft, bowel sounds present, nondistended, nontender, hepatomegaly noted, no rebound/guarding  Vulva: No external lesions, normal hair distribution, no adenopathy  Vagina: Moist, pink, no abnormal discharge, well rugated, no lesions, urethra normal, perineum normal  Cervix: Pap smear is taken, parous, smooth, pink, no visible lesions  Musculoskeletal: No edema, normal muscle tone, normal gait  Neurologic: Alert and oriented, cranial nerves 2-12 intact, grossly non-focal  Skin: No rashes/lesions, benign appearing nevus L anterior chesrt  Psychiatric: normal mentation, affect and mood      Diagnostics:  Labs reviewed in Epic          Assessment and Plan:  Jade was seen today for physical.    Diagnoses and all orders for this visit:    Routine general medical examination at a health care facility  -     REVIEW OF HEALTH MAINTENANCE PROTOCOL ORDERS    Insomnia, unspecified type  -     zolpidem (AMBIEN) 5 MG tablet; Take 1 tablet (5 mg) by mouth nightly as needed for sleep Due for PCP appt    Need for pneumococcal 20-valent conjugate vaccination  -     Pneumococcal 20 Valent Conjugate (Prevnar 20)    Screening for hyperlipidemia  -     Lipid panel reflex to direct LDL Fasting; Future    Thyroid nodule  -     TSH with free T4 reflex; Future    Polycystic liver disease  -     Comprehensive metabolic panel (BMP + Alb, Alk Phos, ALT, AST, Total. Bili, TP); Future    Screening for cervical cancer  -     Pap screen with HPV - recommended age 30 - 65 years    Advised Derm follow-up given  hx of melanoma      Marina Owens MD  Internal Medicine

## 2024-01-09 LAB
TTG IGA SER-ACNC: 0.8 U/ML
TTG IGG SER-ACNC: 1.1 U/ML

## 2024-01-10 LAB
BKR LAB AP GYN ADEQUACY: NORMAL
BKR LAB AP GYN INTERPRETATION: NORMAL
BKR LAB AP HPV REFLEX: NORMAL
BKR LAB AP PREVIOUS ABNORMAL: NORMAL
PATH REPORT.COMMENTS IMP SPEC: NORMAL
PATH REPORT.COMMENTS IMP SPEC: NORMAL
PATH REPORT.RELEVANT HX SPEC: NORMAL

## 2024-01-11 LAB
HUMAN PAPILLOMA VIRUS 16 DNA: NEGATIVE
HUMAN PAPILLOMA VIRUS 18 DNA: NEGATIVE
HUMAN PAPILLOMA VIRUS FINAL DIAGNOSIS: NORMAL
HUMAN PAPILLOMA VIRUS OTHER HR: NEGATIVE

## 2024-01-19 ENCOUNTER — MYC MEDICAL ADVICE (OUTPATIENT)
Dept: INTERNAL MEDICINE | Facility: CLINIC | Age: 59
End: 2024-01-19
Payer: COMMERCIAL

## 2024-01-22 ENCOUNTER — LAB (OUTPATIENT)
Dept: LAB | Facility: CLINIC | Age: 59
End: 2024-01-22
Payer: COMMERCIAL

## 2024-01-22 ENCOUNTER — ANCILLARY PROCEDURE (OUTPATIENT)
Dept: CT IMAGING | Facility: CLINIC | Age: 59
End: 2024-01-22
Attending: INTERNAL MEDICINE
Payer: COMMERCIAL

## 2024-01-22 ENCOUNTER — OFFICE VISIT (OUTPATIENT)
Dept: INTERNAL MEDICINE | Facility: CLINIC | Age: 59
End: 2024-01-22
Payer: COMMERCIAL

## 2024-01-22 VITALS
BODY MASS INDEX: 22.91 KG/M2 | HEART RATE: 76 BPM | OXYGEN SATURATION: 98 % | DIASTOLIC BLOOD PRESSURE: 82 MMHG | HEIGHT: 68 IN | SYSTOLIC BLOOD PRESSURE: 121 MMHG | WEIGHT: 151.2 LBS

## 2024-01-22 DIAGNOSIS — R50.9 FEVER, UNSPECIFIED FEVER CAUSE: ICD-10-CM

## 2024-01-22 DIAGNOSIS — Q44.6 POLYCYSTIC LIVER DISEASE: ICD-10-CM

## 2024-01-22 DIAGNOSIS — Q44.6 POLYCYSTIC LIVER DISEASE: Primary | ICD-10-CM

## 2024-01-22 LAB
ALBUMIN SERPL BCG-MCNC: 4.4 G/DL (ref 3.5–5.2)
ALP SERPL-CCNC: 113 U/L (ref 40–150)
ALT SERPL W P-5'-P-CCNC: 19 U/L (ref 0–50)
ANION GAP SERPL CALCULATED.3IONS-SCNC: 11 MMOL/L (ref 7–15)
AST SERPL W P-5'-P-CCNC: 26 U/L (ref 0–45)
BASOPHILS # BLD AUTO: 0.1 10E3/UL (ref 0–0.2)
BASOPHILS NFR BLD AUTO: 1 %
BILIRUB SERPL-MCNC: 2.1 MG/DL
BUN SERPL-MCNC: 10 MG/DL (ref 8–23)
CALCIUM SERPL-MCNC: 9.8 MG/DL (ref 8.6–10)
CHLORIDE SERPL-SCNC: 101 MMOL/L (ref 98–107)
CREAT SERPL-MCNC: 0.68 MG/DL (ref 0.51–0.95)
CRP SERPL-MCNC: 65.1 MG/L
DEPRECATED HCO3 PLAS-SCNC: 28 MMOL/L (ref 22–29)
EGFRCR SERPLBLD CKD-EPI 2021: >90 ML/MIN/1.73M2
EOSINOPHIL # BLD AUTO: 0 10E3/UL (ref 0–0.7)
EOSINOPHIL NFR BLD AUTO: 1 %
ERYTHROCYTE [DISTWIDTH] IN BLOOD BY AUTOMATED COUNT: 11.6 % (ref 10–15)
GLUCOSE SERPL-MCNC: 93 MG/DL (ref 70–99)
HCT VFR BLD AUTO: 40.3 % (ref 35–47)
HGB BLD-MCNC: 13.8 G/DL (ref 11.7–15.7)
IMM GRANULOCYTES # BLD: 0 10E3/UL
IMM GRANULOCYTES NFR BLD: 0 %
LYMPHOCYTES # BLD AUTO: 0.9 10E3/UL (ref 0.8–5.3)
LYMPHOCYTES NFR BLD AUTO: 15 %
MCH RBC QN AUTO: 31 PG (ref 26.5–33)
MCHC RBC AUTO-ENTMCNC: 34.2 G/DL (ref 31.5–36.5)
MCV RBC AUTO: 91 FL (ref 78–100)
MONOCYTES # BLD AUTO: 0.6 10E3/UL (ref 0–1.3)
MONOCYTES NFR BLD AUTO: 11 %
NEUTROPHILS # BLD AUTO: 4.3 10E3/UL (ref 1.6–8.3)
NEUTROPHILS NFR BLD AUTO: 72 %
NRBC # BLD AUTO: 0 10E3/UL
NRBC BLD AUTO-RTO: 0 /100
PLATELET # BLD AUTO: 254 10E3/UL (ref 150–450)
POTASSIUM SERPL-SCNC: 3.5 MMOL/L (ref 3.4–5.3)
PROT SERPL-MCNC: 7.6 G/DL (ref 6.4–8.3)
RBC # BLD AUTO: 4.45 10E6/UL (ref 3.8–5.2)
SODIUM SERPL-SCNC: 140 MMOL/L (ref 135–145)
WBC # BLD AUTO: 6 10E3/UL (ref 4–11)

## 2024-01-22 PROCEDURE — 74177 CT ABD & PELVIS W/CONTRAST: CPT | Mod: GC | Performed by: RADIOLOGY

## 2024-01-22 PROCEDURE — 85025 COMPLETE CBC W/AUTO DIFF WBC: CPT | Performed by: PATHOLOGY

## 2024-01-22 PROCEDURE — 99417 PROLNG OP E/M EACH 15 MIN: CPT | Performed by: INTERNAL MEDICINE

## 2024-01-22 PROCEDURE — 99215 OFFICE O/P EST HI 40 MIN: CPT | Performed by: INTERNAL MEDICINE

## 2024-01-22 PROCEDURE — 99000 SPECIMEN HANDLING OFFICE-LAB: CPT | Performed by: PATHOLOGY

## 2024-01-22 PROCEDURE — 86140 C-REACTIVE PROTEIN: CPT | Performed by: PATHOLOGY

## 2024-01-22 PROCEDURE — 87040 BLOOD CULTURE FOR BACTERIA: CPT | Mod: 59 | Performed by: INTERNAL MEDICINE

## 2024-01-22 PROCEDURE — 36415 COLL VENOUS BLD VENIPUNCTURE: CPT | Performed by: PATHOLOGY

## 2024-01-22 PROCEDURE — 80053 COMPREHEN METABOLIC PANEL: CPT | Performed by: PATHOLOGY

## 2024-01-22 RX ORDER — METRONIDAZOLE 500 MG/1
500 TABLET ORAL 3 TIMES DAILY
Qty: 30 TABLET | Refills: 0 | Status: SHIPPED | OUTPATIENT
Start: 2024-01-22 | End: 2024-02-01

## 2024-01-22 RX ORDER — CIPROFLOXACIN 500 MG/1
500 TABLET, FILM COATED ORAL 2 TIMES DAILY
Qty: 20 TABLET | Refills: 0 | Status: SHIPPED | OUTPATIENT
Start: 2024-01-22 | End: 2024-02-01

## 2024-01-22 RX ORDER — IOPAMIDOL 755 MG/ML
82 INJECTION, SOLUTION INTRAVASCULAR ONCE
Status: COMPLETED | OUTPATIENT
Start: 2024-01-22 | End: 2024-01-22

## 2024-01-22 RX ADMIN — IOPAMIDOL 82 ML: 755 INJECTION, SOLUTION INTRAVASCULAR at 11:33

## 2024-01-22 NOTE — PROGRESS NOTES
Assessment & Plan     Polycystic liver disease  Has Hx of Polycystic liver disease with previous hospitalization for IV ABX as well as outpatient therapy.  Current symptoms of low grade fever coupled with an elevated CRP are similar to previous exacerbations of her PCLD course, suspicious for ruptured cyst versus infection versus mild atypical cholangitis. Patient hemodynamically stable, no SIRs criteria. Discussed with hepatology who agreed with outpatient management. Patient given return to clinic/ER precautions. Will d/w Dr. Hobbs for ongoing GI management.  - CRP, inflammation - levels grossly elevated (65.10)  indicative of an infectious process.    - Blood Culture Peripheral Blood - results pending, if positive will adjust antibiotics for sensitivities  - CBC with platelets and differential - Unramarkable  - Comprehensive metabolic panel (BMP + Alb, Alk Phos, ALT, AST, Total. Bili, TP) Total Bili elevated (2.1)  - metroNIDAZOLE (FLAGYL) 500 MG tablet  Dispense: 30 tablet; Refill: This will cover for anaerobes since infection may be of GI origin  - ciprofloxacin (CIPRO) 500 MG tablet  Dispense: 20 tablet; Refill: Tis E coli and klebsiella because infection of the liver cysts may be due to GI bacteria origin  Fever, unspecified fever cause    - CRP, inflammation  - Blood Culture Peripheral Blood  - Blood Culture Peripheral Blood  - CBC with platelets and differential  - Comprehensive metabolic panel (BMP + Alb, Alk Phos, ALT, AST, Total. Bili, TP)  - metroNIDAZOLE (FLAGYL) 500 MG tablet  Dispense: 30 tablet; Refill: 0  - ciprofloxacin (CIPRO) 500 MG tablet  Dispense: 20 tablet; Refill: 0      No follow-ups on file.    Octavio Hansen is a 58 year old, presenting for the following health issues:  Fever (Pt reports having fevers during the day and night for the past two weeks. Fevers are low grade. Since about Thursday and Friday the fevers have been more consistent. )      1/22/2024     9:22 AM   Additional  "Questions   Roomed by Dharmeshpanda Catamy   Accompanied by  Shlomo MULLEN   Fevers:  Onset: Jan 9th at  NOC only.  On Jan 18th progressed to during the day as well  Value:100-100.4 F Oral.    Frequency:  Daily  Alleviating factors: Ibuprofen 400mg 1-2 times per day.  Temps decrease after Ibuprofen.  Last ibuprofen dose 400mg was 1/22/24 0500am   Denies any other associated symptoms occurring with fevers.    One year ago was hospitalized on IV abx.    Worried about CA, wants to be tested for the inflammatory marker.            Review of Systems    CONSTITUTIONAL: NEGATIVE for change in weight.  Having fevers daily  INTEGUMENTARY/SKIN: NEGATIVE for worrisome rashes, moles or lesions  EYES: NEGATIVE for vision changes or irritation  ENT/MOUTH: NEGATIVE for ear, mouth and throat problems  RESP: NEGATIVE for significant cough or SOB  BREAST: NEGATIVE for masses, tenderness or discharge  CV: NEGATIVE for chest pain, palpitations or peripheral edema  GI: NEGATIVE for nausea, abdominal pain, heartburn, or change in bowel habits  : NEGATIVE for frequency, dysuria, or hematuria  MUSCULOSKELETAL: NEGATIVE for significant arthralgias or myalgia  NEURO: NEGATIVE for weakness, dizziness or paresthesias  ENDOCRINE: NEGATIVE for temperature intolerance, skin/hair changes  HEME: NEGATIVE for bleeding problems  PSYCHIATRIC: NEGATIVE for changes in mood or affect          Objective    /82 (BP Location: Right arm, Patient Position: Sitting, Cuff Size: Adult Regular)   Pulse 76   Ht 1.73 m (5' 8.11\")   Wt 68.6 kg (151 lb 3.2 oz)   LMP 11/14/2016   SpO2 98%   BMI 22.92 kg/m    Body mass index is 22.92 kg/m .    Physical Exam   GENERAL: alert and no distress  EYES: Eyes grossly normal to inspection, PERRL and conjunctivae and sclerae normal  HENT: ear canals and TM's normal, nose and mouth without ulcers or lesions  NECK: no adenopathy, no asymmetry, masses, or scars  RESP: lungs clear to auscultation - no rales, rhonchi " or wheezes  CV: regular rate and rhythm, normal S1 S2, no S3 or S4, no murmur, click or rub, no peripheral edema  ABDOMEN: soft, RUQ tender to palpation,  Liver borders enlarged. no masses and bowel sounds normal  MS: no gross musculoskeletal defects noted, no edema  SKIN: no suspicious lesions or rashes  NEURO: Normal strength and tone, mentation intact and speech normal  PSYCH: mentation appears normal, affect normal/bright  LYMPH: no cervical, supraclavicular, axillary, or inguinal adenopathy            Signed Electronically by: Marina Owens MD       :[id number]}  LEXIE Pitts Student    Attending Addendum:  The NP acted as scribe.  The patient was seen and examined with medical student.  The history and physical were independently verified by myself.  The above documentation represents our joint assessment and plan.  Marina Owens MD  Internal Medicine    >60 minutes spent today performing chart review, history and exam, documentation and further activities as noted above, exclusive of any procedures or EKG interpretation

## 2024-01-22 NOTE — PROGRESS NOTES
The 10-year ASCVD risk score (Rogelio GRIDER, et al., 2019) is: 2.4%    Values used to calculate the score:      Age: 58 years      Sex: Female      Is Non- : No      Diabetic: No      Tobacco smoker: No      Systolic Blood Pressure: 121 mmHg      Is BP treated: No      HDL Cholesterol: 69 mg/dL      Total Cholesterol: 215 mg/dL

## 2024-01-27 LAB
BACTERIA BLD CULT: NO GROWTH
BACTERIA BLD CULT: NO GROWTH

## 2024-02-27 ENCOUNTER — OFFICE VISIT (OUTPATIENT)
Dept: GASTROENTEROLOGY | Facility: CLINIC | Age: 59
End: 2024-02-27
Attending: INTERNAL MEDICINE
Payer: COMMERCIAL

## 2024-02-27 VITALS
TEMPERATURE: 97.9 F | DIASTOLIC BLOOD PRESSURE: 89 MMHG | HEIGHT: 68 IN | RESPIRATION RATE: 16 BRPM | OXYGEN SATURATION: 98 % | BODY MASS INDEX: 22.63 KG/M2 | HEART RATE: 65 BPM | WEIGHT: 149.3 LBS | SYSTOLIC BLOOD PRESSURE: 132 MMHG

## 2024-02-27 DIAGNOSIS — Q44.6 POLYCYSTIC LIVER DISEASE: Primary | ICD-10-CM

## 2024-02-27 PROCEDURE — G0463 HOSPITAL OUTPT CLINIC VISIT: HCPCS | Performed by: INTERNAL MEDICINE

## 2024-02-27 PROCEDURE — 99214 OFFICE O/P EST MOD 30 MIN: CPT | Performed by: INTERNAL MEDICINE

## 2024-02-27 ASSESSMENT — PAIN SCALES - GENERAL: PAINLEVEL: NO PAIN (0)

## 2024-02-27 NOTE — NURSING NOTE
"Chief Complaint   Patient presents with    RECHECK     Polycystic liver disease      Vital signs:  Temp: 97.9  F (36.6  C) Temp src: Oral BP: 132/89 Pulse: 65   Resp: 16 SpO2: 98 %     Height: 172.5 cm (5' 7.91\") (shoe off) Weight: 67.7 kg (149 lb 4.8 oz)  Estimated body mass index is 22.76 kg/m  as calculated from the following:    Height as of this encounter: 1.725 m (5' 7.91\").    Weight as of this encounter: 67.7 kg (149 lb 4.8 oz).      Andreia Belle, [facility]  2/27/2024 8:35 AM    "

## 2024-02-27 NOTE — LETTER
"    2/27/2024         RE: Jade Carcamo  [address]  [place] [address]        Dear Colleague,    Thank you for referring your patient, Jade Carcamo, to the [facility] [place]. Please see a copy of my visit note below.    Hepatology Follow-Up Visit:     HISTORY OF PRESENT ILLNESS:   I had the pleasure of seeing Jade Carcamo for followup in the Liver Clinic at the [facility] on February 27, 2024.  Ms Carcamo returns for follow-up of polycystic liver disease.      She did have an episode of fever about 1 month ago.  It was not accompanied by chills but she did have some bodyaches.  There is no right upper quadrant pain.  We did get a CT scan which did not suggest any infected cysts.  It did resolve after about 2 weeks without the need for antibiotics.    She is doing well at this visit.  She denies any abdominal pain.  She denies any itching or skin rash, or fatigue.  She denies any increased abdominal girth or lower extremity edema.      She denies any fevers or chills, cough or shortness of breath.  She denies any nausea or vomiting, diarrhea or constipation.  Her appetite has been good and her weight is down intentionally.  She is exercising on a regular basis.     There have been no other new events since she was last seen.    Medications:   Current Outpatient Medications   Medication    zolpidem (AMBIEN) 5 MG tablet    gabapentin (NEURONTIN) 100 MG capsule     No current facility-administered medications for this visit.      Vitals:   /89 (BP Location: Left arm, Patient Position: Sitting, Cuff Size: Adult Regular)   Pulse 65   Temp 97.9  F (36.6  C) (Oral)   Resp 16   Ht 1.725 m (5' 7.91\")   Wt 67.7 kg (149 lb 4.8 oz)   LMP 11/14/2016   SpO2 98%   BMI 22.76 kg/m      Physical Exam:   In general she looks quite well. HEENT exam shows no scleral icterus or temporal muscle wasting. Chest is clear. Abdominal exam shows no increase in " girth. No masses or tenderness to palpation are present. Liver is 11-12 cm in span without left lobe enlargement. No spleen tip is palpable. Extremity exam shows no edema. Skin exam shows no stigmata of chronic liver disease. Neurologic exam shows no asterixis.   She is doing well  Labs:   Lab Results   Component Value Date     01/22/2024    POTASSIUM 3.5 01/22/2024    CHLORIDE 101 01/22/2024    ANIONGAP 11 01/22/2024    CO2 28 01/22/2024    BUN 10.0 01/22/2024    CR 0.68 01/22/2024    GFRESTIMATED >90 01/22/2024    THUAN 9.8 01/22/2024      Lab Results   Component Value Date    WBC 6.0 01/22/2024    HGB 13.8 01/22/2024    HCT 40.3 01/22/2024    MCV 91 01/22/2024    MCH 31.0 01/22/2024    MCHC 34.2 01/22/2024    RDW 11.6 01/22/2024     01/22/2024     Lab Results   Component Value Date    ALBUMIN 4.4 01/22/2024    ALKPHOS 113 01/22/2024    AST 26 01/22/2024     Lab Results   Component Value Date    INR 0.95 05/09/2023     MELD 3.0: 10 at 5/9/2023  8:42 AM  MELD-Na: 9 at 5/9/2023  8:42 AM  Calculated from:  Serum Creatinine: 0.81 mg/dL (Using min of 1 mg/dL) at 5/9/2023  8:42 AM  Serum Sodium: 141 mmol/L (Using max of 137 mmol/L) at 5/9/2023  8:42 AM  Total Bilirubin: 1.9 mg/dL at 5/9/2023  8:42 AM  Serum Albumin: 4.4 g/dL (Using max of 3.5 g/dL) at 5/9/2023  8:42 AM  INR(ratio): 0.95 (Using min of 1) at 5/9/2023  8:42 AM  Age at listing (hypothetical): 57 years  Sex: Female at 5/9/2023  8:42 AM    Imaging:   EXAMINATION: CT ABDOMEN PELVIS W CONTRAST, 1/22/2024 11:44 AM     INDICATION: hx PLD, fevers, evaluate for cholangitis, infected cyst, obstruction or other intraabdominal infection; Polycystic liver disease; Fever, unspecified fever cause     COMPARISON STUDY: MRI 2/18/2023. CT 6/29/2018.     TECHNIQUE: CT scan of the abdomen and pelvis was performed on multidetector CT scanner using volumetric acquisition technique and images were reconstructed in multiple planes with variable thickness  and reviewed  on dedicated workstations.      CONTRAST: Isovue 370 82cc injected IV without oral contrast     CT scan radiation dose is optimized to minimum requisite dose using automated dose modulation techniques.     FINDINGS:     Lower thorax: Heart size is normal. No pericardial effusion. Minimal bibasilar subsegmental atelectasis.     Liver: No substantial change in innumerable simple density cysts predominantly within the right hepatic lobe. The liver is enlarged measuring up to 24.3 cm in the left mid clavicular line.  No overtly  suspicious hepatic lesion is identified. No intrahepatic biliary dilation.  No convincing evidence of intrahepatic abscess.      Biliary System: Normal gallbladder. No extrahepatic biliary ductal dilation. No suspicious extrahepatic inflammatory change.     Pancreas: No mass or pancreatic ductal dilation.     Adrenal glands: No mass or nodules     Spleen: Normal.     Kidneys: No suspicious mass, obstructing calculus or hydronephrosis.  Similar displacement of the right kidney inferiorly secondary to the numerous hepatic cysts.     Gastrointestinal tract: Normal appendix. Normal caliber small bowel.  Colonic diverticulosis.     Mesentery/peritoneum/retroperitoneum: No mass. No free fluid or air.     Lymph nodes: No significant lymphadenopathy by size criteria.     Vasculature: The major abdominal arteries and portal vessels are patent.  Prominent periuterine/ovarian veins within the pelvis.     Pelvis: Urinary bladder is normal.     Osseous structures: No aggressive or acute osseous lesion.      Soft tissues: Within normal limits.                                                                  IMPRESSION:   1. No acute finding in the abdomen or pelvis.  2. No substantial change in innumerable hepatic cysts and hepatomegaly compared to MRI 2/18/2023. No overtly suspicious hepatic lesion or evidence of an infected cyst.  3. No intrahepatic or extra hepatic biliary dilation.  4. Prominent  periuterine/paraovarian veins. Correlate for pelvic  congestion syndrome.    Assessment/Plan:   IMPRESSION:   IMPRESSION: My impression is that Ms. Carcamo has polycystic liver disease.  She is doing well.  I do not think the episode of fever was related to cholangitis or cyst infection. I will not be making any change to her medical regimen. She is fully vaccinated including against COVID-19, including having had a booster. I will see her back in the clinic again in 1 year.     I did spend a total of 30 minutes (on the date of the encounter), including 20 minutes of face-to-face clinic time including counseling. The rest of the time was spent in documentation and review of records.     Thank you very much for allowing me to participate in the care of this patient.  If you have any questions regarding my recommendations, please do not hesitate to contact me.      Sincerely,       Davis Hobbs MD      Professor of Medicine  [employer]      Executive Medical Director, Solid Organ Transplant Program  [facility]

## 2024-02-27 NOTE — PROGRESS NOTES
"Hepatology Follow-Up Visit:     HISTORY OF PRESENT ILLNESS:   I had the pleasure of seeing Jade Carcamo for followup in the Liver Clinic at the [facility] on February 27, 2024.  Ms Carcamo returns for follow-up of polycystic liver disease.      She did have an episode of fever about 1 month ago.  It was not accompanied by chills but she did have some bodyaches.  There is no right upper quadrant pain.  We did get a CT scan which did not suggest any infected cysts.  It did resolve after about 2 weeks without the need for antibiotics.    She is doing well at this visit.  She denies any abdominal pain.  She denies any itching or skin rash, or fatigue.  She denies any increased abdominal girth or lower extremity edema.      She denies any fevers or chills, cough or shortness of breath.  She denies any nausea or vomiting, diarrhea or constipation.  Her appetite has been good and her weight is down intentionally.  She is exercising on a regular basis.     There have been no other new events since she was last seen.    Medications:   Current Outpatient Medications   Medication    zolpidem (AMBIEN) 5 MG tablet    gabapentin (NEURONTIN) 100 MG capsule     No current facility-administered medications for this visit.      Vitals:   /89 (BP Location: Left arm, Patient Position: Sitting, Cuff Size: Adult Regular)   Pulse 65   Temp 97.9  F (36.6  C) (Oral)   Resp 16   Ht 1.725 m (5' 7.91\")   Wt 67.7 kg (149 lb 4.8 oz)   LMP 11/14/2016   SpO2 98%   BMI 22.76 kg/m      Physical Exam:   In general she looks quite well. HEENT exam shows no scleral icterus or temporal muscle wasting. Chest is clear. Abdominal exam shows no increase in girth. No masses or tenderness to palpation are present. Liver is 11-12 cm in span without left lobe enlargement. No spleen tip is palpable. Extremity exam shows no edema. Skin exam shows no stigmata of chronic liver disease. Neurologic exam shows no asterixis. "   She is doing well  Labs:   Lab Results   Component Value Date     01/22/2024    POTASSIUM 3.5 01/22/2024    CHLORIDE 101 01/22/2024    ANIONGAP 11 01/22/2024    CO2 28 01/22/2024    BUN 10.0 01/22/2024    CR 0.68 01/22/2024    GFRESTIMATED >90 01/22/2024    THUAN 9.8 01/22/2024      Lab Results   Component Value Date    WBC 6.0 01/22/2024    HGB 13.8 01/22/2024    HCT 40.3 01/22/2024    MCV 91 01/22/2024    MCH 31.0 01/22/2024    MCHC 34.2 01/22/2024    RDW 11.6 01/22/2024     01/22/2024     Lab Results   Component Value Date    ALBUMIN 4.4 01/22/2024    ALKPHOS 113 01/22/2024    AST 26 01/22/2024     Lab Results   Component Value Date    INR 0.95 05/09/2023     MELD 3.0: 10 at 5/9/2023  8:42 AM  MELD-Na: 9 at 5/9/2023  8:42 AM  Calculated from:  Serum Creatinine: 0.81 mg/dL (Using min of 1 mg/dL) at 5/9/2023  8:42 AM  Serum Sodium: 141 mmol/L (Using max of 137 mmol/L) at 5/9/2023  8:42 AM  Total Bilirubin: 1.9 mg/dL at 5/9/2023  8:42 AM  Serum Albumin: 4.4 g/dL (Using max of 3.5 g/dL) at 5/9/2023  8:42 AM  INR(ratio): 0.95 (Using min of 1) at 5/9/2023  8:42 AM  Age at listing (hypothetical): 57 years  Sex: Female at 5/9/2023  8:42 AM    Imaging:   EXAMINATION: CT ABDOMEN PELVIS W CONTRAST, 1/22/2024 11:44 AM     INDICATION: hx PLD, fevers, evaluate for cholangitis, infected cyst, obstruction or other intraabdominal infection; Polycystic liver disease; Fever, unspecified fever cause     COMPARISON STUDY: MRI 2/18/2023. CT 6/29/2018.     TECHNIQUE: CT scan of the abdomen and pelvis was performed on multidetector CT scanner using volumetric acquisition technique and images were reconstructed in multiple planes with variable thickness  and reviewed on dedicated workstations.      CONTRAST: Isovue 370 82cc injected IV without oral contrast     CT scan radiation dose is optimized to minimum requisite dose using automated dose modulation techniques.     FINDINGS:     Lower thorax: Heart size is normal. No  pericardial effusion. Minimal bibasilar subsegmental atelectasis.     Liver: No substantial change in innumerable simple density cysts predominantly within the right hepatic lobe. The liver is enlarged measuring up to 24.3 cm in the left mid clavicular line.  No overtly  suspicious hepatic lesion is identified. No intrahepatic biliary dilation.  No convincing evidence of intrahepatic abscess.      Biliary System: Normal gallbladder. No extrahepatic biliary ductal dilation. No suspicious extrahepatic inflammatory change.     Pancreas: No mass or pancreatic ductal dilation.     Adrenal glands: No mass or nodules     Spleen: Normal.     Kidneys: No suspicious mass, obstructing calculus or hydronephrosis.  Similar displacement of the right kidney inferiorly secondary to the numerous hepatic cysts.     Gastrointestinal tract: Normal appendix. Normal caliber small bowel.  Colonic diverticulosis.     Mesentery/peritoneum/retroperitoneum: No mass. No free fluid or air.     Lymph nodes: No significant lymphadenopathy by size criteria.     Vasculature: The major abdominal arteries and portal vessels are patent.  Prominent periuterine/ovarian veins within the pelvis.     Pelvis: Urinary bladder is normal.     Osseous structures: No aggressive or acute osseous lesion.      Soft tissues: Within normal limits.                                                                  IMPRESSION:   1. No acute finding in the abdomen or pelvis.  2. No substantial change in innumerable hepatic cysts and hepatomegaly compared to MRI 2/18/2023. No overtly suspicious hepatic lesion or evidence of an infected cyst.  3. No intrahepatic or extra hepatic biliary dilation.  4. Prominent periuterine/paraovarian veins. Correlate for pelvic  congestion syndrome.    Assessment/Plan:   IMPRESSION:   IMPRESSION: My impression is that Ms. Carcamo has polycystic liver disease.  She is doing well.  I do not think the episode of fever was related to cholangitis  or cyst infection. I will not be making any change to her medical regimen. She is fully vaccinated including against COVID-19, including having had a booster. I will see her back in the clinic again in 1 year.     I did spend a total of 30 minutes (on the date of the encounter), including 20 minutes of face-to-face clinic time including counseling. The rest of the time was spent in documentation and review of records.     Thank you very much for allowing me to participate in the care of this patient.  If you have any questions regarding my recommendations, please do not hesitate to contact me.      Sincerely,       Davis Hobbs MD      Professor of Medicine  [employer]      Executive Medical Director, Solid Organ Transplant Program  [facility]

## 2024-02-28 ENCOUNTER — TELEPHONE (OUTPATIENT)
Dept: GASTROENTEROLOGY | Facility: CLINIC | Age: 59
End: 2024-02-28
Payer: COMMERCIAL

## 2024-09-12 ENCOUNTER — MYC MEDICAL ADVICE (OUTPATIENT)
Dept: INTERNAL MEDICINE | Facility: CLINIC | Age: 59
End: 2024-09-12
Payer: COMMERCIAL

## 2024-12-10 ENCOUNTER — TELEPHONE (OUTPATIENT)
Dept: GASTROENTEROLOGY | Facility: CLINIC | Age: 59
End: 2024-12-10
Payer: COMMERCIAL

## 2024-12-10 NOTE — TELEPHONE ENCOUNTER
Patient confirmed scheduled appointment:     Date: 3/24/25  Time: 2:30 pm  Appointment Type: Liver Eval Return HEPT  Provider: Dr. Davis Hobbs  Location: [place]  Testing/imaging: Lab  Additional Notes:

## 2025-02-23 ENCOUNTER — HEALTH MAINTENANCE LETTER (OUTPATIENT)
Age: 60
End: 2025-02-23

## 2025-05-07 DIAGNOSIS — G47.00 INSOMNIA, UNSPECIFIED TYPE: ICD-10-CM

## 2025-05-08 NOTE — TELEPHONE ENCOUNTER
Last Written Prescription:  zolpidem (AMBIEN) 5 MG tablet 18 tablet 0 1/8/2024 -- No   Sig - Route: Take 1 tablet (5 mg) by mouth nightly as needed for sleep Due for PCP appt - Oral   Sent to pharmacy as: Zolpidem Tartrate 5 MG Oral Tablet (AMBIEN)   Class: E-Prescribe   Order: [id number]     ----------------------  Last Visit Date: 1/22/24  Future Visit Date: 0  ----------------------        Refill decision: Medication unable to be refilled by RN due to: Controlled medication        Request from pharmacy:  Requested Prescriptions   Pending Prescriptions Disp Refills    zolpidem (AMBIEN) 5 MG tablet 18 tablet 0     Sig: Take 1 tablet (5 mg) by mouth nightly as needed for sleep. Due for PCP appt       Rx Protocol Controlled Substance Failed - 5/8/2025  3:14 PM        Failed - Visit with relevant provider in past 3 months or upcoming 3 months        Failed - Urine drug screeen results on file in past 12 months     [unfilled]           Failed - Controlled Substance Agreement on file in last 12 months     Please review last Controlled Substance Pain agreement document.   CSA -- Encounter Level:    CSA: None found at the encounter level.       CSA -- Patient Level:    CSA: None found at the patient level.               Failed - Auto Fail - Please forward to Provider        Passed - Medication is active on med list and the sig matches        Passed - Medication not refilled in past 28 days     Invalid Medication Grouper          Passed - No active pregnancy on record        Passed - No pregnancy test in past 12 months or most recent test was negative

## 2025-05-15 NOTE — TELEPHONE ENCOUNTER
Patient confirmed scheduled appointment:  Date: 6/24  Time: 9am  Visit type: EPP  Provider: PCP  [place] [facility]

## 2025-05-20 RX ORDER — ZOLPIDEM TARTRATE 5 MG/1
5 TABLET ORAL
Qty: 12 TABLET | Refills: 0 | Status: SHIPPED | OUTPATIENT
Start: 2025-05-20

## 2025-05-27 ENCOUNTER — TRANSFERRED RECORDS (OUTPATIENT)
Dept: GASTROENTEROLOGY | Facility: OTHER | Age: 60
End: 2025-05-27

## 2025-05-27 NOTE — PROCEDURES
[facility]   [address], [address], [place] [address]     Patient Name: Jade Carcamo  Gender:   Female  Exam Date: 05/27/2025 Visit Number:   [id number]  Age: 59 Years 5 Months YOB: 1965  Attending MD: Greg Meza MD Medical Record#:   [id number]  -----------------------------------------------------------------------------------------------------------------------------   Procedure: Colonoscopy   Indications: Family history of polyps in patient's sister. Age diagnosed:  50-59  Referring MD: Referral Self  Primary MD:      Marina Owens MD   Medications: Admitting Medications:   0.9% Normal Saline at [facility]   Intra Procedure Medications:   Patient received monitored anesthesia care.     Complications: No immediate complications  ______________________________________________________________________________  Procedure:   An examination of the heart and lungs was performed and found to be within acceptable limits.  The patient was therefore deemed a reasonable candidate for endoscopy and monitored anesthesia care.   The risks, benefits and plan of the procedure were discussed with the patient and/or patient representative and all questions were answered.  After obtaining informed consent, the patient received monitored anesthesia care and I passed the scope   without difficulty via the rectum to the cecum.  The appendiceal orifice and ic valve were identified.  The scope was retroflexed during the examination  The quality of the prep was excellent  (Miralax/Gatorade/2 tablets Bisacodyl/Magnesium Citrate).    This was a complete examination throughout the entire colon.    Findings:    Diverticulosis.  Location: - sigmoid.      Size:  large.    Quantity:  several.    Small internal hemorrhoids without bleeding.    Remainder of the exam is normal.    Impression:  Screening Colonoscopy     Plan  Repeat colonoscopy in 5 years due to family history of polyps.    We will  attempt to contact you at appropriate intervals via [place] mail.  We may not be able to find you or contact you at that time, therefore you should know that the responsibility for following our recommendation rests with you.  If you don't hear from us at the time your procedure is due, please contact our office to schedule an appointment.  If your contact information should change, please contact our office so that we can update your records.        Electronically signed by:___________________  Greg Meza MD                 05/27/2025    Allergies:  Medication Name Ingredient Reaction Comment    NO KNOWN ALLERGIES       Vital Signs:  Date Time Systolic Diastolic Height Weight BMI   05/27/2025 12:47  70 67.50 in 145.99 22.50     Smoking Status:    Use Status Type Smoking Status Usage Per Day Years Used Total Pack Years   no/never  Never smoker      no/never  Never smoker      Race:  White  Ethnicity:  Not  or   Preferred Language:  English      cc: Marina Owens MD        [facility] [phone]

## 2025-06-26 ENCOUNTER — PRE VISIT (OUTPATIENT)
Dept: INTERNAL MEDICINE | Facility: CLINIC | Age: 60
End: 2025-06-26
Payer: COMMERCIAL

## 2025-06-26 NOTE — TELEPHONE ENCOUNTER
Date of upcoming preventative visit: 6/30/25    Date of last preventative visit: 1/8/24    Relevant notes from last preventative visit:   Routine Health Maintenence:     Colonoscopy (50-75 yrs):  3/15 hemorrhoids, repeat 10 years  Dexa (>65W or 70M yrs): n/a  Mammogram (40-75 yrs): does at [facility]  Pap (21-65 yrs): 11/21 NILM, HPV neg  HPV neg        Lab Results   Component Value Date     PAP NIL 12/21/2018      Last 6/16 NIL  GC/Chlam (<25 yrs): n/a  HIV/HCV if risk factors: both neg 7/18  Tob/EtOH: n/a  Lifestyle factors: reviewed  Advanced Directive: deferred    Orders patient completed: Labs 1/22/24, colonoscopy 5/27/25    Orders patient needs to complete: N/A    Vaccinations Due: RSV    Actions needed for upcoming preventative visit: N/A     Medications due for Refill: N/A    Room Set up needed: N/A    Katia Espinoza, EMT at 12:28 PM on 6/26/2025

## 2025-07-22 ENCOUNTER — MYC MEDICAL ADVICE (OUTPATIENT)
Dept: INTERNAL MEDICINE | Facility: CLINIC | Age: 60
End: 2025-07-22
Payer: COMMERCIAL

## 2025-07-24 ENCOUNTER — VIRTUAL VISIT (OUTPATIENT)
Dept: FAMILY MEDICINE | Facility: CLINIC | Age: 60
End: 2025-07-24
Payer: COMMERCIAL

## 2025-07-24 DIAGNOSIS — F33.1 MODERATE EPISODE OF RECURRENT MAJOR DEPRESSIVE DISORDER (H): Primary | ICD-10-CM

## 2025-07-24 RX ORDER — ACETAMINOPHEN 500 MG
500 TABLET ORAL EVERY 6 HOURS PRN
COMMUNITY
Start: 2024-10-22

## 2025-07-24 RX ORDER — IBUPROFEN 200 MG
400 TABLET ORAL EVERY 6 HOURS PRN
COMMUNITY
Start: 2024-10-22

## 2025-07-24 RX ORDER — SERTRALINE HYDROCHLORIDE 25 MG/1
TABLET, FILM COATED ORAL
Qty: 113 TABLET | Refills: 0 | Status: SHIPPED | OUTPATIENT
Start: 2025-07-24 | End: 2025-09-22

## 2025-07-24 ASSESSMENT — PATIENT HEALTH QUESTIONNAIRE - PHQ9
SUM OF ALL RESPONSES TO PHQ QUESTIONS 1-9: 10
10. IF YOU CHECKED OFF ANY PROBLEMS, HOW DIFFICULT HAVE THESE PROBLEMS MADE IT FOR YOU TO DO YOUR WORK, TAKE CARE OF THINGS AT HOME, OR GET ALONG WITH OTHER PEOPLE: SOMEWHAT DIFFICULT
SUM OF ALL RESPONSES TO PHQ QUESTIONS 1-9: 10

## 2025-07-24 NOTE — PROGRESS NOTES
Jade is a 59 year old who is being evaluated via a billable video visit.    How would you like to obtain your AVS? MyChart  If the video visit is dropped, the invitation should be resent by: Text to cell phone: [phone]  Will anyone else be joining your video visit? No      Assessment & Plan     Moderate episode of recurrent major depressive disorder (H)  Patient experiencing depressive symptoms.  No thoughts of self-harm but is having loss of interest in doing things.  Patient was previously on Lexapro and felt like this made her sleepy.  She was taking this for perimenopausal symptoms.  Discussed starting on sertraline and patient to follow-up with primary care provider.  Discussed side effects.  - sertraline (ZOLOFT) 25 MG tablet; Take 1 tablet (25 mg) by mouth daily for 7 days, THEN 2 tablets (50 mg) daily.    Depression Screening Follow Up        7/24/2025     1:55 PM   PHQ   PHQ-9 Total Score 10    Q9: Thoughts of better off dead/self-harm past 2 weeks Not at all       Patient-reported           Follow Up Actions Taken  Started patient on anti-depressant.         Subjective   Jade is a 59 year old, presenting for the following health issues:  Depression (Was on depression medication years ago. Finding no interest in things and crying a lot. )    PATIENT HEALTH QUESTIONNAIRE-9 (PHQ - 9)    Over the last 2 weeks, how often have you been bothered by any of the following problems?    1. Little interest or pleasure in doing things -  More than half the days   2. Feeling down, depressed, or hopeless -  Nearly every day   3. Trouble falling or staying asleep, or sleeping too much - Nearly every day   4. Feeling tired or having little energy -  Nearly every day   5. Poor appetite or overeating -  Not at all   6. Feeling bad about yourself - or that you are a failure or have let yourself or your family down -  Several days   7. Trouble concentrating on things, such as reading the newspaper or watching television  - More than half the days   8. Moving or speaking so slowly that other people could have noticed? Or the opposite - being so fidgety or restless that you have been moving around a lot more than usual Not at all   9. Thoughts that you would be better off dead or of hurting  yourself in some way Not at all   Total Score: 14     If you checked off any problems, how difficult have these problems made it for you to do your work, take care of things at home, or get along with other people? Not difficult at all    Developed by Sharlene Latif, Stephany Leigh, Luis Rachel and colleagues, with an educational austin from [employer] No permission required to reproduce, translate, display or distribute. permission required to reproduce, translate, display or distribute.   Symptoms of depression worsening over 1 year. She has been on lexapro previously around the time she was experiencing menopause. This made her very sleepy.     History of Present Illness       Reason for visit:  Depression    She eats 2-3 servings of fruits and vegetables daily.She consumes 0 sweetened beverage(s) daily.She exercises with enough effort to increase her heart rate 9 or less minutes per day.  She exercises with enough effort to increase her heart rate 4 days per week.   She is taking medications regularly.                    Objective    Vitals - Patient Reported  Weight (Patient Reported): 68 kg (150 lb)        Physical Exam   GENERAL: alert and no distress  EYES: Eyes grossly normal to inspection.  No discharge or erythema, or obvious scleral/conjunctival abnormalities.  RESP: No audible wheeze, cough, or visible cyanosis.    SKIN: Visible skin clear. No significant rash, abnormal pigmentation or lesions.  NEURO: Cranial nerves grossly intact.  Mentation and speech appropriate for age.  PSYCH: Appropriate affect, tone, and pace of words          Video-Visit Details    Type of service:  Video Visit   Originating Location (pt.  Location): Home    Distant Location (provider location):  On-site  Platform used for Video Visit: Cindy  Signed Electronically by: ELIE Mcnulty CNP

## (undated) DEVICE — SOL WATER IRRIG 1000ML BOTTLE 2F7114

## (undated) DEVICE — ENDO TUBING CO2 SMARTCAP STERILE DISP 100145CO2EXT

## (undated) DEVICE — Device

## (undated) DEVICE — KIT ENDO FIRST STEP DISINFECTANT 200ML W/POUCH EP-4

## (undated) DEVICE — ENDO CATH BALLOON EXTRACTOR PRO RX 09-12MM 4700

## (undated) DEVICE — CATH BALLOON ELATION PULM 5.5CM 12-13.5-15MMX100CM P12L55

## (undated) DEVICE — PACK ENDOSCOPY GI CUSTOM UMMC

## (undated) DEVICE — WIRE GUIDE 0.025"X270CM ANG VISIGLIDE G-240-2527A

## (undated) DEVICE — TAPE CLOTH 3" CARDINAL 3TRCL03

## (undated) DEVICE — ENDO BITE BLOCK ADULT OMNI-BLOC

## (undated) DEVICE — BIOPSY VALVE BIOSHIELD 00711135

## (undated) DEVICE — ENDO DEVICE LOCKING AND BIOPSY CAP M00545261

## (undated) DEVICE — ENDO FUSION OMNI-TOME G31903

## (undated) DEVICE — KIT CONNECTOR FOR OLYMPUS ENDOSCOPES DEFENDO 100310

## (undated) DEVICE — ENDO CAP AND TUBING STERILE FOR ENDOGATOR  100130

## (undated) DEVICE — ESU GROUND PAD ADULT W/CORD E7507

## (undated) DEVICE — ENDO SNARE POLYPECTOMY OVAL 15MM LOOP SD-240U-15

## (undated) DEVICE — CATH RETRIEVAL BALLOON EXTRACTOR PRO RX-S INJ ABOVE 9-12MM

## (undated) DEVICE — WIRE GUIDE 0.025"X270CM SHORT ANG VISIGLIDE 2 G-260-2527A

## (undated) RX ORDER — INDOMETHACIN 50 MG/1
SUPPOSITORY RECTAL
Status: DISPENSED
Start: 2018-07-09

## (undated) RX ORDER — PROPOFOL 10 MG/ML
INJECTION, EMULSION INTRAVENOUS
Status: DISPENSED
Start: 2018-09-10

## (undated) RX ORDER — FENTANYL CITRATE 50 UG/ML
INJECTION, SOLUTION INTRAMUSCULAR; INTRAVENOUS
Status: DISPENSED
Start: 2018-09-10

## (undated) RX ORDER — ONDANSETRON 2 MG/ML
INJECTION INTRAMUSCULAR; INTRAVENOUS
Status: DISPENSED
Start: 2018-09-10

## (undated) RX ORDER — ONDANSETRON 2 MG/ML
INJECTION INTRAMUSCULAR; INTRAVENOUS
Status: DISPENSED
Start: 2018-07-09

## (undated) RX ORDER — LIDOCAINE HYDROCHLORIDE 10 MG/ML
INJECTION, SOLUTION EPIDURAL; INFILTRATION; INTRACAUDAL; PERINEURAL
Status: DISPENSED
Start: 2022-01-21

## (undated) RX ORDER — DEXAMETHASONE SODIUM PHOSPHATE 4 MG/ML
INJECTION, SOLUTION INTRA-ARTICULAR; INTRALESIONAL; INTRAMUSCULAR; INTRAVENOUS; SOFT TISSUE
Status: DISPENSED
Start: 2018-07-09

## (undated) RX ORDER — GLYCOPYRROLATE 0.2 MG/ML
INJECTION, SOLUTION INTRAMUSCULAR; INTRAVENOUS
Status: DISPENSED
Start: 2018-09-10

## (undated) RX ORDER — LIDOCAINE HYDROCHLORIDE 20 MG/ML
INJECTION, SOLUTION EPIDURAL; INFILTRATION; INTRACAUDAL; PERINEURAL
Status: DISPENSED
Start: 2018-09-10

## (undated) RX ORDER — LIDOCAINE HYDROCHLORIDE 10 MG/ML
INJECTION, SOLUTION EPIDURAL; INFILTRATION; INTRACAUDAL; PERINEURAL
Status: DISPENSED
Start: 2018-10-12

## (undated) RX ORDER — FENTANYL CITRATE 50 UG/ML
INJECTION, SOLUTION INTRAMUSCULAR; INTRAVENOUS
Status: DISPENSED
Start: 2018-07-09